# Patient Record
Sex: MALE | Race: WHITE | NOT HISPANIC OR LATINO | Employment: OTHER | URBAN - METROPOLITAN AREA
[De-identification: names, ages, dates, MRNs, and addresses within clinical notes are randomized per-mention and may not be internally consistent; named-entity substitution may affect disease eponyms.]

---

## 2017-02-02 ENCOUNTER — HOSPITAL ENCOUNTER (OUTPATIENT)
Dept: RADIOLOGY | Facility: MEDICAL CENTER | Age: 47
End: 2017-02-02
Attending: INTERNAL MEDICINE
Payer: COMMERCIAL

## 2017-02-02 VITALS
WEIGHT: 194 LBS | SYSTOLIC BLOOD PRESSURE: 103 MMHG | HEART RATE: 68 BPM | OXYGEN SATURATION: 91 % | DIASTOLIC BLOOD PRESSURE: 69 MMHG | HEIGHT: 72 IN | RESPIRATION RATE: 18 BRPM | BODY MASS INDEX: 26.28 KG/M2 | TEMPERATURE: 97.4 F

## 2017-02-02 DIAGNOSIS — M25.561 RIGHT KNEE PAIN, UNSPECIFIED CHRONICITY: ICD-10-CM

## 2017-02-02 PROCEDURE — 01922 ANES N-INVAS IMG/RADJ THER: CPT

## 2017-02-02 PROCEDURE — 700111 HCHG RX REV CODE 636 W/ 250 OVERRIDE (IP)

## 2017-02-02 PROCEDURE — 73721 MRI JNT OF LWR EXTRE W/O DYE: CPT | Mod: RT

## 2017-02-02 ASSESSMENT — PAIN SCALES - GENERAL
PAINLEVEL_OUTOF10: 0
PAINLEVEL_OUTOF10: 2

## 2017-02-02 NOTE — IP AVS SNAPSHOT
" <p align=\"LEFT\"><IMG SRC=\"[email or web address]\" alt=\"Image\" WIDTH=\"50%\" HEIGHT=\"200\" BORDER=\"\"></p>      `           Landon Allred   MRN: [id number]    Department:  [address]   Date of Visit:              `  Discharge Instructions       MRI ADULT DISCHARGE INSTRUCTIONS    You have been medicated today for your scan. Please follow the instructions below to ensure your safe recovery. If you have any questions or problems, feel free to call us at [phone].     1.   Have someone stay with you to assist you as needed.    2.   Do not drive or operate any mechanical devices.    3.   Do not perform any activity that requires concentration. Make no major decisions over the next 24 hours.     4.   Be careful changing positions from laying down to sitting or standing, as you may become dizzy.     5.   Do not drink alcohol for 48 hours.    6.   There are no restrictions for eating your normal meals. Drink fluids.    7.   You may continue your usual medications for pain, tranquilizers, muscle relaxants or sedatives when awake.     8.   Tomorrow, you may continue your normal daily activities.     9.   Pressure dressing on 10 - 15 minutes. If swelling or bleeding occurs when removed, continue placing direct pressure on injection site for another 5 minutes, or until bleeding stops.     I have been informed of and understand the above discharge instructions.      `       Diet / Nutrition:    Follow any diet instructions given to you by your doctor or the dietician, including how much salt (sodium) you are allowed each day.    If you are overweight, talk to your doctor about a weight reduction plan.    Activity:    Remain physically active following your doctor's instructions about exercise and activity.    Rest often.     Any time you become even a little tired or short of breath, SIT DOWN and rest.    Worsening Symptoms:    Report any of the following signs and symptoms to the " doctor's office immediately:    *Pain of jaw, arm, or neck  *Chest pain not relieved by medication                               *Dizziness or loss of consciousness  *Difficulty breathing even when at rest   *More tired than usual                                       *Bleeding drainage or swelling of surgical site  *Swelling of feet, ankles, legs or stomach                 *Fever (>100ºF)  *Pink or blood tinged sputum  *Weight gain (3lbs/day or 5lbs /week)           *Shock from internal defibrillator (if applicable)  *Palpitations or irregular heartbeats                *Cool and/or numb extremities    Stroke Awareness    Common Risk Factors for Stroke include:    Age  Atrial Fibrillation  Carotid Artery Stenosis  Diabetes Mellitus  Excessive alcohol consumption  High blood pressure  Overweight   Physical inactivity  Smoking    Warning signs and symptoms of a stroke include:    *Sudden numbness or weakness of the face, arm or leg (especially on one side of the body).  *Sudden confusion, trouble speaking or understanding.  *Sudden trouble seeing in one or both eyes.  *Sudden trouble walking, dizziness, loss of balance or coordination.Sudden severe headache with no known cause.    It is very important to get treatment quickly when a stroke occurs. If you experience any of the above warning signs, call 911 immediately.                    `     Quit Smoking / Tobacco Use:    I understand the use of any tobacco products increases my chance of suffering from future heart disease or stroke and could cause other illnesses which may shorten my life. Quitting the use of tobacco products is the single most important thing I can do to improve my health. For further information on smoking / tobacco cessation call a Toll Free Quit Line at 1-168.594.9287 (*[employer] [place]) or 1-483.183.1415 (American [employer] Association) or you can access the web based program at www.lungusa.org.    [place] Tobacco Users Help Line:  (017)  878-8044       Toll Free: 8-526-960-0241  Quit Tobacco Program [facility] (924)798-9350    Crisis Hotline:    [place] Crisis Hotline:  5-700-NQGWIFT or 1-923.188.4771    [place] Crisis Hotline:    1-633.990.8184 or 757-040-2488    Discharge Survey:   Thank you for choosing [facility]. We hope we did everything we could to make your hospital stay a pleasant one. You may be receiving a phone survey and we would appreciate your time and participation in answering the questions. Your input is very valuable to us in our efforts to improve our service to our patients and their families.        My signature on this form indicates that:    1. I have reviewed and understand the above information.  2. My questions regarding this information have been answered to my satisfaction.  3. I have formulated a plan with my discharge nurse to obtain my prescribed medications for home.                   `           Patient or Caregiver Signature:  ____________________________________________________________    Date:  ____________________________________________________________       `

## 2017-02-02 NOTE — DISCHARGE INSTRUCTIONS
MRI ADULT DISCHARGE INSTRUCTIONS    You have been medicated today for your scan. Please follow the instructions below to ensure your safe recovery. If you have any questions or problems, feel free to call us at [phone] or [phone].     1.   Have someone stay with you to assist you as needed.    2.   Do not drive or operate any mechanical devices.    3.   Do not perform any activity that requires concentration. Make no major decisions over the next 24 hours.     4.   Be careful changing positions from laying down to sitting or standing, as you may become dizzy.     5.   Do not drink alcohol for 48 hours.    6.   There are no restrictions for eating your normal meals. Drink fluids.    7.   You may continue your usual medications for pain, tranquilizers, muscle relaxants or sedatives when awake.     8.   Tomorrow, you may continue your normal daily activities.     9.   Pressure dressing on 10 - 15 minutes. If swelling or bleeding occurs when removed, continue placing direct pressure on injection site for another 5 minutes, or until bleeding stops.     I have been informed of and understand the above discharge instructions.

## 2018-03-05 ENCOUNTER — HOSPITAL ENCOUNTER (OUTPATIENT)
Dept: RADIOLOGY | Facility: MEDICAL CENTER | Age: 48
End: 2018-03-05
Attending: NURSE PRACTITIONER
Payer: MEDICARE

## 2018-03-05 VITALS
HEART RATE: 67 BPM | OXYGEN SATURATION: 90 % | RESPIRATION RATE: 18 BRPM | WEIGHT: 195 LBS | HEIGHT: 72 IN | DIASTOLIC BLOOD PRESSURE: 74 MMHG | TEMPERATURE: 98.4 F | BODY MASS INDEX: 26.41 KG/M2 | SYSTOLIC BLOOD PRESSURE: 114 MMHG

## 2018-03-05 DIAGNOSIS — M54.12 CERVICAL RADICULOPATHY: ICD-10-CM

## 2018-03-05 DIAGNOSIS — G89.29 OTHER CHRONIC BACK PAIN: ICD-10-CM

## 2018-03-05 DIAGNOSIS — M54.16 LUMBAR RADICULOPATHY: ICD-10-CM

## 2018-03-05 DIAGNOSIS — M54.9 OTHER CHRONIC BACK PAIN: ICD-10-CM

## 2018-03-05 PROCEDURE — 01922 ANES N-INVAS IMG/RADJ THER: CPT

## 2018-03-05 PROCEDURE — 72146 MRI CHEST SPINE W/O DYE: CPT

## 2018-03-05 PROCEDURE — 72141 MRI NECK SPINE W/O DYE: CPT

## 2018-03-05 PROCEDURE — 72148 MRI LUMBAR SPINE W/O DYE: CPT

## 2018-03-05 PROCEDURE — 700111 HCHG RX REV CODE 636 W/ 250 OVERRIDE (IP)

## 2018-03-05 RX ORDER — MIDAZOLAM HYDROCHLORIDE 1 MG/ML
INJECTION INTRAMUSCULAR; INTRAVENOUS
Status: DISPENSED
Start: 2018-03-05 | End: 2018-03-05

## 2018-03-05 ASSESSMENT — PAIN SCALES - GENERAL: PAINLEVEL_OUTOF10: 8

## 2018-03-05 NOTE — DISCHARGE INSTRUCTIONS
MRI ADULT DISCHARGE INSTRUCTIONS    You have been medicated today for your scan. Please follow the instructions below to ensure your safe recovery. If you have any questions or problems, feel free to call us at [phone] or [phone].     1.   Have someone stay with you to assist you as needed.    2.   Do not drive or operate any mechanical devices.    3.   Do not perform any activity that requires concentration. Make no major decisions over the next 24 hours.     4.   Be careful changing positions from laying down to sitting or standing, as you may become dizzy.     5.   Do not drink alcohol for 48 hours.    6.   There are no restrictions for eating your normal meals. Drink fluids.    7.   You may continue your usual medications for pain, tranquilizers, muscle relaxants or sedatives when awake.     8.   Tomorrow, you may continue your normal daily activities.     9.   Pressure dressing on 10 - 15 minutes. If swelling or bleeding occurs when removed, continue placing direct pressure on injection site for another 5 minutes, or until bleeding stops.     I have been informed of and understand the above discharge instructions.

## 2018-12-31 ENCOUNTER — HOSPITAL ENCOUNTER (OUTPATIENT)
Dept: RADIOLOGY | Facility: MEDICAL CENTER | Age: 48
End: 2018-12-31
Attending: INTERNAL MEDICINE
Payer: MEDICARE

## 2018-12-31 VITALS
WEIGHT: 214 LBS | HEIGHT: 72 IN | HEART RATE: 74 BPM | BODY MASS INDEX: 28.99 KG/M2 | TEMPERATURE: 98 F | RESPIRATION RATE: 18 BRPM | OXYGEN SATURATION: 93 %

## 2018-12-31 DIAGNOSIS — R06.02 SHORTNESS OF BREATH: ICD-10-CM

## 2018-12-31 DIAGNOSIS — R04.2 BLOODY SPUTUM: ICD-10-CM

## 2018-12-31 PROCEDURE — 01922 ANES N-INVAS IMG/RADJ THER: CPT

## 2018-12-31 PROCEDURE — 700111 HCHG RX REV CODE 636 W/ 250 OVERRIDE (IP)

## 2018-12-31 PROCEDURE — 71260 CT THORAX DX C+: CPT

## 2018-12-31 PROCEDURE — 700117 HCHG RX CONTRAST REV CODE 255: Performed by: INTERNAL MEDICINE

## 2018-12-31 RX ADMIN — IOHEXOL 75 ML: 350 INJECTION, SOLUTION INTRAVENOUS at 08:21

## 2018-12-31 ASSESSMENT — PAIN SCALES - GENERAL
PAINLEVEL_OUTOF10: 0

## 2018-12-31 NOTE — DISCHARGE INSTRUCTIONS
MRI ADULT DISCHARGE INSTRUCTIONS    You have been medicated today for your scan. Please follow the instructions below to ensure your safe recovery. If you have any questions or problems, feel free to call us at [phone] or [phone].     1.   Have someone stay with you to assist you as needed.    2.   Do not drive or operate any mechanical devices.    3.   Do not perform any activity that requires concentration. Make no major decisions over the next 24 hours.     4.   Be careful changing positions from laying down to sitting or standing, as you may become dizzy.     5.   Do not drink alcohol for 48 hours.    6.   There are no restrictions for eating your normal meals. Drink fluids.    7.   You may continue your usual medications for pain, tranquilizers, muscle relaxants or sedatives when awake.     8.   Tomorrow, you may continue your normal daily activities.     9.   Pressure dressing on 10 - 15 minutes. If swelling or bleeding occurs when removed, continue placing direct pressure on injection site for another 5 minutes, or until bleeding stops.     I have been informed of and understand the above discharge instructions.

## 2019-01-09 ENCOUNTER — HOSPITAL ENCOUNTER (EMERGENCY)
Facility: MEDICAL CENTER | Age: 49
End: 2019-01-09
Attending: EMERGENCY MEDICINE
Payer: MEDICARE

## 2019-01-09 VITALS
RESPIRATION RATE: 16 BRPM | WEIGHT: 195 LBS | DIASTOLIC BLOOD PRESSURE: 82 MMHG | HEIGHT: 72 IN | HEART RATE: 80 BPM | TEMPERATURE: 98.9 F | SYSTOLIC BLOOD PRESSURE: 129 MMHG | OXYGEN SATURATION: 95 % | BODY MASS INDEX: 26.41 KG/M2

## 2019-01-09 DIAGNOSIS — T78.40XA ALLERGIC REACTION, INITIAL ENCOUNTER: ICD-10-CM

## 2019-01-09 DIAGNOSIS — L29.9 ITCHING: ICD-10-CM

## 2019-01-09 PROCEDURE — 700102 HCHG RX REV CODE 250 W/ 637 OVERRIDE(OP): Performed by: EMERGENCY MEDICINE

## 2019-01-09 PROCEDURE — 700111 HCHG RX REV CODE 636 W/ 250 OVERRIDE (IP): Performed by: EMERGENCY MEDICINE

## 2019-01-09 PROCEDURE — 96375 TX/PRO/DX INJ NEW DRUG ADDON: CPT

## 2019-01-09 PROCEDURE — 99285 EMERGENCY DEPT VISIT HI MDM: CPT

## 2019-01-09 PROCEDURE — 96374 THER/PROPH/DIAG INJ IV PUSH: CPT

## 2019-01-09 PROCEDURE — A9270 NON-COVERED ITEM OR SERVICE: HCPCS | Performed by: EMERGENCY MEDICINE

## 2019-01-09 RX ORDER — HYDROMORPHONE HYDROCHLORIDE 1 MG/ML
1 INJECTION, SOLUTION INTRAMUSCULAR; INTRAVENOUS; SUBCUTANEOUS ONCE
Status: COMPLETED | OUTPATIENT
Start: 2019-01-09 | End: 2019-01-09

## 2019-01-09 RX ORDER — DIPHENHYDRAMINE HYDROCHLORIDE 50 MG/ML
50 INJECTION INTRAMUSCULAR; INTRAVENOUS ONCE
Status: COMPLETED | OUTPATIENT
Start: 2019-01-09 | End: 2019-01-09

## 2019-01-09 RX ORDER — PREDNISONE 20 MG/1
40 TABLET ORAL DAILY
Qty: 10 TAB | Refills: 0 | Status: SHIPPED | OUTPATIENT
Start: 2019-01-09 | End: 2019-01-14

## 2019-01-09 RX ORDER — PROMETHAZINE HYDROCHLORIDE 25 MG/1
25 TABLET ORAL ONCE
Status: COMPLETED | OUTPATIENT
Start: 2019-01-09 | End: 2019-01-09

## 2019-01-09 RX ORDER — PREDNISONE 20 MG/1
40 TABLET ORAL DAILY
Qty: 10 TAB | Refills: 0 | Status: SHIPPED | OUTPATIENT
Start: 2019-01-09 | End: 2019-01-09

## 2019-01-09 RX ORDER — PREDNISONE 20 MG/1
60 TABLET ORAL ONCE
Status: COMPLETED | OUTPATIENT
Start: 2019-01-09 | End: 2019-01-09

## 2019-01-09 RX ADMIN — PREDNISONE 60 MG: 20 TABLET ORAL at 13:19

## 2019-01-09 RX ADMIN — PROMETHAZINE HYDROCHLORIDE 25 MG: 25 TABLET ORAL at 14:03

## 2019-01-09 RX ADMIN — HYDROMORPHONE HYDROCHLORIDE 1 MG: 1 INJECTION, SOLUTION INTRAMUSCULAR; INTRAVENOUS; SUBCUTANEOUS at 15:19

## 2019-01-09 RX ADMIN — FAMOTIDINE 40 MG: 10 INJECTION INTRAVENOUS at 13:18

## 2019-01-09 RX ADMIN — DIPHENHYDRAMINE HYDROCHLORIDE 50 MG: 50 INJECTION INTRAMUSCULAR; INTRAVENOUS at 13:18

## 2019-01-09 ASSESSMENT — ENCOUNTER SYMPTOMS
FEVER: 0
ROS SKIN COMMENTS: SKIN PAIN
HEADACHES: 1
NAUSEA: 0
VOMITING: 0
SHORTNESS OF BREATH: 0
ABDOMINAL PAIN: 0

## 2019-01-09 ASSESSMENT — PAIN SCALES - GENERAL: PAINLEVEL_OUTOF10: 4

## 2019-01-09 NOTE — ED NOTES
"Pt arrives to triage with c/c possible allergic reaction to biofreeze.  Pt states \"My chiropractor sprayed this stuff all over me & now it's burning me.\"  Pt reports hives \"all over\"- no obvious sign of hives noted.  A&ox4. VSS.    *Pt becomes angry with RN's questions- \"I just don't know the answers to all your questions.\"  "

## 2019-01-09 NOTE — ED NOTES
"Pt provided with warm blankets. He is now c/o pain in his neck and states his migraine is not subsiding. Pt states \"I know I didn't come in for that, but I just wanted to mention it.\" Attempted to reassure pt. Advised him that the medications that he received are frequently given for migraine headaches.    "

## 2019-01-09 NOTE — ED PROVIDER NOTES
"ED Provider Note    Scribed for Lianet Mckenzie M.D. by Rigo Hercules. 1/9/2019, 12:26 PM.      Means of arrival: walk in  History obtained from: patient  History limited by: none    CHIEF COMPLAINT  Chief Complaint   Patient presents with   • Allergic Reaction     \"I believe I am having an allergic reaction to bio freeze\"        HPI  Landon Allred is a 48 y.o. male who presents to the Emergency Department for evaluation of skin itching starting just prior to arrival. He describes his pain as burning localized to his back and scalp where an analgesic spray (bio-freeze) was applied by his chiropracter. Patient reports associated diffuse rash. He states that he has a history of several allergies and is prescribed benadryl for acute reactions, but he did not administer this medication. Patient states that he was being treated at his chiropractor who spray Biofreeze to his back and scalp immediately preceding his symptom onset. He presents to the ED for evaluation of probable allergic reaction to this substance. Patient reports htat he has done well with Prednisone administration during previous allergic reactions.  Patient denies shortness of breath, mouth swelling, tongue swelling nausea, vomiting, abdominal pain.  He does have a mild headache associated with this reaction.    REVIEW OF SYSTEMS  Review of Systems   Constitutional: Negative for fever.   Respiratory: Negative for shortness of breath.    Cardiovascular: Negative for chest pain.   Gastrointestinal: Negative for abdominal pain, nausea and vomiting.   Skin: Positive for rash.        Skin pain   Neurological: Positive for headaches.   All other systems reviewed and are negative.    PAST MEDICAL HISTORY   has a past medical history of Abdominal pain; Abnormal gall bladder diagnostic imaging; Clostridium difficile diarrhea; DVT (deep venous thrombosis) (HCC); Migraine; Pancreatitis; PE (pulmonary embolism); Psychiatric disorder; and Psychiatric " disorder.    SURGICAL HISTORY   has a past surgical history that includes nerve ulnar repair or explore; dental extraction(s); closed rx nasal septal fracture; and rectal exploration.    SOCIAL HISTORY  Social History   Substance Use Topics   • Smoking status: Never Smoker   • Smokeless tobacco: Never Used   • Alcohol use No      History   Drug Use No       FAMILY HISTORY  None noted    CURRENT MEDICATIONS  Home Medications     Reviewed by Ema Garzon R.N. (Registered Nurse) on 01/09/19 at 1146  Med List Status: Not Addressed   Medication Last Dose Status   diazePAM (VALIUM) 5 MG Tab  Active   diphenhydrAMINE (BENADRYL) 50 MG Cap  Active   HYDROmorphone (DILAUDID) 4 MG Tab  Active   omeprazole (PRILOSEC) 20 MG delayed-release capsule  Active   promethazine (PHENERGAN) 25 MG Tab  Active   rivaroxaban (XARELTO) 10 MG Tab tablet  Active                ALLERGIES  Allergies   Allergen Reactions   • Dilaudid [Hydromorphone] Itching   • Shellfish Allergy Anaphylaxis   • Tape Hives   • Amitriptyline Hcl    • Asa [Aspirin]    • Biofreeze    • Codeine    • Hydrocodone-Acetaminophen    • Ketorolac Tromethamine [Toradol]    • Morphine Vomiting   • Ondansetron [Zofran]    • Other Misc      Can take dilaudid if given benadryl first   • Oxycodone    • Oxycodone-Acetaminophen    • Oxycodone-Aspirin    • Reglan [Metoclopramide]      Has no idea what his RX is   • Tramadol    • Zofran [Ondansetron]        PHYSICAL EXAM  VITAL SIGNS: /90   Pulse 94   Temp 37.2 °C (98.9 °F) (Temporal)   Resp 18   Ht 1.829 m (6')   Wt 88.5 kg (195 lb)   SpO2 95%   BMI 26.45 kg/m²   Vitals reviewed by myself.  Physical Exam  Nursing note and vitals reviewed.  Constitutional: Well-developed and well-nourished. Mild distress  HENT: Head is normocephalic and atraumatic.mild blanchable flushing to the face, no obvious urticaria, no tongue swelling, no facial swelling  Eyes: extra-ocular movements intact  Cardiovascular: Normal rate and  regular rhythm. No murmur heard.  Pulmonary/Chest: Breath sounds normal. No wheezes or rales.   Abdominal: Soft and non-tender. No distention.    Musculoskeletal: Extremities exhibit normal range of motion without edema or tenderness.   Neurological: Awake and alert  Skin: Skin is warm and dry. Mild blanchable flushing to face    DIAGNOSTIC STUDIES     PROCEDURES  None    REASSESSMENT  12:35 PM - Patient seen and evaluated at bedside. Discussed treatment in the ED with Benadryl and steroids for his symptoms. Patient verbalizes understanding and agreement to this plan of care.     1:30 PM - Patient reevaluated at bedside. Patient states that his symptoms continue and have not been relieved with interventions and is complaining of nausea. Discussed additional treatment with antiemetic. Patient agrees to this plan of care. Patient will be treated with Phenergan 25 mg.       2:40 PM - Recheck: Patient re-evaluated at beside. Patient reports feeling improved with interventions. At this time I feel that he is a good outpatient candidate. Patient will be discharged with instructions and provided with strict return precautions. Advised to follow up with his primary. Instructed to return to Emergency Department immediately if any new or worsening symptoms. He will be treated with a dose of dilaudid prior to discharge.     Discharge vitals: /90   Pulse 94   Temp 37.2 °C (98.9 °F) (Temporal)   Resp 18   Ht 1.829 m (6')   Wt 88.5 kg (195 lb)   SpO2 95%   BMI 26.45 kg/m²     COURSE & MEDICAL DECISION MAKING  Nursing notes, VS, PMSFHx reviewed in chart.    Patient is a 48-year-old male who comes in for allergic reaction.  On physical exam patient is well-appearing with vitals in normal limits.  He has no symptoms of airway compromise and is not hypotensive, therefore this is not anaphylaxis.  Patient does not have obvious urticaria but has mild flushing of his face and is complaining of itching.  As this feels like  his prior allergic reactions I like to treat him with Benadryl, prednisone, and famotidine.  After treatment patient reports his itchiness and redness have improved.  He states that this reaction has triggered a migraine headache for him with associated nausea.  He reports that he frequently gets migraine headaches with nausea.  He states that the migraine headache is right-sided and throbbing unchanged from his baseline migraines.  He is prescribed Dilaudid for his migraines, therefore I treat him with a dose of Dilaudid and Phenergan for his migraine headache associated with nausea.  After treatment patient is feeling greatly improved.  Therefore he is reassured, advised on symptomatic management of allergic reaction, provided with burst course of prednisone, and given strict return precautions.  Patient is then discharged home in stable condition.    The patient will return for new or worsening symptoms and is stable at the time of discharge.    The patient is referred to a primary physician for blood pressure management, diabetic screening, and for all other preventative health concerns.    DISPOSITION:  Patient will be discharged home in stable condition.      FINAL IMPRESSION  1. Allergic reaction, initial encounter    2. Itching          Rigo BURCH (Scribe), am scribing for, and in the presence of, Lianet Mckenzie M.D..    Electronically signed by: Rigo Hercules (Anika), 1/9/2019    Lianet BURCH M.D. personally performed the services described in this documentation, as scribed by Rigo Hercules in my presence, and it is both accurate and complete. C    The note accurately reflects work and decisions made by me.  Lianet Mckenzie  1/9/2019  3:05 PM

## 2019-01-09 NOTE — ED NOTES
Pt discharged to home. Pt was given follow up instructions and prescription for prednisone. Pt verbalized understanding of all instructions for discharge and is ambulatory out of ED with steady gait.

## 2019-01-14 ENCOUNTER — HOSPITAL ENCOUNTER (OUTPATIENT)
Dept: RADIOLOGY | Facility: MEDICAL CENTER | Age: 49
End: 2019-01-14
Attending: ORTHOPAEDIC SURGERY
Payer: MEDICARE

## 2019-01-14 VITALS
BODY MASS INDEX: 25.73 KG/M2 | HEART RATE: 78 BPM | WEIGHT: 190 LBS | OXYGEN SATURATION: 95 % | DIASTOLIC BLOOD PRESSURE: 77 MMHG | RESPIRATION RATE: 16 BRPM | TEMPERATURE: 98 F | HEIGHT: 72 IN | SYSTOLIC BLOOD PRESSURE: 124 MMHG

## 2019-01-14 DIAGNOSIS — M25.561 RIGHT KNEE PAIN, UNSPECIFIED CHRONICITY: ICD-10-CM

## 2019-01-14 PROCEDURE — 700111 HCHG RX REV CODE 636 W/ 250 OVERRIDE (IP)

## 2019-01-14 PROCEDURE — 01922 ANES N-INVAS IMG/RADJ THER: CPT

## 2019-01-14 PROCEDURE — 73721 MRI JNT OF LWR EXTRE W/O DYE: CPT | Mod: RT

## 2019-01-14 RX ORDER — MIDAZOLAM HYDROCHLORIDE 1 MG/ML
INJECTION INTRAMUSCULAR; INTRAVENOUS
Status: DISCONTINUED
Start: 2019-01-14 | End: 2019-01-15 | Stop reason: HOSPADM

## 2019-01-14 ASSESSMENT — PAIN SCALES - GENERAL
PAINLEVEL_OUTOF10: 0

## 2019-01-15 ENCOUNTER — HOSPITAL ENCOUNTER (EMERGENCY)
Facility: MEDICAL CENTER | Age: 49
End: 2019-01-15
Attending: EMERGENCY MEDICINE
Payer: MEDICARE

## 2019-01-15 ENCOUNTER — APPOINTMENT (OUTPATIENT)
Dept: RADIOLOGY | Facility: MEDICAL CENTER | Age: 49
End: 2019-01-15
Attending: EMERGENCY MEDICINE
Payer: MEDICARE

## 2019-01-15 VITALS
TEMPERATURE: 98 F | HEIGHT: 72 IN | SYSTOLIC BLOOD PRESSURE: 133 MMHG | OXYGEN SATURATION: 91 % | RESPIRATION RATE: 19 BRPM | WEIGHT: 190 LBS | HEART RATE: 83 BPM | BODY MASS INDEX: 25.73 KG/M2 | DIASTOLIC BLOOD PRESSURE: 74 MMHG

## 2019-01-15 DIAGNOSIS — F41.9 ANXIETY: ICD-10-CM

## 2019-01-15 DIAGNOSIS — J02.9 PHARYNGITIS, UNSPECIFIED ETIOLOGY: ICD-10-CM

## 2019-01-15 LAB
ANION GAP SERPL CALC-SCNC: 12 MMOL/L (ref 0–11.9)
BASOPHILS # BLD AUTO: 0.1 % (ref 0–1.8)
BASOPHILS # BLD: 0.01 K/UL (ref 0–0.12)
BUN SERPL-MCNC: 26 MG/DL (ref 8–22)
CALCIUM SERPL-MCNC: 9.5 MG/DL (ref 8.5–10.5)
CHLORIDE SERPL-SCNC: 106 MMOL/L (ref 96–112)
CO2 SERPL-SCNC: 22 MMOL/L (ref 20–33)
CREAT SERPL-MCNC: 1.2 MG/DL (ref 0.5–1.4)
EOSINOPHIL # BLD AUTO: 0 K/UL (ref 0–0.51)
EOSINOPHIL NFR BLD: 0 % (ref 0–6.9)
ERYTHROCYTE [DISTWIDTH] IN BLOOD BY AUTOMATED COUNT: 44.5 FL (ref 35.9–50)
GLUCOSE SERPL-MCNC: 119 MG/DL (ref 65–99)
HCT VFR BLD AUTO: 47.4 % (ref 42–52)
HGB BLD-MCNC: 16.2 G/DL (ref 14–18)
IMM GRANULOCYTES # BLD AUTO: 0.04 K/UL (ref 0–0.11)
IMM GRANULOCYTES NFR BLD AUTO: 0.4 % (ref 0–0.9)
LYMPHOCYTES # BLD AUTO: 1.31 K/UL (ref 1–4.8)
LYMPHOCYTES NFR BLD: 12.7 % (ref 22–41)
MCH RBC QN AUTO: 32 PG (ref 27–33)
MCHC RBC AUTO-ENTMCNC: 34.2 G/DL (ref 33.7–35.3)
MCV RBC AUTO: 93.7 FL (ref 81.4–97.8)
MONOCYTES # BLD AUTO: 0.61 K/UL (ref 0–0.85)
MONOCYTES NFR BLD AUTO: 5.9 % (ref 0–13.4)
NEUTROPHILS # BLD AUTO: 8.34 K/UL (ref 1.82–7.42)
NEUTROPHILS NFR BLD: 80.9 % (ref 44–72)
NRBC # BLD AUTO: 0 K/UL
NRBC BLD-RTO: 0 /100 WBC
PLATELET # BLD AUTO: 203 K/UL (ref 164–446)
PMV BLD AUTO: 9.8 FL (ref 9–12.9)
POTASSIUM SERPL-SCNC: 3.9 MMOL/L (ref 3.6–5.5)
RBC # BLD AUTO: 5.06 M/UL (ref 4.7–6.1)
SODIUM SERPL-SCNC: 140 MMOL/L (ref 135–145)
WBC # BLD AUTO: 10.3 K/UL (ref 4.8–10.8)

## 2019-01-15 PROCEDURE — 700102 HCHG RX REV CODE 250 W/ 637 OVERRIDE(OP): Performed by: EMERGENCY MEDICINE

## 2019-01-15 PROCEDURE — 99285 EMERGENCY DEPT VISIT HI MDM: CPT

## 2019-01-15 PROCEDURE — 85025 COMPLETE CBC W/AUTO DIFF WBC: CPT

## 2019-01-15 PROCEDURE — 71045 X-RAY EXAM CHEST 1 VIEW: CPT

## 2019-01-15 PROCEDURE — 70360 X-RAY EXAM OF NECK: CPT

## 2019-01-15 PROCEDURE — A9270 NON-COVERED ITEM OR SERVICE: HCPCS | Performed by: EMERGENCY MEDICINE

## 2019-01-15 PROCEDURE — 80048 BASIC METABOLIC PNL TOTAL CA: CPT

## 2019-01-15 PROCEDURE — 700111 HCHG RX REV CODE 636 W/ 250 OVERRIDE (IP): Performed by: EMERGENCY MEDICINE

## 2019-01-15 PROCEDURE — 36415 COLL VENOUS BLD VENIPUNCTURE: CPT

## 2019-01-15 PROCEDURE — 96374 THER/PROPH/DIAG INJ IV PUSH: CPT

## 2019-01-15 RX ORDER — DIPHENHYDRAMINE HYDROCHLORIDE 50 MG/ML
50 INJECTION INTRAMUSCULAR; INTRAVENOUS ONCE
Status: COMPLETED | OUTPATIENT
Start: 2019-01-15 | End: 2019-01-15

## 2019-01-15 RX ADMIN — DIPHENHYDRAMINE HYDROCHLORIDE 50 MG: 50 INJECTION, SOLUTION INTRAMUSCULAR; INTRAVENOUS at 09:37

## 2019-01-15 RX ADMIN — LIDOCAINE HYDROCHLORIDE 30 ML: 20 SOLUTION OROPHARYNGEAL at 09:37

## 2019-01-15 ASSESSMENT — PAIN SCALES - GENERAL: PAINLEVEL_OUTOF10: 8

## 2019-01-15 NOTE — ED TRIAGE NOTES
"Pt bib ems c/o cough sob pt states he had an mri yesterday \"and I had to be totally out sedated for it\" pt states he was intubated yesterday for it. Pt states today he woke with cough blood in sputum as well. Pt anxious. Pt states \"i don't know what's going on\" vss  "

## 2019-01-15 NOTE — ED PROVIDER NOTES
ED Provider Note    CHIEF COMPLAINT  Chief Complaint   Patient presents with   • Cough       HPI  Landon Allred is a 48 y.o. male who presents complaining of coughing vomiting up a small amount of blood, shortness of breath and pain in his throat that started this morning.  The patient states that he was intubated for an MRI of his knee yesterday.  He states that he always requires sedation for CTs and MRIs.  The patient states that he had no problems after the procedure and went to the  R ate dinner and slept all night.  He states that his symptoms started this morning.  He has pain with swallowing and feels like his throat is swollen.  He denies any acid reflux.  He states that it was a very small amount of blood in his sputum and then he coughed up some white sputum as well.  He feels somewhat short of breath and very anxious.  He denies any chest pains.  Denies any leg swelling.  He does take Xarelto which he did take this morning.  He denies any fevers or chills.  He denies any abdominal pain or leg swelling.    REVIEW OF SYSTEMS  HEENT:  No ear pain, congestion positive sore throat   EYES: no discharge redness or vision changes  CARDIAC: no chest pain, palpitations    PULMONARY: no dyspnea, positive cough or congestion   GI: no vomiting diarrhea or abdominal pain   : no dysuria, back pain or hematuria   Neuro: no weakness, numbness aphasia or headache  Musculoskeletal: no swelling deformity or pain no joint swelling  Endocrine: no fevers, sweating, weight loss   SKIN: no rash, erythema or contusions     See history of present illness all other systems are negative      PAST MEDICAL HISTORY  Past Medical History:   Diagnosis Date   • Abdominal pain    • Abnormal gall bladder diagnostic imaging    • Clostridium difficile diarrhea    • DVT (deep venous thrombosis) (HCC)    • Migraine    • Pancreatitis    • PE (pulmonary embolism)    • Psychiatric disorder     PTSD   • Psychiatric disorder     Depression        FAMILY HISTORY  History reviewed. No pertinent family history.    SOCIAL HISTORY  Social History     Social History   • Marital status: Single     Spouse name: N/A   • Number of children: N/A   • Years of education: N/A     Social History Main Topics   • Smoking status: Never Smoker   • Smokeless tobacco: Never Used   • Alcohol use No   • Drug use: No   • Sexual activity: Not on file     Other Topics Concern   • Not on file     Social History Narrative   • No narrative on file       SURGICAL HISTORY  Past Surgical History:   Procedure Laterality Date   • DENTAL EXTRACTION(S)     • NERVE ULNAR REPAIR OR EXPLORE     • PB CLOSED RX NASAL SEPTAL FRACTURE     • RECTAL EXPLORATION         CURRENT MEDICATIONS  Home Medications    **Home medications have not yet been reviewed for this encounter**         ALLERGIES  Allergies   Allergen Reactions   • Dilaudid [Hydromorphone] Itching   • Shellfish Allergy Anaphylaxis   • Tape Hives   • Amitriptyline Hcl    • Asa [Aspirin]    • Biofreeze    • Codeine    • Hydrocodone-Acetaminophen    • Ketorolac Tromethamine [Toradol]    • Morphine Vomiting   • Ondansetron [Zofran]    • Other Misc      Can take dilaudid if given benadryl first   • Oxycodone    • Oxycodone-Acetaminophen    • Oxycodone-Aspirin    • Reglan [Metoclopramide]      Has no idea what his RX is   • Tramadol    • Zofran [Ondansetron]        PHYSICAL EXAM  VITAL SIGNS: /87   Pulse 86   Temp 36.7 °C (98 °F) (Temporal)   Resp 15   Ht 1.829 m (6')   Wt 86.2 kg (190 lb)   SpO2 89%   BMI 25.77 kg/m²        Constitutional: Well developed, Well nourished, No acute distress, Non-toxic appearance.   HENT: Normocephalic, Atraumatic, Bilateral external ears normal, Oropharynx moist, No oral exudates, Nose normal.   Eyes: PERRLA, EOMI, Conjunctiva normal, No discharge.   Neck: Normal range of motion, No tenderness, Supple, No stridor.   Cardiovascular: Regular rate and rhythm no murmurs rubs or gallops  Thorax &  Lungs: There to auscultation bilaterally without wheezes rales or rhonchi no respiratory distress speaking full sentences  Abdomen: Bowel sounds normal, Soft, No tenderness, No masses, No pulsatile masses.   Skin: Warm, Dry, No erythema, No rash.   Back: No tenderness, No CVA tenderness.   Extremities: Intact distal pulses, No tenderness, No cyanosis, No clubbing.  Negative edema negative ccording negative Homans sign  Neurologic: Alert & oriented x 3, Normal motor function, Normal sensory function, No focal deficits noted.   Psych: Positive for anxiety    RADIOLOGY/PROCEDURES  DX-NECK FOR SOFT TISSUE   Final Result      Contour deformity at the base of tongue/anterior hypopharynx. This may be related to prominence of the lingual tonsils but a mass is not excluded.      No retropharyngeal/prevertebral edema is seen.      Limited evaluation of the epiglottis which does not appear significantly thickened.         DX-CHEST-PORTABLE (1 VIEW)   Final Result      No acute cardiopulmonary process is seen.            COURSE & MEDICAL DECISION MAKING  Pertinent Labs & Imaging studies reviewed. (See chart for details)  Ifferential diagnosis: Allergic reaction versus post traumatic intubation swelling versus reflux versus aspiration    Results for orders placed or performed during the hospital encounter of 01/15/19   CBC WITH DIFFERENTIAL   Result Value Ref Range    WBC 10.3 4.8 - 10.8 K/uL    RBC 5.06 4.70 - 6.10 M/uL    Hemoglobin 16.2 14.0 - 18.0 g/dL    Hematocrit 47.4 42.0 - 52.0 %    MCV 93.7 81.4 - 97.8 fL    MCH 32.0 27.0 - 33.0 pg    MCHC 34.2 33.7 - 35.3 g/dL    RDW 44.5 35.9 - 50.0 fL    Platelet Count 203 164 - 446 K/uL    MPV 9.8 9.0 - 12.9 fL    Neutrophils-Polys 80.90 (H) 44.00 - 72.00 %    Lymphocytes 12.70 (L) 22.00 - 41.00 %    Monocytes 5.90 0.00 - 13.40 %    Eosinophils 0.00 0.00 - 6.90 %    Basophils 0.10 0.00 - 1.80 %    Immature Granulocytes 0.40 0.00 - 0.90 %    Nucleated RBC 0.00 /100 WBC     Neutrophils (Absolute) 8.34 (H) 1.82 - 7.42 K/uL    Lymphs (Absolute) 1.31 1.00 - 4.80 K/uL    Monos (Absolute) 0.61 0.00 - 0.85 K/uL    Eos (Absolute) 0.00 0.00 - 0.51 K/uL    Baso (Absolute) 0.01 0.00 - 0.12 K/uL    Immature Granulocytes (abs) 0.04 0.00 - 0.11 K/uL    NRBC (Absolute) 0.00 K/uL   BASIC METABOLIC PANEL   Result Value Ref Range    Sodium 140 135 - 145 mmol/L    Potassium 3.9 3.6 - 5.5 mmol/L    Chloride 106 96 - 112 mmol/L    Co2 22 20 - 33 mmol/L    Glucose 119 (H) 65 - 99 mg/dL    Bun 26 (H) 8 - 22 mg/dL    Creatinine 1.20 0.50 - 1.40 mg/dL    Calcium 9.5 8.5 - 10.5 mg/dL    Anion Gap 12.0 (H) 0.0 - 11.9   ESTIMATED GFR   Result Value Ref Range    GFR If African American >60 >60 mL/min/1.73 m 2    GFR If Non African American >60 >60 mL/min/1.73 m 2      Gave the patient IV Benadryl in case this is some sort of allergic reaction.  X-rays were ordered.  His labs are normal.    The patient has not had any worsening of his sore throat and actually feels improved after the GI cocktail.  He does not have any stridor or respiratory distress and has been stable in the emergency department.  I reassured him that his symptoms might be traumatic from the intubation but he is having no worsening swelling.  I will discharge him home with GI cocktail.  He is to follow-up with his primary care physician and return for worsening symptoms.    primary care    Call in 2 days  for recheck    Current Outpatient Prescriptions   Medication Sig Dispense Refill   • fozia lanta-lidocaine-APAP-donnatal (GI COCKTAIL) Take 30 mL by mouth every 6 hours as needed for up to 3 days. 1 Bottle 0   • rivaroxaban (XARELTO) 10 MG Tab tablet Take 10 mg by mouth every bedtime.     • omeprazole (PRILOSEC) 20 MG delayed-release capsule Take 40 mg by mouth every day.     • diphenhydrAMINE (BENADRYL) 50 MG Cap Take 50 mg by mouth every 6 hours as needed for Itching (with dilaudid, valium).     • diazePAM (VALIUM) 5 MG Tab Take 10 mg by mouth  every 6 hours as needed for Anxiety.     • HYDROmorphone (DILAUDID) 4 MG Tab Take 4 mg by mouth 1 time daily as needed for Severe Pain.     • promethazine (PHENERGAN) 25 MG Tab Take 25 mg by mouth every 8 hours as needed for Nausea/Vomiting.       In prescribing controlled substances to this patient, I certify that I have obtained and reviewed the medical history of Landon Allred. I have also made a good bette effort to obtain applicable records from other providers who have treated the patient and records did not demonstrate any increased risk of substance abuse that would prevent me from prescribing controlled substances.     I have conducted a physical exam and documented it. I have reviewed Mr. Allred’s prescription history as maintained by the [place] Prescription Monitoring Program.     I have assessed the patient’s risk for abuse, dependency, and addiction using the validated Opioid Risk Tool available at https://www.mdcalc.com/hryeit-eyda-gpih-ort-narcotic-abuse.     Given the above, I believe the benefits of controlled substance therapy outweigh the risks. The reasons for prescribing controlled substances include non-narcotic, oral analgesic alternatives have been inadequate for pain control. Accordingly, I have discussed the risk and benefits, treatment plan, and alternative therapies with the patient.         FINAL IMPRESSION  1. Pharyngitis, traumatic    2. Anxiety            Electronically signed by: Natividad Mayers, 1/15/2019 9:29 AM

## 2019-01-15 NOTE — ED NOTES
Additional warm blankets provided upon request. Pillow readjusted under patient's neck upon request. Lights dimmed for comfort upon request

## 2019-01-15 NOTE — DISCHARGE INSTRUCTIONS
MRI ADULT DISCHARGE INSTRUCTIONS    You have been medicated today for your scan. Please follow the instructions below to ensure your safe recovery. If you have any questions or problems, feel free to call us at [phone] or [phone].     1.   Have someone stay with you to assist you as needed.    2.   Do not drive or operate any mechanical devices.    3.   Do not perform any activity that requires concentration. Make no major decisions over the next 24 hours.     4.   Be careful changing positions from laying down to sitting or standing, as you may become dizzy.     5.   Do not drink alcohol for 48 hours.    6.   There are no restrictions for eating your normal meals. Drink fluids.    7.   You may continue your usual medications for pain, tranquilizers, muscle relaxants or sedatives when awake.     8.   Tomorrow, you may continue your normal daily activities.     9.   Pressure dressing on 10 - 15 minutes. If swelling or bleeding occurs when removed, continue placing direct pressure on injection site for another 5 minutes, or until bleeding stops.     I have been informed of and understand the above discharge instructions. Midazolam (VERSED)  What is this medicine?  You were given MIDAZOLAM (CHELSEA julio) for your procedure today. This medication is a benzodiazepine. It is used to cause relaxation or sleep before surgery and to block the memory of the procedure.  This medicine may be used for other purposes; ask your health care provider or pharmacist if you have questions.  What side effects may I notice from receiving this medicine?  Side effects that you should report to your doctor or health care professional as soon as possible:  • allergic reactions like skin rash, itching or hives, swelling of the face, lips, or tongue  • breathing problems  • confusion  • dizziness or lightheadedness  • fast, irregular heartbeat  • halluninations during recovery  • numbness or tingling in the hands or feet  • pain, redness,  or swelling at site where injected  • seizures  Side effects that usually do not require medical attention (report to your doctor or health care professional if they continue or are bothersome):  • coughing  • headache  • hiccups  • involuntary eye and muscle movements  • loss of memory of events just before, during, and after use  • nausea, vomiting  • speech problems  • tiredness  • trouble sleeping or nightmares  This list may not describe all possible side effects. Call your doctor for medical advice about side effects. You may report side effects to FDA at 2-832-YQX-7704.    Fentanyl  What is this medicine?  You were given FENTANYL (FEN ta nil) for your procedure today, it is a pain reliever. It is used to treat breakthrough pain that your long acting pain medicine does not control. Do not use this medicine for a pain that will go away in a few days like pain from surgery, doctor or dentist visits.   This medicine may be used for other purposes; ask your health care provider or pharmacist if you have questions.  What side effects may I notice from receiving this medicine?  Side effects that you should report to your doctor or health care professional as soon as possible:  • allergic reactions like skin rash, itching or hives, swelling of the face, lips, or tongue  • breathing problems  • changes in vision  • confusion  • dry mouth  • feeling faint, lightheaded  • hallucination  • irregular heartbeat  • mouth pain, sores  • problems with balance, talking, walking  • trouble passing urine or change in the amount of urine  • unusual bleeding or bruising  • unusually weak or tired  Side effects that usually do not require medical attention (report to your doctor or health care professional if they continue or are bothersome):  • dizzy  • headache  • loss of appetite  • nausea, vomiting  • sweating  • tingling in mouth  This list may not describe all possible side effects. Call your doctor for medical advice about  side effects. You may report side effects to FDA at 8-315-FDA-8379.

## 2019-01-15 NOTE — ED NOTES
.Patient states understanding of discharge instructions. Ambulated with steady gait out of ED . Personal belongings with patient. Patient calm and smiling with staff at time of discharge.

## 2019-01-15 NOTE — FLOWSHEET NOTE
Patient extremely anxious and claustrophobic. Requests Benadryl, Versed and Fentanyl before anesthesia induction. Tolerated MRI procedure without incident. D/C instructions given to Homar/. Patient discharged to  via wheelchair. [place] and dress himself. VSS

## 2019-01-21 ENCOUNTER — HOSPITAL ENCOUNTER (OUTPATIENT)
Dept: RADIOLOGY | Facility: MEDICAL CENTER | Age: 49
End: 2019-01-21
Attending: PSYCHIATRY & NEUROLOGY
Payer: MEDICARE

## 2019-01-21 VITALS
TEMPERATURE: 97.8 F | OXYGEN SATURATION: 92 % | HEIGHT: 72 IN | HEART RATE: 88 BPM | WEIGHT: 200 LBS | RESPIRATION RATE: 18 BRPM | BODY MASS INDEX: 27.09 KG/M2

## 2019-01-21 DIAGNOSIS — M54.12 CERVICAL RADICULOPATHY: ICD-10-CM

## 2019-01-21 PROCEDURE — 72141 MRI NECK SPINE W/O DYE: CPT

## 2019-01-21 PROCEDURE — 700111 HCHG RX REV CODE 636 W/ 250 OVERRIDE (IP)

## 2019-01-21 PROCEDURE — 01922 ANES N-INVAS IMG/RADJ THER: CPT

## 2019-01-21 RX ORDER — MIDAZOLAM HYDROCHLORIDE 1 MG/ML
INJECTION INTRAMUSCULAR; INTRAVENOUS
Status: DISCONTINUED
Start: 2019-01-21 | End: 2019-01-22 | Stop reason: HOSPADM

## 2019-01-21 ASSESSMENT — PAIN SCALES - GENERAL
PAINLEVEL_OUTOF10: 0

## 2019-01-22 NOTE — DISCHARGE INSTRUCTIONS
MRI ADULT DISCHARGE INSTRUCTIONS    You have been medicated today for your scan. Please follow the instructions below to ensure your safe recovery. If you have any questions or problems, feel free to call us at [phone] or [phone].     1.   Have someone stay with you to assist you as needed.    2.   Do not drive or operate any mechanical devices.    3.   Do not perform any activity that requires concentration. Make no major decisions over the next 24 hours.     4.   Be careful changing positions from laying down to sitting or standing, as you may become dizzy.     5.   Do not drink alcohol for 48 hours.    6.   There are no restrictions for eating your normal meals. Drink fluids.    7.   You may continue your usual medications for pain, tranquilizers, muscle relaxants or sedatives when awake.     8.   Tomorrow, you may continue your normal daily activities.     9.   Pressure dressing on 10 - 15 minutes. If swelling or bleeding occurs when removed, continue placing direct pressure on injection site for another 5 minutes, or until bleeding stops.   Midazolam (VERSED)  What is this medicine?  You were given MIDAZOLAM (CHELSEA julio) for your procedure today. This medication is a benzodiazepine. It is used to cause relaxation or sleep before surgery and to block the memory of the procedure.  This medicine may be used for other purposes; ask your health care provider or pharmacist if you have questions.  What side effects may I notice from receiving this medicine?  Side effects that you should report to your doctor or health care professional as soon as possible:  • allergic reactions like skin rash, itching or hives, swelling of the face, lips, or tongue  • breathing problems  • confusion  • dizziness or lightheadedness  • fast, irregular heartbeat  • halluninations during recovery  • numbness or tingling in the hands or feet  • pain, redness, or swelling at site where injected  • seizures  Side effects that usually  do not require medical attention (report to your doctor or health care professional if they continue or are bothersome):  • coughing  • headache  • hiccups  • involuntary eye and muscle movements  • loss of memory of events just before, during, and after use  • nausea, vomiting  • speech problems  • tiredness  • trouble sleeping or nightmares  This list may not describe all possible side effects. Call your doctor for medical advice about side effects. You may report side effects to FDA at 6-316-SCA-6255.    Fentanyl  What is this medicine?  You were given FENTANYL (FEN ta nil) for your procedure today, it is a pain reliever. It is used to treat breakthrough pain that your long acting pain medicine does not control. Do not use this medicine for a pain that will go away in a few days like pain from surgery, doctor or dentist visits.   This medicine may be used for other purposes; ask your health care provider or pharmacist if you have questions.  What side effects may I notice from receiving this medicine?  Side effects that you should report to your doctor or health care professional as soon as possible:  • allergic reactions like skin rash, itching or hives, swelling of the face, lips, or tongue  • breathing problems  • changes in vision  • confusion  • dry mouth  • feeling faint, lightheaded  • hallucination  • irregular heartbeat  • mouth pain, sores  • problems with balance, talking, walking  • trouble passing urine or change in the amount of urine  • unusual bleeding or bruising  • unusually weak or tired  Side effects that usually do not require medical attention (report to your doctor or health care professional if they continue or are bothersome):  • dizzy  • headache  • loss of appetite  • nausea, vomiting  • sweating  • tingling in mouth  This list may not describe all possible side effects. Call your doctor for medical advice about side effects. You may report side effects to FDA at 8-195-FDA-8301.    I  have been informed of and understand the above discharge instructions.

## 2019-01-28 ENCOUNTER — HOSPITAL ENCOUNTER (OUTPATIENT)
Dept: RADIOLOGY | Facility: MEDICAL CENTER | Age: 49
End: 2019-01-28
Attending: PSYCHIATRY & NEUROLOGY
Payer: MEDICARE

## 2019-01-28 ENCOUNTER — HOSPITAL ENCOUNTER (EMERGENCY)
Facility: MEDICAL CENTER | Age: 49
End: 2019-01-29
Attending: EMERGENCY MEDICINE
Payer: MEDICARE

## 2019-01-28 VITALS
HEART RATE: 84 BPM | OXYGEN SATURATION: 94 % | WEIGHT: 200 LBS | BODY MASS INDEX: 27.12 KG/M2 | TEMPERATURE: 97.8 F | RESPIRATION RATE: 18 BRPM

## 2019-01-28 DIAGNOSIS — R55 SYNCOPE AND COLLAPSE: ICD-10-CM

## 2019-01-28 DIAGNOSIS — R73.09 ELEVATED GLUCOSE LEVEL: ICD-10-CM

## 2019-01-28 DIAGNOSIS — R51.9 FACIAL PAIN: ICD-10-CM

## 2019-01-28 DIAGNOSIS — R42 LIGHTHEADEDNESS: ICD-10-CM

## 2019-01-28 DIAGNOSIS — R53.1 ASTHENIA: ICD-10-CM

## 2019-01-28 DIAGNOSIS — E86.0 DEHYDRATION: ICD-10-CM

## 2019-01-28 LAB
ANION GAP SERPL CALC-SCNC: 11 MMOL/L (ref 0–11.9)
BASOPHILS # BLD AUTO: 0.2 % (ref 0–1.8)
BASOPHILS # BLD: 0.01 K/UL (ref 0–0.12)
BUN SERPL-MCNC: 27 MG/DL (ref 8–22)
CALCIUM SERPL-MCNC: 9.6 MG/DL (ref 8.5–10.5)
CHLORIDE SERPL-SCNC: 103 MMOL/L (ref 96–112)
CO2 SERPL-SCNC: 23 MMOL/L (ref 20–33)
CREAT SERPL-MCNC: 1.33 MG/DL (ref 0.5–1.4)
EOSINOPHIL # BLD AUTO: 0 K/UL (ref 0–0.51)
EOSINOPHIL NFR BLD: 0 % (ref 0–6.9)
ERYTHROCYTE [DISTWIDTH] IN BLOOD BY AUTOMATED COUNT: 45 FL (ref 35.9–50)
GLUCOSE SERPL-MCNC: 189 MG/DL (ref 65–99)
HCT VFR BLD AUTO: 49 % (ref 42–52)
HGB BLD-MCNC: 16.5 G/DL (ref 14–18)
IMM GRANULOCYTES # BLD AUTO: 0.01 K/UL (ref 0–0.11)
IMM GRANULOCYTES NFR BLD AUTO: 0.2 % (ref 0–0.9)
LYMPHOCYTES # BLD AUTO: 0.65 K/UL (ref 1–4.8)
LYMPHOCYTES NFR BLD: 14.2 % (ref 22–41)
MCH RBC QN AUTO: 32.2 PG (ref 27–33)
MCHC RBC AUTO-ENTMCNC: 33.7 G/DL (ref 33.7–35.3)
MCV RBC AUTO: 95.5 FL (ref 81.4–97.8)
MONOCYTES # BLD AUTO: 0.04 K/UL (ref 0–0.85)
MONOCYTES NFR BLD AUTO: 0.9 % (ref 0–13.4)
NEUTROPHILS # BLD AUTO: 3.86 K/UL (ref 1.82–7.42)
NEUTROPHILS NFR BLD: 84.5 % (ref 44–72)
NRBC # BLD AUTO: 0 K/UL
NRBC BLD-RTO: 0 /100 WBC
PLATELET # BLD AUTO: 190 K/UL (ref 164–446)
PMV BLD AUTO: 9.9 FL (ref 9–12.9)
POTASSIUM SERPL-SCNC: 3.9 MMOL/L (ref 3.6–5.5)
RBC # BLD AUTO: 5.13 M/UL (ref 4.7–6.1)
SODIUM SERPL-SCNC: 137 MMOL/L (ref 135–145)
TROPONIN I SERPL-MCNC: <0.01 NG/ML (ref 0–0.04)
WBC # BLD AUTO: 4.6 K/UL (ref 4.8–10.8)

## 2019-01-28 PROCEDURE — 70551 MRI BRAIN STEM W/O DYE: CPT

## 2019-01-28 PROCEDURE — 96375 TX/PRO/DX INJ NEW DRUG ADDON: CPT | Mod: XU

## 2019-01-28 PROCEDURE — 84484 ASSAY OF TROPONIN QUANT: CPT

## 2019-01-28 PROCEDURE — 700111 HCHG RX REV CODE 636 W/ 250 OVERRIDE (IP)

## 2019-01-28 PROCEDURE — 80048 BASIC METABOLIC PNL TOTAL CA: CPT

## 2019-01-28 PROCEDURE — 700105 HCHG RX REV CODE 258: Performed by: EMERGENCY MEDICINE

## 2019-01-28 PROCEDURE — 93005 ELECTROCARDIOGRAM TRACING: CPT | Performed by: EMERGENCY MEDICINE

## 2019-01-28 PROCEDURE — 99285 EMERGENCY DEPT VISIT HI MDM: CPT

## 2019-01-28 PROCEDURE — 96374 THER/PROPH/DIAG INJ IV PUSH: CPT | Mod: XU

## 2019-01-28 PROCEDURE — 01922 ANES N-INVAS IMG/RADJ THER: CPT

## 2019-01-28 PROCEDURE — 85025 COMPLETE CBC W/AUTO DIFF WBC: CPT

## 2019-01-28 PROCEDURE — 700111 HCHG RX REV CODE 636 W/ 250 OVERRIDE (IP): Performed by: EMERGENCY MEDICINE

## 2019-01-28 RX ORDER — ONDANSETRON 2 MG/ML
4 INJECTION INTRAMUSCULAR; INTRAVENOUS
Status: DISCONTINUED | OUTPATIENT
Start: 2019-01-28 | End: 2019-01-29 | Stop reason: HOSPADM

## 2019-01-28 RX ORDER — SODIUM CHLORIDE 9 MG/ML
1000 INJECTION, SOLUTION INTRAVENOUS ONCE
Status: COMPLETED | OUTPATIENT
Start: 2019-01-28 | End: 2019-01-29

## 2019-01-28 RX ORDER — SODIUM CHLORIDE, SODIUM LACTATE, POTASSIUM CHLORIDE, CALCIUM CHLORIDE 600; 310; 30; 20 MG/100ML; MG/100ML; MG/100ML; MG/100ML
INJECTION, SOLUTION INTRAVENOUS CONTINUOUS
Status: DISCONTINUED | OUTPATIENT
Start: 2019-01-28 | End: 2019-01-29 | Stop reason: HOSPADM

## 2019-01-28 RX ORDER — DIPHENHYDRAMINE HYDROCHLORIDE 50 MG/ML
50 INJECTION INTRAMUSCULAR; INTRAVENOUS ONCE
Status: COMPLETED | OUTPATIENT
Start: 2019-01-28 | End: 2019-01-28

## 2019-01-28 RX ORDER — HALOPERIDOL 5 MG/ML
1 INJECTION INTRAMUSCULAR
Status: DISCONTINUED | OUTPATIENT
Start: 2019-01-28 | End: 2019-01-29 | Stop reason: HOSPADM

## 2019-01-28 RX ORDER — DIPHENHYDRAMINE HYDROCHLORIDE 50 MG/ML
INJECTION INTRAMUSCULAR; INTRAVENOUS
Status: COMPLETED
Start: 2019-01-28 | End: 2019-01-28

## 2019-01-28 RX ORDER — MIDAZOLAM HYDROCHLORIDE 5 MG/ML
INJECTION INTRAMUSCULAR; INTRAVENOUS
Status: COMPLETED
Start: 2019-01-28 | End: 2019-01-28

## 2019-01-28 RX ORDER — LORAZEPAM 2 MG/ML
0.5 INJECTION INTRAMUSCULAR
Status: DISCONTINUED | OUTPATIENT
Start: 2019-01-28 | End: 2019-01-29 | Stop reason: HOSPADM

## 2019-01-28 RX ORDER — HALOPERIDOL 5 MG/ML
1 INJECTION INTRAMUSCULAR ONCE
Status: COMPLETED | OUTPATIENT
Start: 2019-01-28 | End: 2019-01-28

## 2019-01-28 RX ORDER — DIPHENHYDRAMINE HYDROCHLORIDE 50 MG/ML
12.5 INJECTION INTRAMUSCULAR; INTRAVENOUS
Status: DISCONTINUED | OUTPATIENT
Start: 2019-01-28 | End: 2019-01-29 | Stop reason: HOSPADM

## 2019-01-28 RX ADMIN — DIPHENHYDRAMINE HYDROCHLORIDE: 50 INJECTION, SOLUTION INTRAMUSCULAR; INTRAVENOUS at 13:00

## 2019-01-28 RX ADMIN — HALOPERIDOL LACTATE 1 MG: 5 INJECTION, SOLUTION INTRAMUSCULAR at 22:41

## 2019-01-28 RX ADMIN — FENTANYL CITRATE: 50 INJECTION, SOLUTION INTRAMUSCULAR; INTRAVENOUS at 13:00

## 2019-01-28 RX ADMIN — DIPHENHYDRAMINE HYDROCHLORIDE 50 MG: 50 INJECTION, SOLUTION INTRAMUSCULAR; INTRAVENOUS at 22:41

## 2019-01-28 RX ADMIN — SODIUM CHLORIDE 1000 ML: 9 INJECTION, SOLUTION INTRAVENOUS at 23:45

## 2019-01-28 RX ADMIN — MIDAZOLAM HYDROCHLORIDE: 5 INJECTION, SOLUTION INTRAMUSCULAR; INTRAVENOUS at 13:00

## 2019-01-28 NOTE — DISCHARGE INSTRUCTIONS
MRI ADULT DISCHARGE INSTRUCTIONS    You have been medicated today for your scan. Please follow the instructions below to ensure your safe recovery. If you have any questions or problems, feel free to call us at [phone] or [phone].     1.   Have someone stay with you to assist you as needed.    2.   Do not drive or operate any mechanical devices.    3.   Do not perform any activity that requires concentration. Make no major decisions over the next 24 hours.     4.   Be careful changing positions from laying down to sitting or standing, as you may become dizzy.     5.   Do not drink alcohol for 48 hours.    6.   There are no restrictions for eating your normal meals. Drink fluids.    7.   You may continue your usual medications for pain, tranquilizers, muscle relaxants or sedatives when awake.     8.   Tomorrow, you may continue your normal daily activities.     9.   Pressure dressing on 10 - 15 minutes. If swelling or bleeding occurs when removed, continue placing direct pressure on injection site for another 5 minutes, or until bleeding stops.     I have been informed of and understand the above discharge instructions.

## 2019-01-29 ENCOUNTER — APPOINTMENT (OUTPATIENT)
Dept: RADIOLOGY | Facility: MEDICAL CENTER | Age: 49
End: 2019-01-29
Attending: EMERGENCY MEDICINE
Payer: MEDICARE

## 2019-01-29 ENCOUNTER — APPOINTMENT (OUTPATIENT)
Dept: RADIOLOGY | Facility: MEDICAL CENTER | Age: 49
End: 2019-01-29
Payer: MEDICARE

## 2019-01-29 ENCOUNTER — HOSPITAL ENCOUNTER (EMERGENCY)
Facility: MEDICAL CENTER | Age: 49
End: 2019-01-29
Attending: EMERGENCY MEDICINE
Payer: MEDICARE

## 2019-01-29 VITALS
BODY MASS INDEX: 26.41 KG/M2 | WEIGHT: 195 LBS | DIASTOLIC BLOOD PRESSURE: 89 MMHG | SYSTOLIC BLOOD PRESSURE: 118 MMHG | TEMPERATURE: 98.1 F | OXYGEN SATURATION: 94 % | HEIGHT: 72 IN | RESPIRATION RATE: 18 BRPM | HEART RATE: 85 BPM

## 2019-01-29 VITALS
OXYGEN SATURATION: 94 % | BODY MASS INDEX: 26.41 KG/M2 | WEIGHT: 195 LBS | RESPIRATION RATE: 14 BRPM | HEART RATE: 89 BPM | HEIGHT: 72 IN | SYSTOLIC BLOOD PRESSURE: 117 MMHG | DIASTOLIC BLOOD PRESSURE: 84 MMHG | TEMPERATURE: 98.2 F

## 2019-01-29 DIAGNOSIS — S60.221A CONTUSION OF RIGHT HAND, INITIAL ENCOUNTER: ICD-10-CM
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
## 2019-01-29 LAB
ALBUMIN SERPL BCP-MCNC: 4.5 G/DL (ref 3.2–4.9)
ALBUMIN/GLOB SERPL: 1.6 G/DL
ALP SERPL-CCNC: 51 U/L (ref 30–99)
ALT SERPL-CCNC: 30 U/L (ref 2–50)
ANION GAP SERPL CALC-SCNC: 11 MMOL/L (ref 0–11.9)
APTT PPP: 25.5 SEC (ref 24.7–36)
AST SERPL-CCNC: 17 U/L (ref 12–45)
BASOPHILS # BLD AUTO: 0.1 % (ref 0–1.8)
BASOPHILS # BLD: 0.01 K/UL (ref 0–0.12)
BILIRUB SERPL-MCNC: 0.4 MG/DL (ref 0.1–1.5)
BNP SERPL-MCNC: 11 PG/ML (ref 0–100)
BUN SERPL-MCNC: 31 MG/DL (ref 8–22)
CALCIUM SERPL-MCNC: 9.3 MG/DL (ref 8.5–10.5)
CHLORIDE SERPL-SCNC: 107 MMOL/L (ref 96–112)
CO2 SERPL-SCNC: 23 MMOL/L (ref 20–33)
CREAT SERPL-MCNC: 1.24 MG/DL (ref 0.5–1.4)
EKG IMPRESSION: NORMAL
EOSINOPHIL # BLD AUTO: 0.01 K/UL (ref 0–0.51)
EOSINOPHIL NFR BLD: 0.1 % (ref 0–6.9)
ERYTHROCYTE [DISTWIDTH] IN BLOOD BY AUTOMATED COUNT: 46.5 FL (ref 35.9–50)
GLOBULIN SER CALC-MCNC: 2.9 G/DL (ref 1.9–3.5)
GLUCOSE SERPL-MCNC: 110 MG/DL (ref 65–99)
HCT VFR BLD AUTO: 48.6 % (ref 42–52)
HGB BLD-MCNC: 16.2 G/DL (ref 14–18)
IMM GRANULOCYTES # BLD AUTO: 0.03 K/UL (ref 0–0.11)
IMM GRANULOCYTES NFR BLD AUTO: 0.3 % (ref 0–0.9)
INR PPP: 0.97 (ref 0.87–1.13)
LIPASE SERPL-CCNC: 13 U/L (ref 11–82)
LYMPHOCYTES # BLD AUTO: 1.66 K/UL (ref 1–4.8)
LYMPHOCYTES NFR BLD: 19 % (ref 22–41)
MCH RBC QN AUTO: 32.3 PG (ref 27–33)
MCHC RBC AUTO-ENTMCNC: 33.3 G/DL (ref 33.7–35.3)
MCV RBC AUTO: 96.8 FL (ref 81.4–97.8)
MONOCYTES # BLD AUTO: 0.63 K/UL (ref 0–0.85)
MONOCYTES NFR BLD AUTO: 7.2 % (ref 0–13.4)
NEUTROPHILS # BLD AUTO: 6.4 K/UL (ref 1.82–7.42)
NEUTROPHILS NFR BLD: 73.3 % (ref 44–72)
NRBC # BLD AUTO: 0 K/UL
NRBC BLD-RTO: 0 /100 WBC
PLATELET # BLD AUTO: 195 K/UL (ref 164–446)
PMV BLD AUTO: 9.8 FL (ref 9–12.9)
POTASSIUM SERPL-SCNC: 3.8 MMOL/L (ref 3.6–5.5)
PROT SERPL-MCNC: 7.4 G/DL (ref 6–8.2)
PROTHROMBIN TIME: 13 SEC (ref 12–14.6)
RBC # BLD AUTO: 5.02 M/UL (ref 4.7–6.1)
SODIUM SERPL-SCNC: 141 MMOL/L (ref 135–145)
TROPONIN I SERPL-MCNC: <0.01 NG/ML (ref 0–0.04)
WBC # BLD AUTO: 8.7 K/UL (ref 4.8–10.8)

## 2019-01-29 PROCEDURE — 85610 PROTHROMBIN TIME: CPT

## 2019-01-29 PROCEDURE — 700111 HCHG RX REV CODE 636 W/ 250 OVERRIDE (IP): Performed by: EMERGENCY MEDICINE

## 2019-01-29 PROCEDURE — 96375 TX/PRO/DX INJ NEW DRUG ADDON: CPT

## 2019-01-29 PROCEDURE — 85730 THROMBOPLASTIN TIME PARTIAL: CPT

## 2019-01-29 PROCEDURE — 85025 COMPLETE CBC W/AUTO DIFF WBC: CPT

## 2019-01-29 PROCEDURE — 73130 X-RAY EXAM OF HAND: CPT | Mod: RT

## 2019-01-29 PROCEDURE — 83880 ASSAY OF NATRIURETIC PEPTIDE: CPT

## 2019-01-29 PROCEDURE — 84484 ASSAY OF TROPONIN QUANT: CPT

## 2019-01-29 PROCEDURE — 83690 ASSAY OF LIPASE: CPT

## 2019-01-29 PROCEDURE — 96374 THER/PROPH/DIAG INJ IV PUSH: CPT

## 2019-01-29 PROCEDURE — 99285 EMERGENCY DEPT VISIT HI MDM: CPT

## 2019-01-29 PROCEDURE — 700105 HCHG RX REV CODE 258: Performed by: EMERGENCY MEDICINE

## 2019-01-29 PROCEDURE — 70450 CT HEAD/BRAIN W/O DYE: CPT

## 2019-01-29 PROCEDURE — 80053 COMPREHEN METABOLIC PANEL: CPT

## 2019-01-29 PROCEDURE — 96376 TX/PRO/DX INJ SAME DRUG ADON: CPT

## 2019-01-29 PROCEDURE — 71045 X-RAY EXAM CHEST 1 VIEW: CPT

## 2019-01-29 RX ORDER — SODIUM CHLORIDE, SODIUM LACTATE, POTASSIUM CHLORIDE, CALCIUM CHLORIDE 600; 310; 30; 20 MG/100ML; MG/100ML; MG/100ML; MG/100ML
1000 INJECTION, SOLUTION INTRAVENOUS ONCE
Status: COMPLETED | OUTPATIENT
Start: 2019-01-29 | End: 2019-01-29

## 2019-01-29 RX ORDER — MIDAZOLAM HYDROCHLORIDE 1 MG/ML
4 INJECTION INTRAMUSCULAR; INTRAVENOUS ONCE
Status: COMPLETED | OUTPATIENT
Start: 2019-01-29 | End: 2019-01-29

## 2019-01-29 RX ORDER — DIPHENHYDRAMINE HYDROCHLORIDE 50 MG/ML
12.5 INJECTION INTRAMUSCULAR; INTRAVENOUS ONCE
Status: COMPLETED | OUTPATIENT
Start: 2019-01-29 | End: 2019-01-29

## 2019-01-29 RX ADMIN — DIPHENHYDRAMINE HYDROCHLORIDE 12.5 MG: 50 INJECTION, SOLUTION INTRAMUSCULAR; INTRAVENOUS at 20:30

## 2019-01-29 RX ADMIN — MIDAZOLAM 4 MG: 1 INJECTION INTRAMUSCULAR; INTRAVENOUS at 21:34

## 2019-01-29 RX ADMIN — SODIUM CHLORIDE, POTASSIUM CHLORIDE, SODIUM LACTATE AND CALCIUM CHLORIDE 1000 ML: 600; 310; 30; 20 INJECTION, SOLUTION INTRAVENOUS at 20:26

## 2019-01-29 ASSESSMENT — LIFESTYLE VARIABLES: DO YOU DRINK ALCOHOL: NO
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
## 2019-01-29 NOTE — ED PROVIDER NOTES
ED Provider Note    CHIEF COMPLAINT  Chief Complaint   Patient presents with   • Dizziness       HPI  Landon Allred is a 48 y.o. male who presents To the emergency department with chief complaint of feeling hot and flushed some nausea some dizziness and just feeling tired.  Patient has a history of chronic opiate abuse and use as well as some chronic headaches for which she has had multiple MRIs recently has extreme claustrophobia needs to be sedated with an LMA during these procedures.  He had an MRI earlier today of his brain.  He states after that he was still feeling a little woozy and then started to develop what he thought were high waves and some nausea but no vomiting and some difficulty breathing.  He states he does have multiple allergies but has not used his EpiPen today.  He was evaluated by EMS around 5 PM this evening they said he had a high glucose but other than that looks fine without signs of anaphylaxis.  He states his symptoms are not improving which is why presented to the ED.  He denies any unilateral weakness numbness or tingling any chest pain any leg swelling any cough or hemoptysis.  He is feeling a little anxious currently.  Patient is also complaining about 1 of his chronic headaches that started beginning this afternoon.  He states is usually treated with IV Dilaudid but is similar to his chronic headaches frontal and pressure-like in nature.    REVIEW OF SYSTEMS  Positives as above. Pertinent negatives include chest pain weakness numbness tingling vomiting leg swelling  All other review of systems are negative    PAST MEDICAL HISTORY   has a past medical history of Abdominal pain; Abnormal gall bladder diagnostic imaging; Clostridium difficile diarrhea; DVT (deep venous thrombosis) (HCC); Migraine; Pancreatitis; PE (pulmonary embolism); Psychiatric disorder; and Psychiatric disorder.    SOCIAL HISTORY  Social History     Social History Main Topics   • Smoking status: Never Smoker    • Smokeless tobacco: Never Used   • Alcohol use No   • Drug use: No   • Sexual activity: Not on file       SURGICAL HISTORY   has a past surgical history that includes nerve ulnar repair or explore; dental extraction(s); closed rx nasal septal fracture; and rectal exploration.    CURRENT MEDICATIONS  Home Medications     Reviewed by Addison Bañuelos R.N. (Registered Nurse) on 01/28/19 at 2015  Med List Status: Not Addressed   Medication Last Dose Status   diazePAM (VALIUM) 5 MG Tab  Active   diphenhydrAMINE (BENADRYL) 50 MG Cap  Active   Fluticasone Furoate-Vilanterol (BREO ELLIPTA) 200-25 MCG/INH AEROSOL POWDER, BREATH ACTIVATED  Active   HYDROmorphone (DILAUDID) 4 MG Tab  Active   promethazine (PHENERGAN) 25 MG Tab  Active   rivaroxaban (XARELTO) 10 MG Tab tablet  Active                ALLERGIES  Allergies   Allergen Reactions   • Dilaudid [Hydromorphone] Itching   • Shellfish Allergy Anaphylaxis   • Tape Hives   • Amitriptyline Hcl    • Asa [Aspirin]    • Biofreeze    • Codeine    • Hydrocodone-Acetaminophen    • Ketorolac Tromethamine [Toradol]    • Morphine Vomiting   • Ondansetron [Zofran]    • Other Misc      Can take dilaudid if given benadryl first   • Oxycodone    • Oxycodone-Acetaminophen    • Oxycodone-Aspirin    • Reglan [Metoclopramide]      Has no idea what his RX is   • Tramadol    • Zofran [Ondansetron]        PHYSICAL EXAM  VITAL SIGNS: /89   Pulse 81   Temp 36.7 °C (98.1 °F) (Temporal)   Resp 19   Ht 1.829 m (6')   Wt 88.5 kg (195 lb)   SpO2 95%   BMI 26.45 kg/m²    Pulse ox interpretation: I interpret this pulse ox as normal.  Constitutional: Alert mildly anxious  HENT: Normocephalic atraumatic, mildly dry MM  Eyes: PERRLA - 3mm not pinpoint, Conjunctiva normal, Non-icteric.   Neck: Normal range of motion, No tenderness, Supple, No stridor.   Cardiovascular: Regular rhythm, tachycardic no murmurs.   Thorax & Lungs: Normal breath sounds, No respiratory distress, No wheezing, No  chest tenderness.   Abdomen: Bowel sounds normal, Soft, No tenderness, No pulsatile masses. No peritoneal signs.  Skin: Warm, Dry, No erythema, hot and flushed, no hives but mildly erythematous at the edges of his face  Back: No bony tenderness, No CVA tenderness.   Extremities: Intact distal pulses, No edema, No tenderness, No cyanosis  Neurologic: Alert and oriented x3, No focal deficits noted.       DIFFERENTIAL DIAGNOSIS AND WORK UP PLAN    This is a 48 y.o. male who presents with Some generalized anxious complaints he appears mildly anxious but also tired at the same time, he has some flushness around the skin but no overt hives no wheezing no actual vomiting no oropharyngeal swelling no signs of anaphylaxis.  This could be an allergic reaction versus opiate withdrawal versus anxiety.  Low concern for pulmonary embolism he is tachycardic and feeling a little nauseous this could be an atypical ACS I have low concern.  Will evaluate with blood evaluation to see if he is hyperglycemic he has no history of diabetes.  Will perform EKG troponin treat patient with Benadryl and a small dose of IV Haldol.    DIAGNOSTIC STUDIES / PROCEDURES    EKG  12- Lead EKG; interpreted by myself - Ramila  Normal sinus rhythm with a rate of 108 bpm.   Normal axis.  Normal intervals.   No ST elevation or depression, or abnormal T wave inversion   No widening of QRS complex   Good R wave progression   No diagnostic Q waves.   Unchanged from prior EKG   Clinical Impression: sinus tach non specific unchanged from prior       LABS  Pertinent Lab Findings  White blood cell count of 4 with a left shift otherwise within normal limits patient has an elevated glucose of 190 with a BUN of 27 indicative of some dehydration without acute kidney injury troponin negative      RADIOLOGY  No orders to display   MRI BRAIN 1/28/19    1. Minimal supratentorial white matter disease. Nonspecific findings consistent with microvascular ischemic change versus  demyelination or gliosis.  2. Mild dolichoectasia of the basilar artery.  3. No evidence of acute infarction, hemorrhage, or mass lesion.    The radiologist's interpretation of all radiological studies have been reviewed by me.      COURSE & MEDICAL DECISION MAKING  Pertinent Labs & Imaging studies reviewed. (See chart for details)    11:19 PM  Patient w signs of KERRI on labs and still mild tachycardia when sitting up, concern for orthostasis 2/2 to hypertension but he is feeling better after benadryl and haldol   Patient received IV fluids and further resuscitation but he is already feeling much better.    00:30 AM  Patient had positive response to IV fluids with an improvement in his heart rate he ambulated without difficulty and is no longer hypoxic likely secondary to the Benadryl and Haldol.  He is feeling much better he feels comfortable going home we discussed the importance of plenty of oral fluids over the next few days and return to the ED for any new or worsening symptoms.  He understands feels comfortable going home    /78   Pulse 90   Temp 36.7 °C (98.1 °F) (Temporal)   Resp 18   Ht 1.829 m (6')   Wt 88.5 kg (195 lb)   SpO2 94%   BMI 26.45 kg/m²       The patient will return for new or worsening symptoms and is stable at the time of discharge.    The patient is referred to a primary physician for blood pressure management, diabetic screening, and for all other preventative health concerns.    DISPOSITION:  Patient will be discharged home in stable condition.    FOLLOW UP:  [facility], Emergency Dept  [address] [facility] [address] [address] [phone]    If symptoms worsen    Primary Care Provider    Schedule an appointment as soon as possible for a visit   for glucose recheck and eval for diabetes      OUTPATIENT MEDICATIONS:  Discharge Medication List as of 1/29/2019 12:34 AM              FINAL IMPRESSION  1. Dehydration    2. Lightheadedness    3. Elevated glucose  level              Electronically signed by: Rose Mcgrath, 1/28/2019 10:13 PM    This dictation has been created using voice recognition software and/or scribes. The accuracy of the dictation is limited by the abilities of the software and the expertise of the scribes. I expect there may be some errors of grammar and possibly content. I made every attempt to manually correct the errors within my dictation. However, errors related to voice recognition software and/or scribes may still exist and should be interpreted within the appropriate context.

## 2019-01-29 NOTE — ED NOTES
Pt tolerated meds well, no adverse reactions to meds given, pt no longer c/o head ache, dizziness.  Understands discharge instructions follow up.  Out ot lobby with steady gait.
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
## 2019-01-30 NOTE — ED NOTES
Verbalizes understanding of discharge and followup instructions. PIV removed, VSS. Ambulates at baseline, assisted to discharge by wheelchair.

## 2019-01-30 NOTE — ED NOTES
PIV established, blood drawn and sent to lab. Medicated per MAR. Holding versed until ready for CT scan. VSS, call light within reach.
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
## 2019-08-27 ENCOUNTER — APPOINTMENT (OUTPATIENT)
Dept: RADIOLOGY | Facility: MEDICAL CENTER | Age: 49
DRG: 379 | End: 2019-08-27
Attending: EMERGENCY MEDICINE
Payer: COMMERCIAL

## 2019-08-27 ENCOUNTER — HOSPITAL ENCOUNTER (INPATIENT)
Facility: MEDICAL CENTER | Age: 49
LOS: 2 days | DRG: 379 | End: 2019-08-30
Attending: EMERGENCY MEDICINE | Admitting: INTERNAL MEDICINE
Payer: COMMERCIAL

## 2019-08-27 ENCOUNTER — OFFICE VISIT (OUTPATIENT)
Dept: URGENT CARE | Facility: CLINIC | Age: 49
End: 2019-08-27
Payer: MEDICARE

## 2019-08-27 VITALS
DIASTOLIC BLOOD PRESSURE: 90 MMHG | TEMPERATURE: 98.4 F | BODY MASS INDEX: 26.41 KG/M2 | HEART RATE: 102 BPM | OXYGEN SATURATION: 97 % | SYSTOLIC BLOOD PRESSURE: 116 MMHG | HEIGHT: 72 IN | WEIGHT: 195 LBS | RESPIRATION RATE: 14 BRPM

## 2019-08-27 DIAGNOSIS — R04.2 HEMOPTYSIS: ICD-10-CM

## 2019-08-27 DIAGNOSIS — R10.11 RUQ ABDOMINAL PAIN: ICD-10-CM

## 2019-08-27 DIAGNOSIS — R53.83 EXHAUSTION: ICD-10-CM

## 2019-08-27 DIAGNOSIS — K92.2 GASTROINTESTINAL HEMORRHAGE, UNSPECIFIED GASTROINTESTINAL HEMORRHAGE TYPE: ICD-10-CM

## 2019-08-27 DIAGNOSIS — R63.0 LOSS OF APPETITE: ICD-10-CM

## 2019-08-27 DIAGNOSIS — R10.9 RIGHT FLANK PAIN: Primary | ICD-10-CM

## 2019-08-27 LAB
ALBUMIN SERPL BCP-MCNC: 5.3 G/DL (ref 3.2–4.9)
ALBUMIN/GLOB SERPL: 1.6 G/DL
ALP SERPL-CCNC: 59 U/L (ref 30–99)
ALT SERPL-CCNC: 30 U/L (ref 2–50)
ANION GAP SERPL CALC-SCNC: 13 MMOL/L (ref 0–11.9)
AST SERPL-CCNC: 18 U/L (ref 12–45)
BASOPHILS # BLD AUTO: 0.2 % (ref 0–1.8)
BASOPHILS # BLD: 0.01 K/UL (ref 0–0.12)
BILIRUB SERPL-MCNC: 0.9 MG/DL (ref 0.1–1.5)
BUN SERPL-MCNC: 26 MG/DL (ref 8–22)
CALCIUM SERPL-MCNC: 10.4 MG/DL (ref 8.5–10.5)
CHLORIDE SERPL-SCNC: 108 MMOL/L (ref 96–112)
CO2 SERPL-SCNC: 21 MMOL/L (ref 20–33)
CREAT SERPL-MCNC: 1.38 MG/DL (ref 0.5–1.4)
EOSINOPHIL # BLD AUTO: 0.09 K/UL (ref 0–0.51)
EOSINOPHIL NFR BLD: 1.7 % (ref 0–6.9)
ERYTHROCYTE [DISTWIDTH] IN BLOOD BY AUTOMATED COUNT: 43.9 FL (ref 35.9–50)
GLOBULIN SER CALC-MCNC: 3.4 G/DL (ref 1.9–3.5)
GLUCOSE SERPL-MCNC: 94 MG/DL (ref 65–99)
HCT VFR BLD AUTO: 50.2 % (ref 42–52)
HGB BLD-MCNC: 16.9 G/DL (ref 14–18)
IMM GRANULOCYTES # BLD AUTO: 0.01 K/UL (ref 0–0.11)
IMM GRANULOCYTES NFR BLD AUTO: 0.2 % (ref 0–0.9)
LIPASE SERPL-CCNC: 24 U/L (ref 11–82)
LYMPHOCYTES # BLD AUTO: 1.51 K/UL (ref 1–4.8)
LYMPHOCYTES NFR BLD: 28.6 % (ref 22–41)
MCH RBC QN AUTO: 31.6 PG (ref 27–33)
MCHC RBC AUTO-ENTMCNC: 33.7 G/DL (ref 33.7–35.3)
MCV RBC AUTO: 94 FL (ref 81.4–97.8)
MONOCYTES # BLD AUTO: 0.34 K/UL (ref 0–0.85)
MONOCYTES NFR BLD AUTO: 6.4 % (ref 0–13.4)
NEUTROPHILS # BLD AUTO: 3.32 K/UL (ref 1.82–7.42)
NEUTROPHILS NFR BLD: 62.9 % (ref 44–72)
NRBC # BLD AUTO: 0 K/UL
NRBC BLD-RTO: 0 /100 WBC
PLATELET # BLD AUTO: 198 K/UL (ref 164–446)
PMV BLD AUTO: 10 FL (ref 9–12.9)
POTASSIUM SERPL-SCNC: 3.9 MMOL/L (ref 3.6–5.5)
PROT SERPL-MCNC: 8.7 G/DL (ref 6–8.2)
RBC # BLD AUTO: 5.34 M/UL (ref 4.7–6.1)
SODIUM SERPL-SCNC: 142 MMOL/L (ref 135–145)
TROPONIN T SERPL-MCNC: <6 NG/L (ref 6–19)
WBC # BLD AUTO: 5.3 K/UL (ref 4.8–10.8)

## 2019-08-27 PROCEDURE — 74177 CT ABD & PELVIS W/CONTRAST: CPT

## 2019-08-27 PROCEDURE — 304561 HCHG STAT O2

## 2019-08-27 PROCEDURE — 700117 HCHG RX CONTRAST REV CODE 255: Performed by: EMERGENCY MEDICINE

## 2019-08-27 PROCEDURE — 99285 EMERGENCY DEPT VISIT HI MDM: CPT

## 2019-08-27 PROCEDURE — 93005 ELECTROCARDIOGRAM TRACING: CPT | Performed by: EMERGENCY MEDICINE

## 2019-08-27 PROCEDURE — 99204 OFFICE O/P NEW MOD 45 MIN: CPT | Performed by: PHYSICIAN ASSISTANT

## 2019-08-27 PROCEDURE — 85025 COMPLETE CBC W/AUTO DIFF WBC: CPT

## 2019-08-27 PROCEDURE — 83735 ASSAY OF MAGNESIUM: CPT

## 2019-08-27 PROCEDURE — 84484 ASSAY OF TROPONIN QUANT: CPT

## 2019-08-27 PROCEDURE — 96374 THER/PROPH/DIAG INJ IV PUSH: CPT

## 2019-08-27 PROCEDURE — 80053 COMPREHEN METABOLIC PANEL: CPT

## 2019-08-27 PROCEDURE — 83690 ASSAY OF LIPASE: CPT

## 2019-08-27 PROCEDURE — 96375 TX/PRO/DX INJ NEW DRUG ADDON: CPT

## 2019-08-27 PROCEDURE — 36415 COLL VENOUS BLD VENIPUNCTURE: CPT

## 2019-08-27 PROCEDURE — 700111 HCHG RX REV CODE 636 W/ 250 OVERRIDE (IP): Performed by: EMERGENCY MEDICINE

## 2019-08-27 RX ORDER — DIPHENHYDRAMINE HYDROCHLORIDE 50 MG/ML
25 INJECTION INTRAMUSCULAR; INTRAVENOUS ONCE
Status: COMPLETED | OUTPATIENT
Start: 2019-08-27 | End: 2019-08-27

## 2019-08-27 RX ORDER — LORAZEPAM 2 MG/ML
1 INJECTION INTRAMUSCULAR ONCE
Status: COMPLETED | OUTPATIENT
Start: 2019-08-27 | End: 2019-08-27

## 2019-08-27 RX ORDER — DIAZEPAM 10 MG/1
10 TABLET ORAL EVERY 6 HOURS PRN
Status: ON HOLD | COMMUNITY
End: 2019-08-30

## 2019-08-27 RX ORDER — PANTOPRAZOLE SODIUM 40 MG/10ML
40 INJECTION, POWDER, LYOPHILIZED, FOR SOLUTION INTRAVENOUS ONCE
Status: COMPLETED | OUTPATIENT
Start: 2019-08-27 | End: 2019-08-28

## 2019-08-27 RX ORDER — ZONISAMIDE 50 MG/1
50 CAPSULE ORAL 2 TIMES DAILY
COMMUNITY

## 2019-08-27 RX ORDER — ESOMEPRAZOLE MAGNESIUM 40 MG/1
40 CAPSULE, DELAYED RELEASE ORAL
COMMUNITY

## 2019-08-27 RX ADMIN — IOHEXOL 25 ML: 240 INJECTION, SOLUTION INTRATHECAL; INTRAVASCULAR; INTRAVENOUS; ORAL at 22:34

## 2019-08-27 RX ADMIN — DIPHENHYDRAMINE HYDROCHLORIDE 25 MG: 50 INJECTION INTRAMUSCULAR; INTRAVENOUS at 21:59

## 2019-08-27 RX ADMIN — IOHEXOL 100 ML: 350 INJECTION, SOLUTION INTRAVENOUS at 22:32

## 2019-08-27 RX ADMIN — LORAZEPAM 1 MG: 2 INJECTION INTRAMUSCULAR; INTRAVENOUS at 22:00

## 2019-08-28 PROBLEM — I26.99 PULMONARY EMBOLISM (HCC): Status: ACTIVE | Noted: 2019-08-28

## 2019-08-28 PROBLEM — K40.20 INGUINAL HERNIA, BILATERAL: Status: ACTIVE | Noted: 2019-08-28

## 2019-08-28 PROBLEM — I82.629 DEEP VEIN THROMBOSIS (DVT) OF UPPER EXTREMITY (HCC): Status: ACTIVE | Noted: 2019-08-28

## 2019-08-28 PROBLEM — K92.0 HEMATEMESIS: Status: ACTIVE | Noted: 2019-08-28

## 2019-08-28 LAB
EKG IMPRESSION: NORMAL
MAGNESIUM SERPL-MCNC: 2.3 MG/DL (ref 1.5–2.5)

## 2019-08-28 PROCEDURE — C9113 INJ PANTOPRAZOLE SODIUM, VIA: HCPCS | Performed by: EMERGENCY MEDICINE

## 2019-08-28 PROCEDURE — 700102 HCHG RX REV CODE 250 W/ 637 OVERRIDE(OP): Performed by: STUDENT IN AN ORGANIZED HEALTH CARE EDUCATION/TRAINING PROGRAM

## 2019-08-28 PROCEDURE — 96375 TX/PRO/DX INJ NEW DRUG ADDON: CPT

## 2019-08-28 PROCEDURE — A9270 NON-COVERED ITEM OR SERVICE: HCPCS | Performed by: STUDENT IN AN ORGANIZED HEALTH CARE EDUCATION/TRAINING PROGRAM

## 2019-08-28 PROCEDURE — 700105 HCHG RX REV CODE 258: Performed by: STUDENT IN AN ORGANIZED HEALTH CARE EDUCATION/TRAINING PROGRAM

## 2019-08-28 PROCEDURE — 770006 HCHG ROOM/CARE - MED/SURG/GYN SEMI*

## 2019-08-28 PROCEDURE — 700111 HCHG RX REV CODE 636 W/ 250 OVERRIDE (IP): Performed by: EMERGENCY MEDICINE

## 2019-08-28 PROCEDURE — 700111 HCHG RX REV CODE 636 W/ 250 OVERRIDE (IP): Performed by: STUDENT IN AN ORGANIZED HEALTH CARE EDUCATION/TRAINING PROGRAM

## 2019-08-28 PROCEDURE — 51798 US URINE CAPACITY MEASURE: CPT

## 2019-08-28 PROCEDURE — 99223 1ST HOSP IP/OBS HIGH 75: CPT | Mod: GC | Performed by: INTERNAL MEDICINE

## 2019-08-28 RX ORDER — HYDROMORPHONE HYDROCHLORIDE 1 MG/ML
0.5 INJECTION, SOLUTION INTRAMUSCULAR; INTRAVENOUS; SUBCUTANEOUS EVERY 4 HOURS PRN
Status: DISCONTINUED | OUTPATIENT
Start: 2019-08-28 | End: 2019-08-30

## 2019-08-28 RX ORDER — SODIUM CHLORIDE 9 MG/ML
INJECTION, SOLUTION INTRAVENOUS CONTINUOUS
Status: DISPENSED | OUTPATIENT
Start: 2019-08-28 | End: 2019-08-30

## 2019-08-28 RX ORDER — DIPHENHYDRAMINE HYDROCHLORIDE 50 MG/ML
25 INJECTION INTRAMUSCULAR; INTRAVENOUS EVERY 6 HOURS PRN
Status: DISCONTINUED | OUTPATIENT
Start: 2019-08-28 | End: 2019-08-29

## 2019-08-28 RX ORDER — POLYETHYLENE GLYCOL 3350 17 G/17G
1 POWDER, FOR SOLUTION ORAL
Status: DISCONTINUED | OUTPATIENT
Start: 2019-08-28 | End: 2019-08-30

## 2019-08-28 RX ORDER — OMEPRAZOLE 20 MG/1
20 CAPSULE, DELAYED RELEASE ORAL
Status: DISCONTINUED | OUTPATIENT
Start: 2019-08-28 | End: 2019-08-30 | Stop reason: HOSPADM

## 2019-08-28 RX ORDER — ZONISAMIDE 50 MG/1
50 CAPSULE ORAL 2 TIMES DAILY
Status: DISCONTINUED | OUTPATIENT
Start: 2019-08-28 | End: 2019-08-30 | Stop reason: HOSPADM

## 2019-08-28 RX ORDER — AMOXICILLIN 250 MG
2 CAPSULE ORAL 2 TIMES DAILY
Status: DISCONTINUED | OUTPATIENT
Start: 2019-08-28 | End: 2019-08-30

## 2019-08-28 RX ORDER — PROMETHAZINE HYDROCHLORIDE 25 MG/1
25 TABLET ORAL EVERY 6 HOURS PRN
Status: DISCONTINUED | OUTPATIENT
Start: 2019-08-28 | End: 2019-08-30 | Stop reason: HOSPADM

## 2019-08-28 RX ORDER — BISACODYL 10 MG
10 SUPPOSITORY, RECTAL RECTAL
Status: DISCONTINUED | OUTPATIENT
Start: 2019-08-28 | End: 2019-08-30

## 2019-08-28 RX ADMIN — ZONISAMIDE 50 MG: 50 CAPSULE ORAL at 18:01

## 2019-08-28 RX ADMIN — HYDROMORPHONE HYDROCHLORIDE 0.5 MG: 1 INJECTION, SOLUTION INTRAMUSCULAR; INTRAVENOUS; SUBCUTANEOUS at 18:08

## 2019-08-28 RX ADMIN — RIVAROXABAN 10 MG: 20 TABLET, FILM COATED ORAL at 18:00

## 2019-08-28 RX ADMIN — PANTOPRAZOLE SODIUM 40 MG: 40 INJECTION, POWDER, LYOPHILIZED, FOR SOLUTION INTRAVENOUS at 01:25

## 2019-08-28 RX ADMIN — SODIUM CHLORIDE: 9 INJECTION, SOLUTION INTRAVENOUS at 13:43

## 2019-08-28 RX ADMIN — HYDROMORPHONE HYDROCHLORIDE 0.5 MG: 1 INJECTION, SOLUTION INTRAMUSCULAR; INTRAVENOUS; SUBCUTANEOUS at 05:50

## 2019-08-28 RX ADMIN — OMEPRAZOLE 20 MG: 20 CAPSULE, DELAYED RELEASE ORAL at 05:55

## 2019-08-28 RX ADMIN — PROMETHAZINE HYDROCHLORIDE 25 MG: 25 TABLET ORAL at 13:52

## 2019-08-28 RX ADMIN — DIPHENHYDRAMINE HYDROCHLORIDE 25 MG: 50 INJECTION INTRAMUSCULAR; INTRAVENOUS at 05:52

## 2019-08-28 RX ADMIN — PROMETHAZINE HYDROCHLORIDE 25 MG: 25 TABLET ORAL at 07:41

## 2019-08-28 RX ADMIN — ZONISAMIDE 50 MG: 50 CAPSULE ORAL at 05:58

## 2019-08-28 RX ADMIN — SENNOSIDES, DOCUSATE SODIUM 2 TABLET: 50; 8.6 TABLET, FILM COATED ORAL at 05:55

## 2019-08-28 RX ADMIN — HYDROMORPHONE HYDROCHLORIDE 0.5 MG: 1 INJECTION, SOLUTION INTRAMUSCULAR; INTRAVENOUS; SUBCUTANEOUS at 11:52

## 2019-08-28 RX ADMIN — DIPHENHYDRAMINE HYDROCHLORIDE 25 MG: 50 INJECTION INTRAMUSCULAR; INTRAVENOUS at 11:44

## 2019-08-28 RX ADMIN — DIPHENHYDRAMINE HYDROCHLORIDE 25 MG: 50 INJECTION INTRAMUSCULAR; INTRAVENOUS at 18:08

## 2019-08-28 ASSESSMENT — ENCOUNTER SYMPTOMS
CHILLS: 0
TINGLING: 0
DOUBLE VISION: 0
BACK PAIN: 1
SPUTUM PRODUCTION: 0
EYE PAIN: 0
SINUS PAIN: 1
SPEECH CHANGE: 0
STRIDOR: 0
BLOOD IN STOOL: 1
PALPITATIONS: 0
COUGH: 0
EYE REDNESS: 0
DEPRESSION: 0
HEADACHES: 1
PND: 0
WHEEZING: 0
FEVER: 0
MYALGIAS: 0
INSOMNIA: 0
ORTHOPNEA: 0
VOMITING: 1
FOCAL WEAKNESS: 0
CLAUDICATION: 0
PHOTOPHOBIA: 0
NECK PAIN: 0
NERVOUS/ANXIOUS: 0
LOSS OF CONSCIOUSNESS: 0
SORE THROAT: 0
HEARTBURN: 0
TREMORS: 0
SEIZURES: 0
HALLUCINATIONS: 0
FLANK PAIN: 0
HEMOPTYSIS: 1
MYALGIAS: 1
FALLS: 0
ABDOMINAL PAIN: 1
WEAKNESS: 0
DIZZINESS: 0
HEMOPTYSIS: 0
NAUSEA: 1
CONSTIPATION: 0
BLURRED VISION: 0
HEARTBURN: 1
WEIGHT LOSS: 0
DIAPHORESIS: 0
DIARRHEA: 0
POLYDIPSIA: 0
BRUISES/BLEEDS EASILY: 0
MEMORY LOSS: 0
SHORTNESS OF BREATH: 0
SENSORY CHANGE: 0
HEADACHES: 0
EYE DISCHARGE: 0

## 2019-08-28 ASSESSMENT — COGNITIVE AND FUNCTIONAL STATUS - GENERAL
WALKING IN HOSPITAL ROOM: A LITTLE
MOBILITY SCORE: 17
SUGGESTED CMS G CODE MODIFIER DAILY ACTIVITY: CI
CLIMB 3 TO 5 STEPS WITH RAILING: A LOT
STANDING UP FROM CHAIR USING ARMS: A LITTLE
TURNING FROM BACK TO SIDE WHILE IN FLAT BAD: A LITTLE
DRESSING REGULAR LOWER BODY CLOTHING: A LITTLE
MOVING TO AND FROM BED TO CHAIR: A LITTLE
DAILY ACTIVITIY SCORE: 23
MOVING FROM LYING ON BACK TO SITTING ON SIDE OF FLAT BED: A LITTLE
SUGGESTED CMS G CODE MODIFIER MOBILITY: CK

## 2019-08-28 ASSESSMENT — LIFESTYLE VARIABLES
HAVE PEOPLE ANNOYED YOU BY CRITICIZING YOUR DRINKING: NO
EVER HAD A DRINK FIRST THING IN THE MORNING TO STEADY YOUR NERVES TO GET RID OF A HANGOVER: NO
CONSUMPTION TOTAL: NEGATIVE
ON A TYPICAL DAY WHEN YOU DRINK ALCOHOL HOW MANY DRINKS DO YOU HAVE: 0
HOW MANY TIMES IN THE PAST YEAR HAVE YOU HAD 5 OR MORE DRINKS IN A DAY: 0
TOTAL SCORE: 0
TOTAL SCORE: 0
AVERAGE NUMBER OF DAYS PER WEEK YOU HAVE A DRINK CONTAINING ALCOHOL: 0
EVER_SMOKED: NEVER
HAVE YOU EVER FELT YOU SHOULD CUT DOWN ON YOUR DRINKING: NO
EVER FELT BAD OR GUILTY ABOUT YOUR DRINKING: NO
SUBSTANCE_ABUSE: 0
TOTAL SCORE: 0
ALCOHOL_USE: NO

## 2019-08-28 ASSESSMENT — COPD QUESTIONNAIRES
DO YOU EVER COUGH UP ANY MUCUS OR PHLEGM?: NO/ONLY WITH OCCASIONAL COLDS OR INFECTIONS
IN THE PAST 12 MONTHS DO YOU DO LESS THAN YOU USED TO BECAUSE OF YOUR BREATHING PROBLEMS: DISAGREE/UNSURE
DURING THE PAST 4 WEEKS HOW MUCH DID YOU FEEL SHORT OF BREATH: NONE/LITTLE OF THE TIME
COPD SCREENING SCORE: 0
HAVE YOU SMOKED AT LEAST 100 CIGARETTES IN YOUR ENTIRE LIFE: NO/DON'T KNOW

## 2019-08-28 NOTE — ED NOTES
Call to schedule Mammogram [facility] : [phone]    Andrew Patel MD, for [employer].   ? 2010 [employer] Kristin Norris, MS, PT, and Ann Ha, PT, [facility], [facility], for [facility].                     Kristin Norris MS, PT, and Ann Ha PT, [facility], hospitals, for [employer].    2011 [facility] and/or its affiliates. All rights reserved.         Copyright   [employer] 2011  Adult Health Advisor

## 2019-08-28 NOTE — ED PROVIDER NOTES
ED Provider Note    Scribed for Tello Pollard M.D. by Chava Luna. 8/27/2019  8:56 PM    Primary care provider: Don Crawford M.D.  Means of arrival: walk in  History obtained from: patient  History limited by: none     CHIEF COMPLAINT  Chief Complaint   Patient presents with   • Sent from Urgent Care   • RUQ Pain       HPI  Landon Allred is a 49 y.o. male who presents to the Emergency Department for worsening hematemesis onset 3 weeks ago. The patient state that he had his upper and lower GI tact scoped prior to the bleeding. He was seen by his doctor who informed him that the bleeding should get better. The patient first went to urgent care when he was unable to see the GI specialist today. It was urgent care who then spent him here due to concern due to his RUQ pain. The patient was told there were concerns for his gallbladder and kidney's.  At this time he also endorses hematochezia, right lower quadrant pain, right lower back pain, and drowsiness but denies any  He states that the hematochezia began two days ago. He is currently anticoagulated with Xarelto. He has a history of pancreatis, IBS, brain lesions, and migraines. He also notes that he has a right inguinal hernia that will receive surgery Friday.     REVIEW OF SYSTEMS  Pertinent positives include: Hemoptysis. Pertinent negatives include: hematuria and headache. See history of present illness. All other systems are negative.     PAST MEDICAL HISTORY   has a past medical history of Abdominal pain, Abnormal gall bladder diagnostic imaging, Allergy, Arthritis, Asthma, Claustrophobia, Clostridium difficile diarrhea, Clotting disorder (HCC), Depression, DVT (deep venous thrombosis) ([facility]), GERD (gastroesophageal reflux disease), Head ache, IBD (inflammatory bowel disease), Migraine, MRSA (methicillin resistant Staphylococcus aureus), Pancreatitis, PE (pulmonary embolism), Psychiatric disorder, and Psychiatric disorder.    SURGICAL HISTORY   has a  "past surgical history that includes nerve ulnar repair or explore; dental extraction(s); closed rx nasal septal fracture; rectal exploration; femur orif; and elbow orif.    SOCIAL HISTORY  Social History     Tobacco Use   • Smoking status: Never Smoker   • Smokeless tobacco: Never Used   Substance Use Topics   • Alcohol use: No   • Drug use: No      Social History     Substance and Sexual Activity   Drug Use No       FAMILY HISTORY  No family history on file.    CURRENT MEDICATIONS  Home Medications     Reviewed by Franky Correa (Pharmacy Tech) on 08/27/19 at 2037  Med List Status: Complete   Medication Last Dose Status   esomeprazole (NEXIUM) 40 MG delayed-release capsule 8/27/2019 Active   HYDROmorphone (DILAUDID) 4 MG Tab 8/26/2019 Active   rivaroxaban (XARELTO) 10 MG Tab tablet 8/26/2019 Active   zonisamide (ZONEGRAN) 50 MG capsule 8/27/2019 Active                ALLERGIES  Allergies   Allergen Reactions   • Ciprofloxacin Anaphylaxis   • Dilaudid [Hydromorphone] Itching     States \"I have an adverse reaction and need benadryl first\"   • Shellfish Allergy Anaphylaxis   • Tape Hives   • Amitriptyline Hcl    • Asa [Aspirin]    • Biofreeze    • Codeine    • Hydrocodone-Acetaminophen    • Ketorolac Tromethamine [Toradol]    • Morphine Vomiting   • Ondansetron [Zofran]    • Other Misc      Can take dilaudid if given benadryl first   • Oxycodone    • Oxycodone-Acetaminophen    • Oxycodone-Aspirin    • Reglan [Metoclopramide]      Has no idea what his RX is   • Tramadol    • Zofran [Ondansetron]        PHYSICAL EXAM  VITAL SIGNS: /81   Pulse 88   Temp 37 °C (98.6 °F) (Temporal)   Resp 16   Ht 1.829 m (6')   Wt 88.5 kg (195 lb 1.7 oz)   SpO2 97%   BMI 26.46 kg/m²     Constitutional: Alert in no apparent distress.  HENT: No signs of trauma, Bilateral external ears normal, Nose normal. Uvula midline.   Eyes: Pupils are equal and reactive, Conjunctiva normal, Non-icteric.   Neck: Normal range of motion, No " tenderness, Supple, No stridor.   Lymphatic: No lymphadenopathy noted.   Cardiovascular: Regular rate and rhythm, no murmurs.   Thorax & Lungs: Normal breath sounds, No respiratory distress, No wheezing, No chest tenderness.   Abdomen:  Soft, No tenderness, No peritoneal signs, No masses, No pulsatile masses.   Skin: Warm, Dry, No erythema, No rash.   Back: No bony tenderness, No CVA tenderness.   Extremities: Intact distal pulses, No edema, No tenderness, No cyanosis.  Musculoskeletal: Good range of motion in all major joints. No tenderness to palpation or major deformities noted.   Neurologic: Alert , Normal motor function, Normal sensory function, No focal deficits noted.   Psychiatric: Affect normal, Judgment normal, Mood normal.     DIAGNOSTIC STUDIES / PROCEDURES    LABS  Labs Reviewed   COMP METABOLIC PANEL - Abnormal; Notable for the following components:       Result Value    Anion Gap 13.0 (*)     Bun 26 (*)     Albumin 5.3 (*)     Total Protein 8.7 (*)     All other components within normal limits   ESTIMATED GFR - Abnormal; Notable for the following components:    GFR If Non  55 (*)     All other components within normal limits   CBC WITH DIFFERENTIAL   LIPASE   TROPONIN   MAGNESIUM   URINALYSIS,CULTURE IF INDICATED   URINE DRUG SCREEN      All labs reviewed by me.        RADIOLOGY  CT-ABDOMEN-PELVIS WITH   Final Result      1.  No acute abdominal or pelvic findings.        The radiologist's interpretation of all radiological studies have been reviewed by me.    COURSE & MEDICAL DECISION MAKING  Nursing notes, VSALMAHx reviewed in chart.    49 y.o. male p/w chief complaint of hemoptysis.    8:56 PM Patient seen and examined at bedside.      The differential diagnoses include but are not limited to:   #GI bleed  large bore IV placed.   Not Tachycardic, normotensive, no e/o hemorraghic shock.  Given empiric PPI for PUD.  Not Given empiric octreotide for variceal bleed given no hx  Nor  ceftriaxone for SBP prophylaxis, as no hx  Unclear etiology at this time.   CBC w/o acute blood loss anemia, no thrombocytopenia.  BMP w/ uremia,    11:27 PM I discussed the patient's case and the above findings with Dr. Fontaine ([facility] internal med) who agrees to admit the patient.        DISPOSITION:  Patient will be admitted to Dr. Fontaine in guarded condition.     FINAL IMPRESSION  1. Gastrointestinal hemorrhage, unspecified gastrointestinal hemorrhage type          I, Chava Luna (Scribe), am scribing for, and in the presence of, Tello Pollard M.D..    Electronically signed by: Chava Luna (Scribe), 8/27/2019    I, Tello Pollard M.D. personally performed the services described in this documentation, as scribed by Chava Luna in my presence, and it is both accurate and complete.  C  The note accurately reflects work and decisions made by me.  Tello Pollard  8/28/2019  6:40 AM

## 2019-08-28 NOTE — SENIOR ADMIT NOTE
Senior Admit Note    Patient: Landon Allred.  MRN: [id number].                               Chief complaint: hematemesis, abdominal pain    HPI:  Mr Allred is a 49 y M w/ PMHx chronic pain, DVT/PE on xarelto, seizures, presenting for hematemesis and abdominal pain. Patient reports he had surveillance upper and lower endoscopy 8/7/19, results as discussed with his gastroenterologist were normal including biopsies. However patient reports since his scope procedures, he has had spoonfuls of coughing/vomiting up blood a few times per day. He notes he has never had this before, blood is sometimes bright sometimes dark. He also reports dark blood mixed with his stool for the past two days. He has been compliant with his xarelto. He also reports right sided abdominal pain which started recently as well. He spoke to his gastroenterologist about his hematemesis but was told everything was okay and plan was to monitor. He is concerned about his gallbladder after speaking with urgent care provider who referred him to ED today. Denies any other abdominal surgeries. He is very particular about his medicines, says he only tolerates benadryl and phenergan for nausea, dilaudid for pain.   In the ED, vital signs stable. Labs show H/H 16.9/50, CBC and CMP quite unremarkable, lipase wnl. FOBT on ED physicians rectal exam was negative. Although patient reports to me ED physician told him FOBT was positive. CT abdomen and pelvis shows no acute findings; does show bilateral inguinal hernias.    Assessment and Plan:    # Hematemesis  # Abdominal pain  # History of DVT/PE on xarelto  # Inguinal hernias    Admit to medicine as observation.  Vital signs stable, hemoglobin stable. Patient may be having mild bleeding from endoscopy biopsy sites which are slowly healing while on xarelto. Upper and lower endoscopy performed 8/7/19 per patient with normal findings. No laboratory or radiologic evidence of any post procedure complications such as  perforation.  Will continue home xarelto for now, monitor for hematemesis or bleeding.  Review ED note when available to check FOBT status.  Consider courtesy call to [facility] to let them know their patient is in the hospital, and if they would like formal consult placed for evaluation of symptoms.  Treat pain with low dose dilaudid which patient does take at home for migraines, only one opiate prescriber per [place] aware search.  Continue home PPI.  Patient has scheduled inguinal hernia repair next week.      DVT prophylaxis: xarelto  Code status: FULL    For complete details, please refer to H&P by intern.     Rob Fontaine M.D.

## 2019-08-28 NOTE — ED NOTES
Pt refusing to urinate at this time. Urinal within reach. Educated on importance of providing urine.

## 2019-08-28 NOTE — PATIENT INSTRUCTIONS
Flank Pain  Flank pain refers to pain that is located on the side of the body between the upper abdomen and the back. The pain may occur over a short period of time (acute) or may be long-term or reoccurring (chronic). It may be mild or severe. Flank pain can be caused by many things.  CAUSES   Some of the more common causes of flank pain include:  · Muscle strains.    · Muscle spasms.    · A disease of your spine (vertebral disk disease).    · A lung infection (pneumonia).    · Fluid around your lungs (pulmonary edema).    · A kidney infection.    · Kidney stones.    · A very painful skin rash caused by the chickenpox virus (shingles).    · Gallbladder disease.    HOME CARE INSTRUCTIONS   Home care will depend on the cause of your pain. In general,  · Rest as directed by your caregiver.  · Drink enough fluids to keep your urine clear or pale yellow.  · Only take over-the-counter or prescription medicines as directed by your caregiver. Some medicines may help relieve the pain.  · Tell your caregiver about any changes in your pain.  · Follow up with your caregiver as directed.  SEEK IMMEDIATE MEDICAL CARE IF:   · Your pain is not controlled with medicine.    · You have new or worsening symptoms.  · Your pain increases.    · You have abdominal pain.    · You have shortness of breath.    · You have persistent nausea or vomiting.    · You have swelling in your abdomen.    · You feel faint or pass out.    · You have blood in your urine.  · You have a fever or persistent symptoms for more than 2-3 days.  · You have a fever and your symptoms suddenly get worse.  MAKE SURE YOU:   · Understand these instructions.  · Will watch your condition.  · Will get help right away if you are not doing well or get worse.  This information is not intended to replace advice given to you by your health care provider. Make sure you discuss any questions you have with your health care provider.  Document Released: 02/08/2007 Document  Revised: 09/11/2013 Document Reviewed: 09/20/2016  [employer] Interactive Patient Education © 2017 [employer]

## 2019-08-28 NOTE — ED NOTES
Pt taken to [facility] by transport. All belongings and chart with patient.    Report called to Minal HOLLEY.

## 2019-08-28 NOTE — ASSESSMENT & PLAN NOTE
Multiple episodes of hematemesis since after the scope of the upper and lower GI and biopsy.  Blood streaked stools  hemodyamically stable  Hb 16.9  GI says to continue management, no need for any scope again.  Observe  Monitor for hematemesis,  IV Fluids 75ml/hr.  Continue PPI.

## 2019-08-28 NOTE — PROGRESS NOTES
Patient arrived on unit via gurney. Able to ambulate to from gurney to bed with hand held assist. A&OX4, VSS on room air. Oriented to room, remote and use of call light.    2 RN skin check complete with Jian, RN  Devices in place PIV LFA, R knee immobilizer, SCDs.  Skin assessed under devices yes-skin intact.  Confirmed pressure ulcers found on NA.  New potential pressure ulcers noted on NA.   The following interventions in place pt able to turn self side to side, extra pillows for support/repositioning.

## 2019-08-28 NOTE — ASSESSMENT & PLAN NOTE
Patient had a h/o pulmonary embolism 6 years ago for which he was on couple of medication didn't help in recurrence   Since he got Xeralto he didn't have any more episodes.  Continue Xeralto.

## 2019-08-28 NOTE — ED NOTES
Pt ambulated to room with assistance from RN. Agree with triage note. Assessment complete. PIV placed. Chart ready for ERP.

## 2019-08-28 NOTE — PROGRESS NOTES
"Subjective:      Pt is a 49 y.o. male who presents with Emesis; Low Back Pain; Loss of Appetite; and Fatigue            HPI  This is a new problem. Pt notes on 8/7/19, 20 days ago he had an upper and lower GI series and notes 3-4 days now of intense right flank pain, scant urine, loss of appetite, exhaustion, RUQ abd pain, throwing and coughing up blood and is concerned his B/L hernia repairs scheduled next week might be canceled. He notes he can only due CTs under complete anesthesia and cannot go to [facility] due to \"legal reasons\".  Pt has not taken any Rx medications for this condition. Pt states the pain is a 9-10/10, aching in nature and worse at night. Pt denies CP, SOB,  paresthesias, headaches, dizziness, change in vision, hives, or other joint pain. The pt's medication list, problem list, and allergies have been evaluated and reviewed during today's visit.    PMH:  Past Medical History:   Diagnosis Date   • Abdominal pain    • Abnormal gall bladder diagnostic imaging    • Allergy    • Arthritis    • Asthma    • Claustrophobia    • Clostridium difficile diarrhea    • Clotting disorder (HCC)    • Depression    • DVT (deep venous thrombosis) (HCC)    • GERD (gastroesophageal reflux disease)    • Head ache    • IBD (inflammatory bowel disease)    • Migraine    • MRSA (methicillin resistant Staphylococcus aureus)    • Pancreatitis    • PE (pulmonary embolism)    • Psychiatric disorder     PTSD   • Psychiatric disorder     Depression       PSH:  Past Surgical History:   Procedure Laterality Date   • DENTAL EXTRACTION(S)     • ELBOW ORIF     • FEMUR ORIF     • NERVE ULNAR REPAIR OR EXPLORE     • PB CLOSED RX NASAL SEPTAL FRACTURE     • RECTAL EXPLORATION         Fam Hx:  the patient's family history is not pertinent to their current complaint      Soc HX:  Social History     Socioeconomic History   • Marital status: Single     Spouse name: Not on file   • Number of children: Not on file   • Years of " education: Not on file   • Highest education level: Not on file   Occupational History   • Not on file   Social Needs   • Financial resource strain: Not on file   • Food insecurity:     Worry: Not on file     Inability: Not on file   • Transportation needs:     Medical: Not on file     Non-medical: Not on file   Tobacco Use   • Smoking status: Never Smoker   • Smokeless tobacco: Never Used   Substance and Sexual Activity   • Alcohol use: No   • Drug use: No   • Sexual activity: Not on file   Lifestyle   • Physical activity:     Days per week: Not on file     Minutes per session: Not on file   • Stress: Not on file   Relationships   • Social connections:     Talks on phone: Not on file     Gets together: Not on file     Attends [employer] service: Not on file     Active member of club or organization: Not on file     Attends meetings of clubs or organizations: Not on file     Relationship status: Not on file   • Intimate partner violence:     Fear of current or ex partner: Not on file     Emotionally abused: Not on file     Physically abused: Not on file     Forced sexual activity: Not on file   Other Topics Concern   • Not on file   Social History Narrative   • Not on file         Medications:    Current Outpatient Medications:   •  Fluticasone Furoate-Vilanterol (BREO ELLIPTA) 200-25 MCG/INH AEROSOL POWDER, BREATH ACTIVATED, Inhale 1 Puff by mouth., Disp: , Rfl:   •  rivaroxaban (XARELTO) 10 MG Tab tablet, Take 10 mg by mouth every bedtime., Disp: , Rfl:   •  diphenhydrAMINE (BENADRYL) 50 MG Cap, Take 50 mg by mouth every 6 hours as needed for Itching (with dilaudid, valium)., Disp: , Rfl:   •  HYDROmorphone (DILAUDID) 4 MG Tab, Take 4 mg by mouth 1 time daily as needed for Severe Pain., Disp: , Rfl:   •  promethazine (PHENERGAN) 25 MG Tab, Take 25 mg by mouth every 8 hours as needed for Nausea/Vomiting., Disp: , Rfl:       Allergies:  Ciprofloxacin; Dilaudid [hydromorphone]; Shellfish allergy; Tape; Amitriptyline  "hcl; Asa [aspirin]; Biofreeze; Codeine; Hydrocodone-acetaminophen; Ketorolac tromethamine [toradol]; Morphine; Ondansetron [zofran]; Other misc; Oxycodone; Oxycodone-acetaminophen; Oxycodone-aspirin; Reglan [metoclopramide]; Tramadol; and Zofran [ondansetron]    ROS  Constitutional: POS \"extreme exhaustion\", loss of appetite  HENT: Negative for congestion and sore throat.    Eyes: Negative for blurred vision, double vision and photophobia.   Respiratory: Negative for cough and shortness of breath. +hemoptysis  Cardiovascular: Negative for chest pain and palpitations.   Gastrointestinal: POS nausea, vomiting, RUQ abdominal pain, hematemesis  Genitourinary: POS right flank pain.   Musculoskeletal: Negative for joint pain and myalgias.   Skin: Negative for itching and rash.   Neurological: Negative for dizziness, tingling and headaches.   Endo/Heme/Allergies: Does not bruise/bleed easily.   Psychiatric/Behavioral: Negative for depression. The patient is not nervous/anxious.           Objective:     /90 (BP Location: Left arm, Patient Position: Standing)   Pulse (!) 102   Temp 36.9 °C (98.4 °F)   Resp 14   Ht 1.829 m (6')   Wt 88.5 kg (195 lb)   SpO2 97%   BMI 26.45 kg/m²      Physical Exam   Abdominal: Soft. Normal appearance and bowel sounds are normal. He exhibits no shifting dullness, no distension, no pulsatile liver, no fluid wave, no abdominal bruit, no ascites, no pulsatile midline mass and no mass. There is no hepatosplenomegaly. There is tenderness in the right upper quadrant. There is rebound, guarding and positive Mckenzie's sign. There is no CVA tenderness and no tenderness at McBurney's point. No hernia.       Musculoskeletal:        Back:           Constitutional: PT is oriented to person, place, and time. PT appears moderately distressed.   HENT:   Head: Normocephalic and atraumatic.   Mouth/Throat: Oropharynx is clear and moist. No oropharyngeal exudate.   Eyes: Conjunctivae normal and EOM " "are normal. Pupils are equal, round, and reactive to light.   Neck: Normal range of motion. Neck supple. No thyromegaly present.   Cardiovascular: Normal rate, regular rhythm, normal heart sounds and intact distal pulses.  Exam reveals no gallop and no friction rub.    No murmur heard.  Pulmonary/Chest: Effort normal and breath sounds normal. No respiratory distress. PT has no wheezes. PT has no rales. Pt exhibits no tenderness.   Musculoskeletal: Normal range of motion. PT exhibits no edema and no tenderness.   Neurological: PT is alert and oriented to person, place, and time. PT has normal reflexes. No cranial nerve deficit.   Skin: Skin is warm and dry. No rash noted. PT is not diaphoretic. No erythema.       Psychiatric: PT has a normal mood and affect. PT behavior is normal. Judgment and thought content normal.        Assessment/Plan:     1. Right flank pain      2. Hemoptysis      3. RUQ abdominal pain      4. Loss of appetite      5. Exhaustion    Due to current symptoms of 10/10 RUQ abd pain, right flank pain, inability to give a urine sample in clinic today, weakness, loss of appetite, exhaustion and coughing and throwing up blood pt will need to be seen at a higher level of triage.  Diff Dx: Esophageal varices, upper GI bleed, nephrolithiasis, cholelithiasis, complications from recent GI procedures  Pt has an extensive medical history and is seen concurrently by many specialists and at the ER many times in the past few months  Pt concerned for B/L hernia repair surgeries next week with his current symptoms and wishes for a full work up   Pt cannot go to [facility] ER due to \"legal reasons\" he notes  Pt states he needs to be a \"direct admit\" in order to perform CTs as he needs to be completely sedated due to hx of anxiety   TO [facility] ER NOW for further evaluation. Spoke with Alexsander on the phone, Charge Nurse at the ER , and he graciously accepted transfer of care for further imaging and evaluation of the " pt. Reiterated to patient that although a provider to provider transfer was made this will not necessarily expedite the ER process.  Pt declines EMS transport to ER now  Pt to go POV to ER now  Rest, fluids encouraged.  AVS with medical info given.  Pt was in full understanding and agreement with the plan.  Differential diagnosis, natural history, supportive care, and indications for immediate follow-up discussed. All questions answered. Patient agrees with the plan of care.  Follow-up as needed if symptoms worsen or fail to improve.

## 2019-08-28 NOTE — ASSESSMENT & PLAN NOTE
Patient with h/o upper limb DVT's about 3 times once in right wrist, and right arm about 4 to 5 years ago. says worked up made as he had family h/o Factor problem. He was also diagnosed with familial disease.  Continue Xeralto.

## 2019-08-28 NOTE — H&P
Internal Medicine Admitting History and Physical    Note Author: Stephania Khan M.D.       Name Landon Allred     1970   Age/Sex 49 y.o. male   MRN [id number]   Code Status Full     After 5PM or if no immediate response to page, please call for cross-coverage  Attending/Team: Edgard See Patient List for primary contact information  Call [phone] to page    1st Call - Day Intern (R1):   Dr. Mcgill 2nd Call - Day Sr. Resident (R2/R3):   Dr. Villar       Chief Complaint:   Hematemesis    HPI:  Mr Allred is a 50 y/o male with pmh of DVT, PE on xeralto,PTSD, depression, chronic pancreatitis, IBS and Inguinal Hernia who presented to the ER for hematemesis and right upper quadrant pain.    He says he visited his gasteroenterologist two weeks ago for a biopsy and ever since then has had 3/4 episodes of spoonfuls of hematemesis and hemoptysis every day. He has also had two episodes of  blood streaked stools since the last two days. This was accompanied by right upper quadrant pain and right lower back pain pain. He has had intermittent sharp right upper quadrant pain, ranging from 8/10 on movement to a 2/10 dull pain at rest.    This was associated with nausea and he says has not been eating or drinking much for the last two weeks. He expressed he gets a rash for which he has a prescription of i.v benadryl. He further expressed the only medications that work for his pain are Dilaudid and valium. He also expressed that if he undergoes a procedure the medications that work best for him are fentanyl and propofol.    In the ED, vital signs stable. Labs show H/H 16.9/50, CBC and CMP quite unremarkable, lipase wnl. CT abdomen and pelvis shows no acute findings; does show bilateral inguinal hernias.       Review of Systems   Constitutional: Negative for chills, diaphoresis, fever, malaise/fatigue and weight loss.   HENT: Positive for sinus pain. Negative for congestion, ear discharge, ear pain, hearing  loss, nosebleeds, sore throat and tinnitus.    Eyes: Negative for blurred vision, double vision, photophobia, pain, discharge and redness.   Respiratory: Positive for hemoptysis. Negative for cough, sputum production, shortness of breath, wheezing and stridor.    Cardiovascular: Negative for chest pain, palpitations, orthopnea, claudication, leg swelling and PND.   Gastrointestinal: Positive for abdominal pain, blood in stool and vomiting. Negative for constipation, diarrhea, heartburn and melena.   Genitourinary: Negative for dysuria, flank pain, frequency, hematuria and urgency.   Musculoskeletal: Positive for back pain. Negative for falls, joint pain, myalgias and neck pain.   Skin: Negative for itching and rash.   Neurological: Positive for headaches. Negative for dizziness, tingling, tremors, sensory change, speech change, focal weakness, seizures, loss of consciousness and weakness.   Endo/Heme/Allergies: Negative for environmental allergies. Does not bruise/bleed easily.   Psychiatric/Behavioral: Negative for depression, hallucinations, memory loss, substance abuse and suicidal ideas. The patient is not nervous/anxious and does not have insomnia.              Past Medical History (Chronic medical problem, known complications and current treatment)    DVT and PE: eliquis  Chronic pancreatitis: not being managed  B/L Inguinal hernia: scheduled for surgery on 6th Sept  IBS  PTSD  Depression    Past Surgical History:  Past Surgical History:   Procedure Laterality Date   • DENTAL EXTRACTION(S)     • ELBOW ORIF     • FEMUR ORIF     • NERVE ULNAR REPAIR OR EXPLORE     • PB CLOSED RX NASAL SEPTAL FRACTURE     • RECTAL EXPLORATION         Current Outpatient Medications:  Home Medications     Reviewed by Franky Correa (Pharmacy Tech) on 08/27/19 at 2037  Med List Status: Complete   Medication Last Dose Status   esomeprazole (NEXIUM) 40 MG delayed-release capsule 8/27/2019 Active   HYDROmorphone (DILAUDID) 4 MG Tab  "8/26/2019 Active   rivaroxaban (XARELTO) 10 MG Tab tablet 8/26/2019 Active   zonisamide (ZONEGRAN) 50 MG capsule 8/27/2019 Active                Medication Allergy/Sensitivities:  Allergies   Allergen Reactions   • Ciprofloxacin Anaphylaxis   • Dilaudid [Hydromorphone] Itching     States \"I have an adverse reaction and need benadryl first\"   • Shellfish Allergy Anaphylaxis   • Tape Hives   • Amitriptyline Hcl    • Asa [Aspirin]    • Biofreeze    • Codeine    • Hydrocodone-Acetaminophen    • Ketorolac Tromethamine [Toradol]    • Morphine Vomiting   • Ondansetron [Zofran]    • Other Misc      Can take dilaudid if given benadryl first   • Oxycodone    • Oxycodone-Acetaminophen    • Oxycodone-Aspirin    • Reglan [Metoclopramide]      Has no idea what his RX is   • Tramadol    • Zofran [Ondansetron]          Family History (mandatory)   No family history on file.   Father: [place]  Maternal Uncle: history of clots    Social History (mandatory)   Social History     Socioeconomic History   • Marital status: Single     Spouse name: Not on file   • Number of children: Not on file   • Years of education: Not on file   • Highest education level: Not on file   Occupational History   • Not on file   Social Needs   • Financial resource strain: Not on file   • Food insecurity:     Worry: Not on file     Inability: Not on file   • Transportation needs:     Medical: Not on file     Non-medical: Not on file   Tobacco Use   • Smoking status: Never Smoker   • Smokeless tobacco: Never Used   Substance and Sexual Activity   • Alcohol use: No   • Drug use: No   • Sexual activity: Not on file   Lifestyle   • Physical activity:     Days per week: Not on file     Minutes per session: Not on file   • Stress: Not on file   Relationships   • Social connections:     Talks on phone: Not on file     Gets together: Not on file     Attends Orthodox service: Not on file     Active member of club or organization: Not on file     Attends meetings of " clubs or organizations: Not on file     Relationship status: Not on file   • Intimate partner violence:     Fear of current or ex partner: Not on file     Emotionally abused: Not on file     Physically abused: Not on file     Forced sexual activity: Not on file   Other Topics Concern   • Not on file   Social History Narrative   • Not on file     Living situation: lives alone  PCP : Don Crawford M.D.    Physical Exam     Vitals:    08/27/19 2349 08/28/19 0100 08/28/19 0259 08/28/19 0315   BP: 104/67 101/78  100/66   Pulse: 75 65 66 75   Resp: 16 18 16 17   Temp:    36.5 °C (97.7 °F)   TempSrc:    Temporal   SpO2: 93% 95% 93% 95%   Weight:       Height:         Body mass index is 26.46 kg/m².  O2 therapy: Pulse Oximetry: 95 %, O2 (LPM): 0, O2 Delivery: None (Room Air)    Physical Exam   Constitutional: He is oriented to person, place, and time and well-developed, well-nourished, and in no distress. No distress.   HENT:   Head: Normocephalic and atraumatic.   Nose: Nose normal.   Mouth/Throat: No oropharyngeal exudate.   Eyes: Pupils are equal, round, and reactive to light. Conjunctivae and EOM are normal. Right eye exhibits no discharge. Left eye exhibits no discharge. No scleral icterus.   Neck: Normal range of motion. Neck supple. No JVD present.   Cardiovascular: Normal rate, regular rhythm, normal heart sounds and intact distal pulses. Exam reveals no gallop and no friction rub.   No murmur heard.  Pulmonary/Chest: Effort normal and breath sounds normal. No stridor. No respiratory distress. He has no wheezes. He has no rales.   Abdominal: Soft. Bowel sounds are normal. He exhibits no distension and no mass. There is tenderness. There is no rebound and no guarding.   Right upper quadrant, Rt lumbar , umblical, epigastric and hypogastric tenderness    Musculoskeletal: Normal range of motion. He exhibits no edema or deformity.   Right lower back tenderness  Negative CVA tenderness   Lymphadenopathy:     He has no  cervical adenopathy.   Neurological: He is alert and oriented to person, place, and time. He has normal reflexes. He displays normal reflexes. No cranial nerve deficit. He exhibits normal muscle tone. Gait normal.   Skin: Skin is warm. No rash noted. He is not diaphoretic. No erythema. No pallor.   Psychiatric: Affect normal.         Data Review       Old Records Request:   Deferred  Current Records review/summary: Completed    Lab Data Review:  Recent Results (from the past 24 hour(s))   CBC WITH DIFFERENTIAL    Collection Time: 08/27/19  8:20 PM   Result Value Ref Range    WBC 5.3 4.8 - 10.8 K/uL    RBC 5.34 4.70 - 6.10 M/uL    Hemoglobin 16.9 14.0 - 18.0 g/dL    Hematocrit 50.2 42.0 - 52.0 %    MCV 94.0 81.4 - 97.8 fL    MCH 31.6 27.0 - 33.0 pg    MCHC 33.7 33.7 - 35.3 g/dL    RDW 43.9 35.9 - 50.0 fL    Platelet Count 198 164 - 446 K/uL    MPV 10.0 9.0 - 12.9 fL    Neutrophils-Polys 62.90 44.00 - 72.00 %    Lymphocytes 28.60 22.00 - 41.00 %    Monocytes 6.40 0.00 - 13.40 %    Eosinophils 1.70 0.00 - 6.90 %    Basophils 0.20 0.00 - 1.80 %    Immature Granulocytes 0.20 0.00 - 0.90 %    Nucleated RBC 0.00 /100 WBC    Neutrophils (Absolute) 3.32 1.82 - 7.42 K/uL    Lymphs (Absolute) 1.51 1.00 - 4.80 K/uL    Monos (Absolute) 0.34 0.00 - 0.85 K/uL    Eos (Absolute) 0.09 0.00 - 0.51 K/uL    Baso (Absolute) 0.01 0.00 - 0.12 K/uL    Immature Granulocytes (abs) 0.01 0.00 - 0.11 K/uL    NRBC (Absolute) 0.00 K/uL   COMP METABOLIC PANEL    Collection Time: 08/27/19  8:20 PM   Result Value Ref Range    Sodium 142 135 - 145 mmol/L    Potassium 3.9 3.6 - 5.5 mmol/L    Chloride 108 96 - 112 mmol/L    Co2 21 20 - 33 mmol/L    Anion Gap 13.0 (H) 0.0 - 11.9    Glucose 94 65 - 99 mg/dL    Bun 26 (H) 8 - 22 mg/dL    Creatinine 1.38 0.50 - 1.40 mg/dL    Calcium 10.4 8.5 - 10.5 mg/dL    AST(SGOT) 18 12 - 45 U/L    ALT(SGPT) 30 2 - 50 U/L    Alkaline Phosphatase 59 30 - 99 U/L    Total Bilirubin 0.9 0.1 - 1.5 mg/dL    Albumin 5.3 (H) 3.2  - 4.9 g/dL    Total Protein 8.7 (H) 6.0 - 8.2 g/dL    Globulin 3.4 1.9 - 3.5 g/dL    A-G Ratio 1.6 g/dL   LIPASE    Collection Time: 19  8:20 PM   Result Value Ref Range    Lipase 24 11 - 82 U/L   ESTIMATED GFR    Collection Time: 19  8:20 PM   Result Value Ref Range    GFR If African American >60 >60 mL/min/1.73 m 2    GFR If Non African American 55 (A) >60 mL/min/1.73 m 2   TROPONIN    Collection Time: 19 11:10 PM   Result Value Ref Range    Troponin T <6 6 - 19 ng/L   EKG    Collection Time: 19 11:15 PM   Result Value Ref Range    Report       [facility] Emergency Dept.    Test Date:  2019  Pt Name:    MANOJ MARISCAL                 Department: ER  MRN:        [id number]                      Room:       [facility]  Gender:     Male                         Technician: [id number]  :        1970                   Requested By:JUAN RAMON MILNER  Order #:    [id number]                    Reading MD:    Measurements  Intervals                                Axis  Rate:       69                           P:          63  AZ:         152                          QRS:        5  QRSD:       84                           T:          25  QT:         424  QTc:        455    Interpretive Statements  SINUS RHYTHM  Compared to ECG 2019 22:34:57  Sinus tachycardia no longer present  Left-axis deviation no longer present         Imaging/Procedures Review:    Independant Imaging Review: Completed  CT-ABDOMEN-PELVIS WITH   Final Result      1.  No acute abdominal or pelvic findings.               EKG:   EKG Independent Review: Completed  QTc:455, HR: 69, Normal Sinus Rhythm, no ST/T changes     Records reviewed and summarized in current documentation :  No  [employer] teaching service handout given to patient:  No         Assessment/Plan     Inguinal hernia, bilateral  Assessment & Plan  Pt to undergo surgery 2019    Hematemesis  Assessment & Plan  Multiple episodes of hematemesis   Blood  streaked stools  H/o upper and lower GI endoscopy with biopsy  hemodyamically stable  Hb 16.9  GI opinion  Observe    Abdominal pain, other specified site  Assessment & Plan  Right Upper and lower quadrant pain  CT abdomen and pelvis unremarkable  Continue low dose dilaudid,which patient does take at home for migraines, only one opiate prescriber per [place] aware search.  Continue home PPI        Anticipated Hospital stay: Observation admit        Quality Measures  Quality-Core Measures   Reviewed items::  Labs reviewed, Medications reviewed and EKG reviewed  DVT prophylaxis - mechanical:  SCDs    PCP: Don Crawford M.D.

## 2019-08-28 NOTE — ED TRIAGE NOTES
"Pt sent from  . Pt notes on 8/7/19, upper and lower GI series and notes 3-4 days now of intense right flank pain, scant urine, loss of appetite, exhaustion, RUQ abd pain, throwing and coughing up blood and is concerned his B/L hernia repairs scheduled next week might be canceled. He notes he can only due CTs under complete anesthesia and cannot go to [facility] due to \"legal reasons\".  "

## 2019-08-28 NOTE — PROGRESS NOTES
Internal Medicine Interval Note  Note Author: Radha Mcgill M.D.     Name Landon Allred     1970   Age/Sex 49 y.o. male   MRN [id number]   Code Status Full Code     After 5PM or if no immediate response to page, please call for cross-coverage  Attending/Team: Dr Crain/Edgard See Patient List for primary contact information  Call [phone] to page    1st Call - Day Intern (R1):   Dr Mcgill 2nd Call - Day Sr. Resident (R2/R3):   Dr Villar         Reason for interval visit  (Principal Problem)   Vomiting blood since 3 weeks      Interval Problem Daily Status Update  (24 hours, problem oriented, brief subjective history, new lab/imaging data pertinent to that problem)   Mr Allred a 49 year old male with Pmx of Chronic pain, DVT/PE diagnosed 6 years ago with DVT in the right wrist and right arm about 3 times on Xeralto, Seizure ,Inguinal hernias, IBS,Fibromaylgia, GERD on Nexium had a EGD on  since then he says he is bringing out blood about 2 to 3 table spoonful. Associated with all medical problems.  Overnight still he has abdominal pain, bringing out blood, headache, back pain,denies any fever.chest pain,   His Labs CBC, chem, ca, mg, lfts, lipase troponin are all WNL  CT Abd-pelvis normal.      Review of Systems   Constitutional: Negative for chills, diaphoresis, fever and weight loss.   HENT: Negative for congestion, ear pain, hearing loss, sore throat and tinnitus.    Eyes: Negative for blurred vision, double vision and photophobia.   Respiratory: Negative for cough, hemoptysis, shortness of breath and wheezing.    Cardiovascular: Negative for chest pain, palpitations, orthopnea and leg swelling.   Gastrointestinal: Positive for abdominal pain, blood in stool, heartburn, nausea and vomiting.   Genitourinary: Negative for dysuria, frequency, hematuria and urgency.   Musculoskeletal: Positive for back pain and myalgias.   Skin: Negative for itching and rash.   Neurological:  Negative for dizziness, tingling, tremors, seizures, weakness and headaches.   Endo/Heme/Allergies: Negative for polydipsia. Does not bruise/bleed easily.       Disposition/Barriers to discharge:   Inpatient/for hematemesis  Consultants/Specialty  GI    PCP: Don Crawford M.D.      Quality Measures  Quality-Core Measures   Reviewed items::  EKG reviewed, Labs reviewed, Medications reviewed and Radiology images reviewed  Cox catheter::  No Cox  DVT: Xeralto.          Physical Exam       Vitals:    08/28/19 0100 08/28/19 0259 08/28/19 0315 08/28/19 0800   BP: 101/78  100/66 (!) 94/66   Pulse: 65 66 75 66   Resp: 18 16 17 16   Temp:   36.5 °C (97.7 °F) 36.6 °C (97.8 °F)   TempSrc:   Temporal Temporal   SpO2: 95% 93% 95% 92%   Weight:       Height:         Body mass index is 26.46 kg/m². Weight: 88.5 kg (195 lb 1.7 oz)  Oxygen Therapy:  Pulse Oximetry: 92 %, O2 (LPM): 0, O2 Delivery: None (Room Air)    Physical Exam   Constitutional: He is oriented to person, place, and time and well-developed, well-nourished, and in no distress. No distress.   HENT:   Head: Normocephalic and atraumatic.   Eyes: Pupils are equal, round, and reactive to light. Conjunctivae and EOM are normal.   Neck: Normal range of motion. Neck supple.   Cardiovascular: Normal rate, regular rhythm and normal heart sounds.   No murmur heard.  Pulmonary/Chest: Effort normal and breath sounds normal. No respiratory distress. He has no wheezes. He has no rales.   Abdominal: Soft. Bowel sounds are normal. He exhibits no distension and no mass. There is tenderness. There is no rebound and no guarding.   Tenderness present over the epigastric and right side of the abdomen.  No swellings in the inguinal area now.   Musculoskeletal: Normal range of motion. He exhibits no edema, tenderness or deformity.   Neurological: He is alert and oriented to person, place, and time. GCS score is 15.   Skin: Skin is warm. No rash noted. He is not diaphoretic. No erythema.              Assessment/Plan     Hematemesis  Assessment & Plan  Multiple episodes of hematemesis since after the scope of the upper and lower GI and biopsy.  Blood streaked stools  hemodyamically stable  Hb 16.9  GI says to continue management, no need for any scope again.  Observe  Monitor for hematemesis,  IV Fluids 75ml/hr.  Continue PPI.    Abdominal pain, other specified site  Assessment & Plan  Epigastric, Right Upper and lower quadrant pain   CT abdomen and pelvis unremarkable  Continue low dose dilaudid,which patient does take at home for migraines, only one opiate prescriber per [place] aware search.  Continue home PPI.        Deep vein thrombosis (DVT) of upper extremity (HCC)  Assessment & Plan  Patient with h/o upper limb DVT's about 3 times once in right wrist, and right arm about 4 to 5 years ago. says worked up made as he had family h/o Factor problem. He was also diagnosed with familial disease.  Continue Xeralto.    Pulmonary embolism (HCC)  Assessment & Plan  Patient had a h/o pulmonary embolism 6 years ago for which he was on couple of medication didn't help in recurrence   Since he got Xeralto he didn't have any more episodes.  Continue Xeralto.    Inguinal hernia, bilateral  Assessment & Plan  Patient with h/o bilateral inguinal hernias.  CT Scan of abdomen and pelvis confirmed.  Pt to undergo surgery 09/06/2019

## 2019-08-28 NOTE — ED NOTES
CT tech states she will give pt oral contrast in the next 15 minutes with oral contrast. Pt medicated with ativan and benadryl (see MAR) for CT scan. Vitals obtained.

## 2019-08-28 NOTE — ASSESSMENT & PLAN NOTE
Patient with h/o bilateral inguinal hernias.  CT Scan of abdomen and pelvis confirmed.  Pt to undergo surgery 09/06/2019

## 2019-08-28 NOTE — ED NOTES
Med rec updated and complete. Allergies reviewed. Met with pt at bedside and  Dicussed current medications and last doses taken.  Pt denies antibiotic use in last 14 days at home. [facility]. All other medications come from [facility].

## 2019-08-28 NOTE — ASSESSMENT & PLAN NOTE
Epigastric, Right Upper and lower quadrant pain   CT abdomen and pelvis unremarkable  Continue low dose dilaudid,which patient does take at home for migraines, only one opiate prescriber per [place] aware search.  Continue home PPI.

## 2019-08-29 ENCOUNTER — APPOINTMENT (OUTPATIENT)
Dept: RADIOLOGY | Facility: MEDICAL CENTER | Age: 49
DRG: 379 | End: 2019-08-29
Attending: STUDENT IN AN ORGANIZED HEALTH CARE EDUCATION/TRAINING PROGRAM
Payer: COMMERCIAL

## 2019-08-29 LAB
ANION GAP SERPL CALC-SCNC: 7 MMOL/L (ref 0–11.9)
BUN SERPL-MCNC: 21 MG/DL (ref 8–22)
CALCIUM SERPL-MCNC: 9 MG/DL (ref 8.5–10.5)
CHLORIDE SERPL-SCNC: 109 MMOL/L (ref 96–112)
CO2 SERPL-SCNC: 24 MMOL/L (ref 20–33)
CREAT SERPL-MCNC: 1.23 MG/DL (ref 0.5–1.4)
ERYTHROCYTE [DISTWIDTH] IN BLOOD BY AUTOMATED COUNT: 44.3 FL (ref 35.9–50)
GLUCOSE SERPL-MCNC: 89 MG/DL (ref 65–99)
HCT VFR BLD AUTO: 43.4 % (ref 42–52)
HGB BLD-MCNC: 14.6 G/DL (ref 14–18)
MAGNESIUM SERPL-MCNC: 2 MG/DL (ref 1.5–2.5)
MCH RBC QN AUTO: 32.1 PG (ref 27–33)
MCHC RBC AUTO-ENTMCNC: 33.6 G/DL (ref 33.7–35.3)
MCV RBC AUTO: 95.4 FL (ref 81.4–97.8)
PLATELET # BLD AUTO: 150 K/UL (ref 164–446)
PMV BLD AUTO: 10.3 FL (ref 9–12.9)
POTASSIUM SERPL-SCNC: 3.7 MMOL/L (ref 3.6–5.5)
RBC # BLD AUTO: 4.55 M/UL (ref 4.7–6.1)
SODIUM SERPL-SCNC: 140 MMOL/L (ref 135–145)
WBC # BLD AUTO: 4.5 K/UL (ref 4.8–10.8)

## 2019-08-29 PROCEDURE — 99232 SBSQ HOSP IP/OBS MODERATE 35: CPT | Mod: GC | Performed by: INTERNAL MEDICINE

## 2019-08-29 PROCEDURE — A9270 NON-COVERED ITEM OR SERVICE: HCPCS | Performed by: STUDENT IN AN ORGANIZED HEALTH CARE EDUCATION/TRAINING PROGRAM

## 2019-08-29 PROCEDURE — 700111 HCHG RX REV CODE 636 W/ 250 OVERRIDE (IP): Performed by: STUDENT IN AN ORGANIZED HEALTH CARE EDUCATION/TRAINING PROGRAM

## 2019-08-29 PROCEDURE — 51798 US URINE CAPACITY MEASURE: CPT

## 2019-08-29 PROCEDURE — 80048 BASIC METABOLIC PNL TOTAL CA: CPT

## 2019-08-29 PROCEDURE — 700102 HCHG RX REV CODE 250 W/ 637 OVERRIDE(OP): Performed by: STUDENT IN AN ORGANIZED HEALTH CARE EDUCATION/TRAINING PROGRAM

## 2019-08-29 PROCEDURE — 85027 COMPLETE CBC AUTOMATED: CPT

## 2019-08-29 PROCEDURE — 71046 X-RAY EXAM CHEST 2 VIEWS: CPT

## 2019-08-29 PROCEDURE — 83735 ASSAY OF MAGNESIUM: CPT

## 2019-08-29 PROCEDURE — 700105 HCHG RX REV CODE 258: Performed by: STUDENT IN AN ORGANIZED HEALTH CARE EDUCATION/TRAINING PROGRAM

## 2019-08-29 PROCEDURE — 770006 HCHG ROOM/CARE - MED/SURG/GYN SEMI*

## 2019-08-29 PROCEDURE — 36415 COLL VENOUS BLD VENIPUNCTURE: CPT

## 2019-08-29 RX ORDER — TAMSULOSIN HYDROCHLORIDE 0.4 MG/1
0.4 CAPSULE ORAL
Status: DISCONTINUED | OUTPATIENT
Start: 2019-08-29 | End: 2019-08-30 | Stop reason: HOSPADM

## 2019-08-29 RX ORDER — DIPHENHYDRAMINE HCL 25 MG
25 TABLET ORAL EVERY 6 HOURS PRN
Status: DISCONTINUED | OUTPATIENT
Start: 2019-08-29 | End: 2019-08-30 | Stop reason: HOSPADM

## 2019-08-29 RX ORDER — POTASSIUM CHLORIDE 20 MEQ/1
20 TABLET, EXTENDED RELEASE ORAL ONCE
Status: COMPLETED | OUTPATIENT
Start: 2019-08-29 | End: 2019-08-29

## 2019-08-29 RX ORDER — DIPHENHYDRAMINE HYDROCHLORIDE 50 MG/ML
25 INJECTION INTRAMUSCULAR; INTRAVENOUS ONCE
Status: COMPLETED | OUTPATIENT
Start: 2019-08-29 | End: 2019-08-29

## 2019-08-29 RX ADMIN — SODIUM CHLORIDE: 9 INJECTION, SOLUTION INTRAVENOUS at 18:25

## 2019-08-29 RX ADMIN — PROMETHAZINE HYDROCHLORIDE 25 MG: 25 TABLET ORAL at 00:06

## 2019-08-29 RX ADMIN — DIPHENHYDRAMINE HCL 25 MG: 25 TABLET ORAL at 22:16

## 2019-08-29 RX ADMIN — ZONISAMIDE 50 MG: 50 CAPSULE ORAL at 06:44

## 2019-08-29 RX ADMIN — HYDROMORPHONE HYDROCHLORIDE 0.5 MG: 1 INJECTION, SOLUTION INTRAMUSCULAR; INTRAVENOUS; SUBCUTANEOUS at 00:01

## 2019-08-29 RX ADMIN — TAMSULOSIN HYDROCHLORIDE 0.4 MG: 0.4 CAPSULE ORAL at 12:13

## 2019-08-29 RX ADMIN — HYDROMORPHONE HYDROCHLORIDE 0.5 MG: 1 INJECTION, SOLUTION INTRAMUSCULAR; INTRAVENOUS; SUBCUTANEOUS at 22:16

## 2019-08-29 RX ADMIN — RIVAROXABAN 10 MG: 20 TABLET, FILM COATED ORAL at 18:00

## 2019-08-29 RX ADMIN — OMEPRAZOLE 20 MG: 20 CAPSULE, DELAYED RELEASE ORAL at 06:44

## 2019-08-29 RX ADMIN — DIPHENHYDRAMINE HYDROCHLORIDE 25 MG: 50 INJECTION INTRAMUSCULAR; INTRAVENOUS at 06:55

## 2019-08-29 RX ADMIN — PROMETHAZINE HYDROCHLORIDE 25 MG: 25 TABLET ORAL at 08:59

## 2019-08-29 RX ADMIN — DIPHENHYDRAMINE HYDROCHLORIDE 25 MG: 50 INJECTION INTRAMUSCULAR; INTRAVENOUS at 14:38

## 2019-08-29 RX ADMIN — POTASSIUM CHLORIDE 20 MEQ: 20 TABLET, EXTENDED RELEASE ORAL at 08:42

## 2019-08-29 RX ADMIN — DIPHENHYDRAMINE HYDROCHLORIDE 25 MG: 50 INJECTION INTRAMUSCULAR; INTRAVENOUS at 00:01

## 2019-08-29 RX ADMIN — SODIUM CHLORIDE: 9 INJECTION, SOLUTION INTRAVENOUS at 04:21

## 2019-08-29 RX ADMIN — HYDROMORPHONE HYDROCHLORIDE 0.5 MG: 1 INJECTION, SOLUTION INTRAMUSCULAR; INTRAVENOUS; SUBCUTANEOUS at 07:00

## 2019-08-29 RX ADMIN — SENNOSIDES, DOCUSATE SODIUM 2 TABLET: 50; 8.6 TABLET, FILM COATED ORAL at 18:00

## 2019-08-29 RX ADMIN — DIPHENHYDRAMINE HYDROCHLORIDE 25 MG: 50 INJECTION INTRAMUSCULAR; INTRAVENOUS at 23:20

## 2019-08-29 RX ADMIN — PROMETHAZINE HYDROCHLORIDE 25 MG: 25 TABLET ORAL at 22:20

## 2019-08-29 RX ADMIN — SENNOSIDES, DOCUSATE SODIUM 2 TABLET: 50; 8.6 TABLET, FILM COATED ORAL at 06:44

## 2019-08-29 RX ADMIN — ZONISAMIDE 50 MG: 50 CAPSULE ORAL at 18:00

## 2019-08-29 RX ADMIN — HYDROMORPHONE HYDROCHLORIDE 0.5 MG: 1 INJECTION, SOLUTION INTRAMUSCULAR; INTRAVENOUS; SUBCUTANEOUS at 14:38

## 2019-08-29 ASSESSMENT — ENCOUNTER SYMPTOMS
ABDOMINAL PAIN: 1
EYE PAIN: 0
POLYDIPSIA: 0
MYALGIAS: 0
BLURRED VISION: 0
WEAKNESS: 0
SEIZURES: 0
HEARTBURN: 1
DIAPHORESIS: 0
DOUBLE VISION: 0
PALPITATIONS: 0
NAUSEA: 1
FEVER: 0
DIARRHEA: 0
DIZZINESS: 0
SPUTUM PRODUCTION: 0
BRUISES/BLEEDS EASILY: 0
BACK PAIN: 0
WEIGHT LOSS: 0
CHILLS: 0
TINGLING: 0
FALLS: 0
COUGH: 0
WHEEZING: 0
HEMOPTYSIS: 0
ORTHOPNEA: 0
PHOTOPHOBIA: 0
NECK PAIN: 0
SHORTNESS OF BREATH: 0
TREMORS: 0
HEADACHES: 1

## 2019-08-29 NOTE — PROGRESS NOTES
Internal Medicine Interval Note  Note Author: Radha Mcgill M.D.     Name Landon Allred     1970   Age/Sex 49 y.o. male   MRN [id number]   Code Status Full code     After 5PM or if no immediate response to page, please call for cross-coverage  Attending/Team: Dr Crain/Edgard See Patient List for primary contact information  Call [phone] to page    1st Call - Day Intern (R1):   Dr Mcgill 2nd Call - Day Sr. Resident (R2/R3):   Dr Villar         Reason for interval visit  (Principal Problem)   Vomiting blood, abdominal pain, since 3 weeks.      Interval Problem Daily Status Update  (24 hours, problem oriented, brief subjective history, new lab/imaging data pertinent to that problem)   Mr Allred 49 year old male with PMx of chronic pain, DVT/PE, Seizures, Inguinal hernias, IBS, fibromyalgia, GERD admitted with history of did an EGD 3 weeks ago since then he is brining out blood about 1 table spoon full.  For his pain he is taking narcotic medication from different facilities. No narcotic checks done in ED.  Overnight he is still c/o brining out blood, abdominal pain continued, difficulty passing urine last night for which bladder scan done found 1000ml but he passed out by himself.  CXR is normal.    Review of Systems   Constitutional: Negative for chills, diaphoresis, fever and weight loss.   HENT: Negative for ear discharge, ear pain, hearing loss and tinnitus.    Eyes: Negative for blurred vision, double vision, photophobia and pain.   Respiratory: Negative for cough, hemoptysis, sputum production, shortness of breath and wheezing.    Cardiovascular: Negative for chest pain, palpitations and orthopnea.   Gastrointestinal: Positive for abdominal pain, heartburn and nausea. Negative for diarrhea.   Genitourinary: Negative for dysuria, frequency, hematuria and urgency.   Musculoskeletal: Negative for back pain, falls, myalgias and neck pain.   Skin: Negative for itching and rash.    Neurological: Positive for headaches. Negative for dizziness, tingling, tremors, seizures and weakness.   Endo/Heme/Allergies: Negative for polydipsia. Does not bruise/bleed easily.       Disposition/Barriers to discharge:   Inpatient/Hematemesis.    Consultants/Specialty  Gastroenterology.    PCP: Don Crawford M.D.      Quality Measures  Quality-Core Measures   Reviewed items::  Labs reviewed and Medications reviewed  Cox catheter::  No Cox  DVT: xeralto.  Ulcer Prophylaxis::  Yes          Physical Exam       Vitals:    08/28/19 2307 08/29/19 0200 08/29/19 0514 08/29/19 0800   BP: (!) 97/67 (!) 97/64 (!) 98/66 103/63   Pulse: (!) 54 (!) 58 (!) 54 (!) 57   Resp:  16 18 16   Temp:  36.8 °C (98.2 °F) 36.2 °C (97.2 °F) 36.4 °C (97.6 °F)   TempSrc:  Temporal Temporal Temporal   SpO2:  92% 93% 93%   Weight:       Height:         Body mass index is 26.46 kg/m².    Oxygen Therapy:  Pulse Oximetry: 93 %, O2 (LPM): 0, O2 Delivery: None (Room Air)    Physical Exam   Constitutional: He is oriented to person, place, and time and well-developed, well-nourished, and in no distress. No distress.   HENT:   Head: Normocephalic and atraumatic.   Eyes: Pupils are equal, round, and reactive to light. Conjunctivae and EOM are normal.   Neck: Normal range of motion. Neck supple. No thyromegaly present.   Cardiovascular: Normal rate, regular rhythm and normal heart sounds.   No murmur heard.  Pulmonary/Chest: Breath sounds normal. He is in respiratory distress. He has no wheezes.   Abdominal: Soft. He exhibits no distension. There is tenderness. There is no rebound and no guarding.   Musculoskeletal: Normal range of motion. He exhibits no edema or tenderness.   Neurological: He is alert and oriented to person, place, and time. GCS score is 15.   Skin: Skin is warm. No rash noted. He is not diaphoretic. No erythema.   Psychiatric: Affect normal.             Assessment/Plan     Hematemesis  Assessment & Plan  Multiple episodes of  hematemesis since after the scope of the upper and lower GI and biopsy.  Blood streaked stools  hemodyamically stable  Hb 16.9  GI says to continue management, no need for any scope again.  Observe  Monitor for hematemesis,  IV Fluids 75ml/hr.  Continue PPI.    Abdominal pain, other specified site  Assessment & Plan  Epigastric, Right Upper and lower quadrant pain   CT abdomen and pelvis unremarkable  Continue low dose dilaudid,which patient does take at home for migraines, only one opiate prescriber per [place] aware search.  Continue home PPI.        Deep vein thrombosis (DVT) of upper extremity (HCC)  Assessment & Plan  Patient with h/o upper limb DVT's about 3 times once in right wrist, and right arm about 4 to 5 years ago. says worked up made as he had family h/o Factor problem. He was also diagnosed with familial disease.  Continue Xeralto.    Pulmonary embolism (HCC)  Assessment & Plan  Patient had a h/o pulmonary embolism 6 years ago for which he was on couple of medication didn't help in recurrence   Since he got Xeralto he didn't have any more episodes.  Continue Xeralto.    Inguinal hernia, bilateral  Assessment & Plan  Patient with h/o bilateral inguinal hernias.  CT Scan of abdomen and pelvis confirmed.  Pt to undergo surgery 09/06/2019

## 2019-08-29 NOTE — CARE PLAN
Problem: Communication  Goal: The ability to communicate needs accurately and effectively will improve  Outcome: PROGRESSING AS EXPECTED  Pt encouraged to voice feelings,concerns, and questions. Pt asked about medications, educated on omeprazole, pt verbalized understanding. No further questions/needs at this time.     Problem: Pain Management  Goal: Pain level will decrease to patient's comfort goal  Outcome: PROGRESSING AS EXPECTED   Pt educated on pain control methods, constipation with narcotic use, pain level decreases with medication and distraction. Pt denies further needs at this time.

## 2019-08-30 VITALS
HEIGHT: 72 IN | SYSTOLIC BLOOD PRESSURE: 100 MMHG | WEIGHT: 195.11 LBS | BODY MASS INDEX: 26.43 KG/M2 | RESPIRATION RATE: 16 BRPM | OXYGEN SATURATION: 92 % | TEMPERATURE: 98.3 F | HEART RATE: 74 BPM | DIASTOLIC BLOOD PRESSURE: 66 MMHG

## 2019-08-30 PROCEDURE — 700102 HCHG RX REV CODE 250 W/ 637 OVERRIDE(OP): Performed by: STUDENT IN AN ORGANIZED HEALTH CARE EDUCATION/TRAINING PROGRAM

## 2019-08-30 PROCEDURE — 700105 HCHG RX REV CODE 258: Performed by: STUDENT IN AN ORGANIZED HEALTH CARE EDUCATION/TRAINING PROGRAM

## 2019-08-30 PROCEDURE — A9270 NON-COVERED ITEM OR SERVICE: HCPCS | Performed by: STUDENT IN AN ORGANIZED HEALTH CARE EDUCATION/TRAINING PROGRAM

## 2019-08-30 PROCEDURE — 99239 HOSP IP/OBS DSCHRG MGMT >30: CPT | Mod: GC | Performed by: INTERNAL MEDICINE

## 2019-08-30 RX ADMIN — SENNOSIDES, DOCUSATE SODIUM 2 TABLET: 50; 8.6 TABLET, FILM COATED ORAL at 05:53

## 2019-08-30 RX ADMIN — SODIUM CHLORIDE: 9 INJECTION, SOLUTION INTRAVENOUS at 06:01

## 2019-08-30 RX ADMIN — TAMSULOSIN HYDROCHLORIDE 0.4 MG: 0.4 CAPSULE ORAL at 08:33

## 2019-08-30 RX ADMIN — OMEPRAZOLE 20 MG: 20 CAPSULE, DELAYED RELEASE ORAL at 05:53

## 2019-08-30 RX ADMIN — ZONISAMIDE 50 MG: 50 CAPSULE ORAL at 05:53

## 2019-08-30 NOTE — DISCHARGE PLANNING
Patient lives alone he is disabled. He has Medicare and [facility] as secondary payor. He has multiple medical issues. He reports that he has his own vehicle here and can drive himself home. He reports that he is having hernia and sinus surgery next week at [facility]. No discharge needs anticipated.     Care Transition Team Assessment    Information Source  Orientation : Oriented x 4  Information Given By: Patient  Informant's Name: MANOJ    Readmission Evaluation  Is this a readmission?: Yes - unplanned readmission    Elopement Risk  Legal Hold: No  Ambulatory or Self Mobile in Wheelchair: Yes  Disoriented: No  Psychiatric Symptoms: None  History of Wandering: No  Elopement this Admit: No  Vocalizing Wanting to Leave: No  Displays Behaviors, Body Language Wanting to Leave: No-Not at Risk for Elopement  Elopement Risk: Not at Risk for Elopement    Interdisciplinary Discharge Planning  Patient or legal guardian wants to designate a caregiver (see row info): No    Discharge Preparedness  What is your plan after discharge?: Home with help  What are your discharge supports?: Other (comment)(Friend)  Prior Functional Level: Independent with Activities of Daily Living, Independent with Medication Management    Functional Assesment  Prior Functional Level: Independent with Activities of Daily Living, Independent with Medication Management    Finances  Financial Barriers to Discharge: No  Prescription Coverage: Yes    Vision / Hearing Impairment  Vision Impairment : No  Hearing Impairment : No         Advance Directive  Advance Directive?: None  Advance Directive offered?: AD Booklet refused    Domestic Abuse  Have you ever been the victim of abuse or violence?: No    Psychological Assessment  History of Substance Abuse: None    Discharge Risks or Barriers  Discharge risks or barriers?: Complex medical needs  Patient risk factors: Vulnerable adult    Anticipated Discharge Information  Anticipated discharge  disposition: Home

## 2019-08-30 NOTE — CARE PLAN
Problem: Safety  Goal: Will remain free from falls  Outcome: PROGRESSING AS EXPECTED    Pt reinforced on use of call light when in need of assistance, pt verbalized understanding.     Problem: Pain Management  Goal: Pain level will decrease to patient's comfort goal  Outcome: PROGRESSING AS EXPECTED     Pt reinforced on pain scale 0-10, pt verbalized understanding. Pt stated medication reduces pain the best, with anti-emetic medications administered at the same time.

## 2019-08-30 NOTE — DISCHARGE INSTRUCTIONS
Discharge Instructions    Discharged to home by car with self. Discharged via walking, hospital escort: Refused.  Special equipment needed: Not Applicable    Be sure to schedule a follow-up appointment with your primary care doctor or any specialists as instructed.     Discharge Plan:   Influenza Vaccine Indication: Not indicated: Previously immunized this influenza season and > 8 years of age    I understand that a diet low in cholesterol, fat, and sodium is recommended for good health. Unless I have been given specific instructions below for another diet, I accept this instruction as my diet prescription.   Other diet: Regular    Special Instructions: None    · Is patient discharged on Warfarin / Coumadin?   No     Depression / Suicide Risk    As you are discharged from this [facility] facility, it is important to learn how to keep safe from harming yourself.    Recognize the warning signs:  · Abrupt changes in personality, positive or negative- including increase in energy   · Giving away possessions  · Change in eating patterns- significant weight changes-  positive or negative  · Change in sleeping patterns- unable to sleep or sleeping all the time   · Unwillingness or inability to communicate  · Depression  · Unusual sadness, discouragement and loneliness  · Talk of wanting to die  · Neglect of personal appearance   · Rebelliousness- reckless behavior  · Withdrawal from people/activities they love  · Confusion- inability to concentrate     If you or a loved one observes any of these behaviors or has concerns about self-harm, here's what you can do:  · Talk about it- your feelings and reasons for harming yourself  · Remove any means that you might use to hurt yourself (examples: pills, rope, extension cords, firearm)  · Get professional help from the community (Mental Health, Substance Abuse, psychological counseling)  · Do not be alone:Call your Safe Contact- someone whom you trust who will be there for  you.  · Call your local CRISIS HOTLINE 334-8989 or 123-038-4143  · Call your local Children's Mobile Crisis Response Team [place] (348) 431-8632 or www.[place]  · Call the toll free National Suicide Prevention Hotlines   · National Suicide Prevention Lifeline 870-728-QRQZ (8059)  · [employer] Line Network 800-SUICIDE (056-3962)    The patient is medically stable within his chronic conditions to be discharged home with gastroenterology, neurology and primary care follow-up.   Please asked the patient to call his outpatient providers and to schedule follow-up in the next 5 to 10 days as discussed.   Please remind him to avoid using narcotics.   Remind the patient to [facility] Psychology [facility].   Encourage oral hydration and a healthy, well-balance diet to help with energy and mood.

## 2019-08-30 NOTE — PROGRESS NOTES
Paged on call [facility] doctor regarding IV benedryl, due to patient statement that it is the only medicine route that works to prevent severe itching with dilaudid. On call doctor said that pt had drug seeking behavior, no IV benedryl to be given, after giving oral benedryl and IV dilaudid, if any reaction occurs to call immediately. Pt took oral benedryl and dilaudid, reaction of severe itching did occur, received orders for 1 time dose IV bendryl.

## 2019-08-30 NOTE — DISCHARGE SUMMARY
Internal Medicine Discharge Summary    Note Author: Luz Marina Villar M.D.     Name Landon Allred 1970   Age and Sex 49 y.o. male   MRN [id number]       Admit Date: 2019       Discharge Date: 2019    Service: [facility] Internal Medicine [place] Team  Attending Physician(s): Dr. Crain     Senior Resident(s): Dr. Villar  Abilio Resident(s): Dr. Mcgill  PCP: Don Crawford M.D.      Primary Diagnosis:   Hematemesis    Secondary Diagnoses:                Active Problems:    Hematemesis POA: Unknown    Inguinal hernia, bilateral POA: Unknown    Pulmonary embolism (HCC) POA: Unknown    Deep vein thrombosis (DVT) of upper extremity (HCC) POA: Unknown  Resolved Problems:    * No resolved hospital problems. *      Admission HPI:  49-year-old male with multiple medical problems, including chronic pain with long-term narcotic use, fibromyalgia, anxiety disorder, bilateral inguinal hernias and history of PE and DVT on Xarelto who presented with hematemesis and right-sided abdominal pain. The patient reported onset of hematemesis and right sided abdominal pain after he underwent a surveillance upper and lower endoscopy on 2019 for his family history of colon cancer in his father. He described spitting up spoonfuls of blood a few times per day. He notes he has never had this before, blood is sometimes bright sometimes dark. He also reports dark blood mixed with his stool for the past two days. He has been compliant with his xarelto. He also reports right sided abdominal pain which started recently as well.  He reports following up with his gastroenterologist about his hematemesis and abdominal pain and being told that everything seemed okay and that he should continue to monitor his symptoms. He is concerned about his gallbladder after speaking with urgent care provider who referred him to ED today. Denies any other abdominal surgeries. He is very particular about his medicines, says he only  tolerates benadryl and phenergan for nausea, dilaudid for pain.   In the ED, vital signs stable. Labs show H/H 16.9/50, CBC and CMP quite unremarkable, lipase wnl. FOBT on ED physicians rectal exam was negative. Although patient reports to me ED physician told him FOBT was positive. CT abdomen and pelvis shows no acute findings; does show bilateral inguinal hernias.    The patient's GI records from [facility] were personally reviewed. His EGD was unremarkable except for some gastritis; stomach and distal esophagus biopsies were benign. The patient's colonoscopy showed normal colonic mucosa no evidence of hemorrhoids.       Hospital Summary:     Hematemesis  Assessment & Plan  Multiple episodes of hematemesis since after the scope of the upper and lower GI and biopsy.  Blood streaked stools  hemodyamically stable  Hb 16.9  GI says to continue management, no need for any scope again.  Observe  Monitor for hematemesis,  IV Fluids 75ml/hr.  Continue PPI.    Abdominal pain, other specified site  Assessment & Plan  Epigastric, Right Upper and lower quadrant pain   CT abdomen and pelvis unremarkable  Continue low dose dilaudid,which patient does take at home for migraines, only one opiate prescriber per [place] aware search.  Continue home PPI.        Deep vein thrombosis (DVT) of upper extremity (HCC)  Assessment & Plan  Patient with h/o upper limb DVT's about 3 times once in right wrist, and right arm about 4 to 5 years ago. says worked up made as he had family h/o Factor problem. He was also diagnosed with familial disease.  Continue Xeralto.    Pulmonary embolism (HCC)  Assessment & Plan  Patient had a h/o pulmonary embolism 6 years ago for which he was on couple of medication didn't help in recurrence   Since he got Xeralto he didn't have any more episodes.  Continue Xeralto.    Inguinal hernia, bilateral  Assessment & Plan  Patient with h/o bilateral inguinal hernias.  CT Scan of abdomen and pelvis  confirmed.  Pt to undergo surgery 09/06/2019        Consultants:     GI      Procedures and Imaging:        None      Discharge Medications:          Medication Reconciliation: Completed       Medication List      CONTINUE taking these medications      Instructions   NEXIUM 40 MG delayed-release capsule  Generic drug:  esomeprazole   Take 40 mg by mouth every morning before breakfast.  Dose:  40 mg     XARELTO 10 MG Tabs tablet  Generic drug:  rivaroxaban   Take 10 mg by mouth with dinner.  Dose:  10 mg     zonisamide 50 MG capsule  Commonly known as:  ZONEGRAN   Take 50 mg by mouth 2 times a day.  Dose:  50 mg          Disposition: Home  Diet: Healthy  Activity: As tolerated      Instructions:      The patient was instructed to return to the ER in the event of worsening symptoms. I have counseled the patient on the importance of compliance and the patient has agreed to proceed with all medical recommendations and follow up plan indicated above.   The patient understands that all medications come with benefits and risks. Risks may include permanent injury or death and these risks can be minimized with close reassessment and monitoring.          Primary Care Provider:   Don Crawford M.D.      Follow Appointments:      Call to schedule an appointment in 5-7 days.   Don Crawford M.D.  [address] [address]  [phone]      Pending Studies:        None      Summary of Follow-up Issues:   Per problem-based hospital course above.      Discharge Time: 125 minutes (amount of time spent on discharge day patient visit, preparing discharge paperwork and arranging for patient follow up)  Hospital Course Type:  Inpatient Stay >2 midnights  Condition on Discharge: Stable within his chronic conditions.    ______________________________________________________________________      Interval History and Physical Exam for Day of Discharge:    No acute events.  Notes less pain, urinating regularly, had a  large BM today.      Most Recent Labs:    Lab Results   Component Value Date/Time    WBC 4.5 (L) 08/29/2019 04:09 AM    RBC 4.55 (L) 08/29/2019 04:09 AM    HEMOGLOBIN 14.6 08/29/2019 04:09 AM    HEMATOCRIT 43.4 08/29/2019 04:09 AM    MCV 95.4 08/29/2019 04:09 AM    MCH 32.1 08/29/2019 04:09 AM    MCHC 33.6 (L) 08/29/2019 04:09 AM    MPV 10.3 08/29/2019 04:09 AM    NEUTSPOLYS 62.90 08/27/2019 08:20 PM    LYMPHOCYTES 28.60 08/27/2019 08:20 PM    MONOCYTES 6.40 08/27/2019 08:20 PM    EOSINOPHILS 1.70 08/27/2019 08:20 PM    BASOPHILS 0.20 08/27/2019 08:20 PM      Lab Results   Component Value Date/Time    SODIUM 140 08/29/2019 04:09 AM    POTASSIUM 3.7 08/29/2019 04:09 AM    CHLORIDE 109 08/29/2019 04:09 AM    CO2 24 08/29/2019 04:09 AM    GLUCOSE 89 08/29/2019 04:09 AM    BUN 21 08/29/2019 04:09 AM    CREATININE 1.23 08/29/2019 04:09 AM      Lab Results   Component Value Date/Time    ALTSGPT 30 08/27/2019 08:20 PM    ASTSGOT 18 08/27/2019 08:20 PM    ALKPHOSPHAT 59 08/27/2019 08:20 PM    TBILIRUBIN 0.9 08/27/2019 08:20 PM    LIPASE 24 08/27/2019 08:20 PM    ALBUMIN 5.3 (H) 08/27/2019 08:20 PM    GLOBULIN 3.4 08/27/2019 08:20 PM    INR 0.97 01/29/2019 07:45 PM     Lab Results   Component Value Date/Time    PROTHROMBTM 13.0 01/29/2019 07:45 PM    INR 0.97 01/29/2019 07:45 PM

## 2019-11-18 ENCOUNTER — APPOINTMENT (OUTPATIENT)
Dept: RADIOLOGY | Facility: MEDICAL CENTER | Age: 49
End: 2019-11-18
Attending: EMERGENCY MEDICINE
Payer: MEDICARE

## 2019-11-18 ENCOUNTER — HOSPITAL ENCOUNTER (EMERGENCY)
Facility: MEDICAL CENTER | Age: 49
End: 2019-11-19
Attending: EMERGENCY MEDICINE
Payer: MEDICARE

## 2019-11-18 DIAGNOSIS — R11.2 NON-INTRACTABLE VOMITING WITH NAUSEA, UNSPECIFIED VOMITING TYPE: ICD-10-CM

## 2019-11-18 DIAGNOSIS — G47.00 INSOMNIA, UNSPECIFIED TYPE: ICD-10-CM

## 2019-11-18 DIAGNOSIS — M54.2 ACUTE NECK PAIN: ICD-10-CM

## 2019-11-18 DIAGNOSIS — S09.90XA CLOSED HEAD INJURY, INITIAL ENCOUNTER: ICD-10-CM
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
## 2019-11-18 DIAGNOSIS — S00.83XA CONTUSION OF FACE, INITIAL ENCOUNTER: ICD-10-CM

## 2019-11-18 DIAGNOSIS — R63.0 POOR APPETITE: ICD-10-CM

## 2019-11-18 DIAGNOSIS — R55 SYNCOPE, UNSPECIFIED SYNCOPE TYPE: ICD-10-CM

## 2019-11-18 LAB
ALBUMIN SERPL BCP-MCNC: 4.9 G/DL (ref 3.2–4.9)
ALBUMIN/GLOB SERPL: 1.7 G/DL
ALP SERPL-CCNC: 69 U/L (ref 30–99)
ALT SERPL-CCNC: 30 U/L (ref 2–50)
ANION GAP SERPL CALC-SCNC: 11 MMOL/L (ref 0–11.9)
AST SERPL-CCNC: 20 U/L (ref 12–45)
BASOPHILS # BLD AUTO: 0.3 % (ref 0–1.8)
BASOPHILS # BLD: 0.02 K/UL (ref 0–0.12)
BILIRUB SERPL-MCNC: 0.8 MG/DL (ref 0.1–1.5)
BUN SERPL-MCNC: 22 MG/DL (ref 8–22)
CALCIUM SERPL-MCNC: 10.1 MG/DL (ref 8.5–10.5)
CHLORIDE SERPL-SCNC: 109 MMOL/L (ref 96–112)
CO2 SERPL-SCNC: 22 MMOL/L (ref 20–33)
CREAT SERPL-MCNC: 1.27 MG/DL (ref 0.5–1.4)
EKG IMPRESSION: NORMAL
EOSINOPHIL # BLD AUTO: 0.05 K/UL (ref 0–0.51)
EOSINOPHIL NFR BLD: 0.8 % (ref 0–6.9)
ERYTHROCYTE [DISTWIDTH] IN BLOOD BY AUTOMATED COUNT: 47.2 FL (ref 35.9–50)
GLOBULIN SER CALC-MCNC: 2.9 G/DL (ref 1.9–3.5)
GLUCOSE SERPL-MCNC: 92 MG/DL (ref 65–99)
HCT VFR BLD AUTO: 52 % (ref 42–52)
HGB BLD-MCNC: 17.3 G/DL (ref 14–18)
IMM GRANULOCYTES # BLD AUTO: 0.01 K/UL (ref 0–0.11)
IMM GRANULOCYTES NFR BLD AUTO: 0.2 % (ref 0–0.9)
LIPASE SERPL-CCNC: 16 U/L (ref 11–82)
LYMPHOCYTES # BLD AUTO: 1.42 K/UL (ref 1–4.8)
LYMPHOCYTES NFR BLD: 22.5 % (ref 22–41)
MCH RBC QN AUTO: 32.2 PG (ref 27–33)
MCHC RBC AUTO-ENTMCNC: 33.3 G/DL (ref 33.7–35.3)
MCV RBC AUTO: 96.7 FL (ref 81.4–97.8)
MONOCYTES # BLD AUTO: 0.5 K/UL (ref 0–0.85)
MONOCYTES NFR BLD AUTO: 7.9 % (ref 0–13.4)
NEUTROPHILS # BLD AUTO: 4.31 K/UL (ref 1.82–7.42)
NEUTROPHILS NFR BLD: 68.3 % (ref 44–72)
NRBC # BLD AUTO: 0 K/UL
NRBC BLD-RTO: 0 /100 WBC
PLATELET # BLD AUTO: 185 K/UL (ref 164–446)
PMV BLD AUTO: 10.2 FL (ref 9–12.9)
POTASSIUM SERPL-SCNC: 3.9 MMOL/L (ref 3.6–5.5)
PROT SERPL-MCNC: 7.8 G/DL (ref 6–8.2)
RBC # BLD AUTO: 5.38 M/UL (ref 4.7–6.1)
SODIUM SERPL-SCNC: 142 MMOL/L (ref 135–145)
TROPONIN T SERPL-MCNC: <6 NG/L (ref 6–19)
TSH SERPL DL<=0.005 MIU/L-ACNC: 2.12 UIU/ML (ref 0.38–5.33)
WBC # BLD AUTO: 6.3 K/UL (ref 4.8–10.8)

## 2019-11-18 PROCEDURE — 72125 CT NECK SPINE W/O DYE: CPT

## 2019-11-18 PROCEDURE — 700102 HCHG RX REV CODE 250 W/ 637 OVERRIDE(OP): Performed by: EMERGENCY MEDICINE

## 2019-11-18 PROCEDURE — 99285 EMERGENCY DEPT VISIT HI MDM: CPT

## 2019-11-18 PROCEDURE — 83690 ASSAY OF LIPASE: CPT

## 2019-11-18 PROCEDURE — 96374 THER/PROPH/DIAG INJ IV PUSH: CPT

## 2019-11-18 PROCEDURE — 71046 X-RAY EXAM CHEST 2 VIEWS: CPT

## 2019-11-18 PROCEDURE — A9270 NON-COVERED ITEM OR SERVICE: HCPCS | Performed by: EMERGENCY MEDICINE

## 2019-11-18 PROCEDURE — 93005 ELECTROCARDIOGRAM TRACING: CPT

## 2019-11-18 PROCEDURE — 84484 ASSAY OF TROPONIN QUANT: CPT

## 2019-11-18 PROCEDURE — 84443 ASSAY THYROID STIM HORMONE: CPT

## 2019-11-18 PROCEDURE — 70450 CT HEAD/BRAIN W/O DYE: CPT

## 2019-11-18 PROCEDURE — 700111 HCHG RX REV CODE 636 W/ 250 OVERRIDE (IP): Performed by: EMERGENCY MEDICINE

## 2019-11-18 PROCEDURE — 85025 COMPLETE CBC W/AUTO DIFF WBC: CPT

## 2019-11-18 PROCEDURE — 96375 TX/PRO/DX INJ NEW DRUG ADDON: CPT

## 2019-11-18 PROCEDURE — 70486 CT MAXILLOFACIAL W/O DYE: CPT

## 2019-11-18 PROCEDURE — 80053 COMPREHEN METABOLIC PANEL: CPT

## 2019-11-18 PROCEDURE — 93005 ELECTROCARDIOGRAM TRACING: CPT | Performed by: EMERGENCY MEDICINE

## 2019-11-18 RX ORDER — LORAZEPAM 2 MG/ML
1 INJECTION INTRAMUSCULAR ONCE
Status: COMPLETED | OUTPATIENT
Start: 2019-11-18 | End: 2019-11-18

## 2019-11-18 RX ORDER — DIPHENHYDRAMINE HYDROCHLORIDE 50 MG/ML
25 INJECTION INTRAMUSCULAR; INTRAVENOUS ONCE
Status: COMPLETED | OUTPATIENT
Start: 2019-11-18 | End: 2019-11-18

## 2019-11-18 RX ORDER — PROMETHAZINE HYDROCHLORIDE 25 MG/1
25 TABLET ORAL ONCE
Status: COMPLETED | OUTPATIENT
Start: 2019-11-18 | End: 2019-11-18

## 2019-11-18 RX ADMIN — PROMETHAZINE HYDROCHLORIDE 25 MG: 25 TABLET ORAL at 21:12

## 2019-11-18 RX ADMIN — LORAZEPAM 1 MG: 2 INJECTION INTRAMUSCULAR; INTRAVENOUS at 19:46

## 2019-11-18 RX ADMIN — DIPHENHYDRAMINE HYDROCHLORIDE 25 MG: 50 INJECTION INTRAMUSCULAR; INTRAVENOUS at 19:46

## 2019-11-19 VITALS
RESPIRATION RATE: 16 BRPM | SYSTOLIC BLOOD PRESSURE: 118 MMHG | HEIGHT: 72 IN | OXYGEN SATURATION: 98 % | DIASTOLIC BLOOD PRESSURE: 82 MMHG | BODY MASS INDEX: 26.55 KG/M2 | WEIGHT: 195.99 LBS | TEMPERATURE: 98.2 F | HEART RATE: 74 BPM

## 2019-11-19 RX ORDER — LIDOCAINE 50 MG/G
1 PATCH TOPICAL EVERY 24 HOURS
Qty: 5 PATCH | Refills: 0 | Status: SHIPPED | OUTPATIENT
Start: 2019-11-19 | End: 2019-11-25

## 2019-11-19 NOTE — ED NOTES
"Pt still has pain he reports as 10/10- location posterior left head radiating down to shoulder. Reports nothing helps with pain except \"Dilaudid with Benedryl\". Pt is not diaphoretic, pulse is normal, no grimacing or other signs of distress.   "

## 2019-11-19 NOTE — ED NOTES
ERP aware pt up for re-evaluation. Pt continues to sleep, no emesis since writer took over cares.

## 2019-11-19 NOTE — ED TRIAGE NOTES
Pt to triage .  Chief Complaint   Patient presents with   • T-5000 FALL     fall x 2 yesterday    • N/V     possible blood in vomit but may have broken a tooth during fall    • Loss of Appetite   • Syncope     thursday    • Insomnia   • Dental Pain   • Migraine

## 2019-11-19 NOTE — ED PROVIDER NOTES
ED Provider Note    CHIEF COMPLAINT  Chief Complaint   Patient presents with   • T-5000 FALL     fall x 2 yesterday    • N/V     possible blood in vomit but may have broken a tooth during fall    • Loss of Appetite   • Syncope     thursday    • Insomnia   • Dental Pain   • Migraine        HPI    Primary care provider: Fausto Bill P.A.-C.   History obtained from: Patient  History limited by: None     Landon Allred is a 49 y.o. male who presents to the ED with multiple complaints.  Patient states that he fell 4 days ago and again yesterday.  He states that 4 days ago he was sitting on the toilet when he apparently passed out and woke up a few hours later on the floor.  He states that he almost passed out yesterday but did not.  He hit the back of his head as well as the left side of his jaw and is complaining of severe pounding left posterior headache as well as left jaw pain and neck pain.  He believes he may have broke some of his left lower teeth and that may have caused some bleeding which he swallowed and vomited up.  He vomited some blood yesterday.  He states that he was admitted to this hospital about a month ago for vomiting blood.  Patient states that he has been unable to eat or sleep for the past 2 weeks and getting progressively weaker.  He has nausea every time he tries to eat.  He also reports right lower quadrant abdominal pain that has been persistent.  Patient states he was evaluated at [facility] for his abdominal pain and he states that there is a family history of colon cancer.  He denies diarrhea/constipation/hematochezia/melena/dysuria/hematuria.  He states that he keeps an eye on those things because he is on Xarelto.  Patient is on Xarelto for PE.  He also states that he continues to have chest pain but it is better since he was seen at [facility].  He denies any known fever but reports that he feels cold all the time.  He denies pain anywhere  "else except \"my chronic pain that I have all the time.\"    REVIEW OF SYSTEMS  Please see HPI for pertinent positives/negatives.  All other systems reviewed and are negative.     PAST MEDICAL HISTORY  Past Medical History:   Diagnosis Date   • Allergy    • Arthritis    • Asthma    • Claustrophobia    • Clostridium difficile diarrhea    • Clotting disorder (HCC)    • Depression    • DVT (deep venous thrombosis) ([facility])    • GERD (gastroesophageal reflux disease)    • Hypertension    • IBD (inflammatory bowel disease)    • Migraine    • MRSA (methicillin resistant Staphylococcus aureus)    • Pancreatitis    • PE (pulmonary embolism)    • Psychiatric disorder     PTSD, depression   • Seizure (HCC)         SURGICAL HISTORY  Past Surgical History:   Procedure Laterality Date   • INGUINAL HERNIA REPAIR Right 09/06/2019   • DENTAL EXTRACTION(S)     • ELBOW ORIF     • FEMUR ORIF     • HERNIA REPAIR Right    • NERVE ULNAR REPAIR OR EXPLORE     • PB CLOSED RX NASAL SEPTAL FRACTURE     • RECTAL EXPLORATION          SOCIAL HISTORY  Social History     Tobacco Use   • Smoking status: Never Smoker   • Smokeless tobacco: Never Used   Substance and Sexual Activity   • Alcohol use: No   • Drug use: No   • Sexual activity: Not on file        FAMILY HISTORY  No family history on file.     CURRENT MEDICATIONS  Home Medications     Reviewed by Harriett Simmons R.N. (Registered Nurse) on 11/18/19 at 1651  Med List Status: Partial   Medication Last Dose Status   diazepam (VALIUM) 10 MG tablet prn Active   diphenhydrAMINE (BENADRYL) 50 MG Cap prn Active   EPINEPHrine (EPIPEN 2-NACHO) 0.3 MG/0.3ML Solution Auto-injector solution for injection prn Active   esomeprazole (NEXIUM) 40 MG delayed-release capsule 11/18/2019 Active   famotidine (PEPCID) 10 MG/ML Solution prn Active   HYDROmorphone HCl (DILAUDID PO) prn Active   rivaroxaban (XARELTO) 10 MG Tab tablet 11/18/2019 Active   zonisamide (ZONEGRAN) 50 MG capsule 11/18/2019 Active             " "    ALLERGIES  Allergies   Allergen Reactions   • Ciprofloxacin Anaphylaxis   • Dilaudid [Hydromorphone] Itching     States \"I have an adverse reaction and need benadryl first\"   • Shellfish Allergy Anaphylaxis   • Tape Hives   • Amitriptyline Hcl    • Asa [Aspirin]    • Biofreeze    • Celery Oil      Celery causes hives   • Codeine    • Hydrocodone-Acetaminophen    • Ketorolac Tromethamine [Toradol]    • Morphine Vomiting   • Ondansetron [Zofran]    • Other Misc      Can take dilaudid if given benadryl first   • Oxycodone    • Oxycodone-Acetaminophen    • Oxycodone-Aspirin    • Reglan [Metoclopramide]      Has no idea what his RX is   • Tramadol    • Zofran [Ondansetron]         PHYSICAL EXAM  VITAL SIGNS: /82   Pulse 74   Temp 36.8 °C (98.2 °F) (Temporal)   Resp 16   Ht 1.829 m (6')   Wt 88.9 kg (195 lb 15.8 oz)   SpO2 98%   BMI 26.58 kg/m²  @DINAH[[id number]::@     Pulse ox interpretation: 99% I interpret this pulse ox as normal     Cardiac monitor interpretation: Sinus rhythm with heart rate in the 80s as interpreted by me.  The patient presented with chest pain and cardiac monitor was ordered to monitor for dysrhythmia.    Constitutional: Well developed, well nourished, alert in no apparent distress, nontoxic appearance   HENT: Mild swelling left occipital scalp with tenderness to palpation without bruising/crepitus/step-off, normocephalic, bilateral external ears normal, no hemotympanum bilaterally, oropharynx moist and clear, dental decay left lower second molar with tenderness to palpation without gingival swelling/erythema/bleeding/laceration, airway patent, nose non TTP with no hematoma/bleeding/drainage, midface stable, no malocclusion, no periorbital swelling/bruising, no mastoid swelling/bruising, tender to palpation along left mandible without crepitus/step-off  Eyes: PERRL, EOMI, vision and visual fields are grossly intact bilaterally, conjunctiva without erythema, no discharge, no icterus "   Neck: Soft and supple, trachea midline, no stridor, no swelling/bruising, diffuse tenderness to palpation posteriorly, no stepoffs, no LAD, no JVD   Cardiovascular: Regular rate and rhythm, no murmurs/rubs/gallops, strong distal pulses and good perfusion   Thorax & Lungs: No respiratory distress, CTAB with equal BS bilaterally, no chest tenderness  Abdomen: Soft, mild right lower quadrant tenderness to palpation, nondistended, no G/R, normal BS, pelvis stable   Back: Normal inspection, no swelling/bruising, no midline TTP, no stepoffs, no CVAT   Extremities: No cyanosis, no edema, no gross deformity, good ROM at all joints, no tenderness, intact distal pulses with brisk cap refill   Skin: Warm, dry, no pallor/cyanosis, no rash noted   Lymphatic: No lymphadenopathy noted   Neuro: A/O times 3, GCS15, no focal deficits noted, sensation intact to touch, equal strength bilateral UE/LE   Psychiatric: Cooperative, anxious patient      DIAGNOSTIC STUDIES / PROCEDURES    EKG  12 Lead EKG obtained at 1654 and interpreted by me:   Rate: 83   Rhythm: Sinus rhythm   Ectopy: None  Intervals: Normal   Axis: LAD  QRS: Late precordial R wave transition  ST segments: Normal  T Waves: Normal    Clinical Impression: Sinus rhythm without acute ischemic changes or dysrhythmia  Compared to August 27, 2019 without significant change      LABS  All labs reviewed by me.     Results for orders placed or performed during the hospital encounter of 11/18/19   CBC WITH DIFFERENTIAL   Result Value Ref Range    WBC 6.3 4.8 - 10.8 K/uL    RBC 5.38 4.70 - 6.10 M/uL    Hemoglobin 17.3 14.0 - 18.0 g/dL    Hematocrit 52.0 42.0 - 52.0 %    MCV 96.7 81.4 - 97.8 fL    MCH 32.2 27.0 - 33.0 pg    MCHC 33.3 (L) 33.7 - 35.3 g/dL    RDW 47.2 35.9 - 50.0 fL    Platelet Count 185 164 - 446 K/uL    MPV 10.2 9.0 - 12.9 fL    Neutrophils-Polys 68.30 44.00 - 72.00 %    Lymphocytes 22.50 22.00 - 41.00 %    Monocytes 7.90 0.00 - 13.40 %    Eosinophils 0.80 0.00 -  6.90 %    Basophils 0.30 0.00 - 1.80 %    Immature Granulocytes 0.20 0.00 - 0.90 %    Nucleated RBC 0.00 /100 WBC    Neutrophils (Absolute) 4.31 1.82 - 7.42 K/uL    Lymphs (Absolute) 1.42 1.00 - 4.80 K/uL    Monos (Absolute) 0.50 0.00 - 0.85 K/uL    Eos (Absolute) 0.05 0.00 - 0.51 K/uL    Baso (Absolute) 0.02 0.00 - 0.12 K/uL    Immature Granulocytes (abs) 0.01 0.00 - 0.11 K/uL    NRBC (Absolute) 0.00 K/uL   COMP METABOLIC PANEL   Result Value Ref Range    Sodium 142 135 - 145 mmol/L    Potassium 3.9 3.6 - 5.5 mmol/L    Chloride 109 96 - 112 mmol/L    Co2 22 20 - 33 mmol/L    Anion Gap 11.0 0.0 - 11.9    Glucose 92 65 - 99 mg/dL    Bun 22 8 - 22 mg/dL    Creatinine 1.27 0.50 - 1.40 mg/dL    Calcium 10.1 8.5 - 10.5 mg/dL    AST(SGOT) 20 12 - 45 U/L    ALT(SGPT) 30 2 - 50 U/L    Alkaline Phosphatase 69 30 - 99 U/L    Total Bilirubin 0.8 0.1 - 1.5 mg/dL    Albumin 4.9 3.2 - 4.9 g/dL    Total Protein 7.8 6.0 - 8.2 g/dL    Globulin 2.9 1.9 - 3.5 g/dL    A-G Ratio 1.7 g/dL   LIPASE   Result Value Ref Range    Lipase 16 11 - 82 U/L   TROPONIN   Result Value Ref Range    Troponin T <6 6 - 19 ng/L   TSH   Result Value Ref Range    TSH 2.120 0.380 - 5.330 uIU/mL   ESTIMATED GFR   Result Value Ref Range    GFR If African American >60 >60 mL/min/1.73 m 2    GFR If Non African American >60 >60 mL/min/1.73 m 2   EKG   Result Value Ref Range    Report       [facility] Emergency Dept.    Test Date:  2019  Pt Name:    MANOJ MARISCAL                 Department: ER  MRN:        [id number]                      Room:  Gender:     Male                         Technician: [id number]  :        1970                   Requested By:ER TRIAGE PROTOCOL  Order #:    [id number]                    Reading MD:    Measurements  Intervals                                Axis  Rate:       83                           P:          55  NY:         140                          QRS:        -3  QRSD:       80                            T:          19  QT:         360  QTc:        423    Interpretive Statements  SINUS RHYTHM  Compared to ECG 08/27/2019 23:15:39  No significant changes         RADIOLOGY  The radiologist's interpretation of all radiological studies have been reviewed by me.     CT-HEAD W/O   Final Result         1.  No acute intracranial abnormality.      CT-CSPINE WITHOUT PLUS RECONS   Final Result         1.  No acute traumatic bony injury of the cervical spine is apparent.      CT-MAXILLOFACIAL W/O PLUS RECONS   Final Result         1.  No acute traumatic facial bony injuries identified.      DX-CHEST-2 VIEWS   Final Result         1. No active cardiopulmonary abnormalities are identified.             COURSE & MEDICAL DECISION MAKING  Nursing notes, VS, PMSFHx reviewed in chart.     Review of past medical records shows the patient was last admitted to this hospital August 27, 2019 for hematemesis without acute findings on CT abdomen/pelvis.  He had previous EGD that was unremarkable except for gastritis with benign biopsies of the stomach and distal esophagus and patient's colonoscopy without significant findings.  He was seen at [facility] on November 7, 2019 for chest pain, nausea and vomiting and dizziness and discharged from the ED after work-up.  He was seen at [facility] October 24, 2019 for abdominal pain with no acute process on CT abdomen/pelvis.  His left renal cysts are slightly more prominent compared to previous CT.      Differential diagnoses considered include but are not limited to: Contusion, concussion/post-concussion syndrome, Fx, intracranial hemorrhage, strain/sprain, syncope, near syncope, hypoglycemia, Sz, vasovagal episode, dysrhythmia, dehydration, electrolyte abnormality, AMI, pleural effusion, pleurisy, costochondritis, esophageal spasm, GERD, gastritis, PUD, hiatal hernia, neuropathy      Pt risk-stratified as low risk for MACE in the next 6 weeks by low HEART Score  (0-3):2    HISTORY  Highly suspicious +2  Moderately suspicious +1  Slightly suspicious 0    EKG  Significant ST depression +2  Nonspecific repolarization disturbance +1  Normal 0    AGE  ? 65 +2  45-65 +1  < 45 0    RISK FACTORS  Hypercholesterolemia, HTN, DM, Cigarette smoking, positive family history, obesity  ? 3 risk factors or history of atherosclerotic disease +2  1-2 risk factors +1  No risk factors known 0    TROPONIN  ? 3× normal limit +2  1-3× normal limit +1  ? normal limit 0      History and physical exam as above.  EKG without evidence for acute ischemic changes or dysrhythmia.  Labs are fairly unremarkable and similar compared to prior results.  Imaging studies with findings as above.  Patient requested premedication prior to CT scan and was given Benadryl and Ativan.  He has multiple allergies to nausea medicines and received Phenergan.  He tolerated oral fluids without any vomiting noted during his entire ED stay.  I discussed the findings with the patient.  He is noted to be sleeping comfortably but easily arousable in no acute distress and nontoxic in appearance without focal neurological findings.  No signs of acute surgical abdomen to suggest need for emergent imaging.  Patient without any hematemesis.  No signs of respiratory distress.  Low clinical suspicion at this time for acute serious pathology especially given his multiple recent negative work-ups.  Patient's main concern now is his continued left-sided headache from hitting his head and neck soreness.  Discussed with patient possible concussion/contusion and strain/sprain.  He will be prescribed Lidoderm patch to use as needed.  I discussed with him concussion precautions and avoidance of further head injury.  He was advised on outpatient follow-up and given return to ED precautions.  Patient verbalized understanding and agreed with plan of care with no further questions or concerns.      The patient is referred to a primary physician  for blood pressure management, diabetic screening, and for all other preventative health concerns.       FINAL IMPRESSION  1. Fall, initial encounter Active   2. Closed head injury, initial encounter Active   3. Contusion of face, initial encounter Active   4. Acute neck pain Active   5. Syncope, unspecified syncope type Active   6. Non-intractable vomiting with nausea, unspecified vomiting type Active   7. Insomnia, unspecified type Active   8. Poor appetite Active          DISPOSITION  Patient will be discharged home in stable condition.       FOLLOW UP  Fausto Bill, [email or web address].-ARLINE.  [address] [place] [address] [address]  [facility] [id number] [phone]    Call today      [facility], Emergency Dept  [address] [facility] [address] [phone]    If symptoms worsen         OUTPATIENT MEDICATIONS  Discharge Medication List as of 11/19/2019 12:40 AM      START taking these medications    Details   lidocaine (LIDODERM) 5 % Patch Apply 1 Patch to skin as directed every 24 hours., Disp-5 Patch, R-0, Print Rx Paper                Electronically signed by: Jan Montiel, 11/18/2019 6:12 PM      Portions of this record were made with voice recognition software.  Despite my review, spelling/grammar/context errors may still remain.  Interpretation of this chart should be taken in this context.

## 2019-11-19 NOTE — ED NOTES
Pt given fluids and emesis bag. Advised to sip small, frequent amounts, if emesis occurs- stop oral fluids and call RN.

## 2019-11-19 NOTE — ED NOTES
Pt encouraged to provide urine sample- pt reports he is unable at this time. Urinal at bedside.   Pt aware of plan of care- awaiting CT results.

## 2019-11-19 NOTE — ED NOTES
Patient states that he is allergic to the plastic of the blood pressure cuff and the bracelet.  Would not allow me to take his blood pressure unless it was over this clothing and I had to put coban on his wrist (loosely) to place his bracelet

## 2019-11-19 NOTE — ED NOTES
Pt reports HA rated 10/10 and nausea. Relayed med request for nausea/pain to ERP. Received VO for 25mg Phenergan and 650mg Tylenol.

## 2019-11-19 NOTE — ED NOTES
Discharge instructions given to pt, pt verbalized understanding. VSS. Sent one prescription. All personal belongings accounted for. Pt ambulated safely. IV was removed.

## 2019-11-19 NOTE — ED NOTES
Pt reports he is still nauseated. Reports the nausea increases when he moves his head in any direction. Pt declines attempts at oral fluids for now.

## 2019-11-19 NOTE — ED NOTES
Received report from LEYDI Jack. Updated white board, introduced self to pt. Pt needing to be premedicated for CT. Will call CT to obtain time before premedicating.

## 2019-11-25 ENCOUNTER — HOSPITAL ENCOUNTER (OUTPATIENT)
Facility: MEDICAL CENTER | Age: 49
End: 2019-11-27
Attending: EMERGENCY MEDICINE | Admitting: INTERNAL MEDICINE
Payer: COMMERCIAL

## 2019-11-25 ENCOUNTER — APPOINTMENT (OUTPATIENT)
Dept: RADIOLOGY | Facility: MEDICAL CENTER | Age: 49
End: 2019-11-25
Attending: EMERGENCY MEDICINE
Payer: COMMERCIAL

## 2019-11-25 DIAGNOSIS — R51.9 INTRACTABLE EPISODIC HEADACHE, UNSPECIFIED HEADACHE TYPE: ICD-10-CM

## 2019-11-25 DIAGNOSIS — I26.99 PULMONARY EMBOLISM WITHOUT ACUTE COR PULMONALE, UNSPECIFIED CHRONICITY, UNSPECIFIED PULMONARY EMBOLISM TYPE (HCC): ICD-10-CM

## 2019-11-25 DIAGNOSIS — R55 SYNCOPE, UNSPECIFIED SYNCOPE TYPE: ICD-10-CM

## 2019-11-25 DIAGNOSIS — Z79.01 CHRONIC ANTICOAGULATION: ICD-10-CM

## 2019-11-25 PROBLEM — G44.309 HEADACHE AS LATE EFFECT OF BRAIN INJURY (HCC): Status: ACTIVE | Noted: 2019-11-25

## 2019-11-25 PROBLEM — T78.40XA ALLERGIC REACTION: Status: ACTIVE | Noted: 2019-11-25

## 2019-11-25 PROBLEM — S06.9X0S HEADACHE AS LATE EFFECT OF BRAIN INJURY (HCC): Status: ACTIVE | Noted: 2019-11-25

## 2019-11-25 PROBLEM — S06.9XAS HEADACHE AS LATE EFFECT OF BRAIN INJURY (HCC): Status: ACTIVE | Noted: 2019-11-25

## 2019-11-25 LAB
ALBUMIN SERPL BCP-MCNC: 4.2 G/DL (ref 3.2–4.9)
ALBUMIN/GLOB SERPL: 1.4 G/DL
ALP SERPL-CCNC: 55 U/L (ref 30–99)
ALT SERPL-CCNC: 25 U/L (ref 2–50)
ANION GAP SERPL CALC-SCNC: 9 MMOL/L (ref 0–11.9)
APTT PPP: 35.3 SEC (ref 24.7–36)
AST SERPL-CCNC: 17 U/L (ref 12–45)
BASOPHILS # BLD AUTO: 0.2 % (ref 0–1.8)
BASOPHILS # BLD: 0.01 K/UL (ref 0–0.12)
BILIRUB SERPL-MCNC: 0.6 MG/DL (ref 0.1–1.5)
BUN SERPL-MCNC: 23 MG/DL (ref 8–22)
CALCIUM SERPL-MCNC: 9 MG/DL (ref 8.5–10.5)
CHLORIDE SERPL-SCNC: 110 MMOL/L (ref 96–112)
CO2 SERPL-SCNC: 23 MMOL/L (ref 20–33)
CREAT SERPL-MCNC: 1.23 MG/DL (ref 0.5–1.4)
EOSINOPHIL # BLD AUTO: 0.04 K/UL (ref 0–0.51)
EOSINOPHIL NFR BLD: 0.9 % (ref 0–6.9)
ERYTHROCYTE [DISTWIDTH] IN BLOOD BY AUTOMATED COUNT: 44.3 FL (ref 35.9–50)
GLOBULIN SER CALC-MCNC: 3 G/DL (ref 1.9–3.5)
GLUCOSE SERPL-MCNC: 91 MG/DL (ref 65–99)
HCT VFR BLD AUTO: 44.5 % (ref 42–52)
HGB BLD-MCNC: 15.1 G/DL (ref 14–18)
IMM GRANULOCYTES # BLD AUTO: 0.01 K/UL (ref 0–0.11)
IMM GRANULOCYTES NFR BLD AUTO: 0.2 % (ref 0–0.9)
INR PPP: 1.18 (ref 0.87–1.13)
LACTATE BLD-SCNC: 1.2 MMOL/L (ref 0.5–2)
LYMPHOCYTES # BLD AUTO: 1.44 K/UL (ref 1–4.8)
LYMPHOCYTES NFR BLD: 33.3 % (ref 22–41)
MCH RBC QN AUTO: 32.2 PG (ref 27–33)
MCHC RBC AUTO-ENTMCNC: 33.9 G/DL (ref 33.7–35.3)
MCV RBC AUTO: 94.9 FL (ref 81.4–97.8)
MONOCYTES # BLD AUTO: 0.4 K/UL (ref 0–0.85)
MONOCYTES NFR BLD AUTO: 9.2 % (ref 0–13.4)
NEUTROPHILS # BLD AUTO: 2.43 K/UL (ref 1.82–7.42)
NEUTROPHILS NFR BLD: 56.2 % (ref 44–72)
NRBC # BLD AUTO: 0 K/UL
NRBC BLD-RTO: 0 /100 WBC
PLATELET # BLD AUTO: 184 K/UL (ref 164–446)
PMV BLD AUTO: 10.1 FL (ref 9–12.9)
POTASSIUM SERPL-SCNC: 3.8 MMOL/L (ref 3.6–5.5)
PROT SERPL-MCNC: 7.2 G/DL (ref 6–8.2)
PROTHROMBIN TIME: 15.3 SEC (ref 12–14.6)
RBC # BLD AUTO: 4.69 M/UL (ref 4.7–6.1)
SODIUM SERPL-SCNC: 142 MMOL/L (ref 135–145)
TROPONIN T SERPL-MCNC: <6 NG/L (ref 6–19)
WBC # BLD AUTO: 4.3 K/UL (ref 4.8–10.8)

## 2019-11-25 PROCEDURE — 96375 TX/PRO/DX INJ NEW DRUG ADDON: CPT

## 2019-11-25 PROCEDURE — 99285 EMERGENCY DEPT VISIT HI MDM: CPT

## 2019-11-25 PROCEDURE — 84484 ASSAY OF TROPONIN QUANT: CPT

## 2019-11-25 PROCEDURE — 83605 ASSAY OF LACTIC ACID: CPT

## 2019-11-25 PROCEDURE — 85610 PROTHROMBIN TIME: CPT

## 2019-11-25 PROCEDURE — 85730 THROMBOPLASTIN TIME PARTIAL: CPT

## 2019-11-25 PROCEDURE — 700117 HCHG RX CONTRAST REV CODE 255: Performed by: EMERGENCY MEDICINE

## 2019-11-25 PROCEDURE — 70498 CT ANGIOGRAPHY NECK: CPT

## 2019-11-25 PROCEDURE — 80053 COMPREHEN METABOLIC PANEL: CPT

## 2019-11-25 PROCEDURE — 93005 ELECTROCARDIOGRAM TRACING: CPT | Performed by: EMERGENCY MEDICINE

## 2019-11-25 PROCEDURE — G0378 HOSPITAL OBSERVATION PER HR: HCPCS

## 2019-11-25 PROCEDURE — 70496 CT ANGIOGRAPHY HEAD: CPT

## 2019-11-25 PROCEDURE — 85025 COMPLETE CBC W/AUTO DIFF WBC: CPT

## 2019-11-25 PROCEDURE — 700111 HCHG RX REV CODE 636 W/ 250 OVERRIDE (IP): Performed by: EMERGENCY MEDICINE

## 2019-11-25 PROCEDURE — 96374 THER/PROPH/DIAG INJ IV PUSH: CPT

## 2019-11-25 PROCEDURE — 99220 PR INITIAL OBSERVATION CARE,LEVL III: CPT | Mod: GC | Performed by: HOSPITALIST

## 2019-11-25 RX ORDER — HYDROMORPHONE HYDROCHLORIDE 1 MG/ML
1 INJECTION, SOLUTION INTRAMUSCULAR; INTRAVENOUS; SUBCUTANEOUS ONCE
Status: COMPLETED | OUTPATIENT
Start: 2019-11-25 | End: 2019-11-25

## 2019-11-25 RX ORDER — HYDROMORPHONE HYDROCHLORIDE 4 MG/1
4 TABLET ORAL 4 TIMES DAILY PRN
COMMUNITY
End: 2020-01-30

## 2019-11-25 RX ORDER — ZONISAMIDE 50 MG/1
50 CAPSULE ORAL 2 TIMES DAILY
Status: DISCONTINUED | OUTPATIENT
Start: 2019-11-26 | End: 2019-11-27 | Stop reason: HOSPADM

## 2019-11-25 RX ORDER — BUDESONIDE AND FORMOTEROL FUMARATE DIHYDRATE 160; 4.5 UG/1; UG/1
2 AEROSOL RESPIRATORY (INHALATION) DAILY
Status: DISCONTINUED | OUTPATIENT
Start: 2019-11-26 | End: 2019-11-25

## 2019-11-25 RX ORDER — HYDROMORPHONE HYDROCHLORIDE 4 MG/1
4 TABLET ORAL EVERY 6 HOURS PRN
Status: DISCONTINUED | OUTPATIENT
Start: 2019-11-25 | End: 2019-11-27 | Stop reason: HOSPADM

## 2019-11-25 RX ORDER — ONDANSETRON 2 MG/ML
4 INJECTION INTRAMUSCULAR; INTRAVENOUS ONCE
Status: DISCONTINUED | OUTPATIENT
Start: 2019-11-25 | End: 2019-11-25

## 2019-11-25 RX ORDER — NALOXONE HYDROCHLORIDE 1 MG/ML
2 INJECTION INTRAMUSCULAR; INTRAVENOUS; SUBCUTANEOUS PRN
COMMUNITY
End: 2020-01-30

## 2019-11-25 RX ORDER — NALOXONE HYDROCHLORIDE 0.4 MG/ML
0.4 INJECTION, SOLUTION INTRAMUSCULAR; INTRAVENOUS; SUBCUTANEOUS
Status: DISCONTINUED | OUTPATIENT
Start: 2019-11-25 | End: 2019-11-27 | Stop reason: HOSPADM

## 2019-11-25 RX ORDER — M-VIT,TX,IRON,MINS/CALC/FOLIC 27MG-0.4MG
1 TABLET ORAL DAILY
Status: DISCONTINUED | OUTPATIENT
Start: 2019-11-26 | End: 2019-11-27 | Stop reason: HOSPADM

## 2019-11-25 RX ORDER — PROMETHAZINE HYDROCHLORIDE 12.5 MG/1
12.5-25 SUPPOSITORY RECTAL EVERY 4 HOURS PRN
Status: DISCONTINUED | OUTPATIENT
Start: 2019-11-25 | End: 2019-11-27 | Stop reason: HOSPADM

## 2019-11-25 RX ORDER — DIPHENHYDRAMINE HYDROCHLORIDE 50 MG/ML
25 INJECTION INTRAMUSCULAR; INTRAVENOUS ONCE
Status: COMPLETED | OUTPATIENT
Start: 2019-11-25 | End: 2019-11-25

## 2019-11-25 RX ORDER — FAMOTIDINE 20 MG/1
20 TABLET, FILM COATED ORAL 2 TIMES DAILY
Status: DISCONTINUED | OUTPATIENT
Start: 2019-11-26 | End: 2019-11-26

## 2019-11-25 RX ORDER — DIPHENHYDRAMINE HCL 25 MG
50 TABLET ORAL EVERY 8 HOURS PRN
Status: DISCONTINUED | OUTPATIENT
Start: 2019-11-25 | End: 2019-11-26

## 2019-11-25 RX ORDER — ALBUTEROL SULFATE 90 UG/1
2 AEROSOL, METERED RESPIRATORY (INHALATION) EVERY 6 HOURS PRN
Status: ON HOLD | COMMUNITY
End: 2019-11-27

## 2019-11-25 RX ORDER — FAMOTIDINE 20 MG/1
20 TABLET, FILM COATED ORAL 2 TIMES DAILY
Status: ON HOLD | COMMUNITY
Start: 2019-11-01 | End: 2019-11-27

## 2019-11-25 RX ORDER — PROMETHAZINE HYDROCHLORIDE 25 MG/1
12.5-25 TABLET ORAL EVERY 4 HOURS PRN
Status: DISCONTINUED | OUTPATIENT
Start: 2019-11-25 | End: 2019-11-27 | Stop reason: HOSPADM

## 2019-11-25 RX ORDER — M-VIT,TX,IRON,MINS/CALC/FOLIC 27MG-0.4MG
1 TABLET ORAL DAILY
COMMUNITY

## 2019-11-25 RX ORDER — BUDESONIDE AND FORMOTEROL FUMARATE DIHYDRATE 160; 4.5 UG/1; UG/1
2 AEROSOL RESPIRATORY (INHALATION) 2 TIMES DAILY
Status: DISCONTINUED | OUTPATIENT
Start: 2019-11-26 | End: 2019-11-27 | Stop reason: HOSPADM

## 2019-11-25 RX ORDER — NALOXONE HYDROCHLORIDE 4 MG/.1ML
1 SPRAY NASAL PRN
Status: ON HOLD | COMMUNITY
End: 2019-11-27

## 2019-11-25 RX ORDER — DIAZEPAM 5 MG/1
10 TABLET ORAL 2 TIMES DAILY PRN
Status: DISCONTINUED | OUTPATIENT
Start: 2019-11-25 | End: 2019-11-27 | Stop reason: HOSPADM

## 2019-11-25 RX ADMIN — IOHEXOL 80 ML: 350 INJECTION, SOLUTION INTRAVENOUS at 19:56

## 2019-11-25 RX ADMIN — HYDROMORPHONE HYDROCHLORIDE 1 MG: 1 INJECTION, SOLUTION INTRAMUSCULAR; INTRAVENOUS; SUBCUTANEOUS at 19:34

## 2019-11-25 RX ADMIN — DIPHENHYDRAMINE HYDROCHLORIDE 25 MG: 50 INJECTION INTRAMUSCULAR; INTRAVENOUS at 19:34

## 2019-11-25 ASSESSMENT — LIFESTYLE VARIABLES
DO YOU DRINK ALCOHOL: NO
DOES PATIENT WANT TO STOP DRINKING: NO

## 2019-11-25 ASSESSMENT — ENCOUNTER SYMPTOMS
SINUS PAIN: 0
PALPITATIONS: 0
NAUSEA: 1
DIZZINESS: 1
HEMOPTYSIS: 1
VOMITING: 1
BLURRED VISION: 0
DIAPHORESIS: 0
FEVER: 0
CHILLS: 0
WEAKNESS: 1
SENSORY CHANGE: 0
WEIGHT LOSS: 1
FALLS: 1
HEADACHES: 1
NECK PAIN: 1
COUGH: 1
BLOOD IN STOOL: 0
HEARTBURN: 0

## 2019-11-25 NOTE — ED TRIAGE NOTES
"50 y/o male bib wheelchair with multiple complaints. Pt c/o nausea/vomiting/diarrhea, abd pain, vomiting blood, dizziness, syncopal episode, neck pain, headache, inability to eat or sleep, etc. Pt states he was here one week ago for similar complaints and states his symptoms are not getting any better. He states he has tried icing and resting as well as stretching. Pt states he has taken his \"cocktail\" of dilaudid, valium and benadryl without relief of symptoms. Pt states he had a syncopal episode on Saturday and hit his head. Small abrasion noted to forehead.   "

## 2019-11-26 ENCOUNTER — ANESTHESIA EVENT (OUTPATIENT)
Dept: SURGERY | Facility: MEDICAL CENTER | Age: 49
End: 2019-11-26
Payer: COMMERCIAL

## 2019-11-26 ENCOUNTER — ANESTHESIA (OUTPATIENT)
Dept: SURGERY | Facility: MEDICAL CENTER | Age: 49
End: 2019-11-26
Payer: COMMERCIAL

## 2019-11-26 ENCOUNTER — APPOINTMENT (OUTPATIENT)
Dept: RADIOLOGY | Facility: MEDICAL CENTER | Age: 49
End: 2019-11-26
Attending: STUDENT IN AN ORGANIZED HEALTH CARE EDUCATION/TRAINING PROGRAM
Payer: COMMERCIAL

## 2019-11-26 PROBLEM — R04.2 COUGH WITH HEMOPTYSIS: Status: ACTIVE | Noted: 2019-11-26

## 2019-11-26 LAB — EKG IMPRESSION: NORMAL

## 2019-11-26 PROCEDURE — 700111 HCHG RX REV CODE 636 W/ 250 OVERRIDE (IP): Performed by: STUDENT IN AN ORGANIZED HEALTH CARE EDUCATION/TRAINING PROGRAM

## 2019-11-26 PROCEDURE — 700105 HCHG RX REV CODE 258: Performed by: ANESTHESIOLOGY

## 2019-11-26 PROCEDURE — 700102 HCHG RX REV CODE 250 W/ 637 OVERRIDE(OP): Performed by: STUDENT IN AN ORGANIZED HEALTH CARE EDUCATION/TRAINING PROGRAM

## 2019-11-26 PROCEDURE — A9576 INJ PROHANCE MULTIPACK: HCPCS | Performed by: STUDENT IN AN ORGANIZED HEALTH CARE EDUCATION/TRAINING PROGRAM

## 2019-11-26 PROCEDURE — A9270 NON-COVERED ITEM OR SERVICE: HCPCS | Performed by: STUDENT IN AN ORGANIZED HEALTH CARE EDUCATION/TRAINING PROGRAM

## 2019-11-26 PROCEDURE — G0378 HOSPITAL OBSERVATION PER HR: HCPCS

## 2019-11-26 PROCEDURE — 95819 EEG AWAKE AND ASLEEP: CPT | Mod: 26 | Performed by: PSYCHIATRY & NEUROLOGY

## 2019-11-26 PROCEDURE — 96374 THER/PROPH/DIAG INJ IV PUSH: CPT | Mod: XU

## 2019-11-26 PROCEDURE — 99203 OFFICE O/P NEW LOW 30 MIN: CPT | Performed by: NURSE PRACTITIONER

## 2019-11-26 PROCEDURE — 700102 HCHG RX REV CODE 250 W/ 637 OVERRIDE(OP): Performed by: ANESTHESIOLOGY

## 2019-11-26 PROCEDURE — 700117 HCHG RX CONTRAST REV CODE 255: Performed by: STUDENT IN AN ORGANIZED HEALTH CARE EDUCATION/TRAINING PROGRAM

## 2019-11-26 PROCEDURE — A9270 NON-COVERED ITEM OR SERVICE: HCPCS | Performed by: ANESTHESIOLOGY

## 2019-11-26 PROCEDURE — 96375 TX/PRO/DX INJ NEW DRUG ADDON: CPT | Mod: XU

## 2019-11-26 PROCEDURE — 96376 TX/PRO/DX INJ SAME DRUG ADON: CPT | Mod: XU

## 2019-11-26 PROCEDURE — 70553 MRI BRAIN STEM W/O & W/DYE: CPT

## 2019-11-26 PROCEDURE — 160002 HCHG RECOVERY MINUTES (STAT)

## 2019-11-26 PROCEDURE — 99226 PR SUBSEQUENT OBSERVATION CARE,LEVEL III: CPT | Mod: GC | Performed by: HOSPITALIST

## 2019-11-26 PROCEDURE — 700111 HCHG RX REV CODE 636 W/ 250 OVERRIDE (IP): Performed by: ANESTHESIOLOGY

## 2019-11-26 PROCEDURE — 95819 EEG AWAKE AND ASLEEP: CPT | Performed by: PSYCHIATRY & NEUROLOGY

## 2019-11-26 RX ORDER — OMEPRAZOLE 20 MG/1
20 CAPSULE, DELAYED RELEASE ORAL
Status: DISCONTINUED | OUTPATIENT
Start: 2019-11-26 | End: 2019-11-27 | Stop reason: HOSPADM

## 2019-11-26 RX ORDER — DIPHENHYDRAMINE HYDROCHLORIDE 50 MG/ML
INJECTION INTRAMUSCULAR; INTRAVENOUS PRN
Status: DISCONTINUED | OUTPATIENT
Start: 2019-11-26 | End: 2019-11-26 | Stop reason: SURG

## 2019-11-26 RX ORDER — HYDROMORPHONE HYDROCHLORIDE 1 MG/ML
0.1 INJECTION, SOLUTION INTRAMUSCULAR; INTRAVENOUS; SUBCUTANEOUS
Status: CANCELLED | OUTPATIENT
Start: 2019-11-26

## 2019-11-26 RX ORDER — HYDROMORPHONE HYDROCHLORIDE 1 MG/ML
0.5 INJECTION, SOLUTION INTRAMUSCULAR; INTRAVENOUS; SUBCUTANEOUS EVERY 4 HOURS PRN
Status: DISCONTINUED | OUTPATIENT
Start: 2019-11-26 | End: 2019-11-27 | Stop reason: HOSPADM

## 2019-11-26 RX ORDER — HALOPERIDOL 5 MG/ML
1 INJECTION INTRAMUSCULAR
Status: DISCONTINUED | OUTPATIENT
Start: 2019-11-26 | End: 2019-11-26 | Stop reason: HOSPADM

## 2019-11-26 RX ORDER — SODIUM CHLORIDE, SODIUM LACTATE, POTASSIUM CHLORIDE, CALCIUM CHLORIDE 600; 310; 30; 20 MG/100ML; MG/100ML; MG/100ML; MG/100ML
INJECTION, SOLUTION INTRAVENOUS
Status: DISCONTINUED | OUTPATIENT
Start: 2019-11-26 | End: 2019-11-26 | Stop reason: SURG

## 2019-11-26 RX ORDER — DIPHENHYDRAMINE HYDROCHLORIDE 50 MG/ML
12.5 INJECTION INTRAMUSCULAR; INTRAVENOUS
Status: DISCONTINUED | OUTPATIENT
Start: 2019-11-26 | End: 2019-11-26 | Stop reason: HOSPADM

## 2019-11-26 RX ORDER — HYDROMORPHONE HYDROCHLORIDE 1 MG/ML
0.4 INJECTION, SOLUTION INTRAMUSCULAR; INTRAVENOUS; SUBCUTANEOUS
Status: CANCELLED | OUTPATIENT
Start: 2019-11-26

## 2019-11-26 RX ORDER — MIDAZOLAM HYDROCHLORIDE 1 MG/ML
1 INJECTION INTRAMUSCULAR; INTRAVENOUS
Status: DISCONTINUED | OUTPATIENT
Start: 2019-11-26 | End: 2019-11-26 | Stop reason: HOSPADM

## 2019-11-26 RX ORDER — PROMETHAZINE HYDROCHLORIDE 25 MG/1
25 TABLET ORAL EVERY 6 HOURS PRN
Status: DISCONTINUED | OUTPATIENT
Start: 2019-11-26 | End: 2019-11-26 | Stop reason: HOSPADM

## 2019-11-26 RX ORDER — IPRATROPIUM BROMIDE AND ALBUTEROL SULFATE 2.5; .5 MG/3ML; MG/3ML
3 SOLUTION RESPIRATORY (INHALATION)
Status: DISCONTINUED | OUTPATIENT
Start: 2019-11-26 | End: 2019-11-26 | Stop reason: HOSPADM

## 2019-11-26 RX ORDER — SODIUM CHLORIDE, SODIUM LACTATE, POTASSIUM CHLORIDE, CALCIUM CHLORIDE 600; 310; 30; 20 MG/100ML; MG/100ML; MG/100ML; MG/100ML
INJECTION, SOLUTION INTRAVENOUS CONTINUOUS
Status: DISCONTINUED | OUTPATIENT
Start: 2019-11-26 | End: 2019-11-26 | Stop reason: HOSPADM

## 2019-11-26 RX ORDER — DIPHENHYDRAMINE HYDROCHLORIDE 50 MG/ML
25 INJECTION INTRAMUSCULAR; INTRAVENOUS EVERY 6 HOURS PRN
Status: DISCONTINUED | OUTPATIENT
Start: 2019-11-26 | End: 2019-11-27 | Stop reason: HOSPADM

## 2019-11-26 RX ORDER — MIDAZOLAM HYDROCHLORIDE 1 MG/ML
INJECTION INTRAMUSCULAR; INTRAVENOUS
Status: COMPLETED
Start: 2019-11-26 | End: 2019-11-26

## 2019-11-26 RX ORDER — HYDROMORPHONE HYDROCHLORIDE 1 MG/ML
0.2 INJECTION, SOLUTION INTRAMUSCULAR; INTRAVENOUS; SUBCUTANEOUS
Status: CANCELLED | OUTPATIENT
Start: 2019-11-26

## 2019-11-26 RX ADMIN — DIPHENHYDRAMINE HYDROCHLORIDE 25 MG: 50 INJECTION, SOLUTION INTRAMUSCULAR; INTRAVENOUS at 10:34

## 2019-11-26 RX ADMIN — MULTIPLE VITAMINS W/ MINERALS TAB 1 TABLET: TAB at 05:32

## 2019-11-26 RX ADMIN — RIVAROXABAN 20 MG: 20 TABLET, FILM COATED ORAL at 17:58

## 2019-11-26 RX ADMIN — DIPHENHYDRAMINE HYDROCHLORIDE 25 MG: 50 INJECTION INTRAMUSCULAR; INTRAVENOUS at 01:29

## 2019-11-26 RX ADMIN — BUDESONIDE AND FORMOTEROL FUMARATE DIHYDRATE 2 PUFF: 160; 4.5 AEROSOL RESPIRATORY (INHALATION) at 09:54

## 2019-11-26 RX ADMIN — ZONISAMIDE 50 MG: 50 CAPSULE ORAL at 05:33

## 2019-11-26 RX ADMIN — HYDROMORPHONE HYDROCHLORIDE 0.5 MG: 1 INJECTION, SOLUTION INTRAMUSCULAR; INTRAVENOUS; SUBCUTANEOUS at 05:33

## 2019-11-26 RX ADMIN — DIPHENHYDRAMINE HYDROCHLORIDE 25 MG: 50 INJECTION INTRAMUSCULAR; INTRAVENOUS at 13:46

## 2019-11-26 RX ADMIN — HYDROMORPHONE HYDROCHLORIDE 0.5 MG: 1 INJECTION, SOLUTION INTRAMUSCULAR; INTRAVENOUS; SUBCUTANEOUS at 01:32

## 2019-11-26 RX ADMIN — OMEPRAZOLE 20 MG: 20 CAPSULE, DELAYED RELEASE ORAL at 07:25

## 2019-11-26 RX ADMIN — DIPHENHYDRAMINE HYDROCHLORIDE 25 MG: 50 INJECTION INTRAMUSCULAR; INTRAVENOUS at 20:32

## 2019-11-26 RX ADMIN — PROMETHAZINE HYDROCHLORIDE 25 MG: 25 TABLET ORAL at 20:36

## 2019-11-26 RX ADMIN — MIDAZOLAM HYDROCHLORIDE 2 MG: 1 INJECTION, SOLUTION INTRAMUSCULAR; INTRAVENOUS at 10:34

## 2019-11-26 RX ADMIN — HYDROMORPHONE HYDROCHLORIDE 0.5 MG: 1 INJECTION, SOLUTION INTRAMUSCULAR; INTRAVENOUS; SUBCUTANEOUS at 20:44

## 2019-11-26 RX ADMIN — GADOTERIDOL 18 ML: 279.3 INJECTION, SOLUTION INTRAVENOUS at 11:17

## 2019-11-26 RX ADMIN — DIPHENHYDRAMINE HYDROCHLORIDE 25 MG: 50 INJECTION INTRAMUSCULAR; INTRAVENOUS at 07:30

## 2019-11-26 RX ADMIN — HYDROMORPHONE HYDROCHLORIDE 0.5 MG: 1 INJECTION, SOLUTION INTRAMUSCULAR; INTRAVENOUS; SUBCUTANEOUS at 13:51

## 2019-11-26 RX ADMIN — PROPOFOL 200 MG: 10 INJECTION, EMULSION INTRAVENOUS at 10:34

## 2019-11-26 RX ADMIN — BUDESONIDE AND FORMOTEROL FUMARATE DIHYDRATE 2 PUFF: 160; 4.5 AEROSOL RESPIRATORY (INHALATION) at 17:59

## 2019-11-26 RX ADMIN — ZONISAMIDE 50 MG: 50 CAPSULE ORAL at 17:59

## 2019-11-26 RX ADMIN — SODIUM CHLORIDE, POTASSIUM CHLORIDE, SODIUM LACTATE AND CALCIUM CHLORIDE: 600; 310; 30; 20 INJECTION, SOLUTION INTRAVENOUS at 10:34

## 2019-11-26 ASSESSMENT — ENCOUNTER SYMPTOMS
SINUS PAIN: 0
DIAPHORESIS: 0
NECK PAIN: 1
VOMITING: 1
PALPITATIONS: 0
MEMORY LOSS: 1
DIZZINESS: 1
CHILLS: 0
CONSTIPATION: 0
FALLS: 1
COUGH: 1
HEMOPTYSIS: 1
WEAKNESS: 1
FEVER: 0
DIARRHEA: 1
HEARTBURN: 0
NAUSEA: 1
BLOOD IN STOOL: 0
NERVOUS/ANXIOUS: 1
SENSORY CHANGE: 0
HEADACHES: 1
WEIGHT LOSS: 1
BLURRED VISION: 0
ABDOMINAL PAIN: 1
INSOMNIA: 1

## 2019-11-26 ASSESSMENT — LIFESTYLE VARIABLES
EVER_SMOKED: NEVER
HOW MANY TIMES IN THE PAST YEAR HAVE YOU HAD 5 OR MORE DRINKS IN A DAY: 0
CONSUMPTION TOTAL: NEGATIVE
EVER HAD A DRINK FIRST THING IN THE MORNING TO STEADY YOUR NERVES TO GET RID OF A HANGOVER: NO
ON A TYPICAL DAY WHEN YOU DRINK ALCOHOL HOW MANY DRINKS DO YOU HAVE: 0
ALCOHOL_USE: NO
EVER FELT BAD OR GUILTY ABOUT YOUR DRINKING: NO
HAVE PEOPLE ANNOYED YOU BY CRITICIZING YOUR DRINKING: NO
TOTAL SCORE: 0
AVERAGE NUMBER OF DAYS PER WEEK YOU HAVE A DRINK CONTAINING ALCOHOL: 0
HAVE YOU EVER FELT YOU SHOULD CUT DOWN ON YOUR DRINKING: NO

## 2019-11-26 ASSESSMENT — COGNITIVE AND FUNCTIONAL STATUS - GENERAL
DAILY ACTIVITIY SCORE: 21
DRESSING REGULAR LOWER BODY CLOTHING: A LITTLE
HELP NEEDED FOR BATHING: A LITTLE
MOVING FROM LYING ON BACK TO SITTING ON SIDE OF FLAT BED: A LITTLE
TOILETING: A LITTLE
MOVING TO AND FROM BED TO CHAIR: A LITTLE
CLIMB 3 TO 5 STEPS WITH RAILING: A LITTLE
SUGGESTED CMS G CODE MODIFIER MOBILITY: CK
SUGGESTED CMS G CODE MODIFIER DAILY ACTIVITY: CJ
STANDING UP FROM CHAIR USING ARMS: A LITTLE
MOBILITY SCORE: 19
WALKING IN HOSPITAL ROOM: A LITTLE

## 2019-11-26 ASSESSMENT — PATIENT HEALTH QUESTIONNAIRE - PHQ9
2. FEELING DOWN, DEPRESSED, IRRITABLE, OR HOPELESS: NOT AT ALL
SUM OF ALL RESPONSES TO PHQ9 QUESTIONS 1 AND 2: 0
1. LITTLE INTEREST OR PLEASURE IN DOING THINGS: NOT AT ALL

## 2019-11-26 ASSESSMENT — PAIN SCALES - GENERAL: PAIN_LEVEL: 3

## 2019-11-26 NOTE — ASSESSMENT & PLAN NOTE
Patient presents with multiple complaints including worsening nausea, dizziness, weakness, poor appetite, headache and neck pain with syncopal episodes with LOC, can be hours before he wakes up.  EEG and MRI with and without contrast did not reveal abnormalities.  Patient was assessed by neurology and they recommend patient following up for longer EEG, concern for patient's polypharmacy as cause of his presentation.  Patient's orthostatic vital signs were negative.  Plan  -Patient will have follow-up appointment made with neurology  -Patient will also have order sent for ZIO Patch placement  -Patient has home health with physical therapy and nursing staff

## 2019-11-26 NOTE — PROGRESS NOTES
Received report from ED RN and assumed care of pt. Pt arrived to [place] [id number] via rney. Pt ambulated with 2 person assistance. Pt stated feeling dizzy. Assessment performed. Pt A&Ox4. 2 RN skin check performed. Patient call light and belongings within reach, non-skid socks in use, bed alarm in use.

## 2019-11-26 NOTE — ED PROVIDER NOTES
ED Provider Note    CHIEF COMPLAINT  Chief Complaint   Patient presents with   • N/V   • Headache   • Neck Pain       HPI  Landon Allred is a 49 y.o. male who presents to the emergency department complaining of a headache, nausea, vomiting, hematemesis, dizziness and syncopal episodes.    The patient's been in the emergency department for multiple complaints.  He states he has been evaluated for syncope.  Is also been evaluated for hematemesis.  He is now feeling worse.    The patient states again today he had a syncopal episode.  This caused him to hit his head and again to reinjure his head and cause more pain.  He is having pain which is localized to the base of skull to the top of the neck on the left side of his head.  It is posterior in nature.  And it is severe.  Is been associate with dizziness.  As well as nausea and vomiting and diarrhea.    Patient denies any hematochezia.  States he had some hematemesis.  Denies any chest pain or shortness of breath.  The patient has been taking home medications without relief and is now feeling is able to control his pain.    REVIEW OF SYSTEMS  See HPI for further details. All other systems are negative.    PAST MEDICAL HISTORY  Past Medical History:   Diagnosis Date   • Allergy    • Arthritis    • Asthma    • Claustrophobia    • Clostridium difficile diarrhea    • Clotting disorder (HCC)    • Depression    • DVT (deep venous thrombosis) ([facility])    • GERD (gastroesophageal reflux disease)    • Hypertension    • IBD (inflammatory bowel disease)    • Migraine    • MRSA (methicillin resistant Staphylococcus aureus)    • Pancreatitis    • PE (pulmonary embolism)    • Psychiatric disorder     PTSD, depression   • Seizure (HCC)        FAMILY HISTORY  No family history on file.    SOCIAL HISTORY  Social History     Socioeconomic History   • Marital status: Single     Spouse name: Not on file   • Number of children: Not on file   • Years of education: Not on file   •  "Highest education level: Not on file   Occupational History   • Not on file   Social Needs   • Financial resource strain: Not on file   • Food insecurity:     Worry: Not on file     Inability: Not on file   • Transportation needs:     Medical: Not on file     Non-medical: Not on file   Tobacco Use   • Smoking status: Never Smoker   • Smokeless tobacco: Never Used   Substance and Sexual Activity   • Alcohol use: No   • Drug use: No   • Sexual activity: Not on file   Lifestyle   • Physical activity:     Days per week: Not on file     Minutes per session: Not on file   • Stress: Not on file   Relationships   • Social connections:     Talks on phone: Not on file     Gets together: Not on file     Attends Mu-ism service: Not on file     Active member of club or organization: Not on file     Attends meetings of clubs or organizations: Not on file     Relationship status: Not on file   • Intimate partner violence:     Fear of current or ex partner: Not on file     Emotionally abused: Not on file     Physically abused: Not on file     Forced sexual activity: Not on file   Other Topics Concern   • Not on file   Social History Narrative   • Not on file       SURGICAL HISTORY  Past Surgical History:   Procedure Laterality Date   • INGUINAL HERNIA REPAIR Right 09/06/2019   • DENTAL EXTRACTION(S)     • ELBOW ORIF     • FEMUR ORIF     • HERNIA REPAIR Right    • NERVE ULNAR REPAIR OR EXPLORE     • PB CLOSED RX NASAL SEPTAL FRACTURE     • RECTAL EXPLORATION         CURRENT MEDICATIONS  Home Medications    **Home medications have not yet been reviewed for this encounter**         ALLERGIES  Allergies   Allergen Reactions   • Ciprofloxacin Anaphylaxis   • Dilaudid [Hydromorphone] Itching     States \"I have an adverse reaction and need benadryl first\"   • Shellfish Allergy Anaphylaxis   • Tape Hives   • Amitriptyline Hcl    • Asa [Aspirin]    • Biofreeze    • Celery Oil      Celery causes hives   • Codeine    • " Hydrocodone-Acetaminophen    • Ketorolac Tromethamine [Toradol]    • Morphine Vomiting   • Ondansetron [Zofran]    • Other Misc      Can take dilaudid if given benadryl first   • Oxycodone    • Oxycodone-Acetaminophen    • Oxycodone-Aspirin    • Reglan [Metoclopramide]      Has no idea what his RX is   • Tramadol    • Zofran [Ondansetron]        PHYSICAL EXAM  VITAL SIGNS: /88   Pulse 92   Temp 36.6 °C (97.9 °F) (Temporal)   Resp 20   Ht 1.829 m (6')   Wt 88.5 kg (195 lb)   SpO2 96%   BMI 26.45 kg/m²    Constitutional: Awake alert comfortable no acute cardiopulmonary distress  HENT: Normocephalic, Atraumatic, Bilateral external ears normal, Oropharynx moist, No oral exudates, Nose normal.   Eyes: PERRL, EOMI, Conjunctiva normal, No discharge.   Neck: No midline cervical spine tenderness.  Tenderness in the posterior occipital area most in the left.  Cardiovascular: Normal heart rate, Normal rhythm, No murmurs, No rubs, No gallops.   Thorax & Lungs: Normal breath sounds, No respiratory distress, No wheezing,  Abdomen: Bowel sounds normal, Soft, No tenderness,  Skin: Warm, Dry, No erythema, No rash.   Back: No tenderness, No CVA tenderness.   Musculoskeletal: Good range of motion in all major joints.   Neurologic: Alert & oriented x 3, No focal deficits noted.  Normal sensation with upper and lower extremities.    Psychiatric: Affect normal      Results for orders placed or performed during the hospital encounter of 11/25/19   CBC WITH DIFFERENTIAL   Result Value Ref Range    WBC 4.3 (L) 4.8 - 10.8 K/uL    RBC 4.69 (L) 4.70 - 6.10 M/uL    Hemoglobin 15.1 14.0 - 18.0 g/dL    Hematocrit 44.5 42.0 - 52.0 %    MCV 94.9 81.4 - 97.8 fL    MCH 32.2 27.0 - 33.0 pg    MCHC 33.9 33.7 - 35.3 g/dL    RDW 44.3 35.9 - 50.0 fL    Platelet Count 184 164 - 446 K/uL    MPV 10.1 9.0 - 12.9 fL    Neutrophils-Polys 56.20 44.00 - 72.00 %    Lymphocytes 33.30 22.00 - 41.00 %    Monocytes 9.20 0.00 - 13.40 %    Eosinophils 0.90  0.00 - 6.90 %    Basophils 0.20 0.00 - 1.80 %    Immature Granulocytes 0.20 0.00 - 0.90 %    Nucleated RBC 0.00 /100 WBC    Neutrophils (Absolute) 2.43 1.82 - 7.42 K/uL    Lymphs (Absolute) 1.44 1.00 - 4.80 K/uL    Monos (Absolute) 0.40 0.00 - 0.85 K/uL    Eos (Absolute) 0.04 0.00 - 0.51 K/uL    Baso (Absolute) 0.01 0.00 - 0.12 K/uL    Immature Granulocytes (abs) 0.01 0.00 - 0.11 K/uL    NRBC (Absolute) 0.00 K/uL   COMP METABOLIC PANEL   Result Value Ref Range    Sodium 142 135 - 145 mmol/L    Potassium 3.8 3.6 - 5.5 mmol/L    Chloride 110 96 - 112 mmol/L    Co2 23 20 - 33 mmol/L    Anion Gap 9.0 0.0 - 11.9    Glucose 91 65 - 99 mg/dL    Bun 23 (H) 8 - 22 mg/dL    Creatinine 1.23 0.50 - 1.40 mg/dL    Calcium 9.0 8.5 - 10.5 mg/dL    AST(SGOT) 17 12 - 45 U/L    ALT(SGPT) 25 2 - 50 U/L    Alkaline Phosphatase 55 30 - 99 U/L    Total Bilirubin 0.6 0.1 - 1.5 mg/dL    Albumin 4.2 3.2 - 4.9 g/dL    Total Protein 7.2 6.0 - 8.2 g/dL    Globulin 3.0 1.9 - 3.5 g/dL    A-G Ratio 1.4 g/dL   TROPONIN   Result Value Ref Range    Troponin T <6 6 - 19 ng/L   PROTHROMBIN TIME   Result Value Ref Range    PT 15.3 (H) 12.0 - 14.6 sec    INR 1.18 (H) 0.87 - 1.13   APTT   Result Value Ref Range    APTT 35.3 24.7 - 36.0 sec   LACTIC ACID   Result Value Ref Range    Lactic Acid 1.2 0.5 - 2.0 mmol/L   ESTIMATED GFR   Result Value Ref Range    GFR If African American >60 >60 mL/min/1.73 m 2    GFR If Non African American >60 >60 mL/min/1.73 m 2   EKG (NOW)   Result Value Ref Range    Report       [facility]    Test Date:  2019  Pt Name:    MANOJ MARISCAL                 Department: ER  MRN:        [id number]                      Room:       TRAUMA - EXAM 1  Gender:     Male                         Technician: [id number]  :        1970                   Requested By:ALINA ST  Order #:    [id number]                    Reading MD:    Measurements  Intervals                                Axis  Rate:        73                           P:          0  OK:         135                          QRS:        4  QRSD:       82                           T:          21  QT:         396  QTc:        437    Interpretive Statements  SINUS RHYTHM  Compared to ECG 11/18/2019 16:57:34  No significant changes        RADIOLOGY/PROCEDURES  \  CT-CTA HEAD WITH & W/O-POST PROCESS   Final Result      CT angiogram of the [place] within normal limits.      CT-CTA NECK WITH & W/O-POST PROCESSING   Final Result      CT angiogram of the neck within normal limits.      MR-BRAIN-WITH & W/O    (Results Pending)         COURSE & MEDICAL DECISION MAKING  Pertinent Labs & Imaging studies reviewed. (See chart for details)    The patient presents with posterior headache, neck pain after a single episode and fall.  This is not midline neck tenderness I do not think he is having pain with cervical spine fracture this is lateral pain.  He is currently on anticoagulation, therefore I am concerned about hemorrhage.  I am also concerned about cervical vessel dissection because the location of his pain.    The patient has multiple other complaints.  The other complaints do not seem to be acute and did not really require admission or further work-up other than basic labs.  His abdominal exam is benign.  He reports vomiting and hematemesis.  Is pretty vague on this and he reports this is a series of multiple complaints.  He also claims a syncopal episode/    Regarding the syncopal episodes chest x-ray and basic labs are unremarkable I think is unlikely is a significant I think this can be managed as an outpatient.    Reports vomiting and diarrhea and some blood in his emesis.  Last emesis felt to be from swallowed blood from a oral trauma.  His labs are reassuring including a serum BUN.  Do not think is likely a significant GI bleed.    His main complaint is posterior head pain and neck pain.  This is lateral.  Worried about a dissection causing  posterior occipital pain.  This is negative.  Cerebral hemorrhage considering his anticoagulation.  This is also negative.  The patient was reassessed after given some pain medications and his imaging and work-up was still in quite a bit of pain.    Patient was here about a week ago for similar process was not admitted.  This point with continued recurrent syncope and worsening pain he needs further work-up.  He will be hospitalized the [facility] medicine for continued work-up and treatment.  The been of her intractable pain and further work-up of his posterior neck pain and head pain.    FINAL IMPRESSION  1. Syncope, unspecified syncope type     2. Intractable episodic headache, unspecified headache type     3. Pulmonary embolism without acute cor pulmonale, unspecified chronicity, unspecified pulmonary embolism type (HCC)     4. Chronic anticoagulation         2.   3.         Electronically signed by: Ander Jackson, 11/25/2019 8:11 PM

## 2019-11-26 NOTE — ANESTHESIA POSTPROCEDURE EVALUATION
Patient: Landon Allred    Procedure Summary     Date:  11/26/19 Room / Location:  [facility] INTERVENTIONAL RADIOLOGY 03 / SURGERY [facility]    Anesthesia Start:  1034 Anesthesia Stop:  1122    Procedure:  RECOVERY ONLY Diagnosis:      Surgeon:  Recoveryonly Surgery Responsible Provider:  Roque Limon M.D.    Anesthesia Type:  general ASA Status:  3          Final Anesthesia Type: general  Last vitals  BP   Blood Pressure: 107/73    Temp   36.6 °C (97.8 °F)    Pulse   Pulse: 65   Resp   16    SpO2   94 %      Anesthesia Post Evaluation    Patient location during evaluation: PACU  Patient participation: complete - patient participated  Level of consciousness: sleepy but conscious  Pain score: 3    Airway patency: patent  Anesthetic complications: no  Cardiovascular status: hemodynamically stable  Respiratory status: acceptable  Hydration status: euvolemic    PONV: none           Nurse Pain Score: 7 (NPRS)

## 2019-11-26 NOTE — ANESTHESIA PREPROCEDURE EVALUATION
Anxiety. Pain. Multiple allergies.     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)      CARDIAC   (+) Deep vein thrombosis (DVT) of upper extremity (HCC)   (+) Pulmonary embolism (HCC)       Physical Exam    Airway   Mallampati: II  TM distance: >3 FB  Neck ROM: full       Cardiovascular - normal exam  Rhythm: regular  Rate: normal  (-) murmur     Dental - normal exam         Pulmonary - normal exam  Breath sounds clear to auscultation     Abdominal    Neurological - normal exam                 Anesthesia Plan    ASA 3   ASA physical status 3 criteria: CVA or TIA - history (> 3 months)    Plan - general       Airway plan will be LMA        Induction: intravenous    Postoperative Plan: Postoperative administration of opioids is intended.    Pertinent diagnostic labs and testing reviewed    Informed Consent:    Anesthetic plan and risks discussed with patient.    Use of blood products discussed with: patient whom consented to blood products.

## 2019-11-26 NOTE — ED NOTES
Pt back from CT, pt updated on poc. Additional warm blankets provided. Pt requesting hot packs, order placed. Pt resting with no additional complaints at this time.

## 2019-11-26 NOTE — ED NOTES
Pt pivoted to kiet, reports no improvement and had another syncopal event.  Has a long list of C/C

## 2019-11-26 NOTE — THERAPY
PT eval attempted; pt refused stating he was having too much pain, was dizzy, and was afraid of falling. Attempted education; still refused. Will re-attempt when able.

## 2019-11-26 NOTE — PROGRESS NOTES
"       Internal Medicine Interval Note  Note Author: Anh River M.D.     Name Landon Allred     1970   Age/Sex 49 y.o. male   MRN [id number]   Code Status Full     After 5PM or if no immediate response to page, please call for cross-coverage  Attending/Team: Jeanette/Salomón See Patient List for primary contact information  Call [phone] to page    1st Call - Day Intern (R1): Perico 2nd Call - Day Sr. Resident (R2/R3):   Bob         Reason for interval visit  (Principal Problem)   Head pain      Interval Problem Daily Status Update  (24 hours, problem oriented, brief subjective history, new lab/imaging data pertinent to that problem)   Pt feeling \"off\" this AM but is less aware of his abdominal and back of head pain since being on Dilaudid.   Went in for MRI, to follow  Neuro consult placed, to follow  Sent a fax request for orders from [facility] in order to acquire information about his past neurology services   Follow PT/OT    ROS    Disposition/Barriers to discharge:   ***    Consultants/Specialty  ***  PCP: No primary care provider on file.      Quality Measures  Quality-Core Measures        Physical Exam       Vitals:    19 1145 19 1200 19 1215 19 1300   BP: 112/69 126/63 115/64 (!) 95/58   Pulse: 60 63 60 62   Resp: 14 20 20 18   Temp:    36.5 °C (97.7 °F)   TempSrc:    Temporal   SpO2: 99% 94% 94% 90%   Weight:       Height:         Body mass index is 26.97 kg/m². Weight: 90.2 kg (198 lb 13.7 oz)  Oxygen Therapy:  Pulse Oximetry: 90 %, O2 (LPM): 6, O2 Delivery: None (Room Air)    Physical Exam          Assessment/Plan     * Syncopal episodes  Assessment & Plan  Patient presents with multiple complaints including worsening nausea, dizziness, weakness, poor appetite, headache and neck pain with syncopal episodes with LOC, can be hours before he wakes up.  Causes include neurally mediated (situational, vasovagal) versus less likely cardiac with normal EKG, no " palpitations.  Patient has significant anxiety, pain, multiple stressors from chronic medical conditions.  He maintains that these episodes only occur inside the home.  CTA head and neck here normal and multiple previous head CT is negative.  Was evaluated by neurology Santy but unclear if EEG showed any foci or epileptiform activity.  In the absence of any structural brain disease, functional/psychogenic?  Plan:  -Admit to medical floor  -Per chart review patient had last MRI under general anesthesia, currently brain MRI ordered under sedation, primary team to consult with imaging which is the most appropriate  -Primary team to consider consulting neurology for rule out of seizure etiology  -Phenergan IV for nausea  -Pain control as mentioned in plan for headache  - General diet as tolerated, supplements also ordered if patient prefers those instead  -PT/OT consult  -[facility] and possibly [facility]ry, and for all his other chronic conditions    Cough with hemoptysis  Assessment & Plan  Possible etiologies include continued bleeding from dental trauma given he is on anticoagulants, scant blood in lower left quadrant where tooth was broken versus gastric ulcers.  As mentioned in HPI he had normal EGD 8/7/2019.  [facility], so they want to schedule him for repeat EGD.  No melena or hematochezia reported.  Hemoglobin stable.  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  Most likely due to his positive traumas, previous MRIs C spine shows mild degenerative disc disease with no  central canal stenosis and/or myelopathy.   Checked [facility] and patient has been prescribed 18+30 pills of Dilaudid 4 mg since September 2019 by surgical and 1 other provider in [place].  Patient says he does not need to take them regularly except for very recently and still has most of a bottle left.  Requested Dilaudid IV.  Plan:  - Continued home regimen of Benadryl plus Dilaudid, may have IV as needed for a very limited duration, however should be reevaluated frequently and we aim to not introduce more doses of narcotics as this is not the most appropriate medication for headache    Allergic reaction  Assessment & Plan  Patient has multiple allergies, history of anaphylaxis and requires benadryl multiple times a day before pain medication  -Benadryl IV 25 q6h PRN or before pain medication      Pulmonary embolism (HCC)- (present on admission)  Assessment & Plan  History of upper extremity DVT and PE, continue home Xarelto 10 mg daily

## 2019-11-26 NOTE — OR NURSING
Patient aroused spontaneously; returned to sleep quickly  [facility]  Seizure precautions in place  Dozing long intervals; awakens easily and returns to sleep quickly  No complaints of discomfort/pain  No complaints of nausea  Transferred to room via gurney

## 2019-11-26 NOTE — PROGRESS NOTES
Received bedside report from night RN. Assumed care at 0700. Patient is awake/alert. Pt on room air. Assessment completed. Bed locked in lowest position. Call light within reach. Whiteboard updates with nurse's and CNA's numbers. Hourly rounding in place.

## 2019-11-26 NOTE — PROGRESS NOTES
"       Internal Medicine Interval Note  Note Author: Anh River M.D.     Name Landon Allred     1970   Age/Sex 49 y.o. male   MRN [id number]   Code Status full     After 5PM or if no immediate response to page, please call for cross-coverage  Attending/Team: Jeanette/Slaomón See Patient List for primary contact information  Call [phone] to page    1st Call - Day Intern (R1):   Perico 2nd Call - Day Sr. Resident (R2/R3):   Bob         Reason for interval visit  (Principal Problem)   Head pain      Interval Problem Daily Status Update  (24 hours, problem oriented, brief subjective history, new lab/imaging data pertinent to that problem)   Pt feeling \"off\" this AM but is less aware of his abdominal and back of head pain since being on Dilaudid.   Went in for MRI, to follow  Neuro consult placed, to follow  Sent a fax request for orders from [facility] in order to acquire information about his past neurology services   Follow PT/OT    Review of Systems   Constitutional: Positive for malaise/fatigue and weight loss. Negative for chills, diaphoresis and fever.   HENT: Negative for congestion and sinus pain.    Eyes: Negative for blurred vision.   Respiratory: Positive for cough and hemoptysis.    Cardiovascular: Negative for chest pain, palpitations and leg swelling.   Gastrointestinal: Positive for abdominal pain (From hernia), diarrhea, nausea and vomiting. Negative for blood in stool, constipation and heartburn.   Genitourinary: Negative for dysuria, frequency and urgency.   Musculoskeletal: Positive for falls and neck pain.   Skin: Positive for itching.   Neurological: Positive for dizziness, weakness and headaches. Negative for sensory change.   Psychiatric/Behavioral: Positive for memory loss. The patient is nervous/anxious and has insomnia.        Disposition/Barriers to discharge:   Clinical improvement and management    Consultants/Specialty  Neurology  PCP: No primary care provider " on file.      Quality Measures  Quality-Core Measures   Reviewed items::  Labs reviewed and Medications reviewed  Cox catheter::  No Cox  DVT: On Xarelto.  DVT prophylaxis - mechanical:  SCDs          Physical Exam       Vitals:    11/26/19 1145 11/26/19 1200 11/26/19 1215 11/26/19 1300   BP: 112/69 126/63 115/64 (!) 95/58   Pulse: 60 63 60 62   Resp: 14 20 20 18   Temp:    36.5 °C (97.7 °F)   TempSrc:    Temporal   SpO2: 99% 94% 94% 90%   Weight:       Height:         Body mass index is 26.97 kg/m². Weight: 90.2 kg (198 lb 13.7 oz)  Oxygen Therapy:  Pulse Oximetry: 90 %, O2 (LPM): 6, O2 Delivery: None (Room Air)    Physical Exam   Constitutional: He is oriented to person, place, and time and well-developed, well-nourished, and in no distress.   HENT:   Head: Normocephalic. Head is with abrasion.       Mouth/Throat: Mucous membranes are normal. Oral lesions present. Abnormal dentition. No dental abscesses. No oropharyngeal exudate.       Eyes: Pupils are equal, round, and reactive to light.   Eyelids fluttering   Neck: Muscular tenderness present. No spinous process tenderness present. No neck rigidity. Decreased range of motion present.       Cardiovascular: Normal rate, regular rhythm, S1 normal, S2 normal and intact distal pulses. Exam reveals no gallop and no friction rub.   No murmur heard.  Pulmonary/Chest: Effort normal. No respiratory distress. He has no wheezes. He has no rales.   Abdominal: Soft. Bowel sounds are normal. He exhibits no distension. There is no hepatosplenomegaly. There is no tenderness. There is no rebound and no guarding.       Musculoskeletal:         General: Tenderness present. No edema.      Comments: Right lower extremity in brace, strength 2/5.  Left lower extremity strength 4/5.  Upper extremity strength 4/ bilaterally, sensation intact   Neurological: He is alert and oriented to person, place, and time. No cranial nerve deficit.   Skin: Skin is warm and dry. No rash noted. No  "erythema.   Psychiatric: Affect normal. His mood appears anxious (Very talkative). He does not exhibit a depressed mood.             Assessment/Plan     * Syncopal episodes  Assessment & Plan  Patient presents with multiple complaints including worsening nausea, dizziness, weakness, poor appetite, headache and neck pain with syncopal episodes with LOC, can be hours before he wakes up.  Causes include neurally mediated (situational, vasovagal) versus less likely cardiac with normal EKG, no palpitations.  Patient has significant anxiety, pain, multiple stressors from chronic medical conditions.  He maintains that these episodes only occur inside the home.  CTA head and neck here normal and multiple previous head CT is negative.  Was evaluated by neurology [place] but unclear if EEG showed any foci or epileptiform activity.  In the absence of any structural brain disease, functional/psychogenic?  Plan:  -Admit to medical floor  -Primary team to consider consulting neurology for rule out of seizure etiology  -Phenergan IV for nausea  -Pain control as mentioned in plan for headache  - General diet as tolerated, supplements also ordered if patient prefers those instead  -PT/OT consult  -[facility] and possibly [facility], and for all his other chronic conditions  -Pt feeling \"off\" this AM but is less aware of his abdominal and back of head pain since being on Dilaudid.   -Went in for MRI, to follow  -Neuro consult placed, to follow  -Sent a fax request for orders from [facility] in order to acquire information about his past neurology services   -Follow PT/OT    Cough with hemoptysis  Assessment & Plan  Possible etiologies include continued bleeding from dental trauma given he is on anticoagulants, scant blood in lower left quadrant where tooth was broken versus gastric ulcers.  As mentioned in HPI he had normal EGD 8/7/2019.  [facility], so they want to schedule him for " repeat EGD.  No melena or hematochezia reported.  Hemoglobin stable.  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  Most likely due to his positive traumas, previous MRIs C spine shows mild degenerative disc disease with no central canal stenosis and/or myelopathy.   Checked [place]  and patient has been prescribed 18+30 pills of Dilaudid 4 mg since September 2019 by surgical and 1 other provider in [place].  Patient says he does not need to take them regularly except for very recently and still has most of a bottle left.  Requested Dilaudid IV.  Plan:  - Continued home regimen of Benadryl plus Dilaudid, may have IV as needed for a very limited duration, however should be reevaluated frequently and we aim to not introduce more doses of narcotics as this is not the most appropriate medication for headache    Allergic reaction  Assessment & Plan  Patient has multiple allergies, history of anaphylaxis and requires benadryl multiple times a day before pain medication  -Benadryl IV 25 q6h PRN or before pain medication      Pulmonary embolism (HCC)- (present on admission)  Assessment & Plan  History of upper extremity DVT and PE, continue home Xarelto 10 mg daily

## 2019-11-26 NOTE — ANESTHESIA PROCEDURE NOTES
Airway  Date/Time: 11/26/2019 10:38 AM  Performed by: Roque Limon M.D.  Authorized by: Roque Limon M.D.     Location:  OR  Urgency:  Elective  Indications for Airway Management:  Anesthesia  Spontaneous Ventilation: absent    Sedation Level:  Deep  Preoxygenated: Yes    Mask Difficulty Assessment:  0 - not attempted  Final Airway Type:  Supraglottic airway  Final Supraglottic Airway:  Standard LMA  SGA Size:  4  Number of Attempts at Approach:  1

## 2019-11-26 NOTE — ANESTHESIA QCDR
2019 [facility] Clinical Data Registry (for Quality Improvement)     Postoperative nausea/vomiting risk protocol (Adult = 18 yrs and Pediatric 3-17 yrs)- (430 and 463)  General inhalation anesthetic (NOT TIVA) with PONV risk factors: Yes  Provision of anti-emetic therapy with at least 2 different classes of agents: Yes   Patient DID NOT receive anti-emetic therapy and reason is documented in Medical Record:  N/A    Multimodal Pain Management- (AQI59)  Patient undergoing Elective Surgery (i.e. Outpatient, or ASC, or Prescheduled Surgery prior to Hospital Admission): Yes  Use of Multimodal Pain Management, two or more drugs and/or interventions, NOT including systemic opioids: Yes   Exception: Documented allergy to multiple classes of analgesics:  N/A    PACU assessment of acute postoperative pain prior to Anesthesia Care End- Applies to Patients Age = 18- (ABG7)  Initial PACU pain score is which of the following: < 7/10  Patient unable to report pain score: N/A    Post-anesthetic transfer of care checklist/protocol to PACU/ICU- (426 and 427)  Upon conclusion of case, patient transferred to which of the following locations: PACU/Non-ICU  Use of transfer checklist/protocol: Yes  Exclusion: Service Performed in Patient Hospital Room (and thus did not require transfer): N/A    PACU Reintubation- (AQI31)  General anesthesia requiring endotracheal intubation (ETT) along with subsequent extubation in OR or PACU: No  Required reintubation in the PACU: N/A  Extubation was a planned trial documented in the medical record prior to removal of the original airway device: N/A    Unplanned admission to ICU related to anesthesia service up through end of PACU care- (MD51)  Unplanned admission to ICU (not initially anticipated at anesthesia start time): No
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
## 2019-11-26 NOTE — CARE PLAN
Problem: Communication  Goal: The ability to communicate needs accurately and effectively will improve  Outcome: PROGRESSING AS EXPECTED     Problem: Safety  Goal: Will remain free from injury  Outcome: PROGRESSING AS EXPECTED  Intervention: Provide assistance with mobility  Note:   Pt calling for assistance with mobility using call light.
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
## 2019-11-26 NOTE — ANESTHESIA TIME REPORT
Anesthesia Start and Stop Event Times     Date Time Event    11/26/2019 1033 Ready for Procedure     1034 Anesthesia Start     1122 Anesthesia Stop        Responsible Staff  11/26/19    Name Role Begin End    Roque Limon M.D. Anesth 1034 1122        Preop Diagnosis (Free Text):  Pre-op Diagnosis             Preop Diagnosis (Codes):    Post op Diagnosis  Migraine      Premium Reason  Non-Premium    Comments:

## 2019-11-26 NOTE — ED NOTES
Pt medicated prior to CT. Pt educated on medications given. Pt denies any questions. Pt now to CT.

## 2019-11-26 NOTE — ASSESSMENT & PLAN NOTE
Patient has multiple allergies, history of anaphylaxis and requires benadryl multiple times a day before pain medication  -Benadryl IV 25 q6h PRN or before pain medication
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
## 2019-11-26 NOTE — PROGRESS NOTES
Pt returned from MRI in [facility] with transport. Pt awake but sleepy from the sedation. Vitals signs checked. Hourly rounding in place.

## 2019-11-26 NOTE — PROGRESS NOTES
· 2 RN skin check completed with LEYDI Fleming.   · Devices in place PIV, right leg brace.  · Skin assessed under devices yes.  · Confirmed pressure ulcers found on N/A.  · New potential pressure ulcers noted on N/A. Wound consult placed? N/A. Photo uploaded? N/A.   · The following interventions are in place presure distribution bed, pillows in use for support, q-shift RN skin checks, pt ambulatory x1 assist to bathroom.

## 2019-11-26 NOTE — CARE PLAN
Problem: Safety  Goal: Will remain free from injury  Outcome: PROGRESSING AS EXPECTED  Note:   Bed in locked and lowest position. Three side rails up. Call light within reach. Patient instructed on use of call bell and how to call for assistance. Threaded socks in use. Objects in room cleared for ambulation. Bed alarm in use.       Problem: Pain Management  Goal: Pain level will decrease to patient's comfort goal  Outcome: PROGRESSING AS EXPECTED  Note:   Patient pain level assessed using the 1-10 scale. Patient medicated as per MAR, encouraged position changes and rest. Heat pack applied.

## 2019-11-26 NOTE — ASSESSMENT & PLAN NOTE
Possible etiologies include continued bleeding from dental trauma given he is on anticoagulants, scant blood in lower left quadrant where tooth was broken versus gastric ulcers.  As mentioned in HPI he had normal EGD 8/7/2019.  [facility], so they want to schedule him for repeat EGD.  No melena or hematochezia reported.  Hemoglobin stable.  Plan:  - Fecal occult blood test ordered  - Recommend [facility] with [facility]  -Patient is on both famotidine and esomeprazole as outpatient, continued only omeprazole as inpatient  -Continue to monitor hemoglobin and for worsening symptoms

## 2019-11-26 NOTE — SENIOR ADMIT NOTE
Senior Admit Note    This is a 49 years old male who presents to ER with several complaints -nausea, vomiting, dizziness, syncopal episode, abdominal pain, headache, hematemesis, neck pain, generalized weakness, inability to sleep/function properly.  The complaints are acute, he has been in the ER/hospital multiple times for above-mentioned complaints -previous work-ups has been negative so far.  He has an extensive list of allergy and can only take certain medications, currently takes Dilaudid, Valium, Benadryl which has not been providing him with any relief. Also, patient mentioned he gets these episodes when he is inside the house, never when he is outside.       In the ER his vital signs are stable.  CT angiogram was done in the ER concerns of hemorrhage as he is on blood thinners and was complaining of headache -which was normal.      was checked, he gets his pain meds from pain clinic in [place], last prescription was on 10/30/2019, he was given total 9 pills.       Physical Exam   Constitutional: He is oriented to person, place, and time.   Elderly looking male lying in bed, complains of pain all over his body, posterior side of his headache neck however talking to me does not appear to be in severe pain   HENT:   Head: Normocephalic and atraumatic.   No open wound, tenderness along the posterior neck area   Eyes: Pupils are equal, round, and reactive to light. Conjunctivae and EOM are normal.   Neck: Normal range of motion. Neck supple.   Neck ROM limited due to pain    Cardiovascular: Normal rate, regular rhythm, normal heart sounds and intact distal pulses.   Pulmonary/Chest: Effort normal and breath sounds normal. No respiratory distress. He has no wheezes. He has no rales. He exhibits no tenderness.   Abdominal: Soft. Bowel sounds are normal.   Musculoskeletal: Normal range of motion.         General: No tenderness or edema.      Comments: No  point tenderness along the spine   Neurological: He is alert and oriented to person, place, and time. No cranial nerve deficit. Coordination normal.   Right LE 2/5 chronic   Other extremities 4/5 chronic   Sensation intact        Assessment and Plan:   Nausea Vomiting:  Syncopal episode:   Chronic pain:   - No true neurological deficit.   - Syncope chronic, going on for about a year. From history type is unclear. Functional?  - No echocardiogram on file, however normal EKG. I don't think syncope is cardiac.   - No neurology consult available from recent years, patient states that he was evaluate by neurology in [place] where he was told he has 'abnormal EEG'. He also mentions he has 'inoperable' brain mass  - History and physical exam doesn't reflect any acute worsening of neurological status. Has had multiple images done recently (CT and CTA) without any pathology. Last MRI was done in 1/2019 - which showed possible small vessel disease, but otherwise unremarkable.   - Patient was admitted for further work up given his recurrence of nature.   - It's reasonable to get a repeat MRI of Brain to check if any mass / changes from prior (Patient needs sedation MRI).   - Patient also has a drug seeking behavior ( Multiple allergies to conventional pain medications, only can tolerate Dilaudid, on my exam he didn't appear to be 10/10 pain, when he was complaining of severe pain and asking for IV Dilaudid) Will resume his home dilaudid, will not add extra unless absolutely necessary.   - Patient will probably benefit from psychotherapy ?       For Full H&P see Dr. Downs's note

## 2019-11-27 ENCOUNTER — PATIENT OUTREACH (OUTPATIENT)
Dept: HEALTH INFORMATION MANAGEMENT | Facility: OTHER | Age: 49
End: 2019-11-27

## 2019-11-27 VITALS
HEIGHT: 72 IN | BODY MASS INDEX: 26.93 KG/M2 | WEIGHT: 198.85 LBS | RESPIRATION RATE: 16 BRPM | OXYGEN SATURATION: 92 % | SYSTOLIC BLOOD PRESSURE: 116 MMHG | DIASTOLIC BLOOD PRESSURE: 79 MMHG | TEMPERATURE: 98.9 F | HEART RATE: 86 BPM

## 2019-11-27 PROBLEM — R04.2 COUGH WITH HEMOPTYSIS: Status: RESOLVED | Noted: 2019-11-26 | Resolved: 2019-11-27

## 2019-11-27 PROBLEM — T78.40XA ALLERGIC REACTION: Status: RESOLVED | Noted: 2019-11-25 | Resolved: 2019-11-27

## 2019-11-27 PROBLEM — I26.99 PULMONARY EMBOLISM (HCC): Status: RESOLVED | Noted: 2019-08-28 | Resolved: 2019-11-27

## 2019-11-27 PROCEDURE — A9270 NON-COVERED ITEM OR SERVICE: HCPCS | Performed by: STUDENT IN AN ORGANIZED HEALTH CARE EDUCATION/TRAINING PROGRAM

## 2019-11-27 PROCEDURE — 99217 PR OBSERVATION CARE DISCHARGE: CPT | Mod: GC | Performed by: HOSPITALIST

## 2019-11-27 PROCEDURE — 700102 HCHG RX REV CODE 250 W/ 637 OVERRIDE(OP): Performed by: STUDENT IN AN ORGANIZED HEALTH CARE EDUCATION/TRAINING PROGRAM

## 2019-11-27 PROCEDURE — 700111 HCHG RX REV CODE 636 W/ 250 OVERRIDE (IP): Performed by: STUDENT IN AN ORGANIZED HEALTH CARE EDUCATION/TRAINING PROGRAM

## 2019-11-27 PROCEDURE — G0378 HOSPITAL OBSERVATION PER HR: HCPCS

## 2019-11-27 PROCEDURE — 97161 PT EVAL LOW COMPLEX 20 MIN: CPT

## 2019-11-27 PROCEDURE — 97165 OT EVAL LOW COMPLEX 30 MIN: CPT

## 2019-11-27 PROCEDURE — 96376 TX/PRO/DX INJ SAME DRUG ADON: CPT

## 2019-11-27 RX ADMIN — DIPHENHYDRAMINE HYDROCHLORIDE 25 MG: 50 INJECTION INTRAMUSCULAR; INTRAVENOUS at 02:56

## 2019-11-27 RX ADMIN — HYDROMORPHONE HYDROCHLORIDE 0.5 MG: 1 INJECTION, SOLUTION INTRAMUSCULAR; INTRAVENOUS; SUBCUTANEOUS at 02:56

## 2019-11-27 RX ADMIN — ZONISAMIDE 50 MG: 50 CAPSULE ORAL at 04:35

## 2019-11-27 RX ADMIN — HYDROMORPHONE HYDROCHLORIDE 0.5 MG: 1 INJECTION, SOLUTION INTRAMUSCULAR; INTRAVENOUS; SUBCUTANEOUS at 09:13

## 2019-11-27 RX ADMIN — OMEPRAZOLE 20 MG: 20 CAPSULE, DELAYED RELEASE ORAL at 04:39

## 2019-11-27 RX ADMIN — BUDESONIDE AND FORMOTEROL FUMARATE DIHYDRATE 2 PUFF: 160; 4.5 AEROSOL RESPIRATORY (INHALATION) at 04:35

## 2019-11-27 RX ADMIN — MULTIPLE VITAMINS W/ MINERALS TAB 1 TABLET: TAB at 04:35

## 2019-11-27 RX ADMIN — DIPHENHYDRAMINE HYDROCHLORIDE 25 MG: 50 INJECTION INTRAMUSCULAR; INTRAVENOUS at 09:10

## 2019-11-27 ASSESSMENT — ENCOUNTER SYMPTOMS
HEADACHES: 1
GASTROINTESTINAL NEGATIVE: 1
MUSCULOSKELETAL NEGATIVE: 1
CARDIOVASCULAR NEGATIVE: 1
RESPIRATORY NEGATIVE: 1
NERVOUS/ANXIOUS: 1
WEAKNESS: 1

## 2019-11-27 ASSESSMENT — COGNITIVE AND FUNCTIONAL STATUS - GENERAL
MOBILITY SCORE: 19
DAILY ACTIVITIY SCORE: 22
SUGGESTED CMS G CODE MODIFIER DAILY ACTIVITY: CJ
MOVING FROM LYING ON BACK TO SITTING ON SIDE OF FLAT BED: A LITTLE
MOVING TO AND FROM BED TO CHAIR: A LITTLE
SUGGESTED CMS G CODE MODIFIER MOBILITY: CK
HELP NEEDED FOR BATHING: A LITTLE
CLIMB 3 TO 5 STEPS WITH RAILING: A LITTLE
WALKING IN HOSPITAL ROOM: A LITTLE
STANDING UP FROM CHAIR USING ARMS: A LITTLE
DRESSING REGULAR LOWER BODY CLOTHING: A LITTLE

## 2019-11-27 ASSESSMENT — GAIT ASSESSMENTS
GAIT LEVEL OF ASSIST: SUPERVISED
DEVIATION: ANTALGIC;STEP TO;DECREASED BASE OF SUPPORT;DECREASED HEEL STRIKE;DECREASED TOE OFF;OTHER (COMMENT)
DISTANCE (FEET): 40
ASSISTIVE DEVICE: FRONT WHEEL WALKER

## 2019-11-27 ASSESSMENT — ACTIVITIES OF DAILY LIVING (ADL): TOILETING: INDEPENDENT

## 2019-11-27 NOTE — PROGRESS NOTES
"Patient resting in bed, complains of pain 8/10 in head. Patient is allergic to dilaudid so he can get benadryl and dilaudid at 0900. Patient states \"I haven't had a BM since Saturday, I haven't eaten since then and I haven't slept in 3 weeks. MD saw patient today and he might get discharged today. Seizure precautions on bed, bed locked and in lowest position, all needs met, will continue to hourly round   "

## 2019-11-27 NOTE — CARE PLAN
Problem: Safety  Goal: Will remain free from injury  Outcome: PROGRESSING AS EXPECTED  Note:   Bed locked and in lowest position, non slip socks on, educate patient to call for assistance, seizure pads on bed      Problem: Bowel/Gastric:  Goal: Normal bowel function is maintained or improved  Outcome: PROGRESSING SLOWER THAN EXPECTED  Note:   No BM since Saturday per patient, will inform MD      Problem: Pain Management  Goal: Pain level will decrease to patient's comfort goal  Outcome: PROGRESSING SLOWER THAN EXPECTED  Note:   Dilaudid Q4 hours PRN, hat packs for the back of head

## 2019-11-27 NOTE — THERAPY
"Occupational Therapy Evaluation completed.   Functional Status:  SPV ADLs and ADL transfers, inconsistent report to presentation  Plan of Care: Patient with no further skilled OT needs in the acute care setting at this time  Discharge Recommendations:  Equipment: No Equipment Needed. Post-acute therapy Recommend continue home health already in place for occupational therapy services.     See \"Rehab Therapy-Acute\" Patient Summary Report for complete documentation.    Pt is 48yo male admitted with multiple complaints including dizziness, nausea, poor oral intake, weakness and episodes of \"blacking out\", all imaging and EEGs negative for acute process. Pt reports current home health RN and PT services. Pt reports he manages his self-cares and IADLs including driving himself to appointments independently. Pt with very inconsistent report of functional abilities and challenges. Pt verbose and tangential about unrelated past medical history, appears perseverative on justification for requesting pain medication, pt reminded OTs role is not related to medication administration. Pt presents without functional deficits that indicate need for acute OT intervention, anticipate dc home once medically cleared.   "

## 2019-11-27 NOTE — DISCHARGE INSTRUCTIONS
Discharge Instructions    Discharged to home by car with self. Discharged via wheelchair, hospital escort: Yes.  Special equipment needed: Not Applicable    Be sure to schedule a follow-up appointment with your primary care doctor or any specialists as instructed.     Discharge Plan:   Diet Plan: Discussed  Activity Level: Discussed  Confirmed Follow up Appointment: Patient to Call and Schedule Appointment  Confirmed Symptoms Management: Discussed  Medication Reconciliation Updated: Yes  Influenza Vaccine Indication: Not indicated: Previously immunized this influenza season and > 8 years of age    I understand that a diet low in cholesterol, fat, and sodium is recommended for good health. Unless I have been given specific instructions below for another diet, I accept this instruction as my diet prescription.   Other diet: regular    Special Instructions: None    · Is patient discharged on Warfarin / Coumadin?   No     Depression / Suicide Risk    As you are discharged from this [facility] facility, it is important to learn how to keep safe from harming yourself.    Recognize the warning signs:  · Abrupt changes in personality, positive or negative- including increase in energy   · Giving away possessions  · Change in eating patterns- significant weight changes-  positive or negative  · Change in sleeping patterns- unable to sleep or sleeping all the time   · Unwillingness or inability to communicate  · Depression  · Unusual sadness, discouragement and loneliness  · Talk of wanting to die  · Neglect of personal appearance   · Rebelliousness- reckless behavior  · Withdrawal from people/activities they love  · Confusion- inability to concentrate     If you or a loved one observes any of these behaviors or has concerns about self-harm, here's what you can do:  · Talk about it- your feelings and reasons for harming yourself  · Remove any means that you might use to hurt yourself (examples: pills, rope, extension  cords, firearm)  · Get professional help from the community (Mental Health, Substance Abuse, psychological counseling)  · Do not be alone:Call your Safe Contact- someone whom you trust who will be there for you.  · Call your local CRISIS HOTLINE 558-9375 or 756-914-8315  · Call your local Children's Mobile Crisis Response Team [place] (882) 668-8032 or www.[employer]  · Call the toll free National Suicide Prevention Hotlines   · National Suicide Prevention Lifeline 093-591-QVNU (0435)  · National Hope Line Network 800-SUICIDE (364-9188)

## 2019-11-27 NOTE — PROGRESS NOTES
" Patient has discharge orders in. Patient is to follow up with neurology. Patient drove himself here and the hospital is not going to pay for transportation because patient is medically cleared. Patient worked with PT; read PT note. Orthostatic vitals done. Lying /71 pulse 70. Sitting patient states he is dizzy, BP 97/74 pulse 86. Standing Patient again states \"I'm even more dizzy\" /79 pulse 86. Informed MD of result. MD, nurse and charge nurse are going to speak with patient that he is going to be discharged today and that there will be no more IV pain medication because patient is driving himself home and because he is already dizzy and the medication will only make patient more dizzy.   "

## 2019-11-27 NOTE — PROGRESS NOTES
"Patient will be discharging home and driving himself. No IV pain medications since 0900. Discharge instructions and paperwork gone over with patient. IV discontinued and intact. Patient states \"I am dizzy when I stand but not when I sit or if I'm laying. If I feel dizzy while driving I will pull over and wait till it passes\". All belongings sent with patient.   "

## 2019-11-27 NOTE — PROGRESS NOTES
Bedside report received from Kathe HOLLEY . Assumed care of pt at 1845 . Pt is awake at this time with no signs of distress. Plan of care discussed with pt. Pt is A&O x 4. Pt is on RA. Bed alarm is on, bed in lowest position, bed rails up x 2, belongings and call light within reach. Hourly rounding in place.

## 2019-11-27 NOTE — PROGRESS NOTES
Pt is complaining of swelling in his right hand. There is no warmth or tenderness. Will notify MD in the AM.

## 2019-11-27 NOTE — DISCHARGE PLANNING
Anticipated Discharge Disposition: Home    Action: DC order placed. LSW sent tiger text to [facility] MD to discuss discharge needs. Pt will need to follow up with Neuro. Information to set up appt in [facility]. NO additional anticipated discharge needs.    Barriers to Discharge: None    Plan: No discharge needs at this time.
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
## 2019-11-27 NOTE — PROGRESS NOTES
"Informed patient that benadryl, epinephrine, pepcid, and albuterol were all being stopped. Patient states \"I'm not going to stop taking them, I need the benadryl to take with dilaudid, the epinephrine to carry around at all times, and I'm almost finished with the Pepcid so I'm going to still take these medications.\" Educated patient that he should stop taking them and to talk with his PCP about either continuing or discontinuing the medications if he has questions.   "

## 2019-11-27 NOTE — PROGRESS NOTES
Internal Medicine Interval Note  Note Author: Sreedhar Rojas M.D.     Name Landon Allred     1970   Age/Sex 49 y.o. male   MRN [id number]   Code Status Full Code     After 5PM or if no immediate response to page, please call for cross-coverage  Attending/Team: Dr. Reid/Salomón See Patient List for primary contact information  Call [phone] to page    1st Call - Day Intern (R1):   Dr. River 2nd Call - Day Sr. Resident (R2/R3):   Dr. Rojas         Reason for interval visit  (Principal Problem)   Syncopal episodes    Interval Problem Daily Status Update  (24 hours, problem oriented, brief subjective history, new lab/imaging data pertinent to that problem)   -Patient feels about the same as yesterday.   -Patient updated on results of MRI and EEG, they were normal.   -Patient was set up for Zio patch placement per Neuro recommendations  -Patient will also be set up with follow up with Neurologist in [place]  -Prior to discharge patient began complaining of weakness and trouble standing, physical therapy graciously agreed to see patient and nursing staff also probably took orthostatic vitals.      Review of Systems   Constitutional: Positive for malaise/fatigue.   HENT: Negative.    Respiratory: Negative.    Cardiovascular: Negative.    Gastrointestinal: Negative.    Genitourinary: Negative.    Musculoskeletal: Negative.    Neurological: Positive for weakness and headaches.   Psychiatric/Behavioral: The patient is nervous/anxious.        Disposition/Barriers to discharge:   Patient medically stable for discharge    Consultants/Specialty  Neurology  PCP: No primary care provider on file.      Quality Measures  Quality-Core Measures    DVT Xarelto    Physical Exam       Vitals:    19 0913 19 1330 19 1331 19 1332   BP:  105/71 (!) 97/74 116/79   Pulse:  70 86 86   Resp: 16      Temp:       TempSrc:       SpO2:       Weight:       Height:         Body mass index is 26.97  kg/m².    Oxygen Therapy:  Pulse Oximetry: 92 %, O2 (LPM): 0, O2 Delivery: None (Room Air)    Physical Exam   Constitutional: He is oriented to person, place, and time and well-developed, well-nourished, and in no distress. No distress.   HENT:   Head: Normocephalic and atraumatic.   Eyes: EOM are normal.   Neck: Normal range of motion.   Cardiovascular: Normal rate and regular rhythm.   Pulmonary/Chest: Effort normal and breath sounds normal.   Abdominal: Soft. Bowel sounds are normal. He exhibits no distension.   Musculoskeletal: Normal range of motion.         General: No edema.   Neurological: He is alert and oriented to person, place, and time. GCS score is 15.   Skin: Skin is warm. He is not diaphoretic.   Psychiatric: His mood appears anxious.       Assessment/Plan     * Syncopal episodes  Assessment & Plan  Patient presents with multiple complaints including worsening nausea, dizziness, weakness, poor appetite, headache and neck pain with syncopal episodes with LOC, can be hours before he wakes up.  EEG and MRI with and without contrast did not reveal abnormalities.  Patient was assessed by neurology and they recommend patient following up for longer EEG, concern for patient's polypharmacy as cause of his presentation.  Patient's orthostatic vital signs were negative.  Plan  -Patient will have follow-up appointment made with neurology  -Patient will also have order sent for ZIO Patch placement  -Patient has home health with physical therapy and nursing staff

## 2019-11-27 NOTE — CARE PLAN
Problem: Communication  Goal: The ability to communicate needs accurately and effectively will improve  Outcome: PROGRESSING AS EXPECTED   PT is able to express needs verbally.     Problem: Safety  Goal: Will remain free from falls  Outcome: PROGRESSING AS EXPECTED   Bed is locked and in lowest position. Call light and table within reach. Non-skid socks on. Bed alarm not on.

## 2019-11-27 NOTE — THERAPY
"Physical Therapy Evaluation completed.   Bed Mobility:  Supine to Sit: Supervised  Transfers: Sit to Stand: Stand by Assist  Gait: Level Of Assist: Supervised with Front-Wheel Walker       Plan of Care: Patient to dc to home later today  Discharge Recommendations: Equipment: No Equipment Needed. See below    Pt presents to PT with impaired balance, gait and general locomotion. During evaluation, pt does appear to be at increased risk for falls. However he is somewhat inconsisten with presentation with observed mobility/movement v functional activity. He demonstrates abnormal step to gait mechanics with FWW with SBA/SPv, though initially reports he would be unable to use FWW 2' to pain at BUE hands. Also noted that pt ambulated hallways ealier in AM with no AD and HHA from CNA (to \"get away from roomate\") though at time of eval, reports he is limited and more unsteady 2' to current dizziness (?). His PLOF and transportation situation is also inconsistent.He reports a hx of falls from \"blacking out\" and \"waking up on floor\". Note that per MD, pt is to dc today. Spoke with MD, vignesh, and DARNELL re: dc planning concerns and inconsistencies. He appears functionally capable of dc to home once medically cleared for dc to home.  WOuld recommend resumption of home health at time of medical cleareance to home.       See \"Rehab Therapy-Acute\" Patient Summary Report for complete documentation.     "

## 2019-12-19 ENCOUNTER — HOSPITAL ENCOUNTER (EMERGENCY)
Facility: MEDICAL CENTER | Age: 49
End: 2019-12-20
Attending: EMERGENCY MEDICINE
Payer: COMMERCIAL

## 2019-12-19 VITALS
HEART RATE: 83 BPM | RESPIRATION RATE: 18 BRPM | DIASTOLIC BLOOD PRESSURE: 81 MMHG | SYSTOLIC BLOOD PRESSURE: 123 MMHG | OXYGEN SATURATION: 95 % | WEIGHT: 185 LBS | HEIGHT: 72 IN | TEMPERATURE: 97.7 F | BODY MASS INDEX: 25.06 KG/M2

## 2019-12-19 DIAGNOSIS — T78.40XA ALLERGIC REACTION, INITIAL ENCOUNTER: ICD-10-CM

## 2019-12-19 PROCEDURE — 96374 THER/PROPH/DIAG INJ IV PUSH: CPT

## 2019-12-19 PROCEDURE — 96375 TX/PRO/DX INJ NEW DRUG ADDON: CPT

## 2019-12-19 PROCEDURE — 700111 HCHG RX REV CODE 636 W/ 250 OVERRIDE (IP): Performed by: EMERGENCY MEDICINE

## 2019-12-19 PROCEDURE — 99284 EMERGENCY DEPT VISIT MOD MDM: CPT

## 2019-12-19 RX ORDER — DIPHENHYDRAMINE HYDROCHLORIDE 50 MG/ML
50 INJECTION INTRAMUSCULAR; INTRAVENOUS ONCE
Status: COMPLETED | OUTPATIENT
Start: 2019-12-19 | End: 2019-12-19

## 2019-12-19 RX ORDER — METHYLPREDNISOLONE SODIUM SUCCINATE 125 MG/2ML
125 INJECTION, POWDER, LYOPHILIZED, FOR SOLUTION INTRAMUSCULAR; INTRAVENOUS ONCE
Status: COMPLETED | OUTPATIENT
Start: 2019-12-19 | End: 2019-12-19

## 2019-12-19 RX ORDER — LORAZEPAM 2 MG/ML
1 INJECTION INTRAMUSCULAR ONCE
Status: COMPLETED | OUTPATIENT
Start: 2019-12-19 | End: 2019-12-19

## 2019-12-19 RX ADMIN — METHYLPREDNISOLONE SODIUM SUCCINATE 125 MG: 125 INJECTION, POWDER, FOR SOLUTION INTRAMUSCULAR; INTRAVENOUS at 23:28

## 2019-12-19 RX ADMIN — FAMOTIDINE 20 MG: 10 INJECTION INTRAVENOUS at 23:28

## 2019-12-19 RX ADMIN — LORAZEPAM 1 MG: 2 INJECTION INTRAMUSCULAR; INTRAVENOUS at 23:24

## 2019-12-19 RX ADMIN — DIPHENHYDRAMINE HYDROCHLORIDE 50 MG: 50 INJECTION INTRAMUSCULAR; INTRAVENOUS at 23:28

## 2019-12-20 ENCOUNTER — TELEPHONE (OUTPATIENT)
Dept: OPHTHALMOLOGY | Facility: MEDICAL CENTER | Age: 49
End: 2019-12-20

## 2019-12-20 RX ORDER — FAMOTIDINE 40 MG/1
40 TABLET, FILM COATED ORAL DAILY
Qty: 10 TAB | Refills: 0 | Status: SHIPPED | OUTPATIENT
Start: 2019-12-20 | End: 2020-01-30

## 2019-12-20 RX ORDER — HYDROXYZINE 50 MG/1
50 TABLET, FILM COATED ORAL EVERY 6 HOURS PRN
Qty: 30 TAB | Refills: 1 | Status: SHIPPED | OUTPATIENT
Start: 2019-12-20 | End: 2020-01-30

## 2019-12-20 RX ORDER — PREDNISONE 20 MG/1
TABLET ORAL
Qty: 18 TAB | Refills: 0 | Status: SHIPPED | OUTPATIENT
Start: 2019-12-20 | End: 2020-01-30

## 2019-12-20 RX ORDER — LORAZEPAM 1 MG/1
1 TABLET ORAL EVERY 12 HOURS PRN
Qty: 10 TAB | Refills: 0 | Status: SHIPPED | OUTPATIENT
Start: 2019-12-20 | End: 2019-12-25

## 2019-12-20 ASSESSMENT — LIFESTYLE VARIABLES
DO YOU DRINK ALCOHOL: NO
DOES PATIENT WANT TO STOP DRINKING: NO

## 2019-12-20 NOTE — ED TRIAGE NOTES
Pt ambulated to triage c/o allergic reaction to unknown substance he was at the [employer] bar and states he didn't eat anything new tonight.  Pt has several allergies. Pt states right arm is itching some redness noted. Pt denies sob. Nad. Placed in [facility]

## 2019-12-20 NOTE — ED NOTES
Pt met for the first time by this ed nurse. Pt is a&ox4 and speaking full sentences. Pt w/c'd to room from the lobby and was able to transfer from / to [facility] with no incident.

## 2019-12-20 NOTE — ED PROVIDER NOTES
ED Provider Note    CHIEF COMPLAINT  Chief Complaint   Patient presents with   • Allergic Reaction       HPI  Landon Allred is a 49 y.o. male who presents to the emergency room today with allergic reaction.  Patient has history of frequent allergic reactions and is not quite sure what he came in contact with this occurred just prior him his emergency room he has redness with hives across his arms face trunk and back no shortness of breath nausea vomiting fever chills.  He has recently just finished a steroid taper from previous allergic reaction approximately 10 days ago.  He does have allergy immunologist that he follows up with and has appointment in January to see this physician.    REVIEW OF SYSTEMS  See HPI for further details. All other systems are negative.     PAST MEDICAL HISTORY  Past Medical History:   Diagnosis Date   • Allergy    • Arthritis    • Asthma    • Claustrophobia    • Clostridium difficile diarrhea    • Clotting disorder ([facility])    • Depression    • DVT (deep venous thrombosis) ([facility])    • GERD (gastroesophageal reflux disease)    • Hypertension    • IBD (inflammatory bowel disease)    • Migraine    • MRSA (methicillin resistant Staphylococcus aureus)    • Pancreatitis    • PE (pulmonary embolism)    • Psychiatric disorder     PTSD, depression   • Seizure ([facility])        FAMILY HISTORY  [unfilled]    SOCIAL HISTORY  Social History     Socioeconomic History   • Marital status: Single     Spouse name: Not on file   • Number of children: Not on file   • Years of education: Not on file   • Highest education level: Not on file   Occupational History   • Not on file   Social Needs   • Financial resource strain: Not on file   • Food insecurity:     Worry: Not on file     Inability: Not on file   • Transportation needs:     Medical: Not on file     Non-medical: Not on file   Tobacco Use   • Smoking status: Never Smoker   • Smokeless tobacco: Never Used   Substance and Sexual Activity   • Alcohol use: No  "  • Drug use: No   • Sexual activity: Not on file   Lifestyle   • Physical activity:     Days per week: Not on file     Minutes per session: Not on file   • Stress: Not on file   Relationships   • Social connections:     Talks on phone: Not on file     Gets together: Not on file     Attends Faith service: Not on file     Active member of club or organization: Not on file     Attends meetings of clubs or organizations: Not on file     Relationship status: Not on file   • Intimate partner violence:     Fear of current or ex partner: Not on file     Emotionally abused: Not on file     Physically abused: Not on file     Forced sexual activity: Not on file   Other Topics Concern   • Not on file   Social History Narrative   • Not on file       SURGICAL HISTORY  Past Surgical History:   Procedure Laterality Date   • INGUINAL HERNIA REPAIR Right 09/06/2019   • DENTAL EXTRACTION(S)     • ELBOW ORIF     • FEMUR ORIF     • HERNIA REPAIR Right    • NERVE ULNAR REPAIR OR EXPLORE     • PB CLOSED RX NASAL SEPTAL FRACTURE     • RECTAL EXPLORATION         CURRENT MEDICATIONS  Home Medications     Reviewed by Olga Bolanos R.N. (Registered Nurse) on 12/19/19 at 1926  Med List Status: Partial   Medication Last Dose Status   amoxicillin-clavulanate (AUGMENTIN) 875-125 MG Tab  Active   diazepam (VALIUM) 10 MG tablet  Active   esomeprazole (NEXIUM) 40 MG delayed-release capsule  Active   Fluticasone Furoate-Vilanterol (BREO ELLIPTA) 200-25 MCG/INH AEROSOL POWDER, BREATH ACTIVATED  Active   HYDROmorphone (DILAUDID) 4 MG Tab  Active   Naloxone HCl (NARCAN) 2 MG/2ML Solution Prefilled Syringe  Active   rivaroxaban (XARELTO) 10 MG Tab tablet  Active   therapeutic multivitamin-minerals (THERAGRAN-M) Tab  Active   zonisamide (ZONEGRAN) 50 MG capsule  Active                ALLERGIES  Allergies   Allergen Reactions   • Ciprofloxacin Anaphylaxis   • Dilaudid [Hydromorphone] Itching     States \"I have an adverse reaction and need benadryl " "first\"   • Shellfish Allergy Anaphylaxis   • Tape Hives   • Augmentin Rash     rash   • Amitriptyline Hcl    • Asa [Aspirin]    • Biofreeze    • Celery Oil      Celery causes hives   • Codeine    • Hydrocodone-Acetaminophen    • Ketorolac Tromethamine [Toradol]    • Morphine Vomiting   • Ondansetron [Zofran]    • Other Misc      Can take dilaudid if given benadryl first   • Oxycodone    • Oxycodone-Acetaminophen    • Oxycodone-Aspirin    • Reglan [Metoclopramide]      Has no idea what his RX is   • Risperidone    • Tramadol    • Zofran [Ondansetron]        PHYSICAL EXAM  VITAL SIGNS: /81   Pulse 83   Temp 36.5 °C (97.7 °F) (Oral)   Resp 18   Ht 1.829 m (6')   Wt 83.9 kg (185 lb)   SpO2 95%   BMI 25.09 kg/m²  Room air O2: 96    Constitutional: Well developed, Well nourished, acute distress, Non-toxic appearance.   HENT: Normocephalic, Atraumatic, Bilateral external ears normal, Oropharynx moist, No oral exudates, Nose normal.   Eyes: PERRLA, EOMI, Conjunctiva normal, No discharge.   Neck: Normal range of motion, No tenderness, Supple, No stridor.   Lymphatic: No lymphadenopathy noted.   Cardiovascular: Normal heart rate, Normal rhythm, No murmurs, No rubs, No gallops.   Thorax & Lungs: Normal breath sounds, No respiratory distress, No wheezing, No chest tenderness.   Abdomen: Bowel sounds normal, Soft, No tenderness, No masses, No pulsatile masses.   Skin: Warm, Dry, No erythema, No rash.  Urticaric rash across the upper extremities trunk, back face and neck consistent with allergic reaction.  Back: No tenderness, No CVA tenderness.   Extremities: Intact distal pulses, No edema, No tenderness, No cyanosis, No clubbing.   Musculoskeletal: Good range of motion in all major joints. No tenderness to palpation or major deformities noted.   Neurologic: Alert & oriented x 3, Normal motor function, Normal sensory function, No focal deficits noted.   Psychiatric: Affect normal, Judgment normal, Mood normal. "         COURSE & MEDICAL DECISION MAKING  Pertinent Labs & Imaging studies reviewed. (See chart for details)  IV was established patient was given Solu-Medrol 25 mg IV push famotidine 20 mg IV push, Benadryl 50 mg IV push with good resolution of his rash on reexamination prior to discharge she is feeling much improved.  Placed on these medications for home will place back on steroid taper.  Follow-up as previously scheduled 1/6/2020 with his immunologist return for persistent or worsening symptoms verbalized understanding of instructions and need for follow-up.  Please note that patient does have EpiPen at home.    FINAL IMPRESSION  1.  Acute allergic reaction  2.   3.         Electronically signed by: Krishna Kennedy, 12/20/2019 12:36 AM

## 2019-12-20 NOTE — ED NOTES
Patient verbalized understanding of discharge instructions, provided with discharge paperwork, gait steady, ambulated independently to JENNA roach.

## 2020-01-03 ENCOUNTER — HOSPITAL ENCOUNTER (EMERGENCY)
Facility: MEDICAL CENTER | Age: 50
End: 2020-01-04
Attending: EMERGENCY MEDICINE
Payer: COMMERCIAL

## 2020-01-03 ENCOUNTER — APPOINTMENT (OUTPATIENT)
Dept: RADIOLOGY | Facility: MEDICAL CENTER | Age: 50
End: 2020-01-03
Attending: EMERGENCY MEDICINE
Payer: COMMERCIAL

## 2020-01-03 DIAGNOSIS — R51.9 CHRONIC NONINTRACTABLE HEADACHE, UNSPECIFIED HEADACHE TYPE: ICD-10-CM

## 2020-01-03 DIAGNOSIS — G43.909 MIGRAINE WITHOUT STATUS MIGRAINOSUS, NOT INTRACTABLE, UNSPECIFIED MIGRAINE TYPE: ICD-10-CM

## 2020-01-03 DIAGNOSIS — G89.4 CHRONIC PAIN SYNDROME: ICD-10-CM

## 2020-01-03 DIAGNOSIS — G89.29 CHRONIC NONINTRACTABLE HEADACHE, UNSPECIFIED HEADACHE TYPE: ICD-10-CM

## 2020-01-03 DIAGNOSIS — F11.20 NARCOTIC DEPENDENCE (HCC): ICD-10-CM

## 2020-01-03 PROCEDURE — 99284 EMERGENCY DEPT VISIT MOD MDM: CPT

## 2020-01-03 PROCEDURE — A9270 NON-COVERED ITEM OR SERVICE: HCPCS | Performed by: EMERGENCY MEDICINE

## 2020-01-03 PROCEDURE — 700105 HCHG RX REV CODE 258: Performed by: EMERGENCY MEDICINE

## 2020-01-03 PROCEDURE — 700111 HCHG RX REV CODE 636 W/ 250 OVERRIDE (IP): Performed by: EMERGENCY MEDICINE

## 2020-01-03 PROCEDURE — 96374 THER/PROPH/DIAG INJ IV PUSH: CPT

## 2020-01-03 PROCEDURE — 96375 TX/PRO/DX INJ NEW DRUG ADDON: CPT

## 2020-01-03 PROCEDURE — 700102 HCHG RX REV CODE 250 W/ 637 OVERRIDE(OP): Performed by: EMERGENCY MEDICINE

## 2020-01-03 RX ORDER — DIAZEPAM 5 MG/ML
5 INJECTION, SOLUTION INTRAMUSCULAR; INTRAVENOUS ONCE
Status: COMPLETED | OUTPATIENT
Start: 2020-01-03 | End: 2020-01-03

## 2020-01-03 RX ORDER — SODIUM CHLORIDE 9 MG/ML
1000 INJECTION, SOLUTION INTRAVENOUS ONCE
Status: COMPLETED | OUTPATIENT
Start: 2020-01-03 | End: 2020-01-04

## 2020-01-03 RX ORDER — DIPHENHYDRAMINE HYDROCHLORIDE 50 MG/ML
25 INJECTION INTRAMUSCULAR; INTRAVENOUS ONCE
Status: COMPLETED | OUTPATIENT
Start: 2020-01-03 | End: 2020-01-03

## 2020-01-03 RX ORDER — PROCHLORPERAZINE EDISYLATE 5 MG/ML
10 INJECTION INTRAMUSCULAR; INTRAVENOUS ONCE
Status: DISCONTINUED | OUTPATIENT
Start: 2020-01-03 | End: 2020-01-03

## 2020-01-03 RX ORDER — DEXAMETHASONE SODIUM PHOSPHATE 10 MG/ML
10 INJECTION, SOLUTION INTRAMUSCULAR; INTRAVENOUS ONCE
Status: COMPLETED | OUTPATIENT
Start: 2020-01-03 | End: 2020-01-03

## 2020-01-03 RX ORDER — PROMETHAZINE HYDROCHLORIDE 25 MG/1
25 TABLET ORAL EVERY 6 HOURS PRN
Status: DISCONTINUED | OUTPATIENT
Start: 2020-01-03 | End: 2020-01-04 | Stop reason: HOSPADM

## 2020-01-03 RX ADMIN — DEXAMETHASONE SODIUM PHOSPHATE 10 MG: 10 INJECTION INTRAMUSCULAR; INTRAVENOUS at 23:17

## 2020-01-03 RX ADMIN — DIPHENHYDRAMINE HYDROCHLORIDE 25 MG: 50 INJECTION INTRAMUSCULAR; INTRAVENOUS at 23:15

## 2020-01-03 RX ADMIN — PROMETHAZINE HYDROCHLORIDE 25 MG: 25 TABLET ORAL at 23:14

## 2020-01-03 RX ADMIN — DIAZEPAM 5 MG: 5 INJECTION, SOLUTION INTRAMUSCULAR; INTRAVENOUS at 23:30

## 2020-01-03 RX ADMIN — SODIUM CHLORIDE 1000 ML: 9 INJECTION, SOLUTION INTRAVENOUS at 23:13

## 2020-01-04 ENCOUNTER — APPOINTMENT (OUTPATIENT)
Dept: RADIOLOGY | Facility: MEDICAL CENTER | Age: 50
End: 2020-01-04
Attending: EMERGENCY MEDICINE
Payer: COMMERCIAL

## 2020-01-04 VITALS
HEIGHT: 72 IN | DIASTOLIC BLOOD PRESSURE: 71 MMHG | RESPIRATION RATE: 16 BRPM | HEART RATE: 73 BPM | OXYGEN SATURATION: 94 % | BODY MASS INDEX: 26.01 KG/M2 | TEMPERATURE: 98.5 F | SYSTOLIC BLOOD PRESSURE: 121 MMHG | WEIGHT: 192 LBS

## 2020-01-04 PROCEDURE — 70450 CT HEAD/BRAIN W/O DYE: CPT

## 2020-01-04 NOTE — ED TRIAGE NOTES
Chief Complaint   Patient presents with   • Head Pain     left occipital head pain, radiating down left arm, left back since tuesday   • Nausea     since thursday   • Blurred Vision     left side since thursday     Pt to triage via wheelchair for migraine since Tuesday with nausea and blurred vision since Thursday. Pt said when he initially had the migraine on Tuesday he also had an episode of confusion. Pt reports taking his migraine cocktail X2 at home but it did not work.  Pt also reporting left sided neck pain. Pt a+ox4, GCS 15.

## 2020-01-04 NOTE — ED PROVIDER NOTES
"ED Provider Note    Scribed for Jose Becerra M.D. by Jose Becerra. 1/3/2020  10:26 PM    CHIEF COMPLAINT  Chief Complaint   Patient presents with   • Head Pain     left occipital head pain, radiating down left arm, left back since tuesday   • Nausea     since thursday   • Blurred Vision     left side since thursday       HPI  Landon Allred is a 49 y.o. male who presents to the Emergency Room for persistent left-sided reciprocal headache, with some pain radiating to the left side of the neck and arm since Tuesday.  He says that most of the symptoms are consistent with his migraines, but this has not responded to his home cocktail, which includes Phenergan, Valium, and Dilaudid.  Patient gives a somewhat overinclusive history of being a \"totally disabled vet,\" with biweekly home care, and says that he is discussed this headache twice with a nurse who comes to check on him.  He says that he was encouraged to come to the emergency department since his symptoms have not been responding to his usual treatment at home.  He said that he took his \"migraine cocktail\" at home on Tuesday night, felt a little bit better for a few hours, and then felt much worse on Wednesday.  We discussed that this may be rebound headaches, secondary to treating migraines with narcotics.  The patient's says \"Well, that is what they have always given me, since I am allergic to everything else.\"  He denies fevers or chills, chest pain shortness of breath or trauma.  He also reports some dull pain behind his left eye, which she says is atypical for his migraines.  He is fully alert and oriented, with clear speech, and no deficits.    Records show that this is the patient's 19th emergency department visit visible in Epic in the last year, to various facilities.  He has a history of chronic pain disorder, chronic opiate use due to this condition, with opiate dependence.  He has numerous allergies, including some suspicious allergies " "such as a reported allergy to Dilaudid which he can tolerate if he gets Benadryl first, despite having multiple recent prescriptions for PO dilaudid at home.     The patient says that [facility], and when I asked why he says \"if you can figure that out, it would be helpful to me.\"  Shortly thereafter, he reports that he recently received a letter in the mail from [facility] reporting that he has some \"C4 and C5 narrowing\" on a recent x-ray.  He apparently does receive some [facility].      REVIEW OF SYSTEMS  See HPI for further details.    PAST MEDICAL HISTORY   has a past medical history of Allergy, Arthritis, Asthma, Claustrophobia, Clostridium difficile diarrhea, Clotting disorder ([facility]), Depression, DVT (deep venous thrombosis) ([facility]), GERD (gastroesophageal reflux disease), Hypertension, IBD (inflammatory bowel disease), Migraine, MRSA (methicillin resistant Staphylococcus aureus), Pancreatitis, PE (pulmonary embolism), Psychiatric disorder, and Seizure ([facility]).    SOCIAL HISTORY  Social History     Tobacco Use   • Smoking status: Never Smoker   • Smokeless tobacco: Never Used   Substance and Sexual Activity   • Alcohol use: No   • Drug use: No   • Sexual activity: Not on file       SURGICAL HISTORY   has a past surgical history that includes nerve ulnar repair or explore; dental extraction(s); closed rx nasal septal fracture; rectal exploration; femur orif; elbow orif; inguinal hernia repair (Right, 09/06/2019); and hernia repair (Right).    CURRENT MEDICATIONS  Home Medications    **Home medications have not yet been reviewed for this encounter**         ALLERGIES  Allergies   Allergen Reactions   • Ciprofloxacin Anaphylaxis   • Dilaudid [Hydromorphone] Itching     States \"I have an adverse reaction and need benadryl first\"   • Shellfish Allergy Anaphylaxis   • Tape Hives   • Augmentin Rash     rash   • Amitriptyline Hcl    • Asa [Aspirin]    • Biofreeze    • Celery Oil  "     Celery causes hives   • Codeine    • Compazine [Prochlorperazine]      Hallucinations, dizziness   • Hydrocodone-Acetaminophen    • Ketorolac Tromethamine [Toradol]    • Morphine Vomiting   • Ondansetron [Zofran]    • Other Misc      Can take dilaudid if given benadryl first   • Oxycodone    • Oxycodone-Acetaminophen    • Oxycodone-Aspirin    • Reglan [Metoclopramide]      Has no idea what his RX is   • Risperidone    • Tramadol    • Zofran [Ondansetron]          PHYSICAL EXAM  VITAL SIGNS: /71   Pulse 73   Temp 36.9 °C (98.5 °F) (Temporal)   Resp 16   Ht 1.829 m (6')   Wt 87.1 kg (192 lb)   SpO2 94%   BMI 26.04 kg/m²   Pulse ox interpretation: I interpret this pulse ox as normal.  Constitutional: Alert in no apparent distress.  HENT: No signs of trauma, Bilateral external ears normal, Nose normal.   Eyes: Conjunctiva normal, Non-icteric.   Neck: Normal range of motion, Supple, No stridor.   Lymphatic: No lymphadenopathy noted.   Heart: Normal peripheral perfusion.  Lungs: Unlabored respirations.  Abdomen: Bowel sounds normal, Soft, No tenderness, No masses, No pulsatile masses. No peritoneal signs.  Skin: Warm, Dry, No erythema, No rash.   Back: No midline bony tenderness.  Extremities: Intact distal pulses, No edema, No cyanosis.  Musculoskeletal: Good range of motion in all major joints. No or major deformities noted.   Neurologic: Alert , Normal motor function, Normal sensory function, No focal deficits noted.   Psychiatric: Affect dramatic, Judgment normal, Mood normal.       CT-HEAD W/O   Final Result      Normal CT scan of the head without contrast.               INTERPRETING LOCATION:  [address] [place] [address], [id number]            COURSE & MEDICAL DECISION MAKING  The patient's VS, Nurses notes reviewed. (See chart for details)    10:26 PM Patient seen and examined at bedside. Ordered for he has a history of migraine headaches and chronic pain syndrome.  He is presenting with an atraumatic  headache, consistent with his migraines, but refractory to home treatments, with some new features, including some pain behind his left eye which he says is atypical.  He has a nonfocal neurologic exam.  This patient is a somewhat difficult historian, with a history of chronic pain syndrome, and some evidence in his chart of inconsistencies with respect to allergies, raising some concern for drug-seeking behavior.  With the difficulty in his history, I think it is reasonable to obtain a head CT to exclude mass lesion or bleed.  The patient be treated with migraine medications, including a saline bolus, Compazine, Benadryl, Valium.    11:07 PM this patient declined the Compazine, reporting an allergy.  He takes Phenergan at home, so will be given to him orally.     1:53 AM this patient had an unremarkable head CT, with low pretest probability for acute findings.  He has a history of migraine headaches, and chronic pain syndrome.  He has been more comfortable after ED medications, and as above, we discussed the significant possibility of rebound headaches developing after treating any headache, especially migraines perhaps, with narcotics, including but not limited to the patient's 4 mg Dilaudid tablets at home.  He has established primary care and home health visits, apparently, and is safe and appropriate for discharge.     The patient will return for new or worsening symptoms and is stable at the time of discharge.    The patient is referred to a primary physician for blood pressure management, diabetic screening, and for all other preventative health concerns.    DISPOSITION:  Patient will be discharged home in stable condition.    FOLLOW UP:  Your regular medical providers.    Schedule an appointment as soon as possible for a visit       Gregorio Villasenor M.D.  [address] [place] [id number] [phone]    Schedule an appointment as soon as possible for a visit   for chronic headache  consultation.      OUTPATIENT MEDICATIONS:  Discharge Medication List as of 1/4/2020  2:11 AM            FINAL IMPRESSION  1. Migraine without status migrainosus, not intractable, unspecified migraine type    2. Chronic nonintractable headache, unspecified headache type    3. Chronic pain syndrome    4. Narcotic dependence (HCC)

## 2020-01-04 NOTE — ED NOTES
The patient has been provided with discharge education and information.  The patient was also provided with instructions on follow up care and return precautions.  The patient verbalizes understanding of discharge instructions, follow up care, and return precautions.  All questions have been answered. NAD, A/Ox4, good color and appropriate at time of discharge.  Patient steady gait out of department.  Patient states he is calling for a ride.

## 2020-01-04 NOTE — ED NOTES
Patient roomed via wheel chair, patient in AOx4, NAD, due to allergic reaction will keep on off monitoring equipment.

## 2020-01-04 NOTE — DISCHARGE INSTRUCTIONS
Your laboratory tests and CT scanning here were normal, and reassuring.  There is no evidence of a new or dangerous cause of your headaches.  Your symptoms seem consistent with your history of migraines, with muscle spasm.  Treating migraine headaches with narcotic pain medication, such as Dilaudid, is usually counterproductive, tending to lead to worsening or rebound headaches.  Please discuss alternative approaches to headache management with your doctors.  If you would like to consult a neurologist, use the contact information we have provided.

## 2020-01-29 ENCOUNTER — OFFICE VISIT (OUTPATIENT)
Dept: OPHTHALMOLOGY | Facility: MEDICAL CENTER | Age: 50
End: 2020-01-29
Payer: COMMERCIAL

## 2020-01-29 ENCOUNTER — HOSPITAL ENCOUNTER (OUTPATIENT)
Facility: MEDICAL CENTER | Age: 50
End: 2020-01-30
Attending: EMERGENCY MEDICINE | Admitting: HOSPITALIST
Payer: COMMERCIAL

## 2020-01-29 DIAGNOSIS — H52.4 ACCOMMODATIVE INSUFFICIENCY: ICD-10-CM

## 2020-01-29 DIAGNOSIS — M54.2 NECK PAIN: ICD-10-CM

## 2020-01-29 DIAGNOSIS — R42 DIZZINESS: ICD-10-CM

## 2020-01-29 DIAGNOSIS — S06.9X0S TRAUMATIC BRAIN INJURY, WITHOUT LOSS OF CONSCIOUSNESS, SEQUELA (HCC): ICD-10-CM

## 2020-01-29 DIAGNOSIS — R51.9 NONINTRACTABLE HEADACHE, UNSPECIFIED CHRONICITY PATTERN, UNSPECIFIED HEADACHE TYPE: ICD-10-CM

## 2020-01-29 DIAGNOSIS — M54.81 BILATERAL OCCIPITAL NEURALGIA: ICD-10-CM

## 2020-01-29 DIAGNOSIS — H52.203 HYPEROPIA OF BOTH EYES WITH ASTIGMATISM: ICD-10-CM

## 2020-01-29 DIAGNOSIS — H52.03 HYPEROPIA OF BOTH EYES WITH ASTIGMATISM: ICD-10-CM

## 2020-01-29 PROBLEM — F41.8 DEPRESSION WITH ANXIETY: Status: ACTIVE | Noted: 2020-01-29

## 2020-01-29 PROBLEM — R52 INTRACTABLE PAIN: Status: ACTIVE | Noted: 2020-01-29

## 2020-01-29 PROBLEM — D68.2 FACTOR V DEFICIENCY (HCC): Status: ACTIVE | Noted: 2020-01-29

## 2020-01-29 PROBLEM — S06.9XAA TRAUMATIC BRAIN INJURY (HCC): Status: ACTIVE | Noted: 2020-01-29

## 2020-01-29 LAB
ANION GAP SERPL CALC-SCNC: 9 MMOL/L (ref 0–11.9)
BASOPHILS # BLD AUTO: 0.1 % (ref 0–1.8)
BASOPHILS # BLD: 0.01 K/UL (ref 0–0.12)
BUN SERPL-MCNC: 22 MG/DL (ref 8–22)
CALCIUM SERPL-MCNC: 9.3 MG/DL (ref 8.5–10.5)
CHLORIDE SERPL-SCNC: 110 MMOL/L (ref 96–112)
CO2 SERPL-SCNC: 21 MMOL/L (ref 20–33)
CREAT SERPL-MCNC: 1.31 MG/DL (ref 0.5–1.4)
EOSINOPHIL # BLD AUTO: 0.01 K/UL (ref 0–0.51)
EOSINOPHIL NFR BLD: 0.1 % (ref 0–6.9)
ERYTHROCYTE [DISTWIDTH] IN BLOOD BY AUTOMATED COUNT: 46 FL (ref 35.9–50)
GLUCOSE SERPL-MCNC: 129 MG/DL (ref 65–99)
HCT VFR BLD AUTO: 44.8 % (ref 42–52)
HGB BLD-MCNC: 15.1 G/DL (ref 14–18)
IMM GRANULOCYTES # BLD AUTO: 0.03 K/UL (ref 0–0.11)
IMM GRANULOCYTES NFR BLD AUTO: 0.4 % (ref 0–0.9)
LYMPHOCYTES # BLD AUTO: 0.64 K/UL (ref 1–4.8)
LYMPHOCYTES NFR BLD: 7.5 % (ref 22–41)
MCH RBC QN AUTO: 32.5 PG (ref 27–33)
MCHC RBC AUTO-ENTMCNC: 33.7 G/DL (ref 33.7–35.3)
MCV RBC AUTO: 96.6 FL (ref 81.4–97.8)
MONOCYTES # BLD AUTO: 0.08 K/UL (ref 0–0.85)
MONOCYTES NFR BLD AUTO: 0.9 % (ref 0–13.4)
NEUTROPHILS # BLD AUTO: 7.74 K/UL (ref 1.82–7.42)
NEUTROPHILS NFR BLD: 91 % (ref 44–72)
NRBC # BLD AUTO: 0 K/UL
NRBC BLD-RTO: 0 /100 WBC
PLATELET # BLD AUTO: 193 K/UL (ref 164–446)
PMV BLD AUTO: 10.1 FL (ref 9–12.9)
POTASSIUM SERPL-SCNC: 3.9 MMOL/L (ref 3.6–5.5)
RBC # BLD AUTO: 4.64 M/UL (ref 4.7–6.1)
SODIUM SERPL-SCNC: 140 MMOL/L (ref 135–145)
WBC # BLD AUTO: 8.5 K/UL (ref 4.8–10.8)

## 2020-01-29 PROCEDURE — 80048 BASIC METABOLIC PNL TOTAL CA: CPT

## 2020-01-29 PROCEDURE — 700111 HCHG RX REV CODE 636 W/ 250 OVERRIDE (IP): Performed by: EMERGENCY MEDICINE

## 2020-01-29 PROCEDURE — 99204 OFFICE O/P NEW MOD 45 MIN: CPT | Mod: 25 | Performed by: OPHTHALMOLOGY

## 2020-01-29 PROCEDURE — 700102 HCHG RX REV CODE 250 W/ 637 OVERRIDE(OP): Performed by: EMERGENCY MEDICINE

## 2020-01-29 PROCEDURE — 92015 DETERMINE REFRACTIVE STATE: CPT | Performed by: OPHTHALMOLOGY

## 2020-01-29 PROCEDURE — 85025 COMPLETE CBC W/AUTO DIFF WBC: CPT

## 2020-01-29 PROCEDURE — 96374 THER/PROPH/DIAG INJ IV PUSH: CPT

## 2020-01-29 PROCEDURE — 700105 HCHG RX REV CODE 258: Performed by: EMERGENCY MEDICINE

## 2020-01-29 PROCEDURE — 99285 EMERGENCY DEPT VISIT HI MDM: CPT

## 2020-01-29 PROCEDURE — A9270 NON-COVERED ITEM OR SERVICE: HCPCS | Performed by: EMERGENCY MEDICINE

## 2020-01-29 PROCEDURE — 96376 TX/PRO/DX INJ SAME DRUG ADON: CPT

## 2020-01-29 PROCEDURE — 96375 TX/PRO/DX INJ NEW DRUG ADDON: CPT

## 2020-01-29 PROCEDURE — 92250 FUNDUS PHOTOGRAPHY W/I&R: CPT | Performed by: OPHTHALMOLOGY

## 2020-01-29 PROCEDURE — 99220 PR INITIAL OBSERVATION CARE,LEVL III: CPT | Performed by: HOSPITALIST

## 2020-01-29 PROCEDURE — G0378 HOSPITAL OBSERVATION PER HR: HCPCS

## 2020-01-29 RX ORDER — HYDROMORPHONE HYDROCHLORIDE 1 MG/ML
0.5 INJECTION, SOLUTION INTRAMUSCULAR; INTRAVENOUS; SUBCUTANEOUS
Status: DISCONTINUED | OUTPATIENT
Start: 2020-01-29 | End: 2020-01-30

## 2020-01-29 RX ORDER — PROMETHAZINE HYDROCHLORIDE 25 MG/1
25 TABLET ORAL ONCE
Status: COMPLETED | OUTPATIENT
Start: 2020-01-29 | End: 2020-01-29

## 2020-01-29 RX ORDER — DIAZEPAM 5 MG/ML
5 INJECTION, SOLUTION INTRAMUSCULAR; INTRAVENOUS ONCE
Status: COMPLETED | OUTPATIENT
Start: 2020-01-29 | End: 2020-01-29

## 2020-01-29 RX ORDER — DIAZEPAM 5 MG/1
10 TABLET ORAL 2 TIMES DAILY PRN
Status: DISCONTINUED | OUTPATIENT
Start: 2020-01-29 | End: 2020-01-30

## 2020-01-29 RX ORDER — HYDROMORPHONE HYDROCHLORIDE 1 MG/ML
0.5 INJECTION, SOLUTION INTRAMUSCULAR; INTRAVENOUS; SUBCUTANEOUS
Status: DISCONTINUED | OUTPATIENT
Start: 2020-01-29 | End: 2020-01-29

## 2020-01-29 RX ORDER — SODIUM CHLORIDE 9 MG/ML
1000 INJECTION, SOLUTION INTRAVENOUS ONCE
Status: COMPLETED | OUTPATIENT
Start: 2020-01-29 | End: 2020-01-29

## 2020-01-29 RX ORDER — AMOXICILLIN 250 MG
2 CAPSULE ORAL 2 TIMES DAILY
Status: DISCONTINUED | OUTPATIENT
Start: 2020-01-29 | End: 2020-01-30 | Stop reason: HOSPADM

## 2020-01-29 RX ORDER — GABAPENTIN 100 MG/1
100 CAPSULE ORAL 3 TIMES DAILY
Status: DISCONTINUED | OUTPATIENT
Start: 2020-01-30 | End: 2020-01-29

## 2020-01-29 RX ORDER — OXYCODONE HYDROCHLORIDE 5 MG/1
5 TABLET ORAL
Status: DISCONTINUED | OUTPATIENT
Start: 2020-01-29 | End: 2020-01-30

## 2020-01-29 RX ORDER — ZONISAMIDE 50 MG/1
50 CAPSULE ORAL 2 TIMES DAILY
Status: DISCONTINUED | OUTPATIENT
Start: 2020-01-30 | End: 2020-01-30 | Stop reason: HOSPADM

## 2020-01-29 RX ORDER — POLYETHYLENE GLYCOL 3350 17 G/17G
1 POWDER, FOR SOLUTION ORAL
Status: DISCONTINUED | OUTPATIENT
Start: 2020-01-29 | End: 2020-01-30 | Stop reason: HOSPADM

## 2020-01-29 RX ORDER — PROMETHAZINE HYDROCHLORIDE 25 MG/1
12.5-25 TABLET ORAL EVERY 4 HOURS PRN
Status: DISCONTINUED | OUTPATIENT
Start: 2020-01-29 | End: 2020-01-30 | Stop reason: HOSPADM

## 2020-01-29 RX ORDER — DIPHENHYDRAMINE HYDROCHLORIDE 50 MG/ML
50 INJECTION INTRAMUSCULAR; INTRAVENOUS ONCE
Status: COMPLETED | OUTPATIENT
Start: 2020-01-29 | End: 2020-01-29

## 2020-01-29 RX ORDER — OXYCODONE HYDROCHLORIDE 5 MG/1
10 TABLET ORAL
Status: DISCONTINUED | OUTPATIENT
Start: 2020-01-29 | End: 2020-01-29

## 2020-01-29 RX ORDER — HYDROMORPHONE HYDROCHLORIDE 1 MG/ML
1 INJECTION, SOLUTION INTRAMUSCULAR; INTRAVENOUS; SUBCUTANEOUS
Status: DISCONTINUED | OUTPATIENT
Start: 2020-01-29 | End: 2020-01-29

## 2020-01-29 RX ORDER — OXYCODONE HYDROCHLORIDE 5 MG/1
10 TABLET ORAL
Status: DISCONTINUED | OUTPATIENT
Start: 2020-01-29 | End: 2020-01-30

## 2020-01-29 RX ORDER — DEXAMETHASONE SODIUM PHOSPHATE 10 MG/ML
16 INJECTION, SOLUTION INTRAMUSCULAR; INTRAVENOUS ONCE
Status: COMPLETED | OUTPATIENT
Start: 2020-01-29 | End: 2020-01-29

## 2020-01-29 RX ORDER — BISACODYL 10 MG
10 SUPPOSITORY, RECTAL RECTAL
Status: DISCONTINUED | OUTPATIENT
Start: 2020-01-29 | End: 2020-01-30 | Stop reason: HOSPADM

## 2020-01-29 RX ORDER — OXYCODONE HYDROCHLORIDE 5 MG/1
5 TABLET ORAL
Status: DISCONTINUED | OUTPATIENT
Start: 2020-01-29 | End: 2020-01-29

## 2020-01-29 RX ORDER — ONDANSETRON 2 MG/ML
4 INJECTION INTRAMUSCULAR; INTRAVENOUS EVERY 4 HOURS PRN
Status: DISCONTINUED | OUTPATIENT
Start: 2020-01-29 | End: 2020-01-30 | Stop reason: HOSPADM

## 2020-01-29 RX ORDER — ESOMEPRAZOLE MAGNESIUM 40 MG/1
40 CAPSULE, DELAYED RELEASE ORAL
Status: DISCONTINUED | OUTPATIENT
Start: 2020-01-30 | End: 2020-01-30

## 2020-01-29 RX ORDER — SODIUM CHLORIDE, SODIUM LACTATE, POTASSIUM CHLORIDE, CALCIUM CHLORIDE 600; 310; 30; 20 MG/100ML; MG/100ML; MG/100ML; MG/100ML
1000 INJECTION, SOLUTION INTRAVENOUS ONCE
Status: DISCONTINUED | OUTPATIENT
Start: 2020-01-29 | End: 2020-01-30 | Stop reason: HOSPADM

## 2020-01-29 RX ORDER — PROMETHAZINE HYDROCHLORIDE 12.5 MG/1
12.5-25 SUPPOSITORY RECTAL EVERY 4 HOURS PRN
Status: DISCONTINUED | OUTPATIENT
Start: 2020-01-29 | End: 2020-01-30 | Stop reason: HOSPADM

## 2020-01-29 RX ORDER — ONDANSETRON 4 MG/1
4 TABLET, ORALLY DISINTEGRATING ORAL EVERY 4 HOURS PRN
Status: DISCONTINUED | OUTPATIENT
Start: 2020-01-29 | End: 2020-01-30 | Stop reason: HOSPADM

## 2020-01-29 RX ORDER — BACLOFEN 10 MG/1
10 TABLET ORAL 3 TIMES DAILY
Status: DISCONTINUED | OUTPATIENT
Start: 2020-01-30 | End: 2020-01-30 | Stop reason: HOSPADM

## 2020-01-29 RX ADMIN — DIPHENHYDRAMINE HYDROCHLORIDE 50 MG: 50 INJECTION INTRAMUSCULAR; INTRAVENOUS at 18:41

## 2020-01-29 RX ADMIN — DIAZEPAM 5 MG: 5 INJECTION, SOLUTION INTRAMUSCULAR; INTRAVENOUS at 21:11

## 2020-01-29 RX ADMIN — DEXAMETHASONE SODIUM PHOSPHATE 16 MG: 10 INJECTION INTRAMUSCULAR; INTRAVENOUS at 18:43

## 2020-01-29 RX ADMIN — FENTANYL CITRATE 50 MCG: 0.05 INJECTION, SOLUTION INTRAMUSCULAR; INTRAVENOUS at 23:38

## 2020-01-29 RX ADMIN — FENTANYL CITRATE 50 MCG: 0.05 INJECTION, SOLUTION INTRAMUSCULAR; INTRAVENOUS at 21:11

## 2020-01-29 RX ADMIN — DIAZEPAM 5 MG: 5 INJECTION, SOLUTION INTRAMUSCULAR; INTRAVENOUS at 18:40

## 2020-01-29 RX ADMIN — SODIUM CHLORIDE 1000 ML: 9 INJECTION, SOLUTION INTRAVENOUS at 21:49

## 2020-01-29 RX ADMIN — PROMETHAZINE HYDROCHLORIDE 25 MG: 25 TABLET ORAL at 18:44

## 2020-01-29 ASSESSMENT — EXTERNAL EXAM - LEFT EYE: OS_EXAM: NORMAL

## 2020-01-29 ASSESSMENT — ENCOUNTER SYMPTOMS
WEAKNESS: 1
COUGH: 1
DOUBLE VISION: 1
BLURRED VISION: 1
VOMITING: 0
EYE PAIN: 1
NECK PAIN: 1
NECK PAIN: 1
EYE DISCHARGE: 1
DEPRESSION: 1
HEADACHES: 1
DIZZINESS: 1
EYE REDNESS: 1
HEADACHES: 1
ABDOMINAL PAIN: 0
BLURRED VISION: 1
PHOTOPHOBIA: 1
NAUSEA: 0
PHOTOPHOBIA: 1
VOMITING: 1
BACK PAIN: 1
DIZZINESS: 1
CONSTITUTIONAL NEGATIVE: 1

## 2020-01-29 ASSESSMENT — REFRACTION_MANIFEST
OS_AXIS: 088
OD_CYLINDER: +1.25
METHOD_AUTOREFRACTION: 1
OS_SPHERE: -0.25
OD_SPHERE: -0.75
OD_AXIS: 090
OS_CYLINDER: +1.00

## 2020-01-29 ASSESSMENT — CUP TO DISC RATIO
OD_RATIO: 0.1
OS_RATIO: 0.1

## 2020-01-29 ASSESSMENT — CONF VISUAL FIELD
OS_NORMAL: 1
OD_NORMAL: 1

## 2020-01-29 ASSESSMENT — EXTERNAL EXAM - RIGHT EYE: OD_EXAM: NORMAL

## 2020-01-29 ASSESSMENT — REFRACTION_WEARINGRX
OS_AXIS: 070
OS_SPHERE: -0.50
OD_CYLINDER: +0.75
OD_AXIS: 104
SPECS_TYPE: SVL
OS_CYLINDER: +0.50
OD_SPHERE: +1.00

## 2020-01-29 ASSESSMENT — SLIT LAMP EXAM - LIDS
COMMENTS: NORMAL
COMMENTS: NORMAL

## 2020-01-29 ASSESSMENT — REFRACTION
OS_ADD: +2.75
OS_SPHERE: PLANO
OD_AXIS: 090
OD_ADD: +2.75
OS_AXIS: 090
OD_CYLINDER: +1.00
OS_CYLINDER: +0.75
OD_SPHERE: -0.50

## 2020-01-29 ASSESSMENT — TONOMETRY
OD_IOP_MMHG: 13
IOP_METHOD: APPLANATION
OS_IOP_MMHG: 13

## 2020-01-29 ASSESSMENT — VISUAL ACUITY
METHOD: SNELLEN - LINEAR
OD_CC: J1
OD_SC+: -2
OD_SC: 20/15
OS_SC: 20/15
OS_CC: J1

## 2020-01-29 NOTE — PROGRESS NOTES
Peds/Neuro Ophthalmology:   Sarmad Velazco M.D.    Date & Time note created:    1/29/2020   5:12 PM     Referring MD / APRN:  Pcp Pt States None, No att. providers found    Patient ID:  Name:             Landon Allred   YOB: 1970  Age:                 49 y.o.  male   MRN:               [id number]    Chief Complaint/Reason for Visit:     Loss of Vision and Ptosis      History of Present Illness:    Landon Allred is a 49 y.o. male   50 y/o male referred by Dr. Montoya for worsening vision, bilateral ptosis.  Patient states he has constant eye pain with floaters and blurred vision.  Patient also states he has dizziness, headaches, and lightheadedness.  Patient also states he has double vision and pain in back lower left of head that causes his vision to black out.      Review of Systems:  Review of Systems   Constitutional: Negative.    HENT: Positive for hearing loss and nosebleeds.    Eyes: Positive for blurred vision, double vision, photophobia, pain, discharge and redness.   Respiratory: Positive for cough.    Cardiovascular: Positive for chest pain.   Gastrointestinal: Positive for vomiting.   Genitourinary: Negative.    Musculoskeletal: Positive for back pain, joint pain and neck pain.   Neurological: Positive for dizziness, weakness and headaches.   Psychiatric/Behavioral: Positive for depression.       Past Medical History:   Past Medical History:   Diagnosis Date   • Allergy    • Arthritis    • Asthma    • Claustrophobia    • Clostridium difficile diarrhea    • Clotting disorder (HCC)    • Depression    • DVT (deep venous thrombosis) ([facility])    • GERD (gastroesophageal reflux disease)    • Hypertension    • IBD (inflammatory bowel disease)    • Migraine    • MRSA (methicillin resistant Staphylococcus aureus)    • Pancreatitis    • PE (pulmonary embolism)    • Psychiatric disorder     PTSD, depression   • Seizure ([facility])        Past Surgical History:  Past Surgical History:  "  Procedure Laterality Date   • INGUINAL HERNIA REPAIR Right 09/06/2019   • DENTAL EXTRACTION(S)     • ELBOW ORIF     • FEMUR ORIF     • HERNIA REPAIR Right    • NERVE ULNAR REPAIR OR EXPLORE     • PB CLOSED RX NASAL SEPTAL FRACTURE     • RECTAL EXPLORATION         Current Outpatient Medications:  Current Outpatient Medications   Medication Sig Dispense Refill   • predniSONE (DELTASONE) 20 MG Tab Take one tablet with food three times a day for 3-days, then take one tablet with food 2-times a day for 3-days, then take one tablet once a day with food for 3-days. Total of 9-days  And 18 tablets as directed 18 Tab 0   • hydrOXYzine HCl (ATARAX) 50 MG Tab Take 1 Tab by mouth every 6 hours as needed for Itching. 30 Tab 1   • famotidine (PEPCID) 40 MG Tab Take 1 Tab by mouth every day. 10 Tab 0   • HYDROmorphone (DILAUDID) 4 MG Tab Take 4 mg by mouth 4 times a day as needed for Severe Pain.     • therapeutic multivitamin-minerals (THERAGRAN-M) Tab Take 1 Tab by mouth every day.     • Fluticasone Furoate-Vilanterol (BREO ELLIPTA) 200-25 MCG/INH AEROSOL POWDER, BREATH ACTIVATED Inhale 1 Puff by mouth every day.     • diazepam (VALIUM) 10 MG tablet Take 10 mg by mouth 2 times a day as needed for Anxiety. Indications: Muscle Spasm     • zonisamide (ZONEGRAN) 50 MG capsule Take 50 mg by mouth 2 times a day.     • esomeprazole (NEXIUM) 40 MG delayed-release capsule Take 40 mg by mouth every morning before breakfast.     • rivaroxaban (XARELTO) 10 MG Tab tablet Take 10 mg by mouth with dinner.     • amoxicillin-clavulanate (AUGMENTIN) 875-125 MG Tab Take 1 Tab by mouth 2 times a day. (Patient not taking: Reported on 1/29/2020) 20 Tab 0   • Naloxone HCl (NARCAN) 2 MG/2ML Solution Prefilled Syringe 2 mg by Intravenous route as needed.       No current facility-administered medications for this visit.        Allergies:  Allergies   Allergen Reactions   • Ciprofloxacin Anaphylaxis   • Dilaudid [Hydromorphone] Itching     States \"I " "have an adverse reaction and need benadryl first\"   • Shellfish Allergy Anaphylaxis   • Tape Hives   • Augmentin Rash     rash   • Amitriptyline Hcl    • Asa [Aspirin]    • Biofreeze    • Celery Oil      Celery causes hives   • Codeine    • Compazine [Prochlorperazine]      Hallucinations, dizziness   • Hydrocodone-Acetaminophen    • Ketorolac Tromethamine [Toradol]    • Morphine Vomiting   • Ondansetron [Zofran]    • Other Misc      Can take dilaudid if given benadryl first   • Oxycodone    • Oxycodone-Acetaminophen    • Oxycodone-Aspirin    • Reglan [Metoclopramide]      Has no idea what his RX is   • Risperidone    • Tramadol    • Zofran [Ondansetron]        Family History:  Family History   Problem Relation Age of Onset   • Hypertension Mother    • Cancer Father    • Hypertension Father        Social History:  Social History     Socioeconomic History   • Marital status: Single     Spouse name: Not on file   • Number of children: Not on file   • Years of education: Not on file   • Highest education level: Not on file   Occupational History   • Not on file   Social Needs   • Financial resource strain: Not on file   • Food insecurity:     Worry: Not on file     Inability: Not on file   • Transportation needs:     Medical: Not on file     Non-medical: Not on file   Tobacco Use   • Smoking status: Never Smoker   • Smokeless tobacco: Never Used   Substance and Sexual Activity   • Alcohol use: No   • Drug use: No   • Sexual activity: Not on file   Lifestyle   • Physical activity:     Days per week: Not on file     Minutes per session: Not on file   • Stress: Not on file   Relationships   • Social connections:     Talks on phone: Not on file     Gets together: Not on file     Attends Denominational service: Not on file     Active member of club or organization: Not on file     Attends meetings of clubs or organizations: Not on file     Relationship status: Not on file   • Intimate partner violence:     Fear of current or ex " partner: Not on file     Emotionally abused: Not on file     Physically abused: Not on file     Forced sexual activity: Not on file   Other Topics Concern   • Not on file   Social History Narrative    50 y/o male, disabled          Physical Exam:  Physical Exam    Oriented x 3  Weight/BMI: There is no height or weight on file to calculate BMI.  There were no vitals taken for this visit.    Base Eye Exam     Visual Acuity (Snellen - Linear)       Right Left    Dist sc 20/15 -2 20/15    Near cc J1 J1          Tonometry (Applanation, 5:00 PM)       Right Left    Pressure 13 13          Pupils       Pupils    Right PERRL    Left PERRL          Visual Fields       Right Left     Full Full          Extraocular Movement       Right Left     Full, Ortho Full, Ortho          Neuro/Psych     Oriented x3:  Yes    Mood/Affect:  Normal          Dilation     Both eyes:  Tropicamide (MYDRIACYL) 1% ophthalmic solution, Phenylephrine (NEOSYNEPHRINE) ophthalmic solution 2.5%, Cyclopentolate (CYCLOGYL) 1% ophthalmic solution @ 5:01 PM            Additional Tests     Color       Right Left    [place] 10/10 10/10          Stereo     Fly:  -    Animals:  0/3    Circles:  0/9            Slit Lamp and Fundus Exam     External Exam       Right Left    External Normal Normal          Slit Lamp Exam       Right Left    Lids/Lashes Normal Normal    Conjunctiva/Sclera White and quiet White and quiet    Cornea Clear Clear    Anterior Chamber Deep and quiet Deep and quiet    Iris Round and reactive Round and reactive    Lens Nuclear sclerosis Nuclear sclerosis    Vitreous Normal Normal          Fundus Exam       Right Left    Disc Normal Normal    C/D Ratio 0.1 0.1    Macula Normal Normal    Vessels Normal Normal    Periphery Normal Normal            Refraction     Wearing Rx       Sphere Cylinder Axis    Right +1.00 +0.75 104    Left -0.50 +0.50 070    Age:  4yrs    Type:  SVL          Manifest Refraction (Auto)       Sphere Cylinder Axis     Right -0.75 +1.25 090    Left -0.25 +1.00 088          Cycloplegic Refraction       Sphere Cylinder Axis Dist VA Add    Right -0.50 +1.00 090 20/20 +2.75    Left [place] +0.75 090 20/20 +2.75          Final Rx       Sphere Cylinder Axis Add    Right -0.50 +1.00 090 +2.75    Left [place] +0.75 090 +2.75                Pertinent Lab/Test/Imaging Review:      Assessment and Plan:     Bilateral occipital neuralgia  1/29/2020 - Suspect that some of his occipital very pinpoint headaches associated with stiff neck and discomfort radiating to the periorbital region is secondary to occipital neuralgia. Long history of multiple head injuries and attempts to control pain to the point where he has to come to the ER for control. By history has had some nerve blocks, but in his description the seem to involve mor shoulder girdle. Possible attempts or discussion for further blocks bu Dr Rogers in Neurology at [facility], but having difficulty getting back to be seen. By description from patient uncertain if the blocks were in the region of the occipital nerve. Therefore recommended referral to Dr Molina in Rehab medicine.     Traumatic brain injury (HCC)  1/29/2020 - History of multiple concussions with headaches and traumatic brain injury. Obtained OCT NFL thicken ss and ganglion cell thickness that was unremarkable  With OD 97 and OS 98. Normal ganglion cell thickness. Therefore no evidence of traumatic optic neuropathy or retrograde axonal degeneration.     Accommodative insufficiency  1/29/2020 - accommodative insufficieny and probable episodes of accommodative spasm leading to some of the blurry vision and worsening reading. Most likely a combination of early NS cataracts, presbyopia and the traumatic brain injury. Also current rx over corrected and the reading rx significantly undercorrected. Was able to correct to 20/20 vision at distance and near with new glasses rx. Discussed importance of wearing glasses at all times to  relax accommodation and prevent spasm at both distance and near. Since needs both distance and reading correction recommended progressive lenses.     Hyperopia of both eyes with astigmatism  1/29/2020 gave new glasses rx.         Sarmad Velazco M.D.

## 2020-01-30 VITALS
OXYGEN SATURATION: 95 % | HEIGHT: 72 IN | RESPIRATION RATE: 16 BRPM | TEMPERATURE: 97.9 F | HEART RATE: 78 BPM | SYSTOLIC BLOOD PRESSURE: 100 MMHG | DIASTOLIC BLOOD PRESSURE: 61 MMHG | BODY MASS INDEX: 26.01 KG/M2 | WEIGHT: 192.02 LBS

## 2020-01-30 PROCEDURE — 700111 HCHG RX REV CODE 636 W/ 250 OVERRIDE (IP): Performed by: NURSE PRACTITIONER

## 2020-01-30 PROCEDURE — G0378 HOSPITAL OBSERVATION PER HR: HCPCS

## 2020-01-30 PROCEDURE — 96376 TX/PRO/DX INJ SAME DRUG ADON: CPT

## 2020-01-30 PROCEDURE — A9270 NON-COVERED ITEM OR SERVICE: HCPCS | Performed by: NURSE PRACTITIONER

## 2020-01-30 PROCEDURE — 700102 HCHG RX REV CODE 250 W/ 637 OVERRIDE(OP): Performed by: HOSPITALIST

## 2020-01-30 PROCEDURE — 700105 HCHG RX REV CODE 258: Performed by: NURSE PRACTITIONER

## 2020-01-30 PROCEDURE — 700102 HCHG RX REV CODE 250 W/ 637 OVERRIDE(OP): Performed by: NURSE PRACTITIONER

## 2020-01-30 PROCEDURE — A9270 NON-COVERED ITEM OR SERVICE: HCPCS | Performed by: HOSPITALIST

## 2020-01-30 PROCEDURE — 700111 HCHG RX REV CODE 636 W/ 250 OVERRIDE (IP): Performed by: HOSPITALIST

## 2020-01-30 PROCEDURE — 700105 HCHG RX REV CODE 258: Performed by: HOSPITALIST

## 2020-01-30 PROCEDURE — 99217 PR OBSERVATION CARE DISCHARGE: CPT | Performed by: INTERNAL MEDICINE

## 2020-01-30 PROCEDURE — 51798 US URINE CAPACITY MEASURE: CPT

## 2020-01-30 RX ORDER — DIPHENHYDRAMINE HYDROCHLORIDE 50 MG/ML
25 INJECTION INTRAMUSCULAR; INTRAVENOUS EVERY 6 HOURS PRN
Status: DISCONTINUED | OUTPATIENT
Start: 2020-01-30 | End: 2020-01-30 | Stop reason: HOSPADM

## 2020-01-30 RX ORDER — DIAZEPAM 5 MG/ML
5 INJECTION, SOLUTION INTRAMUSCULAR; INTRAVENOUS ONCE
Status: COMPLETED | OUTPATIENT
Start: 2020-01-30 | End: 2020-01-30

## 2020-01-30 RX ORDER — OMEPRAZOLE 20 MG/1
20 CAPSULE, DELAYED RELEASE ORAL DAILY
Status: DISCONTINUED | OUTPATIENT
Start: 2020-01-30 | End: 2020-01-30 | Stop reason: HOSPADM

## 2020-01-30 RX ORDER — BUDESONIDE AND FORMOTEROL FUMARATE DIHYDRATE 160; 4.5 UG/1; UG/1
2 AEROSOL RESPIRATORY (INHALATION)
Status: DISCONTINUED | OUTPATIENT
Start: 2020-01-30 | End: 2020-01-30 | Stop reason: HOSPADM

## 2020-01-30 RX ORDER — PROMETHAZINE HYDROCHLORIDE 25 MG/1
25 TABLET ORAL ONCE
Status: COMPLETED | OUTPATIENT
Start: 2020-01-30 | End: 2020-01-30

## 2020-01-30 RX ORDER — DEXAMETHASONE SODIUM PHOSPHATE 4 MG/ML
10 INJECTION, SOLUTION INTRA-ARTICULAR; INTRALESIONAL; INTRAMUSCULAR; INTRAVENOUS; SOFT TISSUE ONCE
Status: COMPLETED | OUTPATIENT
Start: 2020-01-30 | End: 2020-01-30

## 2020-01-30 RX ORDER — HYDROMORPHONE HYDROCHLORIDE 2 MG/1
4 TABLET ORAL EVERY 4 HOURS PRN
Status: DISCONTINUED | OUTPATIENT
Start: 2020-01-30 | End: 2020-01-30 | Stop reason: HOSPADM

## 2020-01-30 RX ORDER — CARBAMAZEPINE 100 MG/1
100 TABLET, CHEWABLE ORAL 3 TIMES DAILY
Status: DISCONTINUED | OUTPATIENT
Start: 2020-01-30 | End: 2020-01-30

## 2020-01-30 RX ORDER — DIPHENHYDRAMINE HCL 25 MG
25 TABLET ORAL EVERY 6 HOURS PRN
Status: DISCONTINUED | OUTPATIENT
Start: 2020-01-30 | End: 2020-01-30

## 2020-01-30 RX ORDER — SODIUM CHLORIDE 9 MG/ML
500 INJECTION, SOLUTION INTRAVENOUS ONCE
Status: COMPLETED | OUTPATIENT
Start: 2020-01-30 | End: 2020-01-30

## 2020-01-30 RX ORDER — DIPHENHYDRAMINE HYDROCHLORIDE 50 MG/ML
25 INJECTION INTRAMUSCULAR; INTRAVENOUS ONCE
Status: COMPLETED | OUTPATIENT
Start: 2020-01-30 | End: 2020-01-30

## 2020-01-30 RX ADMIN — DIAZEPAM 5 MG: 5 INJECTION, SOLUTION INTRAMUSCULAR; INTRAVENOUS at 15:51

## 2020-01-30 RX ADMIN — ZONISAMIDE 50 MG: 50 CAPSULE ORAL at 03:23

## 2020-01-30 RX ADMIN — DIPHENHYDRAMINE HYDROCHLORIDE 25 MG: 50 INJECTION INTRAMUSCULAR; INTRAVENOUS at 03:11

## 2020-01-30 RX ADMIN — DEXAMETHASONE SODIUM PHOSPHATE 10 MG: 4 INJECTION, SOLUTION INTRA-ARTICULAR; INTRALESIONAL; INTRAMUSCULAR; INTRAVENOUS; SOFT TISSUE at 15:42

## 2020-01-30 RX ADMIN — HYDROMORPHONE HYDROCHLORIDE 4 MG: 2 TABLET ORAL at 03:19

## 2020-01-30 RX ADMIN — FENTANYL CITRATE 50 MCG: 50 INJECTION, SOLUTION INTRAMUSCULAR; INTRAVENOUS at 15:48

## 2020-01-30 RX ADMIN — RIVAROXABAN 10 MG: 10 TABLET, FILM COATED ORAL at 03:23

## 2020-01-30 RX ADMIN — PROMETHAZINE HYDROCHLORIDE 25 MG: 25 TABLET ORAL at 15:00

## 2020-01-30 RX ADMIN — SODIUM CHLORIDE 500 ML: 9 INJECTION, SOLUTION INTRAVENOUS at 15:57

## 2020-01-30 RX ADMIN — DIPHENHYDRAMINE HYDROCHLORIDE 25 MG: 50 INJECTION INTRAMUSCULAR; INTRAVENOUS at 15:43

## 2020-01-30 RX ADMIN — OMEPRAZOLE 20 MG: 20 CAPSULE, DELAYED RELEASE ORAL at 06:18

## 2020-01-30 RX ADMIN — BUDESONIDE AND FORMOTEROL FUMARATE DIHYDRATE 2 PUFF: 160; 4.5 AEROSOL RESPIRATORY (INHALATION) at 03:17

## 2020-01-30 ASSESSMENT — LIFESTYLE VARIABLES
EVER HAD A DRINK FIRST THING IN THE MORNING TO STEADY YOUR NERVES TO GET RID OF A HANGOVER: NO
EVER FELT BAD OR GUILTY ABOUT YOUR DRINKING: NO
HAVE PEOPLE ANNOYED YOU BY CRITICIZING YOUR DRINKING: NO
ON A TYPICAL DAY WHEN YOU DRINK ALCOHOL HOW MANY DRINKS DO YOU HAVE: 0
ALCOHOL_USE: NO
EVER_SMOKED: NEVER
TOTAL SCORE: 0
TOTAL SCORE: 0
AVERAGE NUMBER OF DAYS PER WEEK YOU HAVE A DRINK CONTAINING ALCOHOL: 0
HOW MANY TIMES IN THE PAST YEAR HAVE YOU HAD 5 OR MORE DRINKS IN A DAY: 0
CONSUMPTION TOTAL: NEGATIVE
HAVE YOU EVER FELT YOU SHOULD CUT DOWN ON YOUR DRINKING: NO
TOTAL SCORE: 0

## 2020-01-30 ASSESSMENT — ENCOUNTER SYMPTOMS
TINGLING: 0
VOMITING: 0
WHEEZING: 0
NAUSEA: 0
SORE THROAT: 0
FOCAL WEAKNESS: 0
DIZZINESS: 1
FEVER: 0
PHOTOPHOBIA: 0
DEPRESSION: 0
DIARRHEA: 0
NECK PAIN: 1
PALPITATIONS: 0
HEADACHES: 1
SHORTNESS OF BREATH: 0
CHILLS: 0
MYALGIAS: 0
ABDOMINAL PAIN: 0
COUGH: 0

## 2020-01-30 ASSESSMENT — PATIENT HEALTH QUESTIONNAIRE - PHQ9
1. LITTLE INTEREST OR PLEASURE IN DOING THINGS: NOT AT ALL
2. FEELING DOWN, DEPRESSED, IRRITABLE, OR HOPELESS: NOT AT ALL
SUM OF ALL RESPONSES TO PHQ9 QUESTIONS 1 AND 2: 0

## 2020-01-30 NOTE — ED NOTES
Per CDU, okay to send pt to floor, RN will call back later for report. Pt to CDU in stable condition via stretcher.

## 2020-01-30 NOTE — ED NOTES
Pt reporting pain to IV with flushing, 2nd IVR placed, unsuccessful attempt at calling report x1.

## 2020-01-30 NOTE — ASSESSMENT & PLAN NOTE
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 Most likely a combination of early NS cataracts, presbyopia and the traumatic brain injury. Also current rx over corrected and the reading rx significantly undercorrected. Was able to correct to 20/20 vision at distance and near with new glasses rx. Discussed importance of wearing glasses at all times to relax accommodation and prevent spasm at both distance and near. Since needs both distance and reading correction recommended progressive lenses.

## 2020-01-30 NOTE — ED NOTES
Pt now able to move neck more than before, states pain slightly improved but continues to c/o of discomfort. Tech at bedside to attempt to ambulate pt.

## 2020-01-30 NOTE — ASSESSMENT & PLAN NOTE
1/29/2020 - Suspect that some of his occipital very pinpoint headaches associated with stiff neck and discomfort radiating to the periorbital region is secondary to occipital neuralgia. Long history of multiple head injuries and attempts to control pain to the point where he has to come to the ER for control. By history has had some nerve blocks, but in his description the seem to involve mor shoulder girdle. Possible attempts or discussion for further blocks bu Dr Rogers in Neurology at [facility], but having difficulty getting back to be seen. By description from patient uncertain if the blocks were in the region of the occipital nerve. Therefore recommended referral to Dr Molina in Rehab medicine.

## 2020-01-30 NOTE — H&P
Hospital Medicine History & Physical Note    Date of Service  1/29/2020    Primary Care Physician  Pcp Pt States None    Consultants  None    Code Status  Full    Chief Complaint  Chief Complaint   Patient presents with   • Headache     left occipital,  recurrent.  dx with occipital neuralgia   • Neck Pain     radiates into left neck    • Dizziness       History of Presenting Illness  49 y.o. male who presented on 1/29/2020 with headache, neck pain, and dizziness.  This is a 49-year-old gentleman who is known to this hospital.  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  The patient was last admitted to this facility in November by the [facility] internal medicine resident team for similar symptoms of headache, neck pain and dizziness with a syncopal episode.  The patient reportedly had inconsistent physical exam findings with various providers.  Work-up including CT of the head and neck, EEG, and MRI were negative.  He was seen by neurology and arrangements had been made for follow-up on December 2 with outpatient neurology.  He had been cleared by PT and OT for discharge home despite his complaints of feeling weak.  He was seen again in our emergency room on January 3 with the same complaints and had a work-up which was negative.  He was educated on the possibility of worsening rebound headache secondary to narcotic use, because his symptoms were controlled in the emergency room, he was discharged home in stable condition.  The patient was seen by Dr. Collins of opthoneurology today and again patient was diagnosed with occipital neuralgia.  He was referred to outpatient for nerve block but comes to the emergency room because he has had intractable left-sided headaches which radiates down his neck.  These are described as constant, severe, and worsened with movement.  He denies any alleviating  factors.  He does have associated dizziness with vision changes.  After several hours in the emergency room, his pain remains uncontrolled.  Hospital service was requested to admit this patient for symptomatic management.    Review of Systems  Review of Systems   Constitutional: Negative for chills and fever.   HENT: Negative for congestion and sore throat.    Eyes: Negative for photophobia.   Respiratory: Negative for cough, shortness of breath and wheezing.    Cardiovascular: Negative for chest pain and palpitations.   Gastrointestinal: Negative for abdominal pain, diarrhea, nausea and vomiting.   Genitourinary: Negative for dysuria.   Musculoskeletal: Positive for neck pain. Negative for myalgias.   Skin: Negative.    Neurological: Positive for dizziness and headaches. Negative for tingling and focal weakness.   Psychiatric/Behavioral: Negative for depression and suicidal ideas.       Past Medical History  Past Medical History:   Diagnosis Date   • Allergy    • Arthritis    • Asthma    • Claustrophobia    • Clostridium difficile diarrhea    • Clotting disorder ([facility])    • Depression    • DVT (deep venous thrombosis) ([facility])    • GERD (gastroesophageal reflux disease)    • Hypertension    • IBD (inflammatory bowel disease)    • Migraine    • MRSA (methicillin resistant Staphylococcus aureus)    • Pancreatitis    • PE (pulmonary embolism)    • Psychiatric disorder     PTSD, depression   • Seizure ([facility])        Surgical History  Past Surgical History:   Procedure Laterality Date   • INGUINAL HERNIA REPAIR Right 09/06/2019   • DENTAL EXTRACTION(S)     • ELBOW ORIF     • FEMUR ORIF     • HERNIA REPAIR Right    • NERVE ULNAR REPAIR OR EXPLORE     • PB CLOSED RX NASAL SEPTAL FRACTURE     • RECTAL EXPLORATION         Family History  Family History   Problem Relation Age of Onset   • Hypertension Mother    • Cancer Father    • Hypertension Father        Social History  Social History     Tobacco Use   • Smoking status: Never  "Smoker   • Smokeless tobacco: Never Used   Substance Use Topics   • Alcohol use: No   • Drug use: No       Allergies  Allergies   Allergen Reactions   • Ciprofloxacin Anaphylaxis   • Dilaudid [Hydromorphone] Itching     States \"I have an adverse reaction and need benadryl first\"   • Shellfish Allergy Anaphylaxis   • Tape Hives   • Augmentin Rash     rash   • Amitriptyline Hcl    • Asa [Aspirin]    • Biofreeze    • Celery Oil      Celery causes hives   • Codeine    • Compazine [Prochlorperazine]      Hallucinations, dizziness   • Hydrocodone-Acetaminophen    • Ketorolac Tromethamine [Toradol]    • Morphine Vomiting   • Ondansetron [Zofran]    • Other Misc      Can take dilaudid if given benadryl first   • Oxycodone    • Oxycodone-Acetaminophen    • Oxycodone-Aspirin    • Reglan [Metoclopramide]      Has no idea what his RX is   • Risperidone    • Tramadol    • Zofran [Ondansetron]        Medications  No current facility-administered medications on file prior to encounter.      Current Outpatient Medications on File Prior to Encounter   Medication Sig Dispense Refill   • predniSONE (DELTASONE) 20 MG Tab Take one tablet with food three times a day for 3-days, then take one tablet with food 2-times a day for 3-days, then take one tablet once a day with food for 3-days. Total of 9-days  And 18 tablets as directed 18 Tab 0   • hydrOXYzine HCl (ATARAX) 50 MG Tab Take 1 Tab by mouth every 6 hours as needed for Itching. 30 Tab 1   • famotidine (PEPCID) 40 MG Tab Take 1 Tab by mouth every day. 10 Tab 0   • amoxicillin-clavulanate (AUGMENTIN) 875-125 MG Tab Take 1 Tab by mouth 2 times a day. (Patient not taking: Reported on 1/29/2020) 20 Tab 0   • HYDROmorphone (DILAUDID) 4 MG Tab Take 4 mg by mouth 4 times a day as needed for Severe Pain.     • therapeutic multivitamin-minerals (THERAGRAN-M) Tab Take 1 Tab by mouth every day.     • Fluticasone Furoate-Vilanterol (BREO ELLIPTA) 200-25 MCG/INH AEROSOL POWDER, BREATH ACTIVATED " Inhale 1 Puff by mouth every day.     • Naloxone HCl (NARCAN) 2 MG/2ML Solution Prefilled Syringe 2 mg by Intravenous route as needed.     • diazepam (VALIUM) 10 MG tablet Take 10 mg by mouth 2 times a day as needed for Anxiety. Indications: Muscle Spasm     • zonisamide (ZONEGRAN) 50 MG capsule Take 50 mg by mouth 2 times a day.     • esomeprazole (NEXIUM) 40 MG delayed-release capsule Take 40 mg by mouth every morning before breakfast.     • rivaroxaban (XARELTO) 10 MG Tab tablet Take 10 mg by mouth with dinner.         Physical Exam  Hemodynamics  Temp (24hrs), Av.6 °C (97.9 °F), Min:36.6 °C (97.9 °F), Max:36.6 °C (97.9 °F)   Temperature: 36.6 °C (97.9 °F)  Pulse  Av.9  Min: 73  Max: 111    Blood Pressure: 108/83      Respiratory      Respiration: 18, Pulse Oximetry: 95 %             Physical Exam   Constitutional: He is oriented to person, place, and time. No distress.   HENT:   Head: Normocephalic and atraumatic.   Right Ear: External ear normal.   Left Ear: External ear normal.   Eyes: EOM are normal. Right eye exhibits no discharge. Left eye exhibits no discharge.   Neck: Neck supple. No JVD present.   Cardiovascular: Normal rate, regular rhythm and normal heart sounds.   Pulmonary/Chest: Effort normal and breath sounds normal. No respiratory distress. He exhibits no tenderness.   Abdominal: Soft. Bowel sounds are normal. He exhibits no distension. There is no tenderness.   Musculoskeletal:         General: No edema.   Neurological: He is alert and oriented to person, place, and time. No cranial nerve deficit.   Skin: Skin is dry. He is not diaphoretic. No erythema.   Psychiatric: He has a normal mood and affect. His behavior is normal.   Nursing note and vitals reviewed.    Capillary refill less than 3 seconds, distal pulses intact    Laboratory:          No results for input(s): ALTSGPT, ASTSGOT, ALKPHOSPHAT, TBILIRUBIN, DBILIRUBIN, GAMMAGT, AMYLASE, LIPASE, ALB, PREALBUMIN, GLUCOSE in the last 72  hours.              Lab Results   Component Value Date    TROPONINI <0.02 11/07/2019       Imaging  Ct-head W/o    Result Date: 1/4/2020  HISTORY/REASON FOR EXAM:  Headache, chronic, with new features. TECHNIQUE/EXAM DESCRIPTION: CT scan of the head without contrast, 1/4/2020 1:06 AM. Contiguous 5 mm axial sections were obtained from the skull base through the vertex. Up to date radiation dose reduction adjustments have been utilized to meet ALARA standards for radiation dose reduction. COMPARISON:  11/25/2019 FINDINGS:   The ventricular system and cortical sulci are normal.  There is no midline shift or other mass effect.  There is no acute intra-axial abnormality or extra-axial fluid collection.  There is no intracranial hemorrhage.  The calvaria are intact.  The visualized paranasal sinuses show no unusual opacity.     Normal CT scan of the head without contrast. INTERPRETING LOCATION:  [address] [place] [address], [id number]        Assessment/Plan:  Anticipate that patient will need less than 2 midnights for management of the discussed medical issues.    * Intractable pain  Assessment & Plan  Patient states that he was previously diagnosed with occipital neuralgia by a neurologist in [facility], this diagnosis was reiterated by an opthoneurologist today.  I discussed the typical treatment for occipital neuralgia with the patient including first-line treatment of carbamazepine which I am hesitant to start as it decreases the efficacy of Xarelto.  I raised the possibility of utilizing gabapentin but patient states that he has an allergy, review of his medical records shows multiple allergies to multiple medications.  I will start him on baclofen although evidence is not as strong as neuropathic pain medications.  Of note, review of the medical record also shows that patient has had behavior in the past which has been concerning for secondary gain and this remains the case.  We will admit him to the hospital and start him  on symptomatic management with plans to discuss with neurology in the morning to see if there is any intervention that can be initiated in the hospital setting to control his symptoms.  We will attempt to quickly wean off of IV therapy with transition to oral.  Patient states that he has been referred by his opthoneurologist for nerve block which will need to be followed up on an outpatient basis.    Factor V deficiency (HCC)  Assessment & Plan  Continue home Xarelto.    Traumatic brain injury (HCC)- (present on admission)  Assessment & Plan  This is chronic, unclear if the patient has had any seizures but will continue his home Zonegran with seizure precautions.    Depression with anxiety  Assessment & Plan  It does not appear that the patient is on any long-acting antidepressants, this should be addressed on an outpatient basis.  Continue home Valium for now.      Prophylaxis: Patient is on Xarelto, no additional need for DVT prophylaxis, home PPI indicated, bowel protocol as needed

## 2020-01-30 NOTE — ED PROVIDER NOTES
ED Provider Note    Scribed for Lianet Mckenzie M.D. by Cleo Murry. 1/29/2020, 5:35 PM.  Means of arrival: Walk-in  History obtained from: Patient  History limited by: None    CHIEF COMPLAINT  Chief Complaint   Patient presents with   • Headache     left occipital,  recurrent.  dx with occipital neuralgia   • Neck Pain     radiates into left neck    • Dizziness       HPI  Landon Allred is a 49 y.o. male who presents to the Emergency Department for a chronic intermittent headache. He states his headache is left sided, and radiates down his neck.  He describes the pain as sharp and shooting and severe.  His pain is worsened with sitting up and ambulating.  He endorses associated dizziness, photophobia, and blurry visions. He was recently here on January 3rd for similar symptoms, for which he states he was treated for, but symptoms returned 5 days later. He has an appointment with a neurologist for the symptoms, however it is now until March 2020.  Patient reports that he did see neuro-ophthalmologist Dr. Velazco today who has diagnosed him with occipital neuralgia and referred him to a pain specialist who can do injections to manage his pain.    REVIEW OF SYSTEMS  Review of Systems   Eyes: Positive for blurred vision and photophobia.   Gastrointestinal: Negative for abdominal pain, nausea and vomiting.   Musculoskeletal: Positive for neck pain.   Neurological: Positive for dizziness and headaches.   All other systems reviewed and are negative.        PAST MEDICAL HISTORY   has a past medical history of Allergy, Arthritis, Asthma, Claustrophobia, Clostridium difficile diarrhea, Clotting disorder ([facility]), Depression, DVT (deep venous thrombosis) ([facility]), GERD (gastroesophageal reflux disease), Hypertension, IBD (inflammatory bowel disease), Migraine, MRSA (methicillin resistant Staphylococcus aureus), Pancreatitis, PE (pulmonary embolism), Psychiatric disorder, and Seizure ([facility]).    SURGICAL HISTORY   has a past  "surgical history that includes nerve ulnar repair or explore; dental extraction(s); closed rx nasal septal fracture; rectal exploration; femur orif; elbow orif; inguinal hernia repair (Right, 09/06/2019); and hernia repair (Right).    SOCIAL HISTORY  Social History     Tobacco Use   • Smoking status: Never Smoker   • Smokeless tobacco: Never Used   Substance Use Topics   • Alcohol use: No   • Drug use: No      Social History     Substance and Sexual Activity   Drug Use No       FAMILY HISTORY  Family History   Problem Relation Age of Onset   • Hypertension Mother    • Cancer Father    • Hypertension Father        CURRENT MEDICATIONS  Home Medications     Reviewed by Reyna Llamas R.N. (Registered Nurse) on 01/29/20 at 1655  Med List Status: Partial   Medication Last Dose Status   amoxicillin-clavulanate (AUGMENTIN) 875-125 MG Tab  Active   diazepam (VALIUM) 10 MG tablet  Active   esomeprazole (NEXIUM) 40 MG delayed-release capsule  Active   famotidine (PEPCID) 40 MG Tab  Active   Fluticasone Furoate-Vilanterol (BREO ELLIPTA) 200-25 MCG/INH AEROSOL POWDER, BREATH ACTIVATED  Active   HYDROmorphone (DILAUDID) 4 MG Tab  Active   hydrOXYzine HCl (ATARAX) 50 MG Tab  Active   Naloxone HCl (NARCAN) 2 MG/2ML Solution Prefilled Syringe  Active   predniSONE (DELTASONE) 20 MG Tab  Active   rivaroxaban (XARELTO) 10 MG Tab tablet  Active   therapeutic multivitamin-minerals (THERAGRAN-M) Tab  Active   zonisamide (ZONEGRAN) 50 MG capsule  Active                ALLERGIES  Allergies   Allergen Reactions   • Ciprofloxacin Anaphylaxis   • Dilaudid [Hydromorphone] Itching     States \"I have an adverse reaction and need benadryl first\"   • Shellfish Allergy Anaphylaxis   • Tape Hives   • Augmentin Rash     rash   • Amitriptyline Hcl    • Asa [Aspirin]    • Biofreeze    • Celery Oil      Celery causes hives   • Codeine    • Compazine [Prochlorperazine]      Hallucinations, dizziness   • Hydrocodone-Acetaminophen    • Ketorolac " Tromethamine [Toradol]    • Morphine Vomiting   • Ondansetron [Zofran]    • Other Misc      Can take dilaudid if given benadryl first   • Oxycodone    • Oxycodone-Acetaminophen    • Oxycodone-Aspirin    • Reglan [Metoclopramide]      Has no idea what his RX is   • Risperidone    • Tramadol    • Zofran [Ondansetron]        PHYSICAL EXAM  VITAL SIGNS: /96   Pulse 94   Temp 36.6 °C (97.9 °F) (Temporal)   Resp 20   Ht 1.829 m (6')   Wt 87.1 kg (192 lb 0.3 oz)   SpO2 96%   BMI 26.04 kg/m²   Vitals reviewed by myself.  Physical Exam  Nursing note and vitals reviewed.  Constitutional: Well-developed and well-nourished. No distress.   HENT: Head is normocephalic and atraumatic. Oropharynx is clear and moist without exudate or erythema.   Eyes: Pupils are equal, round, and reactive to light. No horizontal or vertical nystagmus. Conjunctiva are normal.   Cardiovascular: Normal rate and regular rhythm. No murmur heard. Normal radial pulses.  Pulmonary/Chest: Breath sounds normal. No wheezes or rales.   Abdominal: Soft and non-tender. No distention.    Musculoskeletal: Extremities exhibit normal range of motion without edema or tenderness. Patient ambulates with a normal narrow-based steady gait.   Neurological: Awake, alert and oriented to person, place, and time. No focal deficits noted. Cranial nerves II - XII intact. No pronator drift.  No dysmetria on cerebellar testing. Normal speech and language. Normal strength and sensation in bilateral upper and lower extremities.  Patient does become symptomatic when sitting up  Skin: Skin is warm and dry. No rash.   Psychiatric: Normal mood and affect. Appropriate for clinical situation.    DIAGNOSTIC STUDIES :  none    REASSESSMENT  5:44 PM - Patient seen and examined at bedside. Discussed plan of care. Patient agrees to the plan of care. Patient is advised he will be referred to a specialist to evaluate his chronic symptoms.     7:14 PM  Patient was reevaluated at  bedside.    7:18 PM - Patient was reevaluated at bedside. He reports feeling improved following Benadryl 50 mg.    7:58 PM  - Patient was reevaluated at bedside. Patient reports feeling slightly improved.     8:35 PM - Patient was seen at bedside and updated on the plan of care.    9:30 PM - Patient was reevaluated at bedside.     10:53 PM - Patient was seen at bedside. Discussed lab results with the patient as detailed above. Patient is informed of the plan for admission. Patient verbalizes an understanding and agreement to this plan of care.     HYDRATION: Based on the patient's presentation of Inability to take oral fluids the patient was given IV fluids. IV Hydration was used because oral hydration was not adequate alone. Upon recheck following hydration, the patient was feeling improved.    COURSE & MEDICAL DECISION MAKING  Nursing notes, VS, PMSFHx reviewed in chart.    Patient is a 49-year-old male who comes in for evaluation of headache and neck pain.  Differential diagnosis includes occipital neuralgia, tension headache, migraine headache.  As patient reports this is his normal baseline chronic pain and he has no focal neurologic deficits I do not feel repeat imaging is indicated.  Patient is agreeable to this plan.    Patient's initial vitals are within normal limits, he is neurologically intact with no focal neurologic deficits.  Patient is treated with dexamethasone, Valium, Benadryl, and promethazine.  Patient has minimal relief in his symptoms and is further treated with fentanyl and repeat dose of Valium without any relief in his symptoms.  Patient has been unable to tolerate oral intake as he cannot sit up secondary to his dizziness and therefore he is treated with IV fluids again with minimal relief in symptoms.  Given his ongoing symptoms and patient's inability to sit up and perform normal activities of daily living given the severity of his symptoms I will hospitalize him for further pain and  symptom management.  I discussed the case with Dr. Mai who has accepted the patient for hospitalization.  Patient is in guarded condition.    DISPOSITION:  Patient will be hospitalized by Dr. Mai in gaurded condition.    FINAL IMPRESSION  1. Neck pain    2. Nonintractable headache, unspecified chronicity pattern, unspecified headache type    3. Dizziness          Cleo BURCH (Scribe), am scribing for, and in the presence of, Lianet Mckenzie M.D..    Electronically signed by: Cleo Murry (Scribe), 1/29/2020    Lianet BURCH M.D. personally performed the services described in this documentation, as scribed by Cleo Murry in my presence, and it is both accurate and complete. C.     The note accurately reflects work and decisions made by me.  Lianet Mckenzie M.D.  1/29/2020  11:50 PM

## 2020-01-30 NOTE — ED TRIAGE NOTES
Landon Allred  Chief Complaint   Patient presents with   • Headache     left occipital,  recurrent.  dx with occipital neuralgia   • Neck Pain     radiates into left neck    • Dizziness     Pt to triage in w/c with above complaint.  Was seen by ophthalmology today and was told he needed a block for pain relief, unable to get into neurologist until end of March. Pt states pain has been increasing over last 1.5 weeks.  Pt seen here for same on 1/3 and medication he was given worked at that time.    +nausea and light sensitivity.     Pt returned to [facility], educated on triage process, and to inform staff of any changes or concerns.

## 2020-01-30 NOTE — ASSESSMENT & PLAN NOTE
Patient states that he was previously diagnosed with occipital neuralgia by a neurologist in [facility], this diagnosis was reiterated by an opthoneurologist today.  I discussed the typical treatment for occipital neuralgia with the patient including first-line treatment of carbamazepine which I am hesitant to start as it decreases the efficacy of Xarelto.  I raised the possibility of utilizing gabapentin but patient states that he has an allergy, review of his medical records shows multiple allergies to multiple medications.  I will start him on baclofen although evidence is not as strong as neuropathic pain medications.  Of note, review of the medical record also shows that patient has had behavior in the past which has been concerning for secondary gain and this remains the case.  We will admit him to the hospital and start him on symptomatic management with plans to discuss with neurology in the morning to see if there is any intervention that can be initiated in the hospital setting to control his symptoms.  We will attempt to quickly wean off of IV therapy with transition to oral.  Patient states that he has been referred by his opthoneurologist for nerve block which will need to be followed up on an outpatient basis.

## 2020-01-30 NOTE — ED NOTES
Assumed care of pt from prior RN, pt sleeping, breathing equal and unlabored, ERP to room, awakens easily to voice, pt requesting heated blanket and heat pack, per ERP, pt not yet ready to ambulated for evaluation. Heated blanket given to pt by ERP, await heat pack from supply.

## 2020-01-30 NOTE — ASSESSMENT & PLAN NOTE
It does not appear that the patient is on any long-acting antidepressants, this should be addressed on an outpatient basis.  Continue home Valium for now.

## 2020-01-30 NOTE — ED NOTES
Pt reports headache, blurry vision, neck pain and vomiting blood. [facility] and was told he needs to see a neurologist, to states he can not get into them until end of march. Pt states monitoring devices can not be connected to him due to allergies. Pt has coban placed under name band stating he has an allergy to the name band a well.

## 2020-01-30 NOTE — DISCHARGE SUMMARY
Discharge Summary    CHIEF COMPLAINT ON ADMISSION  Chief Complaint   Patient presents with   • Headache     left occipital,  recurrent.  dx with occipital neuralgia   • Neck Pain     radiates into left neck    • Dizziness       Reason for Admission  Back Pain; Head Pain      Admission Date  1/29/2020    CODE STATUS  Full Code    HPI & HOSPITAL COURSE  49 y.o. male who presented on 1/29/2020 with headache, neck pain, and dizziness.  This is a 49-year-old gentleman who is known to this hospital.  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  The patient was last admitted to this facility in November by the [facility] internal medicine resident team for similar symptoms of headache, neck pain and dizziness with a syncopal episode.  The patient reportedly had inconsistent physical exam findings with various providers.  Work-up including CT of the head and neck, EEG, and MRI were negative.  He was seen by neurology and arrangements had been made for follow-up on December 2 with outpatient neurology.  He had been cleared by PT and OT for discharge home despite his complaints of feeling weak.  He was seen again in our emergency room on January 3 with the same complaints and had a work-up which was negative.  He was educated on the possibility of worsening rebound headache secondary to narcotic use, because his symptoms were controlled in the emergency room, he was discharged home in stable condition.  The patient was seen by Dr. Collins of opthoneurology today and again patient was diagnosed with occipital neuralgia.  He was referred to outpatient for nerve block but comes to the emergency room because he has had intractable left-sided headaches which radiates down his neck.  These are described as constant, severe, and worsened with movement.  He denies any alleviating factors.  He does have associated  dizziness with vision changes.  After several hours in the emergency room, his pain remains uncontrolled.  Hospital service was requested to admit this patient for symptomatic management.    Patient was admitted to the CDU and monitored overnight.  There was no acute events.  On subsequent examination the patient remains nonfocal.  He complains of severe and unrelenting and on improved pain stemming from the left occipital region radiating to the scalp and to the neck.  He says this is been a chronic pain and despite the 4 mg dose of Dilaudid this morning, it is completely unchanged.  He follows with a chronic pain provider in [place] who has recommended injection therapy for occipital neuralgia.  He was also seen by neuro-ophthalmology who has diagnosed him with bilateral occipital neuralgia, prior traumatic brain injury, and accommodative insufficiency.  He feels the latter may be contributing to his ocular pain.    The patient has had benefit with migraine cocktail therapy including steroids, IV narcotics, anti-emetics, and antihistamines.  This was attempted at the ER here today but the patient reports his IV infiltrated and he received little if any benefit.    I called the patient's outpatient pain provider and discussed his case.  The patient would likely benefit from occipital nerve blocks, and he has been somewhat reluctant to pursue this.  However escalation of his narcotics has failed to produce any benefit and will likely lead to ongoing problems with opioid tolerance, dependence, and/or addiction.  His outpatient pain provider has made an appointment available Monday morning at 6:45 AM whereby the patient can receive occipital blocks.  Further, he is discouraged further IV narcotic use as this will only escalate his opioid needs moving forward.  In the absence of an organic neurologic disease, this is primarily palliative.  The patient, again, is nonfocal and relates only on relented pain in the  "occipital region is his primary complaint.  He will be given 1 more attempts at migraine abortive therapy, per his request, and then discharged to outpatient follow-up with his chronic physiatry us.  He does have neurology follow-up in the future in [place] and he can pursue further discussion of palliative measures should he fail to improve.    Therefore, he is discharged in fair and stable condition to home with close outpatient follow-up.    Discharge Date  1/30/2020    FOLLOW UP ITEMS POST DISCHARGE  PM&R follow-up w Dr. Devon Matson 2/3/2020    DISCHARGE DIAGNOSES  Principal Problem:    Intractable pain POA: Yes  Active Problems:    Traumatic brain injury (HCC) POA: Yes    Factor V deficiency (HCC) POA: Yes    Depression with anxiety POA: Yes  Resolved Problems:    * No resolved hospital problems. *      FOLLOW UP  Future Appointments   Date Time Provider Department Center   7/6/2020  1:30 PM Sarmad Velazco M.D. [facility] None     No follow-up provider specified.    MEDICATIONS ON DISCHARGE     Medication List      Continue taking these medications      Instructions   Breo Ellipta 200-25 MCG/INH Aepb  Generic drug:  Fluticasone Furoate-Vilanterol   Inhale 1 Puff by mouth every day.  Dose:  1 Puff     NexIUM 40 MG delayed-release capsule  Generic drug:  esomeprazole   Take 40 mg by mouth every morning before breakfast.  Dose:  40 mg     therapeutic multivitamin-minerals Tabs   Take 1 Tab by mouth every day.  Dose:  1 Tab     Xarelto 10 MG Tabs tablet  Generic drug:  rivaroxaban   Take 10 mg by mouth with dinner.  Dose:  10 mg     zonisamide 50 MG capsule  Commonly known as:  ZONEGRAN   Take 50 mg by mouth 2 times a day.  Dose:  50 mg            Allergies  Allergies   Allergen Reactions   • Ciprofloxacin Anaphylaxis   • Dilaudid [Hydromorphone] Itching     States \"I have an adverse reaction and need benadryl first\"   • Shellfish Allergy Anaphylaxis   • Tape Hives   • Augmentin Rash     rash   • " Amitriptyline Hcl    • Asa [Aspirin]    • Baclofen    • Biofreeze    • Celery Oil      Celery causes hives   • Codeine    • Compazine [Prochlorperazine]      Hallucinations, dizziness   • Hydrocodone-Acetaminophen    • Ketorolac Tromethamine [Toradol]    • Morphine Vomiting   • Ondansetron [Zofran]    • Other Misc      Can take dilaudid if given benadryl first   • Oxycodone    • Oxycodone-Acetaminophen    • Oxycodone-Aspirin    • Reglan [Metoclopramide]      Has no idea what his RX is   • Risperidone    • Tramadol    • Zofran [Ondansetron]        DIET  Orders Placed This Encounter   Procedures   • Diet Order Regular     Standing Status:   Standing     Number of Occurrences:   1     Order Specific Question:   Diet:     Answer:   Regular [1]       ACTIVITY  As tolerated.  Weight bearing as tolerated    CONSULTATIONS  Phone consult w PMR    LABORATORY  Lab Results   Component Value Date    SODIUM 140 01/29/2020    POTASSIUM 3.9 01/29/2020    CHLORIDE 110 01/29/2020    CO2 21 01/29/2020    GLUCOSE 129 (H) 01/29/2020    BUN 22 01/29/2020    CREATININE 1.31 01/29/2020        Lab Results   Component Value Date    WBC 8.5 01/29/2020    HEMOGLOBIN 15.1 01/29/2020    HEMATOCRIT 44.8 01/29/2020    PLATELETCT 193 01/29/2020

## 2020-01-30 NOTE — PROGRESS NOTES
Assessment completed. Pt A&Ox4. Respirations are even and unlabored on 2L n/c. Pt reports constant neck and head pain, 9/10. Multiple complaints at this time, states he should not go home until he has his nerve block as recommended by his outpatient doctor. Patient requested a bladder scan. Updated hospitalist. Constant re-direction needed. Monitors applied, VS stable, call light and belongings within reach. POC updated (re-evaluation, pain control). Pt educated on room and call light, pt verbalized understanding. Communication board updated. Needs met.

## 2020-01-30 NOTE — PROGRESS NOTES
Subjective    Chief Complaint  This information was obtained from the patient  The patient is new to the [address] [facility] here for an initial visit for the evaluation and management of non-healing wound(s). R buttock and back pressure ulcers.   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 Measurements are 1.5cm length x 1.4cm width x 1cm depth, with an area of 2.1 sq cm and a volume of 2.1 cubic cm. Hypergranulation was noted.  Undermining has Triamcinolone acetonide as prescribed by derm applied, then vaseline gauze and Mepore bordered gauze dressing  Wound #2 (Back)  . Wound Treatment Note  Cleansed wound and periwound with non-cytotoxic agent.  using Wound Cleanser Spray (1)  Applied Primary Wo

## 2020-01-30 NOTE — CARE PLAN
Problem: Safety  Goal: Will remain free from falls  Outcome: PROGRESSING AS EXPECTED  Note:   Pt educated on safety precautions, utilization of the call light, and bed alarm.  Pt verbalized understanding.      Problem: Pain Management  Goal: Pain level will decrease to patient's comfort goal  Outcome: PROGRESSING AS EXPECTED  Note:   Pt educated regarding non-pharmacological methods of pain relief as well as informed about medication available on his MAR.

## 2020-01-30 NOTE — ED NOTES
Heat pack applied to neck, pt reports pain persistent at this time, difficult moving neck, rates 9/10. Pt agreeable to have tech attempt vitals at this time.

## 2020-01-30 NOTE — ED NOTES
Med rec updated and complete. Allergies reviewed. Pt stated that he has had allergies to medications for approximately 25 years and does not really remember all the  Reactions.     No antibiotic use in last 14 days.    [facility]. Short term [facility] in [place].

## 2020-01-30 NOTE — ASSESSMENT & PLAN NOTE
1/29/2020 - History of multiple concussions with headaches and traumatic brain injury. Obtained OCT NFL thicken ss and ganglion cell thickness that was unremarkable  With OD 97 and OS 98. Normal ganglion cell thickness. Therefore no evidence of traumatic optic neuropathy or retrograde axonal degeneration.

## 2020-01-30 NOTE — ASSESSMENT & PLAN NOTE
This is chronic, unclear if the patient has had any seizures but will continue his home Zonegran with seizure precautions.

## 2020-01-31 NOTE — PROGRESS NOTES
IV Dc 'd. Discharge instructions provided to patient. Pt verbalizes understanding. Pt states all questions have been answered. Copy of DC provided to patient. Signed copy in chart. Pt states all personal belongings are in possession. Pt escorted off unit by this RN without incident. Refused w/c. States friend is waiting in ER  and will provide transportation.

## 2020-01-31 NOTE — DISCHARGE INSTRUCTIONS
Discharge Instructions    Discharged to home by car with friend. Discharged via wheelchair, hospital escort: Yes.  Special equipment needed: Not Applicable    Be sure to schedule a follow-up appointment with your primary care doctor or any specialists as instructed.     Discharge Plan:   Diet Plan: Discussed  Activity Level: Discussed  Confirmed Follow up Appointment: Patient to Call and Schedule Appointment  Confirmed Symptoms Management: Discussed  Medication Reconciliation Updated: Yes  Influenza Vaccine Indication: Not indicated: Previously immunized this influenza season and > 8 years of age    I understand that a diet low in cholesterol, fat, and sodium is recommended for good health. Unless I have been given specific instructions below for another diet, I accept this instruction as my diet prescription.   Other diet: heart healthy    Special Instructions: None    · Is patient discharged on Warfarin / Coumadin?   No     Depression / Suicide Risk    As you are discharged from this [facility] facility, it is important to learn how to keep safe from harming yourself.    Recognize the warning signs:  · Abrupt changes in personality, positive or negative- including increase in energy   · Giving away possessions  · Change in eating patterns- significant weight changes-  positive or negative  · Change in sleeping patterns- unable to sleep or sleeping all the time   · Unwillingness or inability to communicate  · Depression  · Unusual sadness, discouragement and loneliness  · Talk of wanting to die  · Neglect of personal appearance   · Rebelliousness- reckless behavior  · Withdrawal from people/activities they love  · Confusion- inability to concentrate     If you or a loved one observes any of these behaviors or has concerns about self-harm, here's what you can do:  · Talk about it- your feelings and reasons for harming yourself  · Remove any means that you might use to hurt yourself (examples: pills, rope,  extension cords, firearm)  · Get professional help from the community (Mental Health, Substance Abuse, psychological counseling)  · Do not be alone:Call your Safe Contact- someone whom you trust who will be there for you.  · Call your local CRISIS HOTLINE 760-1887 or 967-244-8370  · Call your local Children's Mobile Crisis Response Team [place] (711) 072-3339 or www.[employer]  · Call the toll free National Suicide Prevention Hotlines   · National Suicide Prevention Lifeline 041-422-FIQH (6812)  · National Hope Line Network 800-SUICIDE (944-7284)                Discharge Instructions per Pj Alas, A.P.N.    1.  Review your discharge Medication Reconciliation form as you may be provided with new prescriptions or changes to previously prescribed medications.  Be sure to take all of your prescribed medications exactly as directed.  Further questions can be directed to your local pharmacist or primary care provider (PCP).    2. Follow-up with your PCP.  An appointment may have already been scheduled for you.  Please review your discharge paperwork and locate this information.  Please be sure to call your PCP to cancel if you are unable to make this appointment or require schedule changes.  It is critical to to follow-up with your PCP to review hospital records and ensure any further diagnostic work-up, prescription refills, or referrals.     3. ACTIVITIES: After discharge from the hospital, you may resume routine activities including walking and going u/down stairs. However, no strenuous activity. For further clarification, call your PCP     4. DRIVING: You may drive whenever you are off pain medications and are able to perform the activities needed to drive, i.e. turning, bending, twisting, etc.     5. BOWEL FUNCTION: A few patients, after hospitalizations, will develop bowel irregularity. You could also experience constipation due to pain medication and decreased activity level. If you feel this is  occurring, please take an over-the-counter laxative (Milk of Magnesia, Ex-Lax, Senokot, etc.) until the problem has resolved.     Return to ER if you develop recurrent chest pain, shortness of breath, bloody sputum, fever of 101.5 or greater, intractable nausea or vomiting, blood in the vomit or urine, intractable pain, new onset inability to ambulate, focal motor weakness, acute vision disturbance, acute speech disturbance, intractable abdominal pain, or any other worrisome symptoms.

## 2020-02-14 ENCOUNTER — APPOINTMENT (OUTPATIENT)
Dept: RADIOLOGY | Facility: MEDICAL CENTER | Age: 50
End: 2020-02-14
Attending: EMERGENCY MEDICINE
Payer: COMMERCIAL

## 2020-02-14 ENCOUNTER — HOSPITAL ENCOUNTER (EMERGENCY)
Facility: MEDICAL CENTER | Age: 50
End: 2020-02-14
Attending: EMERGENCY MEDICINE
Payer: COMMERCIAL

## 2020-02-14 VITALS
RESPIRATION RATE: 16 BRPM | HEIGHT: 72 IN | DIASTOLIC BLOOD PRESSURE: 68 MMHG | HEART RATE: 73 BPM | OXYGEN SATURATION: 93 % | TEMPERATURE: 98.1 F | WEIGHT: 192.02 LBS | SYSTOLIC BLOOD PRESSURE: 110 MMHG | BODY MASS INDEX: 26.01 KG/M2

## 2020-02-14 DIAGNOSIS — G44.89 OTHER HEADACHE SYNDROME: ICD-10-CM

## 2020-02-14 DIAGNOSIS — F43.10 PTSD (POST-TRAUMATIC STRESS DISORDER): ICD-10-CM

## 2020-02-14 LAB
ALBUMIN SERPL BCP-MCNC: 4.2 G/DL (ref 3.2–4.9)
ALBUMIN/GLOB SERPL: 1.4 G/DL
ALP SERPL-CCNC: 52 U/L (ref 30–99)
ALT SERPL-CCNC: 30 U/L (ref 2–50)
ANION GAP SERPL CALC-SCNC: 12 MMOL/L (ref 0–11.9)
AST SERPL-CCNC: 32 U/L (ref 12–45)
BASOPHILS # BLD AUTO: 0.3 % (ref 0–1.8)
BASOPHILS # BLD: 0.02 K/UL (ref 0–0.12)
BILIRUB SERPL-MCNC: 0.7 MG/DL (ref 0.1–1.5)
BUN SERPL-MCNC: 19 MG/DL (ref 8–22)
CALCIUM SERPL-MCNC: 9.2 MG/DL (ref 8.5–10.5)
CHLORIDE SERPL-SCNC: 109 MMOL/L (ref 96–112)
CO2 SERPL-SCNC: 21 MMOL/L (ref 20–33)
CREAT SERPL-MCNC: 1.29 MG/DL (ref 0.5–1.4)
EOSINOPHIL # BLD AUTO: 0.11 K/UL (ref 0–0.51)
EOSINOPHIL NFR BLD: 1.9 % (ref 0–6.9)
ERYTHROCYTE [DISTWIDTH] IN BLOOD BY AUTOMATED COUNT: 44.9 FL (ref 35.9–50)
GLOBULIN SER CALC-MCNC: 3 G/DL (ref 1.9–3.5)
GLUCOSE SERPL-MCNC: 86 MG/DL (ref 65–99)
HCT VFR BLD AUTO: 46.7 % (ref 42–52)
HGB BLD-MCNC: 16.3 G/DL (ref 14–18)
IMM GRANULOCYTES # BLD AUTO: 0.02 K/UL (ref 0–0.11)
IMM GRANULOCYTES NFR BLD AUTO: 0.3 % (ref 0–0.9)
LYMPHOCYTES # BLD AUTO: 1.69 K/UL (ref 1–4.8)
LYMPHOCYTES NFR BLD: 29 % (ref 22–41)
MCH RBC QN AUTO: 33.1 PG (ref 27–33)
MCHC RBC AUTO-ENTMCNC: 34.9 G/DL (ref 33.7–35.3)
MCV RBC AUTO: 94.7 FL (ref 81.4–97.8)
MONOCYTES # BLD AUTO: 0.44 K/UL (ref 0–0.85)
MONOCYTES NFR BLD AUTO: 7.6 % (ref 0–13.4)
NEUTROPHILS # BLD AUTO: 3.54 K/UL (ref 1.82–7.42)
NEUTROPHILS NFR BLD: 60.9 % (ref 44–72)
NRBC # BLD AUTO: 0 K/UL
NRBC BLD-RTO: 0 /100 WBC
PLATELET # BLD AUTO: 174 K/UL (ref 164–446)
PMV BLD AUTO: 10.6 FL (ref 9–12.9)
POTASSIUM SERPL-SCNC: 4.2 MMOL/L (ref 3.6–5.5)
PROT SERPL-MCNC: 7.2 G/DL (ref 6–8.2)
RBC # BLD AUTO: 4.93 M/UL (ref 4.7–6.1)
SODIUM SERPL-SCNC: 142 MMOL/L (ref 135–145)
WBC # BLD AUTO: 5.8 K/UL (ref 4.8–10.8)

## 2020-02-14 PROCEDURE — 80053 COMPREHEN METABOLIC PANEL: CPT

## 2020-02-14 PROCEDURE — A9270 NON-COVERED ITEM OR SERVICE: HCPCS | Performed by: EMERGENCY MEDICINE

## 2020-02-14 PROCEDURE — 700111 HCHG RX REV CODE 636 W/ 250 OVERRIDE (IP): Performed by: EMERGENCY MEDICINE

## 2020-02-14 PROCEDURE — 96374 THER/PROPH/DIAG INJ IV PUSH: CPT

## 2020-02-14 PROCEDURE — 99284 EMERGENCY DEPT VISIT MOD MDM: CPT

## 2020-02-14 PROCEDURE — 85025 COMPLETE CBC W/AUTO DIFF WBC: CPT

## 2020-02-14 PROCEDURE — 700102 HCHG RX REV CODE 250 W/ 637 OVERRIDE(OP): Performed by: EMERGENCY MEDICINE

## 2020-02-14 PROCEDURE — 96375 TX/PRO/DX INJ NEW DRUG ADDON: CPT

## 2020-02-14 PROCEDURE — 36415 COLL VENOUS BLD VENIPUNCTURE: CPT

## 2020-02-14 RX ORDER — PROMETHAZINE HYDROCHLORIDE 25 MG/1
25 TABLET ORAL ONCE
Status: COMPLETED | OUTPATIENT
Start: 2020-02-14 | End: 2020-02-14

## 2020-02-14 RX ORDER — PROCHLORPERAZINE EDISYLATE 5 MG/ML
10 INJECTION INTRAMUSCULAR; INTRAVENOUS ONCE
Status: DISCONTINUED | OUTPATIENT
Start: 2020-02-14 | End: 2020-02-14

## 2020-02-14 RX ORDER — DEXAMETHASONE SODIUM PHOSPHATE 4 MG/ML
10 INJECTION, SOLUTION INTRA-ARTICULAR; INTRALESIONAL; INTRAMUSCULAR; INTRAVENOUS; SOFT TISSUE ONCE
Status: COMPLETED | OUTPATIENT
Start: 2020-02-14 | End: 2020-02-14

## 2020-02-14 RX ORDER — DIPHENHYDRAMINE HYDROCHLORIDE 50 MG/ML
25 INJECTION INTRAMUSCULAR; INTRAVENOUS ONCE
Status: DISCONTINUED | OUTPATIENT
Start: 2020-02-14 | End: 2020-02-14

## 2020-02-14 RX ORDER — DIPHENHYDRAMINE HYDROCHLORIDE 50 MG/ML
25 INJECTION INTRAMUSCULAR; INTRAVENOUS ONCE
Status: COMPLETED | OUTPATIENT
Start: 2020-02-14 | End: 2020-02-14

## 2020-02-14 RX ADMIN — PROMETHAZINE HYDROCHLORIDE 25 MG: 25 TABLET ORAL at 20:06

## 2020-02-14 RX ADMIN — DEXAMETHASONE SODIUM PHOSPHATE 10 MG: 4 INJECTION, SOLUTION INTRA-ARTICULAR; INTRALESIONAL; INTRAMUSCULAR; INTRAVENOUS; SOFT TISSUE at 20:00

## 2020-02-14 RX ADMIN — DIPHENHYDRAMINE HYDROCHLORIDE 25 MG: 50 INJECTION INTRAMUSCULAR; INTRAVENOUS at 20:05

## 2020-02-15 NOTE — ED NOTES
Reviewed DC instructions with pt. Pt verbalized understanding. DC'd IV, catheter intact. Pt ambulatory with steady gait to lobby be transported home by self. VSS on room air. Pt A/Ox4 leaving unit. All questions/ concerns addressed with pt prior to DC

## 2020-02-15 NOTE — ED TRIAGE NOTES
"Landon Allred  Chief Complaint   Patient presents with   • Headache     dx with occipital neuralgia,  left sided pain that now radiates into shoulder blade    • Dizziness   • Nausea   • Neck Pain     Pt to triage in w/c, with above complaint. VSS, pt has been seen here multiple times for same,  Had procedure done on 2/4, possible nerve block, but pt unsure, had relief for 3 days and then on Monday has been having increased pain, similar to pain but now radiates into left shoulder blade and across neck to the right.    Dizziness with ambulating and states unable to move neck due to pain. Pt has neurology appt for end of March but is \"not able to wait that long with the pain and dizziness\"    Pt returned to lobby, educated on triage process, and to inform staff of any changes or concerns.       "

## 2020-02-15 NOTE — ED PROVIDER NOTES
ED Provider Note    CHIEF COMPLAINT  Chief Complaint   Patient presents with   • Headache     dx with occipital neuralgia,  left sided pain that now radiates into shoulder blade    • Dizziness   • Nausea   • Neck Pain       HPI  Landon Allred is a 49 y.o. male who presents to the Emergency Department with history of occipital neuralgia, he is on Xarelto for factor V Leiden, he has had a traumatic brain injury with PTSD depression and anxiety.  He has had multiple admissions to the hospital for headache neck pain and dizziness, the last was because he had an IV that was not working and he was given so much narcotic they were worried about his breathing.  He has had CTs of the head MRIs and EEGs, within the interim between his last admission on January 29 and now he did have occipital nerve block.  He states it was not effective.  He has called the pain management doctor and does have an appointment for next week but states that the pain is too much that he cannot control it.  He does have 4 mg Dilaudid at home which he is taking and is on a contract for pain.  He denies any new trauma to his head no fever the pain radiates from his eye.        REVIEW OF SYSTEMS  Positive for headache dizziness neck pain fever vomiting negative for fever, vomiting.  As above all other systems are negative.    PAST MEDICAL HISTORY   has a past medical history of Allergy, Arthritis, Asthma, Claustrophobia, Clostridium difficile diarrhea, Clotting disorder ([facility]), Depression, DVT (deep venous thrombosis) ([facility]), GERD (gastroesophageal reflux disease), Hypertension, IBD (inflammatory bowel disease), Migraine, MRSA (methicillin resistant Staphylococcus aureus), Pancreatitis, PE (pulmonary embolism), Psychiatric disorder, and Seizure ([facility]).    FAMILY HISTORY  Family History   Problem Relation Age of Onset   • Hypertension Mother    • Cancer Father    • Hypertension Father         SOCIAL HISTORY  Social History     Tobacco Use   •  "Smoking status: Never Smoker   • Smokeless tobacco: Never Used   Substance and Sexual Activity   • Alcohol use: No   • Drug use: No   • Sexual activity: Not on file       SURGICAL HISTORY   has a past surgical history that includes nerve ulnar repair or explore; dental extraction(s); closed rx nasal septal fracture; rectal exploration; femur orif; elbow orif; inguinal hernia repair (Right, 09/06/2019); and hernia repair (Right).    CURRENT MEDICATIONS  Reviewed.  See Encounter Summary.  Include No current facility-administered medications for this encounter.     Current Outpatient Medications:   •  therapeutic multivitamin-minerals (THERAGRAN-M) Tab, Take 1 Tab by mouth every day., Disp: , Rfl:   •  Fluticasone Furoate-Vilanterol (BREO ELLIPTA) 200-25 MCG/INH AEROSOL POWDER, BREATH ACTIVATED, Inhale 1 Puff by mouth every day., Disp: , Rfl:   •  zonisamide (ZONEGRAN) 50 MG capsule, Take 50 mg by mouth 2 times a day., Disp: , Rfl:   •  esomeprazole (NEXIUM) 40 MG delayed-release capsule, Take 40 mg by mouth every morning before breakfast., Disp: , Rfl:   •  rivaroxaban (XARELTO) 10 MG Tab tablet, Take 10 mg by mouth with dinner., Disp: , Rfl:       ALLERGIES  Allergies   Allergen Reactions   • Ciprofloxacin Anaphylaxis   • Dilaudid [Hydromorphone] Itching     States \"I have an adverse reaction and need benadryl first\"   • Shellfish Allergy Anaphylaxis   • Tape Hives   • Augmentin Rash     rash   • Amitriptyline Hcl    • Asa [Aspirin]    • Baclofen    • Biofreeze    • Celery Oil      Celery causes hives   • Codeine    • Compazine [Prochlorperazine]      Hallucinations, dizziness   • Hydrocodone-Acetaminophen    • Ketorolac Tromethamine [Toradol]    • Morphine Vomiting   • Ondansetron [Zofran]    • Other Misc      Can take dilaudid if given benadryl first   • Oxycodone    • Oxycodone-Acetaminophen    • Oxycodone-Aspirin    • Reglan [Metoclopramide]      Has no idea what his RX is   • Risperidone    • Tramadol    • " Zofran [Ondansetron]        PHYSICAL EXAM  VITAL SIGNS: /75   Pulse 67   Temp 36.7 °C (98.1 °F) (Oral)   Resp 18   Ht 1.829 m (6')   Wt 87.1 kg (192 lb 0.3 oz)   SpO2 92%   BMI 26.04 kg/m²   Constitutional:  Talkative, Alert able to answer questions  HENT: Nose is normal in appearance, external ears are normal,  moist mucous membranes  Eyes: Anicteric,  pupils are equal round and reactive, there is no conjunctival drainage or pallor   Neck: The trachea is midline, there is no obvious mass or meningeal signs  Cardiovascular: Good perfusion, regular rate and rhythm without murmurs gallops or rubs  Thorax & Lungs: Respiratory rate and effort are normal. .  Abdomen: Abdomen is normal in appearance, no gross peritoneal signs   Musculoskeletal: No deformities noted in all 4 extremities.   Skin: Visualized skin is warm without rash.  Neurologic:  Cranial nerves II through XII are intact there is no focal abnormality noted.  Psychiatric: Normal mood and mentation    RADIOLOGY/PROCEDURES  Pertinent Labs   Results for orders placed or performed during the hospital encounter of 02/14/20   CBC WITH DIFFERENTIAL   Result Value Ref Range    WBC 5.8 4.8 - 10.8 K/uL    RBC 4.93 4.70 - 6.10 M/uL    Hemoglobin 16.3 14.0 - 18.0 g/dL    Hematocrit 46.7 42.0 - 52.0 %    MCV 94.7 81.4 - 97.8 fL    MCH 33.1 (H) 27.0 - 33.0 pg    MCHC 34.9 33.7 - 35.3 g/dL    RDW 44.9 35.9 - 50.0 fL    Platelet Count 174 164 - 446 K/uL    MPV 10.6 9.0 - 12.9 fL    Neutrophils-Polys 60.90 44.00 - 72.00 %    Lymphocytes 29.00 22.00 - 41.00 %    Monocytes 7.60 0.00 - 13.40 %    Eosinophils 1.90 0.00 - 6.90 %    Basophils 0.30 0.00 - 1.80 %    Immature Granulocytes 0.30 0.00 - 0.90 %    Nucleated RBC 0.00 /100 WBC    Neutrophils (Absolute) 3.54 1.82 - 7.42 K/uL    Lymphs (Absolute) 1.69 1.00 - 4.80 K/uL    Monos (Absolute) 0.44 0.00 - 0.85 K/uL    Eos (Absolute) 0.11 0.00 - 0.51 K/uL    Baso (Absolute) 0.02 0.00 - 0.12 K/uL    Immature  Granulocytes (abs) 0.02 0.00 - 0.11 K/uL    NRBC (Absolute) 0.00 K/uL   Comp Metabolic Panel   Result Value Ref Range    Sodium 142 135 - 145 mmol/L    Potassium 4.2 3.6 - 5.5 mmol/L    Chloride 109 96 - 112 mmol/L    Co2 21 20 - 33 mmol/L    Anion Gap 12.0 (H) 0.0 - 11.9    Glucose 86 65 - 99 mg/dL    Bun 19 8 - 22 mg/dL    Creatinine 1.29 0.50 - 1.40 mg/dL    Calcium 9.2 8.5 - 10.5 mg/dL    AST(SGOT) 32 12 - 45 U/L    ALT(SGPT) 30 2 - 50 U/L    Alkaline Phosphatase 52 30 - 99 U/L    Total Bilirubin 0.7 0.1 - 1.5 mg/dL    Albumin 4.2 3.2 - 4.9 g/dL    Total Protein 7.2 6.0 - 8.2 g/dL    Globulin 3.0 1.9 - 3.5 g/dL    A-G Ratio 1.4 g/dL   ESTIMATED GFR   Result Value Ref Range    GFR If African American >60 >60 mL/min/1.73 m 2    GFR If Non African American 59 (A) >60 mL/min/1.73 m 2         COURSE & MEDICAL DECISION MAKING  Nursing notes and vital signs were reviewed. (See chart for details)  The patients  records were reviewed, history was obtained from the patient;     The patient presents with chronic headaches he has had no new trauma he has no evidence of fever he has no seizure breakthroughs or persistent vomiting.  He has had extensive work-up for this headache and I believe it is a chronic condition.  He presents to the emergency department and I discussed with him that he is on a pain management contract.  It is not appropriate to receive IV narcotics in this setting.  Additionally he is on high-dose narcotics and is requesting a benzodiazepines which is also not indicated.  The patient and I had this discussion and he is agreeable to nonnarcotic treatment.  I discussed doing a CT of his head because he is on blood thinners but the patient feels like his CT would create  more stress because of his PTSD and tells me that this is not different than his normal headaches has had no head trauma.    The patient does state he vomits blood but this has been going on for years so I did check his CBC and a CMP to  make sure he has no change in his hemoglobin or BUN to suggest an upper GI bleeding source.  These were both unremarkable.    In the emergency department he was given IV Benadryl IV Decadron and oral Phenergan.  The patient will go home to sleep.  He knows to follow-up with his pain management doctor for ongoing care.    FINAL IMPRESSION  1.  Occipital neuralgia         DISPOSITION  Home in stable condition      FOLLOW UP:      The patient was discharged home with an information sheet on headache and told to return immediately for any signs or symptoms listed, but specifically if fever, vomiting, or any worsening at all.  The patient verbally agreed to the discharge precautions and follow-up plan which is documented in EPIC.    Electronically signed by: Rosa Maria Salas M.D., 2/14/2020 7:27 PM

## 2020-03-17 ENCOUNTER — HOSPITAL ENCOUNTER (EMERGENCY)
Facility: MEDICAL CENTER | Age: 50
End: 2020-03-18
Attending: EMERGENCY MEDICINE
Payer: COMMERCIAL

## 2020-03-17 ENCOUNTER — APPOINTMENT (OUTPATIENT)
Dept: RADIOLOGY | Facility: MEDICAL CENTER | Age: 50
End: 2020-03-17
Attending: EMERGENCY MEDICINE
Payer: COMMERCIAL

## 2020-03-17 DIAGNOSIS — R68.84 JAW PAIN: ICD-10-CM

## 2020-03-17 DIAGNOSIS — L98.491: ICD-10-CM

## 2020-03-17 DIAGNOSIS — R05.9 COUGH: ICD-10-CM

## 2020-03-17 LAB
ALBUMIN SERPL BCP-MCNC: 4.4 G/DL (ref 3.2–4.9)
ALBUMIN/GLOB SERPL: 1.6 G/DL
ALP SERPL-CCNC: 54 U/L (ref 30–99)
ALT SERPL-CCNC: 25 U/L (ref 2–50)
ANION GAP SERPL CALC-SCNC: 11 MMOL/L (ref 7–16)
AST SERPL-CCNC: 26 U/L (ref 12–45)
BASOPHILS # BLD AUTO: 0.3 % (ref 0–1.8)
BASOPHILS # BLD: 0.02 K/UL (ref 0–0.12)
BILIRUB SERPL-MCNC: 0.6 MG/DL (ref 0.1–1.5)
BUN SERPL-MCNC: 20 MG/DL (ref 8–22)
CALCIUM SERPL-MCNC: 10 MG/DL (ref 8.5–10.5)
CHLORIDE SERPL-SCNC: 109 MMOL/L (ref 96–112)
CO2 SERPL-SCNC: 19 MMOL/L (ref 20–33)
CREAT SERPL-MCNC: 1.27 MG/DL (ref 0.5–1.4)
EOSINOPHIL # BLD AUTO: 0.16 K/UL (ref 0–0.51)
EOSINOPHIL NFR BLD: 2.8 % (ref 0–6.9)
ERYTHROCYTE [DISTWIDTH] IN BLOOD BY AUTOMATED COUNT: 42 FL (ref 35.9–50)
FLUAV RNA SPEC QL NAA+PROBE: NEGATIVE
FLUBV RNA SPEC QL NAA+PROBE: NEGATIVE
GLOBULIN SER CALC-MCNC: 2.7 G/DL (ref 1.9–3.5)
GLUCOSE SERPL-MCNC: 92 MG/DL (ref 65–99)
HCT VFR BLD AUTO: 45.3 % (ref 42–52)
HGB BLD-MCNC: 15.6 G/DL (ref 14–18)
IMM GRANULOCYTES # BLD AUTO: 0.02 K/UL (ref 0–0.11)
IMM GRANULOCYTES NFR BLD AUTO: 0.3 % (ref 0–0.9)
LACTATE BLD-SCNC: 1.5 MMOL/L (ref 0.5–2)
LYMPHOCYTES # BLD AUTO: 1.81 K/UL (ref 1–4.8)
LYMPHOCYTES NFR BLD: 31.5 % (ref 22–41)
MCH RBC QN AUTO: 32.5 PG (ref 27–33)
MCHC RBC AUTO-ENTMCNC: 34.4 G/DL (ref 33.7–35.3)
MCV RBC AUTO: 94.4 FL (ref 81.4–97.8)
MONOCYTES # BLD AUTO: 0.44 K/UL (ref 0–0.85)
MONOCYTES NFR BLD AUTO: 7.7 % (ref 0–13.4)
NEUTROPHILS # BLD AUTO: 3.29 K/UL (ref 1.82–7.42)
NEUTROPHILS NFR BLD: 57.4 % (ref 44–72)
NRBC # BLD AUTO: 0 K/UL
NRBC BLD-RTO: 0 /100 WBC
PLATELET # BLD AUTO: 222 K/UL (ref 164–446)
PMV BLD AUTO: 10.4 FL (ref 9–12.9)
POTASSIUM SERPL-SCNC: 4.4 MMOL/L (ref 3.6–5.5)
PROT SERPL-MCNC: 7.1 G/DL (ref 6–8.2)
RBC # BLD AUTO: 4.8 M/UL (ref 4.7–6.1)
SODIUM SERPL-SCNC: 139 MMOL/L (ref 135–145)
WBC # BLD AUTO: 5.7 K/UL (ref 4.8–10.8)

## 2020-03-17 PROCEDURE — 87633 RESP VIRUS 12-25 TARGETS: CPT

## 2020-03-17 PROCEDURE — 87486 CHLMYD PNEUM DNA AMP PROBE: CPT

## 2020-03-17 PROCEDURE — 94760 N-INVAS EAR/PLS OXIMETRY 1: CPT

## 2020-03-17 PROCEDURE — 80053 COMPREHEN METABOLIC PANEL: CPT

## 2020-03-17 PROCEDURE — 71045 X-RAY EXAM CHEST 1 VIEW: CPT

## 2020-03-17 PROCEDURE — 99284 EMERGENCY DEPT VISIT MOD MDM: CPT

## 2020-03-17 PROCEDURE — 96372 THER/PROPH/DIAG INJ SC/IM: CPT

## 2020-03-17 PROCEDURE — 85025 COMPLETE CBC W/AUTO DIFF WBC: CPT

## 2020-03-17 PROCEDURE — 87581 M.PNEUMON DNA AMP PROBE: CPT

## 2020-03-17 PROCEDURE — 87502 INFLUENZA DNA AMP PROBE: CPT | Mod: XU

## 2020-03-17 PROCEDURE — 73030 X-RAY EXAM OF SHOULDER: CPT | Mod: RT

## 2020-03-17 PROCEDURE — 83605 ASSAY OF LACTIC ACID: CPT

## 2020-03-17 PROCEDURE — 87040 BLOOD CULTURE FOR BACTERIA: CPT

## 2020-03-17 RX ORDER — HYDROMORPHONE HYDROCHLORIDE 1 MG/ML
0.5 INJECTION, SOLUTION INTRAMUSCULAR; INTRAVENOUS; SUBCUTANEOUS ONCE
Status: COMPLETED | OUTPATIENT
Start: 2020-03-18 | End: 2020-03-18

## 2020-03-17 RX ORDER — DIPHENHYDRAMINE HYDROCHLORIDE 50 MG/ML
25 INJECTION INTRAMUSCULAR; INTRAVENOUS ONCE
Status: COMPLETED | OUTPATIENT
Start: 2020-03-18 | End: 2020-03-18

## 2020-03-18 VITALS
HEART RATE: 88 BPM | TEMPERATURE: 98.1 F | OXYGEN SATURATION: 98 % | DIASTOLIC BLOOD PRESSURE: 80 MMHG | RESPIRATION RATE: 15 BRPM | SYSTOLIC BLOOD PRESSURE: 131 MMHG

## 2020-03-18 LAB
C PNEUM DNA SPEC QL NAA+PROBE: NOT DETECTED
FLUAV H1 2009 PAND RNA SPEC QL NAA+PROBE: NOT DETECTED
FLUAV H1 RNA SPEC QL NAA+PROBE: NOT DETECTED
FLUAV H3 RNA SPEC QL NAA+PROBE: NOT DETECTED
FLUAV RNA SPEC QL NAA+PROBE: NOT DETECTED
FLUBV RNA SPEC QL NAA+PROBE: NOT DETECTED
HADV DNA SPEC QL NAA+PROBE: NOT DETECTED
HCOV RNA SPEC QL NAA+PROBE: NOT DETECTED
HMPV RNA SPEC QL NAA+PROBE: NOT DETECTED
HPIV1 RNA SPEC QL NAA+PROBE: NOT DETECTED
HPIV2 RNA SPEC QL NAA+PROBE: NOT DETECTED
HPIV3 RNA SPEC QL NAA+PROBE: NOT DETECTED
HPIV4 RNA SPEC QL NAA+PROBE: NOT DETECTED
M PNEUMO DNA SPEC QL NAA+PROBE: NOT DETECTED
RSV A RNA SPEC QL NAA+PROBE: NOT DETECTED
RSV B RNA SPEC QL NAA+PROBE: NOT DETECTED
RV+EV RNA SPEC QL NAA+PROBE: NOT DETECTED

## 2020-03-18 PROCEDURE — 700111 HCHG RX REV CODE 636 W/ 250 OVERRIDE (IP): Performed by: EMERGENCY MEDICINE

## 2020-03-18 PROCEDURE — 96372 THER/PROPH/DIAG INJ SC/IM: CPT

## 2020-03-18 RX ADMIN — HYDROMORPHONE HYDROCHLORIDE 0.5 MG: 1 INJECTION, SOLUTION INTRAMUSCULAR; INTRAVENOUS; SUBCUTANEOUS at 00:05

## 2020-03-18 RX ADMIN — DIPHENHYDRAMINE HYDROCHLORIDE 25 MG: 50 INJECTION, SOLUTION INTRAMUSCULAR; INTRAVENOUS at 00:05

## 2020-03-18 NOTE — ED NOTES
"ED Positive Culture Follow-up/Notification Note:    Date: 3/18/2020     Patient seen in the ED on 3/17/2020 for jaw pain following dental surgery in [place] as well as pain in the right shoulder, SOB, fatigue and fever with temp of 101 F at home. Has had a recent exposure to a known COVID-19 positive individual.  1. Cough    2. Skin ulcer of shoulder, limited to breakdown of skin (HCC)    3. Jaw pain       Discharge Medication List as of 3/17/2020 11:58 PM          Allergies: Ciprofloxacin; Dilaudid [hydromorphone]; Shellfish allergy; Tape; Augmentin; Amitriptyline hcl; Asa [aspirin]; Baclofen; Biofreeze; Celery oil; Codeine; Compazine [prochlorperazine]; Hydrocodone-acetaminophen; Ketorolac tromethamine [toradol]; Morphine; Ondansetron [zofran]; Other misc; Oxycodone; Oxycodone-acetaminophen; Oxycodone-aspirin; Reglan [metoclopramide]; Risperidone; Tramadol; and Zofran [ondansetron]     Vitals:    03/17/20 2342 03/18/20 0000 03/18/20 0009 03/18/20 0018   BP:    131/80   Pulse: 80 94 84 88   Resp:    15   Temp:    36.7 °C (98.1 °F)   TempSrc:    Tympanic   SpO2: 92% 96% 96% 98%       Final cultures:   Results     Procedure Component Value Units Date/Time    BLOOD CULTURE [[id number]] Collected:  03/17/20 2222    Order Status:  Completed Specimen:  Blood from Peripheral Updated:  03/18/20 0300    Narrative:       Per Hospital Policy: Only change Specimen Src: to \"Line\" if  specified by physician order.    Influenza A/B By PCR (Adult - Flu Only) [[id number]] Collected:  03/17/20 2220    Order Status:  Completed Specimen:  Respirate from Nasopharyngeal Updated:  03/17/20 2313     Influenza virus A RNA Negative     Influenza virus B, PCR Negative    Narrative:       PUI for possible COVID-19. Reflex to [facility] if negative.    BLOOD CULTURE [[id number]]     Order Status:  Canceled Specimen:  Blood from Peripheral         Results for MANOJ MARISCAL (MRN [id number]) as of 3/18/2020 09:16   3/17/2020 22:20   Adenovirus, " PCR Not Detected   Chlamydia pneumoniae, PCR Not Detected   Coronavirus, PCR Not Detected   Human Metapneumovirus, PCR Not Detected   Influenza A 2009, H1N1, PCR Not Detected   Influenza virus A RNA Negative   Influenza virus B, PCR Negative   Influenza virus A H1 RNA Not Detected   Influenza virus A H3 RNA Not Detected   Mycoplasma pneumoniae, PCR Not Detected   Parainfluenza 4, PCR Not Detected   Parainfluenza virus 1, PCR Not Detected   Parainfluenza virus 2, PCR Not Detected   Parainfluenza virus 3, PCR Not Detected   Resp Syncytial Virus A, PCR Not Detected   Resp Syncytial Virus B, PCR Not Detected   Rhinovirus / Enterovirus, PCR Not Detected     Plan:   Influenza and Respiratory PCR panel negative. Their case will move forward to COVID-19 coronavirus testing.  They are to continue to self-isolate/self-quarantine until further until testing is completed and further direction is provided to them.      Ema Delcid, PharmD

## 2020-03-18 NOTE — ED PROVIDER NOTES
ED Provider Note    Scribed for Garfield Mcclendon M.D. by Cleo Murry. 3/17/2020, 9:47 PM.    Primary care provider: Pcp Pt States None  Means of arrival: Walk-in  History obtained from: Patient  History limited by: none    CHIEF COMPLAINT  Chief Complaint   Patient presents with   • Jaw Pain   • Shortness of Breath       HPI  Landon Allred is a 50 y.o. male who presents to the Emergency Department for constant jaw pain since 3/16 following dental surgery at [facility]. Patient states when he woke up following his surgery, he noticed a pain on the back right of his shoulder, which began to have redness and a white area to it. His redness is now down to an ulceration now. He adds that 3-4 days later, he began to experience shortness of breath, fatigue, and fevers of 101 °F. At that point in time, he was called by the hospital and notified he was exposed to COVID, the patient has since been self isolating. He has been on Clindamycin for her jaw surgery. He continues to have jaw pain with bad taste. He denies any swelling of the face or dysphagia. Patient was then instructed to present to the ED from his physician for his jaw pain, shortness of breath, and exposure to COVID.    REVIEW OF SYSTEMS  Pertinent positives include jaw pain, right shoulder pain and erythema, shortness of breath, fatigue, fever, jaw pain, and bad taste. Pertinent negatives include facial swelling or dysphagia. All other systems negative.    PAST MEDICAL HISTORY   has a past medical history of Allergy, Arthritis, Asthma, Claustrophobia, Clostridium difficile diarrhea, Clotting disorder ([facility]), Depression, DVT (deep venous thrombosis) ([facility]), GERD (gastroesophageal reflux disease), Hypertension, IBD (inflammatory bowel disease), Migraine, MRSA (methicillin resistant Staphylococcus aureus), Pancreatitis, PE (pulmonary embolism), Psychiatric disorder, and Seizure ([facility]).    SURGICAL HISTORY   has a past surgical history that includes nerve  "ulnar repair or explore; dental extraction(s); closed rx nasal septal fracture; rectal exploration; femur orif; elbow orif; inguinal hernia repair (Right, 09/06/2019); and hernia repair (Right).    SOCIAL HISTORY  Social History     Tobacco Use   • Smoking status: Never Smoker   • Smokeless tobacco: Never Used   Substance Use Topics   • Alcohol use: No   • Drug use: No      Social History     Substance and Sexual Activity   Drug Use No       FAMILY HISTORY  Family History   Problem Relation Age of Onset   • Hypertension Mother    • Cancer Father    • Hypertension Father        CURRENT MEDICATIONS  Home Medications    **Home medications have not yet been reviewed for this encounter**         ALLERGIES  Allergies   Allergen Reactions   • Ciprofloxacin Anaphylaxis   • Dilaudid [Hydromorphone] Itching     States \"I have an adverse reaction and need benadryl first\"   • Shellfish Allergy Anaphylaxis   • Tape Hives   • Augmentin Rash     rash   • Amitriptyline Hcl    • Asa [Aspirin]    • Baclofen    • Biofreeze    • Celery Oil      Celery causes hives   • Codeine    • Compazine [Prochlorperazine]      Hallucinations, dizziness   • Hydrocodone-Acetaminophen    • Ketorolac Tromethamine [Toradol]    • Morphine Vomiting   • Ondansetron [Zofran]    • Other Misc      Can take dilaudid if given benadryl first   • Oxycodone    • Oxycodone-Acetaminophen    • Oxycodone-Aspirin    • Reglan [Metoclopramide]      Has no idea what his RX is   • Risperidone    • Tramadol    • Zofran [Ondansetron]        PHYSICAL EXAM  VITAL SIGNS: /100   Pulse 99   Temp 36.7 °C (98.1 °F) (Oral)   Resp 16   SpO2 98%     Constitutional: Well developed, Well nourished, mild distress.   HENT: Sutures in place along left jaw line with no access or discharge, Normocephalic, Atraumatic, Oropharynx moist. No signs of ludwigs angina.    Eyes: Conjunctiva normal, No discharge.   Cardiovascular: Normal heart rate, Normal rhythm, No murmurs, equal pulses. "   Pulmonary: Slightly diminished breath sounds bilaterally. No wheezing, No rales, No rhonchi.  Chest: No chest wall tenderness or deformity.   Abdomen:Soft, No tenderness, No masses, no rebound, no guarding.   Musculoskeletal: Right shoulder posterior aspect black eschar following 3 cm by 3 cm with 1 cm surrounding erythema very tender to touch, slightly warm with no fluctuation or discharge. No edema in legs no calf tenderness.  Skin: Warm, Dry, No erythema, No rash.   Neurologic: Alert & oriented x 3, Normal motor function,  No focal deficits noted.   Psychiatric: Affect normal, Judgment normal, Mood normal.     LABS  Results for orders placed or performed during the hospital encounter of 03/17/20   CBC WITH DIFFERENTIAL   Result Value Ref Range    WBC 5.7 4.8 - 10.8 K/uL    RBC 4.80 4.70 - 6.10 M/uL    Hemoglobin 15.6 14.0 - 18.0 g/dL    Hematocrit 45.3 42.0 - 52.0 %    MCV 94.4 81.4 - 97.8 fL    MCH 32.5 27.0 - 33.0 pg    MCHC 34.4 33.7 - 35.3 g/dL    RDW 42.0 35.9 - 50.0 fL    Platelet Count 222 164 - 446 K/uL    MPV 10.4 9.0 - 12.9 fL    Neutrophils-Polys 57.40 44.00 - 72.00 %    Lymphocytes 31.50 22.00 - 41.00 %    Monocytes 7.70 0.00 - 13.40 %    Eosinophils 2.80 0.00 - 6.90 %    Basophils 0.30 0.00 - 1.80 %    Immature Granulocytes 0.30 0.00 - 0.90 %    Nucleated RBC 0.00 /100 WBC    Neutrophils (Absolute) 3.29 1.82 - 7.42 K/uL    Lymphs (Absolute) 1.81 1.00 - 4.80 K/uL    Monos (Absolute) 0.44 0.00 - 0.85 K/uL    Eos (Absolute) 0.16 0.00 - 0.51 K/uL    Baso (Absolute) 0.02 0.00 - 0.12 K/uL    Immature Granulocytes (abs) 0.02 0.00 - 0.11 K/uL    NRBC (Absolute) 0.00 K/uL   COMP METABOLIC PANEL   Result Value Ref Range    Sodium 139 135 - 145 mmol/L    Potassium 4.4 3.6 - 5.5 mmol/L    Chloride 109 96 - 112 mmol/L    Co2 19 (L) 20 - 33 mmol/L    Anion Gap 11.0 7.0 - 16.0    Glucose 92 65 - 99 mg/dL    Bun 20 8 - 22 mg/dL    Creatinine 1.27 0.50 - 1.40 mg/dL    Calcium 10.0 8.5 - 10.5 mg/dL    AST(SGOT) 26  12 - 45 U/L    ALT(SGPT) 25 2 - 50 U/L    Alkaline Phosphatase 54 30 - 99 U/L    Total Bilirubin 0.6 0.1 - 1.5 mg/dL    Albumin 4.4 3.2 - 4.9 g/dL    Total Protein 7.1 6.0 - 8.2 g/dL    Globulin 2.7 1.9 - 3.5 g/dL    A-G Ratio 1.6 g/dL   LACTIC ACID   Result Value Ref Range    Lactic Acid 1.5 0.5 - 2.0 mmol/L   Influenza A/B By PCR (Adult - Flu Only)   Result Value Ref Range    Influenza virus A RNA Negative Negative    Influenza virus B, PCR Negative Negative   ESTIMATED GFR   Result Value Ref Range    GFR If African American >60 >60 mL/min/1.73 m 2    GFR If Non African American >60 >60 mL/min/1.73 m 2     All labs reviewed by me.    RADIOLOGY  DX-CHEST-PORTABLE (1 VIEW)   Final Result      1.  There is no acute cardiopulmonary process.      DX-SHOULDER 2+ RIGHT   Final Result      1.  Unremarkable 2 view imaging of the right shoulder.        The radiologist's interpretation of all radiological studies have been reviewed by me.    COURSE & MEDICAL DECISION MAKING  Pertinent Labs & Imaging studies reviewed. (See chart for details)    9:47 PM - Patient seen and examined at bedside. Patient currently has stable vital signs. Ordered Dx-shoulder, Dx-chest, Respiratory panel, CBC with differential, CMP, blood culture, Lactic acid, and Influenza A/B to evaluate his symptoms. The differential diagnoses include but are not limited to: cellulitis, COVID exposure, pneumonia, post-op pain, or abscess.    12:00 AM - Patient was reevaluated at bedside. Ultrasound performed at bedside, no deep abscess was found, so this is most likely a superficial skin ulcer. Patient states he currently is having pain from this area. He will be treated with Dilaudid 0.5 mg. Discussed lab and radiology results with the patient as detailed above. Patient is informed that they are now stable for discharge. Patient is advised of all return precautions. Patient verbalizes an understanding and agreement to this plan of care.     I verified that the  patient was wearing a mask and I was wearing appropriate PPE every time I entered the room. The patient's mask was on the patient at all times during my encounter except for a brief view of the oropharynx.      Medical Decision Making: At this point time I think the sore on the back of his shoulder is likely possible pressure sore from when he was sedated.  He has some mild erythema around it but no abscess below it.  Patient is already on clindamycin for his dental procedure.  At this point time because of his allergies I do not think his antibiotics need to be broadened.  Patient has possible exposure to coronavirus.  Given his cough fatigue and shortness of breath I think this is possible chest x-ray is negative for pneumonia.  He is not hypoxic does not have signs of sepsis.  At this point time I think the patient can be discharged home for self isolation.    The patient will return for new or worsening symptoms and is stable at the time of discharge.    The patient is referred to a primary physician for blood pressure management, diabetic screening, and for all other preventative health concerns.    DISPOSITION:  Patient will be discharged home in stable condition.    FOLLOW UP:  Your doctor    Schedule an appointment as soon as possible for a visit in 2 weeks        FINAL IMPRESSION  1. Cough    2. Skin ulcer of shoulder, limited to breakdown of skin (HCC)    3. Jaw pain          Cleo BURCH (Scribe), am scribing for, and in the presence of, Garfield Mcclendon M.D.    Electronically signed by: Cleo Murry (Jesseniaibmelinda), 3/17/2020    Garfield BURCH M.D. personally performed the services described in this documentation, as scribed by Cleo Murry in my presence, and it is both accurate and complete. C.    The note accurately reflects work and decisions made by me.  Garfield Mcclendon M.D.  3/18/2020  1:10 AM

## 2020-03-18 NOTE — ED NOTES
The patient presents to the ED via triage for evaluation of shortness of breath, cough, congestion and jaw pain. The patient reports that he has a long medical history and recently had jaw surgery in [place]. The patient reports that 6 days after discharge he developed respiratory symptoms. Due to symptoms and recent travel, the patient was placed in the PUI protocol and was roomed immediately and placed on airborne isolation.

## 2020-03-18 NOTE — DISCHARGE INSTRUCTIONS
STAY HOME UNTIL YOU ARE NOT COUGHING OR HAVING A FEVER for 48 hours. Return if you have increasing shortness of breath, chest pain, increasing redness around your wound or fever that will not go down.     Look into home grocery delivery. Or [employer] so that you are not going out to get groceries

## 2020-03-20 LAB
SARS-COV-2 RNA SPEC QL NAA+PROBE: NOT DETECTED
SPECIMEN SOURCE: NORMAL

## 2020-03-20 NOTE — ED NOTES
COVID-19 Test Follow Up  Date 3/20/20    Results for MANOJ MARISCAL (MRN [id number]) as of 3/20/2020 16:20   Ref. Range 3/17/2020 22:20   SARS-CoV-2 Source Unknown NP Swab   SARS-CoV-2, MURIEL Latest Ref Range: Not Detected  Not Detected       Patient tested negative for COVID-19. Attempted to inform patient of result, but there was no answer and VM was full.     Ema Delcid, PharmD

## 2020-03-23 LAB
BACTERIA BLD CULT: NORMAL
SIGNIFICANT IND 70042: NORMAL
SITE SITE: NORMAL
SOURCE SOURCE: NORMAL

## 2020-03-29 ENCOUNTER — APPOINTMENT (OUTPATIENT)
Dept: RADIOLOGY | Facility: MEDICAL CENTER | Age: 50
End: 2020-03-29
Attending: EMERGENCY MEDICINE
Payer: MEDICARE

## 2020-03-29 ENCOUNTER — HOSPITAL ENCOUNTER (EMERGENCY)
Facility: MEDICAL CENTER | Age: 50
End: 2020-03-29
Attending: EMERGENCY MEDICINE
Payer: MEDICARE

## 2020-03-29 VITALS
HEART RATE: 59 BPM | SYSTOLIC BLOOD PRESSURE: 118 MMHG | TEMPERATURE: 96.7 F | DIASTOLIC BLOOD PRESSURE: 73 MMHG | OXYGEN SATURATION: 95 % | HEIGHT: 72 IN | RESPIRATION RATE: 19 BRPM | BODY MASS INDEX: 25.98 KG/M2 | WEIGHT: 191.8 LBS

## 2020-03-29 DIAGNOSIS — L98.9 SKIN LESION OF BACK: ICD-10-CM

## 2020-03-29 DIAGNOSIS — I80.8 SUPERFICIAL THROMBOPHLEBITIS OF LEFT UPPER EXTREMITY: ICD-10-CM

## 2020-03-29 DIAGNOSIS — M25.511 RIGHT SHOULDER PAIN, UNSPECIFIED CHRONICITY: ICD-10-CM

## 2020-03-29 LAB
ALBUMIN SERPL BCP-MCNC: 5.2 G/DL (ref 3.2–4.9)
ALBUMIN/GLOB SERPL: 2.1 G/DL
ALP SERPL-CCNC: 72 U/L (ref 30–99)
ALT SERPL-CCNC: 33 U/L (ref 2–50)
ANION GAP SERPL CALC-SCNC: 14 MMOL/L (ref 7–16)
APTT PPP: 33.1 SEC (ref 24.7–36)
AST SERPL-CCNC: 22 U/L (ref 12–45)
BASOPHILS # BLD AUTO: 0.2 % (ref 0–1.8)
BASOPHILS # BLD: 0.01 K/UL (ref 0–0.12)
BILIRUB SERPL-MCNC: 0.6 MG/DL (ref 0.1–1.5)
BUN SERPL-MCNC: 26 MG/DL (ref 8–22)
CALCIUM SERPL-MCNC: 10.1 MG/DL (ref 8.5–10.5)
CHLORIDE SERPL-SCNC: 105 MMOL/L (ref 96–112)
CO2 SERPL-SCNC: 22 MMOL/L (ref 20–33)
CREAT SERPL-MCNC: 1.3 MG/DL (ref 0.5–1.4)
CRP SERPL HS-MCNC: 0.05 MG/DL (ref 0–0.75)
EOSINOPHIL # BLD AUTO: 0.11 K/UL (ref 0–0.51)
EOSINOPHIL NFR BLD: 2.4 % (ref 0–6.9)
ERYTHROCYTE [DISTWIDTH] IN BLOOD BY AUTOMATED COUNT: 42.9 FL (ref 35.9–50)
GLOBULIN SER CALC-MCNC: 2.5 G/DL (ref 1.9–3.5)
GLUCOSE SERPL-MCNC: 97 MG/DL (ref 65–99)
HCT VFR BLD AUTO: 47.8 % (ref 42–52)
HGB BLD-MCNC: 16.7 G/DL (ref 14–18)
IMM GRANULOCYTES # BLD AUTO: 0.01 K/UL (ref 0–0.11)
IMM GRANULOCYTES NFR BLD AUTO: 0.2 % (ref 0–0.9)
INR PPP: 1 (ref 0.87–1.13)
LYMPHOCYTES # BLD AUTO: 1.63 K/UL (ref 1–4.8)
LYMPHOCYTES NFR BLD: 35.5 % (ref 22–41)
MCH RBC QN AUTO: 32.9 PG (ref 27–33)
MCHC RBC AUTO-ENTMCNC: 34.9 G/DL (ref 33.7–35.3)
MCV RBC AUTO: 94.1 FL (ref 81.4–97.8)
MONOCYTES # BLD AUTO: 0.36 K/UL (ref 0–0.85)
MONOCYTES NFR BLD AUTO: 7.8 % (ref 0–13.4)
NEUTROPHILS # BLD AUTO: 2.47 K/UL (ref 1.82–7.42)
NEUTROPHILS NFR BLD: 53.9 % (ref 44–72)
NRBC # BLD AUTO: 0 K/UL
NRBC BLD-RTO: 0 /100 WBC
PLATELET # BLD AUTO: 213 K/UL (ref 164–446)
PMV BLD AUTO: 9.9 FL (ref 9–12.9)
POTASSIUM SERPL-SCNC: 4.3 MMOL/L (ref 3.6–5.5)
PROT SERPL-MCNC: 7.7 G/DL (ref 6–8.2)
PROTHROMBIN TIME: 13.4 SEC (ref 12–14.6)
RBC # BLD AUTO: 5.08 M/UL (ref 4.7–6.1)
SODIUM SERPL-SCNC: 141 MMOL/L (ref 135–145)
WBC # BLD AUTO: 4.6 K/UL (ref 4.8–10.8)

## 2020-03-29 PROCEDURE — 85025 COMPLETE CBC W/AUTO DIFF WBC: CPT

## 2020-03-29 PROCEDURE — 96375 TX/PRO/DX INJ NEW DRUG ADDON: CPT

## 2020-03-29 PROCEDURE — 80053 COMPREHEN METABOLIC PANEL: CPT

## 2020-03-29 PROCEDURE — 85730 THROMBOPLASTIN TIME PARTIAL: CPT

## 2020-03-29 PROCEDURE — 99284 EMERGENCY DEPT VISIT MOD MDM: CPT

## 2020-03-29 PROCEDURE — 700111 HCHG RX REV CODE 636 W/ 250 OVERRIDE (IP): Performed by: EMERGENCY MEDICINE

## 2020-03-29 PROCEDURE — 86140 C-REACTIVE PROTEIN: CPT

## 2020-03-29 PROCEDURE — 71045 X-RAY EXAM CHEST 1 VIEW: CPT

## 2020-03-29 PROCEDURE — 85610 PROTHROMBIN TIME: CPT

## 2020-03-29 PROCEDURE — 96374 THER/PROPH/DIAG INJ IV PUSH: CPT

## 2020-03-29 PROCEDURE — 93971 EXTREMITY STUDY: CPT | Mod: LT

## 2020-03-29 RX ORDER — DIPHENHYDRAMINE HYDROCHLORIDE 50 MG/ML
50 INJECTION INTRAMUSCULAR; INTRAVENOUS ONCE
Status: COMPLETED | OUTPATIENT
Start: 2020-03-29 | End: 2020-03-29

## 2020-03-29 RX ORDER — HYDROMORPHONE HYDROCHLORIDE 1 MG/ML
1 INJECTION, SOLUTION INTRAMUSCULAR; INTRAVENOUS; SUBCUTANEOUS ONCE
Status: COMPLETED | OUTPATIENT
Start: 2020-03-29 | End: 2020-03-29

## 2020-03-29 RX ADMIN — DIPHENHYDRAMINE HYDROCHLORIDE 50 MG: 50 INJECTION INTRAMUSCULAR; INTRAVENOUS at 19:43

## 2020-03-29 RX ADMIN — HYDROMORPHONE HYDROCHLORIDE 1 MG: 1 INJECTION, SOLUTION INTRAMUSCULAR; INTRAVENOUS; SUBCUTANEOUS at 20:00

## 2020-03-29 ASSESSMENT — LIFESTYLE VARIABLES: DO YOU DRINK ALCOHOL: NO

## 2020-03-29 ASSESSMENT — FIBROSIS 4 INDEX: FIB4 SCORE: 1.17

## 2020-03-30 NOTE — ED TRIAGE NOTES
"Chief Complaint   Patient presents with   • Cough   • Arm Pain     Pt states cough and congestion since the 10th.  Pt seen here on the 17th and was tested for coronavirus r/u.  Pt states that he has not been contacted about results yet.  Pt also c/o left arm pain with \"bumps.\"  Pt concerned for blood clots.  Recent dental surgery.  Mask on pt.  Pt speaking full sentences.  Triage process explained to patient.  Pt back to waiting room.  Pt instructed to inform RN if any changes or questions arise.    "

## 2020-03-30 NOTE — ED PROVIDER NOTES
"ED Provider Note    CHIEF COMPLAINT  Chief Complaint   Patient presents with   • Cough   • Arm Pain       HPI  Landon Allred is a 50 y.o. male who presents for evaluation of multiple issues including new left medial forearm pain and right posterior shoulder pain.  Patient notes he has had a cough as well and was recently evaluated in the emergency department for the same issues.  He notes that he has factor V Leiden and is on Xarelto, on 10 mg daily.  He notes that he was decreased from 20-10 [facility] despite the protest of his primary care physician.  It is unclear why his primary care physician protested.  Patient notes that he no longer \"gets any help from [facility]\" as they keep telling him to come to the emergency department at [facility].  Currently he states there is a painful bump on his distal medial left forearm which he thinks is a new \"DVT\" and wonders if he has cancer to his right posterior shoulder.  Patient notes a scabbed lesion which was \"draining pus\" recently and occurred after his recent dental surgery.  He notes no source of trauma or injury to the shoulder but thinks it was related nonetheless.  He notes it started as a raised wheal-like linear lesion which then evolved into a denuded area and then a scab.  He was told by his primary care physician this could be melanoma and to go to the emergency room to get \"lab tests.\"    REVIEW OF SYSTEMS  Constitutional: No measured fevers.  Chills.  Fatigue.  S   Skin: Rash to right posterior shoulder  HEENT: No sore throat, runny nose, sores, trouble swallowing, trouble speaking.  Neck: No neck pain, stiffness, or masses.  Chest: No pain or rashes  Pulm: Cough.  No pain with deep inspiration.  Gastrointestinal: No nausea, vomiting, diarrhea, constipation, bloating, melena, hematochezia or pain.  Musculoskeletal: No recent trauma.  Pain to left forearm in the focal area and diffuse pain to right " "shoulder.  Neurologic: No sensory or motor changes. No confusion or disorientation.  Heme: Bruises and bleeds easily due to anticoagulation  Immuno: No hx of recurrent infections      PAST MEDICAL HISTORY   has a past medical history of Allergy, Arthritis, Asthma, Claustrophobia, Clostridium difficile diarrhea, Clotting disorder ([facility]), Depression, DVT (deep venous thrombosis) ([facility]), GERD (gastroesophageal reflux disease), Hypertension, IBD (inflammatory bowel disease), Migraine, MRSA (methicillin resistant Staphylococcus aureus), Pancreatitis, PE (pulmonary embolism), Psychiatric disorder, and Seizure ([facility]).    SOCIAL HISTORY  Social History     Tobacco Use   • Smoking status: Never Smoker   • Smokeless tobacco: Never Used   Substance and Sexual Activity   • Alcohol use: No   • Drug use: No   • Sexual activity: Not on file       SURGICAL HISTORY   has a past surgical history that includes nerve ulnar repair or explore; dental extraction(s); closed rx nasal septal fracture; rectal exploration; femur orif; elbow orif; inguinal hernia repair (Right, 09/06/2019); and hernia repair (Right).    CURRENT MEDICATIONS  Home Medications    **Home medications have not yet been reviewed for this encounter**         ALLERGIES  Allergies   Allergen Reactions   • Ciprofloxacin Anaphylaxis   • Dilaudid [Hydromorphone] Itching     States \"I have an adverse reaction and need benadryl first\"   • Shellfish Allergy Anaphylaxis   • Tape Hives   • Augmentin Rash     rash   • Amitriptyline Hcl    • Asa [Aspirin]    • Baclofen    • Biofreeze    • Celery Oil      Celery causes hives   • Codeine    • Compazine [Prochlorperazine]      Hallucinations, dizziness   • Hydrocodone-Acetaminophen    • Ketorolac Tromethamine [Toradol]    • Morphine Vomiting   • Ondansetron [Zofran]    • Other Misc      Can take dilaudid if given benadryl first   • Oxycodone    • Oxycodone-Acetaminophen    • Oxycodone-Aspirin    • Reglan [Metoclopramide]      Has no " idea what his RX is   • Risperidone    • Tramadol    • Zofran [Ondansetron]        PHYSICAL EXAM  VITAL SIGNS: /73   Pulse (!) 59   Temp 35.9 °C (96.7 °F) (Oral)   Resp 19   Ht 1.829 m (6')   Wt 87 kg (191 lb 12.8 oz)   SpO2 95%   BMI 26.01 kg/m²    Gen: Alert somewhat anxious, otherwise in no apparent distress  HEENT: No signs of trauma, Bilateral external ears normal, Nose normal. Conjunctiva normal, Non-icteric.   Neck:  No tenderness, Supple, No masses  Lymphatic: No cervical lymphadenopathy noted.   Cardiovascular: Regular rate and rhythm, no murmurs.   Thorax & Lungs: Normal breath sounds, No respiratory distress, No wheezing bilateral chest rise  Abdomen: Bowel sounds normal, Soft, No tenderness, No masses, No pulsatile masses. No Guarding or rebound  Skin: Warm, Dry, No erythema, No rash.   Back: Approximately 1 x 2 cm black appearing eschar noted to right posterior shoulder.  There is a very faint halo of surrounding erythema but no induration or fluctuance noted.  Extremities: Patient unwilling/unable to range right shoulder due to pain emanating from the posterior lesion.  There is no specific erythema, ecchymosis, induration, or edema noted to shoulder.  Patient is able to range elbow and fingers on affected side without distress.  There is a subcentimeter tender nodular density in the medial portion of the distal forearm in the region of a superficial vein.  There is no overlying erythema or induration.  Neurologic: Alert , no facial droop, grossly normal coordination and strength  Psychiatric: Affect normal, Judgment normal, Mood normal.           LABS  Results for orders placed or performed during the hospital encounter of 03/29/20   CBC WITH DIFFERENTIAL   Result Value Ref Range    WBC 4.6 (L) 4.8 - 10.8 K/uL    RBC 5.08 4.70 - 6.10 M/uL    Hemoglobin 16.7 14.0 - 18.0 g/dL    Hematocrit 47.8 42.0 - 52.0 %    MCV 94.1 81.4 - 97.8 fL    MCH 32.9 27.0 - 33.0 pg    MCHC 34.9 33.7 - 35.3 g/dL     RDW 42.9 35.9 - 50.0 fL    Platelet Count 213 164 - 446 K/uL    MPV 9.9 9.0 - 12.9 fL    Neutrophils-Polys 53.90 44.00 - 72.00 %    Lymphocytes 35.50 22.00 - 41.00 %    Monocytes 7.80 0.00 - 13.40 %    Eosinophils 2.40 0.00 - 6.90 %    Basophils 0.20 0.00 - 1.80 %    Immature Granulocytes 0.20 0.00 - 0.90 %    Nucleated RBC 0.00 /100 WBC    Neutrophils (Absolute) 2.47 1.82 - 7.42 K/uL    Lymphs (Absolute) 1.63 1.00 - 4.80 K/uL    Monos (Absolute) 0.36 0.00 - 0.85 K/uL    Eos (Absolute) 0.11 0.00 - 0.51 K/uL    Baso (Absolute) 0.01 0.00 - 0.12 K/uL    Immature Granulocytes (abs) 0.01 0.00 - 0.11 K/uL    NRBC (Absolute) 0.00 K/uL   COMP METABOLIC PANEL   Result Value Ref Range    Sodium 141 135 - 145 mmol/L    Potassium 4.3 3.6 - 5.5 mmol/L    Chloride 105 96 - 112 mmol/L    Co2 22 20 - 33 mmol/L    Anion Gap 14.0 7.0 - 16.0    Glucose 97 65 - 99 mg/dL    Bun 26 (H) 8 - 22 mg/dL    Creatinine 1.30 0.50 - 1.40 mg/dL    Calcium 10.1 8.5 - 10.5 mg/dL    AST(SGOT) 22 12 - 45 U/L    ALT(SGPT) 33 2 - 50 U/L    Alkaline Phosphatase 72 30 - 99 U/L    Total Bilirubin 0.6 0.1 - 1.5 mg/dL    Albumin 5.2 (H) 3.2 - 4.9 g/dL    Total Protein 7.7 6.0 - 8.2 g/dL    Globulin 2.5 1.9 - 3.5 g/dL    A-G Ratio 2.1 g/dL   PROTHROMBIN TIME (INR)   Result Value Ref Range    PT 13.4 12.0 - 14.6 sec    INR 1.00 0.87 - 1.13   APTT   Result Value Ref Range    APTT 33.1 24.7 - 36.0 sec   CRP QUANTITIVE (NON-CARDIAC)   Result Value Ref Range    Stat C-Reactive Protein 0.05 0.00 - 0.75 mg/dL   ESTIMATED GFR   Result Value Ref Range    GFR If African American >60 >60 mL/min/1.73 m 2    GFR If Non African American 58 (A) >60 mL/min/1.73 m 2       RADIOLOGY  US-EXTREMITY VENOUS UPPER UNILAT LEFT   Final Result      DX-CHEST-PORTABLE (1 VIEW)   Final Result      No acute cardiac or pulmonary abnormalities are identified.          COURSE & MEDICAL DECISION MAKING  Patient arrives for evaluation of several issues which do not appear to have readily  "available explanations.  Is notable that the patient was seen and evaluated several days ago and that he had a recent negative test for the coronavirus.  He was also negative for all other viral forms of pathology tested at this hospital.  He states his cough has persisted since then however he is not febrile today and does not appear toxic or in respiratory distress.  He is concern for a new nodular density on his left forearm that appears to be superficial thrombophlebitis most likely related to a recent IV.  It does not appear infected however and I do not feel antibiotics will be necessary.  Patient was concerned that this would be considered a treatment failure for his Xarelto.  He notes that his other \"DVTs\" on his right arm had similar appearances and physical signs.  There is no erythema or vascular congestion noted to either extremity and is not have any chest pain or shortness of breath.  He does not have any vital sign abnormalities at this time.  I very much doubt pulmonary embolism and I also very much doubt a DVT although I did agree that screening with an ultrasound was reasonable given his hypercoagulable state.  It is notable that he is only on 10 mg of Xarelto daily which is not the typical dose.  I will perform screening labs and chest x-ray as well to ensure that he has not had any interval worsening of what is likely a mild respiratory illness.  Regarding the patient's lesion on his right posterior shoulder, this appears to be a healing wound, most reminiscent of a healing burn with an overlying eschar.  There are no convincing findings to suggest sepsis and, despite the fact that he has pain throughout her shoulder girdle I very much doubt a septic joint at this point.    11 PM  Had a very long discussion with the patient regarding his symptoms and the findings today.  I do not feel there is enough evidence to warrant CT imaging emergently of his right shoulder and I do not feel an MRI is " necessary as I do not suspect a septic joint.  I suspect this is a healing external lesion and, although I suppose neoplastic process is still on the differential diagnosis, this does not need to be ruled out emergently.  This can be followed up as an outpatient with his primary care physician who had the original concern.  I did reassure patient that I did not feel there was an emergent problem and that switching of his Xarelto from 10 mg to 20 mg was entirely up to both him and his primary care physician as there was no emergent need to do it.  I do not feel the presence of what appears to be a chronic superficial phlebitis in the left forearm warrants the risk of increasing his anticoagulation and I do not feel it represents a treatment failure.  Patient was instructed to contact his primary care physician [facility] him to switch to 10 mg daily.  He will return if his symptoms worsen or change.    FINAL IMPRESSION  1. Right shoulder pain, unspecified chronicity    2. Skin lesion of back    3. Superficial thrombophlebitis of left upper extremity        Electronically signed by: Jony Salinas M.D., 3/29/2020 6:39 PM

## 2020-07-06 ENCOUNTER — APPOINTMENT (OUTPATIENT)
Dept: OPHTHALMOLOGY | Facility: MEDICAL CENTER | Age: 50
End: 2020-07-06
Payer: COMMERCIAL

## 2020-07-24 ENCOUNTER — APPOINTMENT (OUTPATIENT)
Dept: RADIOLOGY | Facility: MEDICAL CENTER | Age: 50
End: 2020-07-24
Attending: EMERGENCY MEDICINE
Payer: COMMERCIAL

## 2020-07-24 ENCOUNTER — HOSPITAL ENCOUNTER (EMERGENCY)
Facility: MEDICAL CENTER | Age: 50
End: 2020-07-25
Attending: EMERGENCY MEDICINE
Payer: COMMERCIAL

## 2020-07-24 DIAGNOSIS — S83.92XA SPRAIN OF LEFT KNEE, UNSPECIFIED LIGAMENT, INITIAL ENCOUNTER: ICD-10-CM

## 2020-07-24 DIAGNOSIS — M25.562 ACUTE PAIN OF LEFT KNEE: ICD-10-CM

## 2020-07-24 PROCEDURE — 99284 EMERGENCY DEPT VISIT MOD MDM: CPT

## 2020-07-24 PROCEDURE — 73560 X-RAY EXAM OF KNEE 1 OR 2: CPT | Mod: LT

## 2020-07-24 ASSESSMENT — FIBROSIS 4 INDEX: FIB4 SCORE: 1.38

## 2020-07-24 ASSESSMENT — LIFESTYLE VARIABLES: DO YOU DRINK ALCOHOL: NO

## 2020-07-25 VITALS
RESPIRATION RATE: 16 BRPM | DIASTOLIC BLOOD PRESSURE: 71 MMHG | HEART RATE: 80 BPM | HEIGHT: 72 IN | TEMPERATURE: 98 F | SYSTOLIC BLOOD PRESSURE: 118 MMHG | BODY MASS INDEX: 21.67 KG/M2 | OXYGEN SATURATION: 99 % | WEIGHT: 160 LBS

## 2020-07-25 PROCEDURE — 96372 THER/PROPH/DIAG INJ SC/IM: CPT

## 2020-07-25 PROCEDURE — 700111 HCHG RX REV CODE 636 W/ 250 OVERRIDE (IP): Performed by: EMERGENCY MEDICINE

## 2020-07-25 RX ORDER — DIPHENHYDRAMINE HYDROCHLORIDE 50 MG/ML
50 INJECTION INTRAMUSCULAR; INTRAVENOUS ONCE
Status: COMPLETED | OUTPATIENT
Start: 2020-07-25 | End: 2020-07-25

## 2020-07-25 RX ADMIN — DIPHENHYDRAMINE HYDROCHLORIDE 50 MG: 50 INJECTION INTRAMUSCULAR; INTRAVENOUS at 00:17

## 2020-07-25 RX ADMIN — FENTANYL CITRATE 100 MCG: 50 INJECTION INTRAMUSCULAR; INTRAVENOUS at 00:17

## 2020-07-25 NOTE — ED PROVIDER NOTES
ED Provider Note     Scribed for Brooke Chacon D.O. by Dylan Dumont. 7/24/2020, 10:31 PM.     Primary care provider: Pcp Pt States None  Means of arrival: Walk in         History obtained from: Patient  History limited by: None    CHIEF COMPLAINT  Chief Complaint   Patient presents with   • Leg Pain     left knee pain       HPI  Landon Allred is a 50 y.o. male who presents to the emergency Department for constant severe left lower extremity pain which started tonight. He states he was walking and abruptly heard a pop and felt a painful snap in his left lower leg. He then fell on the ground and hit his head and back. He states the pain radiated both above and below his knee.  He states that he has had chronic bilateral knee problems for a while and wears a brace on his right knee but [facility] who can evaluate him and treat him due to all his medical issues.  He denies loss of consciousness.  He denies any other areas of significant injury.  Patient states that he has had an MRI of his right knee but not of his left knee is been having a tough time getting in to see an orthopedic surgeon.  [facility] states [facility] but they will not treat him.    REVIEW OF SYSTEMS.   See HPI for further details. All other system reviews are negative.    PAST MEDICAL HISTORY  Past Medical History:   Diagnosis Date   • Allergy    • Arthritis    • Asthma    • Claustrophobia    • Clostridium difficile diarrhea    • Clotting disorder (HCC)    • Depression    • DVT (deep venous thrombosis) ([facility])    • GERD (gastroesophageal reflux disease)    • Hypertension    • IBD (inflammatory bowel disease)    • Migraine    • MRSA (methicillin resistant Staphylococcus aureus)    • Pancreatitis    • PE (pulmonary embolism)    • Psychiatric disorder     PTSD, depression   • Seizure (HCC)        FAMILY HISTORY  Family History   Problem Relation Age of Onset   •  "Hypertension Mother    • Cancer Father    • Hypertension Father        SOCIAL HISTORY  Social History     Tobacco Use   • Smoking status: Never Smoker   • Smokeless tobacco: Never Used   Substance Use Topics   • Alcohol use: No   • Drug use: No      Social History     Substance and Sexual Activity   Drug Use No       SURGICAL HISTORY  Past Surgical History:   Procedure Laterality Date   • INGUINAL HERNIA REPAIR Right 09/06/2019   • DENTAL EXTRACTION(S)     • ELBOW ORIF     • FEMUR ORIF     • HERNIA REPAIR Right    • NERVE ULNAR REPAIR OR EXPLORE     • PB CLOSED RX NASAL SEPTAL FRACTURE     • RECTAL EXPLORATION         CURRENT MEDICATIONS  No current facility-administered medications for this encounter.     Current Outpatient Medications:   •  therapeutic multivitamin-minerals (THERAGRAN-M) Tab, Take 1 Tab by mouth every day., Disp: , Rfl:   •  Fluticasone Furoate-Vilanterol (BREO ELLIPTA) 200-25 MCG/INH AEROSOL POWDER, BREATH ACTIVATED, Inhale 1 Puff by mouth every day., Disp: , Rfl:   •  zonisamide (ZONEGRAN) 50 MG capsule, Take 50 mg by mouth 2 times a day., Disp: , Rfl:   •  esomeprazole (NEXIUM) 40 MG delayed-release capsule, Take 40 mg by mouth every morning before breakfast., Disp: , Rfl:   •  rivaroxaban (XARELTO) 10 MG Tab tablet, Take 10 mg by mouth with dinner., Disp: , Rfl:     ALLERGIES  Allergies   Allergen Reactions   • Ciprofloxacin Anaphylaxis   • Dilaudid [Hydromorphone] Itching     States \"I have an adverse reaction and need benadryl first\"   • Shellfish Allergy Anaphylaxis   • Tape Hives   • Augmentin Rash     rash   • Amitriptyline Hcl    • Asa [Aspirin]    • Baclofen    • Biofreeze    • Celery Oil      Celery causes hives   • Codeine    • Compazine [Prochlorperazine]      Hallucinations, dizziness   • Hydrocodone-Acetaminophen    • Ketorolac Tromethamine [Toradol]    • Morphine Vomiting   • Ondansetron [Zofran]    • Other Misc      Can take dilaudid if given benadryl first   • Oxycodone    • " Oxycodone-Acetaminophen    • Oxycodone-Aspirin    • Reglan [Metoclopramide]      Has no idea what his RX is   • Risperidone    • Tramadol    • Zofran [Ondansetron]        PHYSICAL EXAM  VITAL SIGNS: /95   Pulse (!) 104   Temp 36.7 °C (98.1 °F) (Temporal)   Resp 20   Ht 1.829 m (6')   Wt 72.6 kg (160 lb)   SpO2 98%   BMI 21.70 kg/m²     Constitutional: Patient is well developed, well nourished, very agitated and uncooperative in moderate distress.  His pain is way out of proportion to his physical exam.  HENT: Normocephalic, atraumatic.. Nose normal with no drainage. Oropharynx moist without erythema.  Eyes: PERRL, EOMI, Conjunctiva without erythema.   Neck: Supple with Normal range of motion in flexion, extension and lateral rotation. No tenderness along the bony prominences or paraspinal muscles.  Lymphatic: No lymphadenopathy noted.   Cardiovascular: Normal heart rate and Regular rhythm. No murmur  Thorax & Lungs: Clear and equal breath sounds with good excursion. No respiratory distress.  Abdomen: Bowel sounds normal in all four quadrants. Soft,nontender.   Skin: Warm, Dry, No erythema or rashes.  No contusions or abrasions.  Back: No cervical, thoracic, or lumbosacral tenderness.  Extremities: Peripheral pulses 4/4 left knee is tender to even minimal palpation.  There is some old bruising noted in the medial aspect.  There is no ballotable joint effusion.  There is limited range of motion secondary to pain.  There is no erythema, warmth.  There is no gross signs of trauma or infection.  He has limited range of motion secondary to pain.  Neurologic: Alert & oriented x 3, Normal motor function, Normal sensory function, No lateralizing or focal deficits noted. DTR's 4/4 bilaterally.  Psychiatric: Very odd affect, agitated and uncooperative.      RADIOLOGY/PROCEDURES  DX-KNEE 2- LEFT   Final Result         1.  No acute traumatic bony injury.          Results and radiologist interpretation reviewed by  me.     COURSE & MEDICAL DECISION MAKING  Pertinent Labs & Imaging studies reviewed. (See chart for details)    10:31 PM - Patient seen and evaluated at bedside.  X-ray was ordered and found to be negative for any acute abnormalities or subluxation.  Patient was refusing to be cooperative wearing his mask during the COVID epidemic so his treatment was denied by nursing staff.  Eventually he decided to put his mask on and did receive his pain medication and he was placed in the left knee immobilizer.  He is to follow-up with [facility] in 1 week for recheck and return if any problems or worsening.  He is discharged in stable and improved condition.    FINAL IMPRESSION  Acute left knee pain  Left knee sprain     Dylan BURCH (Scribe), am scribing for, and in the presence of, Brooke Chacon D.O..    Electronically signed by: Dylan Dumont (Scribe), 7/24/2020    Brooke BURCH D.O. personally performed the services described in this documentation, as scribed by Dylan Dumont in my presence, and it is both accurate and complete.    The note accurately reflects work and decisions made by me.  Brooke Chacon D.O.  7/25/2020  1:15 AM

## 2020-07-25 NOTE — ED NOTES
Pt refuses to wear mask.  Verbalized he can't because he has anxiety.  Explained to pt from doorway that it is hospital policy and if he would like to be treated he needs to wear the mask for everyone's safety.  Pt continues to refuse.  Explained to pt that pain medication has been ordered, but RN will not enter room to medicate pt unless he wears mask.  Pt continues to refuse, understands he will not be medicated at this time.  Roque, charge RN made aware.  Door closed.

## 2020-07-25 NOTE — ED NOTES
Agree with triage note. Pt wheeled to room and assisted into bed. Pt placed in gown. Pt continues to refuse to wear face mask. Pt educated on ER process.

## 2020-07-25 NOTE — DISCHARGE INSTRUCTIONS
Ice for 24 hours then moist heat or hot tub soaks in Epsom salts for both of your knees  [facility] and [facility] out [facility] they can [facility] you to see an orthopedic surgeon so that you can have an outpatient MRI performed and further evaluation and treatment.  Take Tylenol or ibuprofen for the pain.

## 2020-07-25 NOTE — ED TRIAGE NOTES
"Chief Complaint   Patient presents with   • Leg Pain     left knee pain     Pt in wheelchair to triage with above complaint.  Pt states he was walking and \"my knee all of a sudden gave out and I heard a pop.\"  Pt states he is unable to bear weight.  Pt states 10/10 pain.      Pt refusing to wear mask, pt states he has too high of anxiety.  Pt holding mask in front of face.     Pt instructed to triage process and advised to alert staff of any changes.  Pt returned to lobby.  Pt states understanding.     "

## 2020-07-25 NOTE — ED NOTES
Pt discharged. Pt provided information about knee pain, ligament sprain, cold therapy. Pt educated to follow-up w/ ortho, PCP, and to return to the hospital with any worsening symptoms. Pt wheeled to the lobby and friend drove him home.

## 2020-09-04 ENCOUNTER — HOSPITAL ENCOUNTER (OUTPATIENT)
Dept: RADIOLOGY | Facility: MEDICAL CENTER | Age: 50
End: 2020-09-04
Attending: PHYSICIAN ASSISTANT
Payer: COMMERCIAL

## 2020-09-04 ENCOUNTER — HOSPITAL ENCOUNTER (OUTPATIENT)
Dept: LAB | Facility: MEDICAL CENTER | Age: 50
End: 2020-09-04
Attending: PHYSICIAN ASSISTANT
Payer: COMMERCIAL

## 2020-09-04 DIAGNOSIS — R05.9 COUGH: ICD-10-CM

## 2020-09-04 LAB
BASOPHILS # BLD AUTO: 0.2 % (ref 0–1.8)
BASOPHILS # BLD: 0.01 K/UL (ref 0–0.12)
EOSINOPHIL # BLD AUTO: 0.02 K/UL (ref 0–0.51)
EOSINOPHIL NFR BLD: 0.4 % (ref 0–6.9)
ERYTHROCYTE [DISTWIDTH] IN BLOOD BY AUTOMATED COUNT: 46.3 FL (ref 35.9–50)
HCT VFR BLD AUTO: 51.2 % (ref 42–52)
HGB BLD-MCNC: 17.1 G/DL (ref 14–18)
IMM GRANULOCYTES # BLD AUTO: 0.01 K/UL (ref 0–0.11)
IMM GRANULOCYTES NFR BLD AUTO: 0.2 % (ref 0–0.9)
LYMPHOCYTES # BLD AUTO: 0.73 K/UL (ref 1–4.8)
LYMPHOCYTES NFR BLD: 15 % (ref 22–41)
MCH RBC QN AUTO: 32.9 PG (ref 27–33)
MCHC RBC AUTO-ENTMCNC: 33.4 G/DL (ref 33.7–35.3)
MCV RBC AUTO: 98.5 FL (ref 81.4–97.8)
MONOCYTES # BLD AUTO: 0.21 K/UL (ref 0–0.85)
MONOCYTES NFR BLD AUTO: 4.3 % (ref 0–13.4)
NEUTROPHILS # BLD AUTO: 3.88 K/UL (ref 1.82–7.42)
NEUTROPHILS NFR BLD: 79.9 % (ref 44–72)
NRBC # BLD AUTO: 0 K/UL
NRBC BLD-RTO: 0 /100 WBC
PLATELET # BLD AUTO: 165 K/UL (ref 164–446)
PMV BLD AUTO: 11.4 FL (ref 9–12.9)
RBC # BLD AUTO: 5.2 M/UL (ref 4.7–6.1)
WBC # BLD AUTO: 4.9 K/UL (ref 4.8–10.8)

## 2020-09-04 PROCEDURE — 36415 COLL VENOUS BLD VENIPUNCTURE: CPT

## 2020-09-04 PROCEDURE — 85025 COMPLETE CBC W/AUTO DIFF WBC: CPT

## 2020-09-04 PROCEDURE — 71046 X-RAY EXAM CHEST 2 VIEWS: CPT

## 2020-09-04 PROCEDURE — 80053 COMPREHEN METABOLIC PANEL: CPT

## 2020-09-08 ENCOUNTER — HOSPITAL ENCOUNTER (OUTPATIENT)
Facility: MEDICAL CENTER | Age: 50
End: 2020-09-08
Attending: PHYSICIAN ASSISTANT
Payer: MEDICARE

## 2020-09-08 LAB
ALBUMIN SERPL BCP-MCNC: 4.4 G/DL (ref 3.2–4.9)
ALBUMIN/GLOB SERPL: 2 G/DL
ALP SERPL-CCNC: 62 U/L (ref 30–99)
ALT SERPL-CCNC: 25 U/L (ref 2–50)
ANION GAP SERPL CALC-SCNC: 11 MMOL/L (ref 7–16)
AST SERPL-CCNC: 16 U/L (ref 12–45)
BILIRUB SERPL-MCNC: 0.5 MG/DL (ref 0.1–1.5)
BUN SERPL-MCNC: 22 MG/DL (ref 8–22)
CALCIUM SERPL-MCNC: 9.5 MG/DL (ref 8.5–10.5)
CHLORIDE SERPL-SCNC: 105 MMOL/L (ref 96–112)
CO2 SERPL-SCNC: 21 MMOL/L (ref 20–33)
CREAT SERPL-MCNC: 0.94 MG/DL (ref 0.5–1.4)
GLOBULIN SER CALC-MCNC: 2.2 G/DL (ref 1.9–3.5)
GLUCOSE SERPL-MCNC: 108 MG/DL (ref 65–99)
POTASSIUM SERPL-SCNC: 3.9 MMOL/L (ref 3.6–5.5)
PROT SERPL-MCNC: 6.6 G/DL (ref 6–8.2)
SODIUM SERPL-SCNC: 137 MMOL/L (ref 135–145)

## 2020-09-08 PROCEDURE — 80053 COMPREHEN METABOLIC PANEL: CPT

## 2020-09-16 ENCOUNTER — HOSPITAL ENCOUNTER (EMERGENCY)
Facility: MEDICAL CENTER | Age: 50
End: 2020-09-16
Attending: EMERGENCY MEDICINE
Payer: MEDICARE

## 2020-09-16 VITALS
HEART RATE: 88 BPM | SYSTOLIC BLOOD PRESSURE: 124 MMHG | HEIGHT: 72 IN | TEMPERATURE: 97.3 F | RESPIRATION RATE: 20 BRPM | WEIGHT: 160 LBS | BODY MASS INDEX: 21.67 KG/M2 | OXYGEN SATURATION: 96 % | DIASTOLIC BLOOD PRESSURE: 74 MMHG

## 2020-09-16 DIAGNOSIS — M54.81 OCCIPITAL NEURALGIA OF LEFT SIDE: ICD-10-CM

## 2020-09-16 LAB
ALBUMIN SERPL BCP-MCNC: 4.7 G/DL (ref 3.2–4.9)
ALBUMIN/GLOB SERPL: 1.8 G/DL
ALP SERPL-CCNC: 71 U/L (ref 30–99)
ALT SERPL-CCNC: 25 U/L (ref 2–50)
ANION GAP SERPL CALC-SCNC: 15 MMOL/L (ref 7–16)
AST SERPL-CCNC: 19 U/L (ref 12–45)
BASOPHILS # BLD AUTO: 0.1 % (ref 0–1.8)
BASOPHILS # BLD: 0.01 K/UL (ref 0–0.12)
BILIRUB SERPL-MCNC: 0.3 MG/DL (ref 0.1–1.5)
BUN SERPL-MCNC: 24 MG/DL (ref 8–22)
CALCIUM SERPL-MCNC: 9.6 MG/DL (ref 8.5–10.5)
CHLORIDE SERPL-SCNC: 105 MMOL/L (ref 96–112)
CO2 SERPL-SCNC: 22 MMOL/L (ref 20–33)
CREAT SERPL-MCNC: 1.04 MG/DL (ref 0.5–1.4)
EKG IMPRESSION: NORMAL
EOSINOPHIL # BLD AUTO: 0.03 K/UL (ref 0–0.51)
EOSINOPHIL NFR BLD: 0.4 % (ref 0–6.9)
ERYTHROCYTE [DISTWIDTH] IN BLOOD BY AUTOMATED COUNT: 45.1 FL (ref 35.9–50)
GLOBULIN SER CALC-MCNC: 2.6 G/DL (ref 1.9–3.5)
GLUCOSE SERPL-MCNC: 98 MG/DL (ref 65–99)
HCT VFR BLD AUTO: 46.7 % (ref 42–52)
HGB BLD-MCNC: 16.2 G/DL (ref 14–18)
IMM GRANULOCYTES # BLD AUTO: 0.03 K/UL (ref 0–0.11)
IMM GRANULOCYTES NFR BLD AUTO: 0.4 % (ref 0–0.9)
LYMPHOCYTES # BLD AUTO: 1.35 K/UL (ref 1–4.8)
LYMPHOCYTES NFR BLD: 19.7 % (ref 22–41)
MCH RBC QN AUTO: 33 PG (ref 27–33)
MCHC RBC AUTO-ENTMCNC: 34.7 G/DL (ref 33.7–35.3)
MCV RBC AUTO: 95.1 FL (ref 81.4–97.8)
MONOCYTES # BLD AUTO: 0.51 K/UL (ref 0–0.85)
MONOCYTES NFR BLD AUTO: 7.4 % (ref 0–13.4)
NEUTROPHILS # BLD AUTO: 4.92 K/UL (ref 1.82–7.42)
NEUTROPHILS NFR BLD: 72 % (ref 44–72)
NRBC # BLD AUTO: 0 K/UL
NRBC BLD-RTO: 0 /100 WBC
PLATELET # BLD AUTO: 198 K/UL (ref 164–446)
PMV BLD AUTO: 10.2 FL (ref 9–12.9)
POTASSIUM SERPL-SCNC: 3.9 MMOL/L (ref 3.6–5.5)
PROT SERPL-MCNC: 7.3 G/DL (ref 6–8.2)
RBC # BLD AUTO: 4.91 M/UL (ref 4.7–6.1)
SODIUM SERPL-SCNC: 142 MMOL/L (ref 135–145)
WBC # BLD AUTO: 6.9 K/UL (ref 4.8–10.8)

## 2020-09-16 PROCEDURE — 93005 ELECTROCARDIOGRAM TRACING: CPT | Performed by: EMERGENCY MEDICINE

## 2020-09-16 PROCEDURE — 96375 TX/PRO/DX INJ NEW DRUG ADDON: CPT

## 2020-09-16 PROCEDURE — 99284 EMERGENCY DEPT VISIT MOD MDM: CPT

## 2020-09-16 PROCEDURE — 93005 ELECTROCARDIOGRAM TRACING: CPT

## 2020-09-16 PROCEDURE — 80053 COMPREHEN METABOLIC PANEL: CPT

## 2020-09-16 PROCEDURE — 96376 TX/PRO/DX INJ SAME DRUG ADON: CPT

## 2020-09-16 PROCEDURE — 85025 COMPLETE CBC W/AUTO DIFF WBC: CPT

## 2020-09-16 PROCEDURE — 96374 THER/PROPH/DIAG INJ IV PUSH: CPT

## 2020-09-16 PROCEDURE — 700111 HCHG RX REV CODE 636 W/ 250 OVERRIDE (IP): Performed by: EMERGENCY MEDICINE

## 2020-09-16 RX ORDER — DEXAMETHASONE SODIUM PHOSPHATE 4 MG/ML
4 INJECTION, SOLUTION INTRA-ARTICULAR; INTRALESIONAL; INTRAMUSCULAR; INTRAVENOUS; SOFT TISSUE ONCE
Status: COMPLETED | OUTPATIENT
Start: 2020-09-16 | End: 2020-09-16

## 2020-09-16 RX ORDER — HYDROMORPHONE HYDROCHLORIDE 1 MG/ML
0.5 INJECTION, SOLUTION INTRAMUSCULAR; INTRAVENOUS; SUBCUTANEOUS ONCE
Status: COMPLETED | OUTPATIENT
Start: 2020-09-16 | End: 2020-09-16

## 2020-09-16 RX ORDER — DIPHENHYDRAMINE HYDROCHLORIDE 50 MG/ML
25 INJECTION INTRAMUSCULAR; INTRAVENOUS ONCE
Status: COMPLETED | OUTPATIENT
Start: 2020-09-16 | End: 2020-09-16

## 2020-09-16 RX ADMIN — HYDROMORPHONE HYDROCHLORIDE 0.5 MG: 1 INJECTION, SOLUTION INTRAMUSCULAR; INTRAVENOUS; SUBCUTANEOUS at 19:18

## 2020-09-16 RX ADMIN — DEXAMETHASONE SODIUM PHOSPHATE 4 MG: 4 INJECTION, SOLUTION INTRA-ARTICULAR; INTRALESIONAL; INTRAMUSCULAR; INTRAVENOUS; SOFT TISSUE at 19:17

## 2020-09-16 RX ADMIN — HYDROMORPHONE HYDROCHLORIDE 0.5 MG: 1 INJECTION, SOLUTION INTRAMUSCULAR; INTRAVENOUS; SUBCUTANEOUS at 20:31

## 2020-09-16 RX ADMIN — DIPHENHYDRAMINE HYDROCHLORIDE 25 MG: 50 INJECTION INTRAMUSCULAR; INTRAVENOUS at 19:16

## 2020-09-16 RX ADMIN — DIPHENHYDRAMINE HYDROCHLORIDE 25 MG: 50 INJECTION INTRAMUSCULAR; INTRAVENOUS at 20:30

## 2020-09-16 ASSESSMENT — FIBROSIS 4 INDEX: FIB4 SCORE: .969696969696969697

## 2020-09-16 NOTE — ED TRIAGE NOTES
Pt refuses to wear a mask due to medical reasons.  Placed in the corner away from others by the phones

## 2020-09-16 NOTE — ED TRIAGE NOTES
Pt to triage by WC  Chief Complaint   Patient presents with   • Migraine     to back of head, reports has a condition that he can't remember the name of   • Chest Pain   • Rectal Bleeding     1 episode yesterday.  on blood thinners   Pt states that he had an uncontrollable BM in the shower that had bright red blood    Pt has had multiple spinal injections over the course of last week getting ready to put a pain pump in.      Pt scheduled for an MRI of spine with sedation next week.    Pt states he is not allergic to dilaudid he just needs benadryl before hand

## 2020-09-17 NOTE — ED PROVIDER NOTES
ED Provider Note    Scribed for Dr. Duglas Beckham M.D. by Malachi Butt. 9/16/2020  6:43 PM    Primary care provider: Pcp Pt States None  Means of arrival: Walk-in  History obtained from: Patient  History limited by: None    CHIEF COMPLAINT  Chief Complaint   Patient presents with   • Migraine     to back of head, reports has a condition that he can't remember the name of   • Chest Pain   • Rectal Bleeding     1 episode yesterday.  on blood thinners       HPI  Landon Allred is a 50 y.o. male with a history of occipital neuralgia who presents to the Emergency Department for a moderate, worsening migraine headache, as well as chest pain and rectal bleeding onset 1 day ago. Patient states that last night he was in the shower when he had an episode of bowel incontinence with bright red blood in his stool. He notes that he felt fine until today when his migraine started worsening and his chest pain started. There are no known alleviating or exacerbating factors. Patient states that he called his Pain Management physician who allegedly told him to come to the ED. He is currently on blood thinners, and denies any associated fevers or cough. Patient notes that he has been being treated with spinal infections for his pain, and is scheduled to have a pain pump installed.     REVIEW OF SYSTEMS  Pertinent positives include migraine headache, incontinence, hematochezia, chest pain. Pertinent negatives include no fever or cough. As above, all other systems reviewed and are negative.   See HPI for further details.     PAST MEDICAL HISTORY   has a past medical history of Allergy, Arthritis, Asthma, Claustrophobia, Clostridium difficile diarrhea, Clotting disorder ([facility]), Depression, DVT (deep venous thrombosis) ([facility]), GERD (gastroesophageal reflux disease), Hypertension, IBD (inflammatory bowel disease), Migraine, MRSA (methicillin resistant Staphylococcus aureus), Pancreatitis, PE (pulmonary embolism), Psychiatric disorder,  "and Seizure (HCC).    SURGICAL HISTORY   has a past surgical history that includes nerve ulnar repair or explore; dental extraction(s); closed rx nasal septal fracture; rectal exploration; femur orif; elbow orif; inguinal hernia repair (Right, 09/06/2019); and hernia repair (Right).    SOCIAL HISTORY  Social History     Tobacco Use   • Smoking status: Never Smoker   • Smokeless tobacco: Never Used   Substance Use Topics   • Alcohol use: No   • Drug use: No      Social History     Substance and Sexual Activity   Drug Use No       FAMILY HISTORY  Family History   Problem Relation Age of Onset   • Hypertension Mother    • Cancer Father    • Hypertension Father        CURRENT MEDICATIONS  Home Medications     Reviewed by Rose Wright R.N. (Registered Nurse) on 09/16/20 at 1614  Med List Status: <None>   Medication Last Dose Status   esomeprazole (NEXIUM) 40 MG delayed-release capsule  Active   Fluticasone Furoate-Vilanterol (BREO ELLIPTA) 200-25 MCG/INH AEROSOL POWDER, BREATH ACTIVATED  Active   rivaroxaban (XARELTO) 10 MG Tab tablet  Active   therapeutic multivitamin-minerals (THERAGRAN-M) Tab  Active   zonisamide (ZONEGRAN) 50 MG capsule  Active                ALLERGIES  Allergies   Allergen Reactions   • Ciprofloxacin Anaphylaxis   • Dilaudid [Hydromorphone] Itching     States \"I have an adverse reaction and need benadryl first\"   • Shellfish Allergy Anaphylaxis   • Tape Hives   • Augmentin Rash     rash   • Amitriptyline Hcl    • Asa [Aspirin]    • Baclofen    • Biofreeze    • Celery Oil      Celery causes hives   • Codeine    • Compazine [Prochlorperazine]      Hallucinations, dizziness   • Hydrocodone-Acetaminophen    • Ketorolac Tromethamine [Toradol]    • Morphine Vomiting   • Ondansetron [Zofran]    • Other Misc      Can take dilaudid if given benadryl first   • Oxycodone    • Oxycodone-Acetaminophen    • Oxycodone-Aspirin    • Reglan [Metoclopramide]      Has no idea what his RX is   • Risperidone    • " Tramadol    • Zofran [Ondansetron]        PHYSICAL EXAM  VITAL SIGNS: /88   Pulse 94   Temp 36.3 °C (97.3 °F) (Temporal)   Resp 16   Ht 1.829 m (6')   Wt 72.6 kg (160 lb)   SpO2 97%   BMI 21.70 kg/m²     Constitutional: Well developed, Well nourished, mild distress, Non-toxic appearance.   HENT: Normocephalic, Atraumatic, Bilateral external ears normal, Oropharynx moist, No oral exudates.   Eyes: PERRLA, EOMI, Conjunctiva normal, No discharge.   Neck: Occipital and posterior neck tenderness to palpation, Supple, No stridor.   Lymphatic: No lymphadenopathy noted.   Cardiovascular: Normal heart rate, Normal rhythm.   Thorax & Lungs: Clear to auscultation bilaterally, No respiratory distress, No wheezing, No crackles.   Abdomen: Soft, No tenderness, No masses, No pulsatile masses.   Skin: Warm, Dry, No erythema, No rash.   Extremities:, No edema No cyanosis.   Musculoskeletal: No tenderness to palpation or major deformities noted.  Intact distal pulses  Neurologic: Awake, alert. Moves all extremities spontaneously.  Psychiatric: Affect normal, Judgment normal, Mood normal.     LABS  Results for orders placed or performed during the hospital encounter of 09/16/20   CBC WITH DIFFERENTIAL   Result Value Ref Range    WBC 6.9 4.8 - 10.8 K/uL    RBC 4.91 4.70 - 6.10 M/uL    Hemoglobin 16.2 14.0 - 18.0 g/dL    Hematocrit 46.7 42.0 - 52.0 %    MCV 95.1 81.4 - 97.8 fL    MCH 33.0 27.0 - 33.0 pg    MCHC 34.7 33.7 - 35.3 g/dL    RDW 45.1 35.9 - 50.0 fL    Platelet Count 198 164 - 446 K/uL    MPV 10.2 9.0 - 12.9 fL    Neutrophils-Polys 72.00 44.00 - 72.00 %    Lymphocytes 19.70 (L) 22.00 - 41.00 %    Monocytes 7.40 0.00 - 13.40 %    Eosinophils 0.40 0.00 - 6.90 %    Basophils 0.10 0.00 - 1.80 %    Immature Granulocytes 0.40 0.00 - 0.90 %    Nucleated RBC 0.00 /100 WBC    Neutrophils (Absolute) 4.92 1.82 - 7.42 K/uL    Lymphs (Absolute) 1.35 1.00 - 4.80 K/uL    Monos (Absolute) 0.51 0.00 - 0.85 K/uL    Eos (Absolute)  0.03 0.00 - 0.51 K/uL    Baso (Absolute) 0.01 0.00 - 0.12 K/uL    Immature Granulocytes (abs) 0.03 0.00 - 0.11 K/uL    NRBC (Absolute) 0.00 K/uL   COMP METABOLIC PANEL   Result Value Ref Range    Sodium 142 135 - 145 mmol/L    Potassium 3.9 3.6 - 5.5 mmol/L    Chloride 105 96 - 112 mmol/L    Co2 22 20 - 33 mmol/L    Anion Gap 15.0 7.0 - 16.0    Glucose 98 65 - 99 mg/dL    Bun 24 (H) 8 - 22 mg/dL    Creatinine 1.04 0.50 - 1.40 mg/dL    Calcium 9.6 8.5 - 10.5 mg/dL    AST(SGOT) 19 12 - 45 U/L    ALT(SGPT) 25 2 - 50 U/L    Alkaline Phosphatase 71 30 - 99 U/L    Total Bilirubin 0.3 0.1 - 1.5 mg/dL    Albumin 4.7 3.2 - 4.9 g/dL    Total Protein 7.3 6.0 - 8.2 g/dL    Globulin 2.6 1.9 - 3.5 g/dL    A-G Ratio 1.8 g/dL   ESTIMATED GFR   Result Value Ref Range    GFR If African American >60 >60 mL/min/1.73 m 2    GFR If Non African American >60 >60 mL/min/1.73 m 2   EKG (NOW)   Result Value Ref Range    Report       [facility]    Test Date:  2020  Pt Name:    MANOJ MARISCAL                 Department: ER  MRN:        [id number]                      Room:  Gender:     Male                         Technician: [id number]  :        1970                   Requested By:ER TRIAGE PROTOCOL  Order #:    [id number]                    Reading MD: SHANTELLE SAENZ MD    Measurements  Intervals                                Axis  Rate:       102                          P:          62  CO:         120                          QRS:        35  QRSD:       78                           T:          18  QT:         332  QTc:        433    Interpretive Statements  SINUS TACHYCARDIA  Compared to ECG 2019 19:00:38  Sinus rhythm no longer present  Electronically Signed On 2020 21:02:11 PDT by SHANTELLE SAENZ MD       All labs reviewed by me.    EKG  Interpreted by me as indicated above.     COURSE & MEDICAL DECISION MAKING  Pertinent Labs & Imaging studies reviewed. (See chart for details)    6:43  PM - Patient seen and examined at bedside. Patient will be treated with Dilaudid 0.5 mg, benadryl 25 mg, and Decadron 4 mg. Ordered CBC w/ diff, CMP, and EKG to evaluate his symptoms. The differential diagnoses include but are not limited to: occipital neuralgia, GI bleed.    9:03 PM -Patient was reevaluated at bedside; patient reports feeling improved. Discussed lab results with the patient and informed them they are reassuring, and encourages him to continue with his scheduled outpatient appointment. Return precautions were discussed with the patient, and they were cleared for discharge at this time. Patient was understanding and agreeable to discharge.     Decision Making:  Patient is presenting complaining of occipital headache which is been ongoing waxing and waning problem for him he is supposed to have a a pain pump inserted in the near future.  He had also complained of some rectal bleeding but that was yesterday and has not reoccurred.  His lab is reassuring he is treated with Dilaudid Benadryl and Decadron IV.  And his symptoms have resolved.  He is aware that he cannot be prescribed outpatient medications.  Apparently his physician is altering some of the medications and that may have precipitated increase in his pain he does have some narcotic dependence chronically under the circumstances I felt it appropriate to go ahead and administer narcotics on a one-time basis in the ER  The patient will return for new or worsening symptoms and is stable at the time of discharge.    The patient is referred to a primary physician for blood pressure management, diabetic screening, and for all other preventative health concerns.    DISPOSITION:  Patient will be discharged home in stable condition.    FOLLOW UP:  No follow-up provider specified.    OUTPATIENT MEDICATIONS:  New Prescriptions    No medications on file       FINAL IMPRESSION  1. Occipital neuralgia of left side          Malachi BURCH (Scribmelinda), am  scribing for, and in the presence of, Duglas Beckham M.D..    Electronically signed by: Malachi Butt (Scribe), 9/16/2020    IDuglas M.D. personally performed the services described in this documentation, as scribed by Malachi Butt in my presence, and it is both accurate and complete. C.    The note accurately reflects work and decisions made by me.  Duglas Beckham M.D.  9/17/2020  1:20 AM

## 2020-09-17 NOTE — ED TRIAGE NOTES
"Patient vital signs rechecked and documented per [facility]. Patient denies any new needs at this time.  Patient updated on wait times, thanked for patience. Pt informed to alert triage tech or triage RN with any needs and/or changes in condition; patient verbalized understanding. Patient stated \"pain is getting worse\".   "

## 2020-09-17 NOTE — ED NOTES
Received report from Sharon HOLLEY. Assumed pt care. Pt resting comfortably in bed, call light within reach

## 2020-09-19 ENCOUNTER — PRE-ADMISSION TESTING (OUTPATIENT)
Dept: ADMISSIONS | Facility: MEDICAL CENTER | Age: 50
End: 2020-09-19
Attending: PHYSICIAN ASSISTANT
Payer: COMMERCIAL

## 2020-09-19 DIAGNOSIS — Z01.812 PRE-OPERATIVE LABORATORY EXAMINATION: ICD-10-CM

## 2020-09-19 LAB
COVID ORDER STATUS COVID19: NORMAL
SARS-COV-2 RNA RESP QL NAA+PROBE: NOTDETECTED
SPECIMEN SOURCE: NORMAL

## 2020-09-19 PROCEDURE — C9803 HOPD COVID-19 SPEC COLLECT: HCPCS

## 2020-09-19 PROCEDURE — U0003 INFECTIOUS AGENT DETECTION BY NUCLEIC ACID (DNA OR RNA); SEVERE ACUTE RESPIRATORY SYNDROME CORONAVIRUS 2 (SARS-COV-2) (CORONAVIRUS DISEASE [COVID-19]), AMPLIFIED PROBE TECHNIQUE, MAKING USE OF HIGH THROUGHPUT TECHNOLOGIES AS DESCRIBED BY CMS-2020-01-R: HCPCS

## 2020-09-23 ENCOUNTER — HOSPITAL ENCOUNTER (OUTPATIENT)
Dept: RADIOLOGY | Facility: MEDICAL CENTER | Age: 50
End: 2020-09-23
Attending: PHYSICIAN ASSISTANT
Payer: COMMERCIAL

## 2020-09-23 ENCOUNTER — HOSPITAL ENCOUNTER (OUTPATIENT)
Dept: RADIOLOGY | Facility: MEDICAL CENTER | Age: 50
End: 2020-09-23
Attending: ORTHOPAEDIC SURGERY
Payer: COMMERCIAL

## 2020-09-23 ENCOUNTER — ANESTHESIA EVENT (OUTPATIENT)
Dept: RADIOLOGY | Facility: MEDICAL CENTER | Age: 50
End: 2020-09-23
Payer: COMMERCIAL

## 2020-09-23 ENCOUNTER — ANESTHESIA (OUTPATIENT)
Dept: RADIOLOGY | Facility: MEDICAL CENTER | Age: 50
End: 2020-09-23
Payer: COMMERCIAL

## 2020-09-23 VITALS
OXYGEN SATURATION: 96 % | HEIGHT: 72 IN | BODY MASS INDEX: 21.7 KG/M2 | TEMPERATURE: 98.2 F | DIASTOLIC BLOOD PRESSURE: 78 MMHG | SYSTOLIC BLOOD PRESSURE: 155 MMHG | HEART RATE: 92 BPM | RESPIRATION RATE: 20 BRPM

## 2020-09-23 VITALS
HEART RATE: 94 BPM | RESPIRATION RATE: 20 BRPM | OXYGEN SATURATION: 99 % | SYSTOLIC BLOOD PRESSURE: 158 MMHG | DIASTOLIC BLOOD PRESSURE: 83 MMHG

## 2020-09-23 DIAGNOSIS — M54.16 LUMBAR RADICULOPATHY: ICD-10-CM

## 2020-09-23 DIAGNOSIS — S83.211S BUCKET-HANDLE TEAR OF MEDIAL MENISCUS OF RIGHT KNEE AS CURRENT INJURY, SEQUELA: ICD-10-CM

## 2020-09-23 DIAGNOSIS — M54.12 BRACHIAL NEURITIS: ICD-10-CM

## 2020-09-23 PROCEDURE — 700111 HCHG RX REV CODE 636 W/ 250 OVERRIDE (IP): Performed by: ANESTHESIOLOGY

## 2020-09-23 PROCEDURE — 72141 MRI NECK SPINE W/O DYE: CPT

## 2020-09-23 PROCEDURE — 700105 HCHG RX REV CODE 258: Performed by: ANESTHESIOLOGY

## 2020-09-23 PROCEDURE — 73721 MRI JNT OF LWR EXTRE W/O DYE: CPT | Mod: RT

## 2020-09-23 PROCEDURE — 72148 MRI LUMBAR SPINE W/O DYE: CPT

## 2020-09-23 PROCEDURE — 72040 X-RAY EXAM NECK SPINE 2-3 VW: CPT

## 2020-09-23 PROCEDURE — 73721 MRI JNT OF LWR EXTRE W/O DYE: CPT | Mod: LT

## 2020-09-23 PROCEDURE — 72110 X-RAY EXAM L-2 SPINE 4/>VWS: CPT

## 2020-09-23 RX ORDER — DIPHENHYDRAMINE HYDROCHLORIDE 50 MG/ML
INJECTION INTRAMUSCULAR; INTRAVENOUS
Status: COMPLETED
Start: 2020-09-23 | End: 2020-09-23

## 2020-09-23 RX ORDER — DIPHENHYDRAMINE HYDROCHLORIDE 50 MG/ML
12.5 INJECTION INTRAMUSCULAR; INTRAVENOUS
Status: DISCONTINUED | OUTPATIENT
Start: 2020-09-23 | End: 2020-09-24 | Stop reason: HOSPADM

## 2020-09-23 RX ORDER — MEPERIDINE HYDROCHLORIDE 25 MG/ML
12.5 INJECTION INTRAMUSCULAR; INTRAVENOUS; SUBCUTANEOUS
Status: DISCONTINUED | OUTPATIENT
Start: 2020-09-23 | End: 2020-09-24 | Stop reason: HOSPADM

## 2020-09-23 RX ORDER — SODIUM CHLORIDE, SODIUM LACTATE, POTASSIUM CHLORIDE, CALCIUM CHLORIDE 600; 310; 30; 20 MG/100ML; MG/100ML; MG/100ML; MG/100ML
INJECTION, SOLUTION INTRAVENOUS CONTINUOUS
Status: DISCONTINUED | OUTPATIENT
Start: 2020-09-23 | End: 2020-09-24 | Stop reason: HOSPADM

## 2020-09-23 RX ORDER — HALOPERIDOL 5 MG/ML
1 INJECTION INTRAMUSCULAR
Status: DISCONTINUED | OUTPATIENT
Start: 2020-09-23 | End: 2020-09-24 | Stop reason: HOSPADM

## 2020-09-23 RX ORDER — LABETALOL HYDROCHLORIDE 5 MG/ML
5 INJECTION, SOLUTION INTRAVENOUS
Status: DISCONTINUED | OUTPATIENT
Start: 2020-09-23 | End: 2020-09-24 | Stop reason: HOSPADM

## 2020-09-23 RX ORDER — METOPROLOL TARTRATE 1 MG/ML
1 INJECTION, SOLUTION INTRAVENOUS
Status: DISCONTINUED | OUTPATIENT
Start: 2020-09-23 | End: 2020-09-24 | Stop reason: HOSPADM

## 2020-09-23 RX ORDER — DIPHENHYDRAMINE HYDROCHLORIDE 50 MG/ML
INJECTION INTRAMUSCULAR; INTRAVENOUS PRN
Status: DISCONTINUED | OUTPATIENT
Start: 2020-09-23 | End: 2020-09-23 | Stop reason: SURG

## 2020-09-23 RX ORDER — MIDAZOLAM HYDROCHLORIDE 1 MG/ML
INJECTION INTRAMUSCULAR; INTRAVENOUS
Status: COMPLETED
Start: 2020-09-23 | End: 2020-09-23

## 2020-09-23 RX ORDER — MIDAZOLAM HYDROCHLORIDE 1 MG/ML
1 INJECTION INTRAMUSCULAR; INTRAVENOUS
Status: DISCONTINUED | OUTPATIENT
Start: 2020-09-23 | End: 2020-09-24 | Stop reason: HOSPADM

## 2020-09-23 RX ADMIN — SODIUM CHLORIDE, POTASSIUM CHLORIDE, SODIUM LACTATE AND CALCIUM CHLORIDE: 600; 310; 30; 20 INJECTION, SOLUTION INTRAVENOUS at 13:10

## 2020-09-23 RX ADMIN — MIDAZOLAM 2 MG: 1 INJECTION INTRAMUSCULAR; INTRAVENOUS at 13:07

## 2020-09-23 RX ADMIN — PROPOFOL 200 MG: 10 INJECTION, EMULSION INTRAVENOUS at 13:07

## 2020-09-23 RX ADMIN — FENTANYL CITRATE 50 MCG: 50 INJECTION, SOLUTION INTRAMUSCULAR; INTRAVENOUS at 16:01

## 2020-09-23 RX ADMIN — DIPHENHYDRAMINE HYDROCHLORIDE 25 MG: 50 INJECTION, SOLUTION INTRAMUSCULAR; INTRAVENOUS at 13:07

## 2020-09-23 ASSESSMENT — PAIN DESCRIPTION - PAIN TYPE
TYPE: ACUTE PAIN
TYPE: CHRONIC PAIN

## 2020-09-23 ASSESSMENT — PAIN SCALES - GENERAL: PAIN_LEVEL: 1

## 2020-09-23 NOTE — ANESTHESIA TIME REPORT
Anesthesia Start and Stop Event Times     Date Time Event    9/23/2020 1302 Ready for Procedure     1306 Anesthesia Start     1517 Anesthesia Stop        Responsible Staff  09/23/20    Name Role Begin End    Hermann Phelps M.D. Anesth 1306 1517        Preop Diagnosis (Free Text):  Pre-op Diagnosis             Preop Diagnosis (Codes):    Post op Diagnosis  Medial meniscus tear      Premium Reason  A. 3PM - 7AM    Comments:

## 2020-09-23 NOTE — DISCHARGE INSTRUCTIONS
MRI ADULT DISCHARGE INSTRUCTIONS    You have been medicated today for your scan. Please follow the instructions below to ensure your safe recovery. If you have any questions or problems, feel free to call us at [phone] or [phone].     1.   Have someone stay with you to assist you as needed.    2.   Do not drive or operate any mechanical devices.    3.   Do not perform any activity that requires concentration. Make no major decisions over the next 24 hours.     4.   Be careful changing positions from laying down to sitting or standing, as you may become dizzy.     5.   Do not drink alcohol for 48 hours.    6.   There are no restrictions for eating your normal meals. Drink fluids.    7.   You may continue your usual medications for pain, tranquilizers, muscle relaxants or sedatives when awake.     8.   Tomorrow, you may continue your normal daily activities.     9.   Pressure dressing on 10 - 15 minutes. If swelling or bleeding occurs when removed, continue placing direct pressure on injection site for another 5 minutes, or until bleeding stops.   Midazolam (VERSED)  What is this medicine?  You were given MIDAZOLAM (CHELSEA julio) for your procedure today. This medication is a benzodiazepine. It is used to cause relaxation or sleep before surgery and to block the memory of the procedure.  This medicine may be used for other purposes; ask your health care provider or pharmacist if you have questions.  What side effects may I notice from receiving this medicine?  Side effects that you should report to your doctor or health care professional as soon as possible:  • allergic reactions like skin rash, itching or hives, swelling of the face, lips, or tongue  • breathing problems  • confusion  • dizziness or lightheadedness  • fast, irregular heartbeat  • halluninations during recovery  • numbness or tingling in the hands or feet  • pain, redness, or swelling at site where injected  • seizures  Side effects that usually  do not require medical attention (report to your doctor or health care professional if they continue or are bothersome):  • coughing  • headache  • hiccups  • involuntary eye and muscle movements  • loss of memory of events just before, during, and after use  • nausea, vomiting  • speech problems  • tiredness  • trouble sleeping or nightmares  This list may not describe all possible side effects. Call your doctor for medical advice about side effects. You may report side effects to FDA at 5-800-IFR-6345.    Fentanyl  What is this medicine?  You were given FENTANYL (FEN ta nil) for your procedure today, it is a pain reliever. It is used to treat breakthrough pain that your long acting pain medicine does not control. Do not use this medicine for a pain that will go away in a few days like pain from surgery, doctor or dentist visits.   This medicine may be used for other purposes; ask your health care provider or pharmacist if you have questions.  What side effects may I notice from receiving this medicine?  Side effects that you should report to your doctor or health care professional as soon as possible:  • allergic reactions like skin rash, itching or hives, swelling of the face, lips, or tongue  • breathing problems  • changes in vision  • confusion  • dry mouth  • feeling faint, lightheaded  • hallucination  • irregular heartbeat  • mouth pain, sores  • problems with balance, talking, walking  • trouble passing urine or change in the amount of urine  • unusual bleeding or bruising  • unusually weak or tired  Side effects that usually do not require medical attention (report to your doctor or health care professional if they continue or are bothersome):  • dizzy  • headache  • loss of appetite  • nausea, vomiting  • sweating  • tingling in mouth  This list may not describe all possible side effects. Call your doctor for medical advice about side effects. You may report side effects to FDA at 1-063-FDA-1381.    This  list may not describe all possible side effects. Call your doctor for medical advice about side effects. You may report side effects to FDA at 9-352-QML-0784.      I have been informed of and understand the above discharge instructions.

## 2020-09-23 NOTE — ANESTHESIA PROCEDURE NOTES
Airway    Date/Time: 9/23/2020 1:06 PM  Performed by: Hermann Phelps M.D.  Authorized by: Hermann Phelps M.D.     Location:  OR  Urgency:  Elective  Indications for Airway Management:  Anesthesia      Spontaneous Ventilation: absent    Sedation Level:  Deep  Preoxygenated: Yes    Final Airway Type:  Supraglottic airway  Final Supraglottic Airway:  Standard LMA    SGA Size:  4  Number of Attempts at Approach:  1

## 2020-09-23 NOTE — ANESTHESIA POSTPROCEDURE EVALUATION
Patient: Landon Allred    Procedure Summary     Date: 09/23/20 Room / Location: [facility] - [address]    Anesthesia Start: 1306 Anesthesia Stop:     Procedure: MR-CERVICAL SPINE-W/O Diagnosis: Bucket-handle tear of medial meniscus of right knee as current injury, sequela    Scheduled Providers:  Responsible Provider: Hermann Phelps M.D.    Anesthesia Type: general ASA Status: 2          Final Anesthesia Type: general  Last vitals  BP        Temp        Pulse       Resp        SpO2          Anesthesia Post Evaluation    Patient location during evaluation: PACU  Patient participation: complete - patient participated  Level of consciousness: awake and alert  Pain score: 1    Airway patency: patent  Anesthetic complications: no  Cardiovascular status: hemodynamically stable  Respiratory status: acceptable  Hydration status: euvolemic    PONV: none           Nurse Pain Score: 9 (NPRS)

## 2020-09-24 NOTE — PROGRESS NOTES
Pt discharged via wheelchair with adult to x-ray . Written instructions given, reviewed and all questions answered.

## 2020-10-05 ENCOUNTER — DOCUMENTATION (OUTPATIENT)
Dept: OPHTHALMOLOGY | Facility: MEDICAL CENTER | Age: 50
End: 2020-10-05

## 2020-10-05 ASSESSMENT — REFRACTION_WEARINGRX
OS_ADD: +2.75
OD_SPHERE: -0.50
OD_CYLINDER: +1.00
OD_AXIS: 090
OS_AXIS: 090
OS_CYLINDER: +0.75
OD_ADD: +2.75
OS_SPHERE: PLANO

## 2020-11-05 ENCOUNTER — HOSPITAL ENCOUNTER (EMERGENCY)
Facility: MEDICAL CENTER | Age: 50
End: 2020-11-06
Attending: EMERGENCY MEDICINE | Admitting: EMERGENCY MEDICINE
Payer: COMMERCIAL

## 2020-11-05 VITALS
RESPIRATION RATE: 18 BRPM | TEMPERATURE: 97.5 F | HEART RATE: 68 BPM | WEIGHT: 150 LBS | SYSTOLIC BLOOD PRESSURE: 109 MMHG | BODY MASS INDEX: 20.32 KG/M2 | HEIGHT: 72 IN | DIASTOLIC BLOOD PRESSURE: 60 MMHG | OXYGEN SATURATION: 99 %

## 2020-11-05 DIAGNOSIS — F41.0 PANIC ATTACK: ICD-10-CM

## 2020-11-05 DIAGNOSIS — R00.0 TACHYCARDIA: ICD-10-CM

## 2020-11-05 DIAGNOSIS — G89.4 CHRONIC PAIN SYNDROME: ICD-10-CM

## 2020-11-05 LAB
ALBUMIN SERPL BCP-MCNC: 4.9 G/DL (ref 3.2–4.9)
ALBUMIN/GLOB SERPL: 1.6 G/DL
ALP SERPL-CCNC: 79 U/L (ref 30–99)
ALT SERPL-CCNC: 29 U/L (ref 2–50)
ANION GAP SERPL CALC-SCNC: 17 MMOL/L (ref 7–16)
AST SERPL-CCNC: 24 U/L (ref 12–45)
BASOPHILS # BLD AUTO: 0.2 % (ref 0–1.8)
BASOPHILS # BLD: 0.01 K/UL (ref 0–0.12)
BILIRUB SERPL-MCNC: 0.7 MG/DL (ref 0.1–1.5)
BUN SERPL-MCNC: 23 MG/DL (ref 8–22)
CALCIUM SERPL-MCNC: 10.5 MG/DL (ref 8.5–10.5)
CHLORIDE SERPL-SCNC: 103 MMOL/L (ref 96–112)
CO2 SERPL-SCNC: 20 MMOL/L (ref 20–33)
CREAT SERPL-MCNC: 1.07 MG/DL (ref 0.5–1.4)
EOSINOPHIL # BLD AUTO: 0.05 K/UL (ref 0–0.51)
EOSINOPHIL NFR BLD: 0.9 % (ref 0–6.9)
ERYTHROCYTE [DISTWIDTH] IN BLOOD BY AUTOMATED COUNT: 44.1 FL (ref 35.9–50)
GLOBULIN SER CALC-MCNC: 3.1 G/DL (ref 1.9–3.5)
GLUCOSE SERPL-MCNC: 98 MG/DL (ref 65–99)
HCT VFR BLD AUTO: 46 % (ref 42–52)
HGB BLD-MCNC: 15.8 G/DL (ref 14–18)
IMM GRANULOCYTES # BLD AUTO: 0.02 K/UL (ref 0–0.11)
IMM GRANULOCYTES NFR BLD AUTO: 0.4 % (ref 0–0.9)
LYMPHOCYTES # BLD AUTO: 1.32 K/UL (ref 1–4.8)
LYMPHOCYTES NFR BLD: 23.8 % (ref 22–41)
MCH RBC QN AUTO: 32.8 PG (ref 27–33)
MCHC RBC AUTO-ENTMCNC: 34.3 G/DL (ref 33.7–35.3)
MCV RBC AUTO: 95.4 FL (ref 81.4–97.8)
MONOCYTES # BLD AUTO: 0.44 K/UL (ref 0–0.85)
MONOCYTES NFR BLD AUTO: 7.9 % (ref 0–13.4)
NEUTROPHILS # BLD AUTO: 3.7 K/UL (ref 1.82–7.42)
NEUTROPHILS NFR BLD: 66.8 % (ref 44–72)
NRBC # BLD AUTO: 0 K/UL
NRBC BLD-RTO: 0 /100 WBC
PLATELET # BLD AUTO: 263 K/UL (ref 164–446)
PMV BLD AUTO: 9.7 FL (ref 9–12.9)
POTASSIUM SERPL-SCNC: 3.4 MMOL/L (ref 3.6–5.5)
PROT SERPL-MCNC: 8 G/DL (ref 6–8.2)
RBC # BLD AUTO: 4.82 M/UL (ref 4.7–6.1)
SODIUM SERPL-SCNC: 140 MMOL/L (ref 135–145)
T4 FREE SERPL-MCNC: 0.93 NG/DL (ref 0.93–1.7)
TSH SERPL DL<=0.005 MIU/L-ACNC: 1.87 UIU/ML (ref 0.38–5.33)
WBC # BLD AUTO: 5.5 K/UL (ref 4.8–10.8)

## 2020-11-05 PROCEDURE — 99284 EMERGENCY DEPT VISIT MOD MDM: CPT

## 2020-11-05 PROCEDURE — 96372 THER/PROPH/DIAG INJ SC/IM: CPT

## 2020-11-05 PROCEDURE — 700105 HCHG RX REV CODE 258: Performed by: EMERGENCY MEDICINE

## 2020-11-05 PROCEDURE — 96374 THER/PROPH/DIAG INJ IV PUSH: CPT

## 2020-11-05 PROCEDURE — 96375 TX/PRO/DX INJ NEW DRUG ADDON: CPT

## 2020-11-05 PROCEDURE — 36415 COLL VENOUS BLD VENIPUNCTURE: CPT

## 2020-11-05 PROCEDURE — 80053 COMPREHEN METABOLIC PANEL: CPT

## 2020-11-05 PROCEDURE — 700111 HCHG RX REV CODE 636 W/ 250 OVERRIDE (IP): Performed by: EMERGENCY MEDICINE

## 2020-11-05 PROCEDURE — 700101 HCHG RX REV CODE 250: Performed by: EMERGENCY MEDICINE

## 2020-11-05 PROCEDURE — 85025 COMPLETE CBC W/AUTO DIFF WBC: CPT

## 2020-11-05 PROCEDURE — 84439 ASSAY OF FREE THYROXINE: CPT

## 2020-11-05 PROCEDURE — 84443 ASSAY THYROID STIM HORMONE: CPT

## 2020-11-05 RX ORDER — HYDROMORPHONE HYDROCHLORIDE 2 MG/1
2-4 TABLET ORAL EVERY 6 HOURS PRN
Status: SHIPPED | COMMUNITY
End: 2023-01-20

## 2020-11-05 RX ORDER — HYDROXYZINE HYDROCHLORIDE 25 MG/ML
25 INJECTION, SOLUTION INTRAMUSCULAR ONCE
Status: COMPLETED | OUTPATIENT
Start: 2020-11-05 | End: 2020-11-05

## 2020-11-05 RX ORDER — DIPHENHYDRAMINE HYDROCHLORIDE 50 MG/ML
25 INJECTION INTRAMUSCULAR; INTRAVENOUS ONCE
Status: COMPLETED | OUTPATIENT
Start: 2020-11-05 | End: 2020-11-05

## 2020-11-05 RX ORDER — SODIUM CHLORIDE 9 MG/ML
1000 INJECTION, SOLUTION INTRAVENOUS ONCE
Status: COMPLETED | OUTPATIENT
Start: 2020-11-05 | End: 2020-11-05

## 2020-11-05 RX ADMIN — HYDROXYZINE HYDROCHLORIDE 25 MG: 25 INJECTION, SOLUTION INTRAMUSCULAR at 21:30

## 2020-11-05 RX ADMIN — KETAMINE HYDROCHLORIDE 25 MG: 10 INJECTION, SOLUTION INTRAMUSCULAR; INTRAVENOUS at 20:14

## 2020-11-05 RX ADMIN — SODIUM CHLORIDE 1000 ML: 9 INJECTION, SOLUTION INTRAVENOUS at 21:28

## 2020-11-05 RX ADMIN — DIPHENHYDRAMINE HYDROCHLORIDE 25 MG: 50 INJECTION, SOLUTION INTRAMUSCULAR; INTRAVENOUS at 20:00

## 2020-11-05 ASSESSMENT — FIBROSIS 4 INDEX: FIB4 SCORE: 1.12

## 2020-11-06 NOTE — ED TRIAGE NOTES
Landon Allred  50 y.o.  Chief Complaint   Patient presents with   • Medication Reaction     pt reports his medication regimen got off schedule and he has now been having multiple panic attacks all day; pt diaphoretic in triage, shaking uncontrollably; pt referred to ER by his MD for eval   pt reports he missed 3 doses of his anti seizure medication (zonisamide); has not missed his dose of dilaudid; pt also c/o severe HA and back pain (r/t recent back surgery)

## 2020-11-06 NOTE — ED PROVIDER NOTES
"ED Provider Note    CHIEF COMPLAINT  Chief Complaint   Patient presents with   • Medication Reaction     pt reports his medication regimen got off schedule and he has now been having multiple panic attacks all day; pt diaphoretic in triage, shaking uncontrollably; pt referred to ER by his MD for eval       HPI  Landon Allred is a 50 y.o. male who presents transferred to the hospital from his pain doctor's office.  He states his pain doctor saw him today secondary to increased discomfort.  Patient had pain pump implanted 3 weeks ago, apparently this has Dilaudid in the pump.  Patient also takes oral Dilaudid.  Patient states there is some confusion that he had on his own medications, has been intermittently taking them.  He was concerned about possible withdrawal, possible medication reaction.  He feels shaky and anxious.  He states is chronic occipital neuralgia has given him a migraine headache.  Review of his chart shows frequent visits to the ER.  Patient states consideration for ketamine in the past has been withheld secondary to history of PTSD.  He states that willingness to try this therapy.  No chest pain, no pleurisy, no cough.  He denies fever or chills.  Patient also stated he stopped taking his seizure medication which he believes also may be making him feel shaky.  Patient has been seen multiple times previously at this emergency department for headache.  Patient describes his headache as typical migraine in response to his chronic pain exacerbating.  Patient requesting Benadryl with any medication to be given to him as he states \"I am sensitive to a lot of things\".  Interestingly, his allergy list states allergy to Dilaudid which he is currently taking 2 forms of via pain pump as well as oral.    REVIEW OF SYSTEMS    Constitutional: No fever  Respiratory: No pleuritic chest pain  Cardiac: No chest pain or syncope  Gastrointestinal: Nausea  Musculoskeletal: Chronic neck and back pain  Neurologic: " Chronic headaches.  States he is having migraine headache.  No acute numbness  Psychiatric: Anxious       All other systems are negative.       PAST MEDICAL HISTORY  Past Medical History:   Diagnosis Date   • Allergy    • Arthritis    • Asthma    • Claustrophobia    • Clostridium difficile diarrhea    • Clotting disorder ([facility])    • Depression    • DVT (deep venous thrombosis) ([facility])    • GERD (gastroesophageal reflux disease)    • Hypertension    • IBD (inflammatory bowel disease)    • Migraine    • MRSA (methicillin resistant Staphylococcus aureus)    • Pancreatitis    • PE (pulmonary embolism)    • Psychiatric disorder     PTSD, depression   • Seizure (HCC)        FAMILY HISTORY  Family History   Problem Relation Age of Onset   • Hypertension Mother    • Cancer Father    • Hypertension Father        SOCIAL HISTORY  Social History     Socioeconomic History   • Marital status: Single     Spouse name: Not on file   • Number of children: Not on file   • Years of education: Not on file   • Highest education level: Not on file   Occupational History   • Not on file   Social Needs   • Financial resource strain: Not on file   • Food insecurity     Worry: Not on file     Inability: Not on file   • Transportation needs     Medical: Not on file     Non-medical: Not on file   Tobacco Use   • Smoking status: Never Smoker   • Smokeless tobacco: Never Used   Substance and Sexual Activity   • Alcohol use: No   • Drug use: No   • Sexual activity: Not on file   Lifestyle   • Physical activity     Days per week: Not on file     Minutes per session: Not on file   • Stress: Not on file   Relationships   • Social connections     Talks on phone: Not on file     Gets together: Not on file     Attends Rastafari service: Not on file     Active member of club or organization: Not on file     Attends meetings of clubs or organizations: Not on file     Relationship status: Not on file   • Intimate partner violence     Fear of current or ex  "partner: Not on file     Emotionally abused: Not on file     Physically abused: Not on file     Forced sexual activity: Not on file   Other Topics Concern   • Not on file   Social History Narrative    48 y/o male, disabled       SURGICAL HISTORY  Past Surgical History:   Procedure Laterality Date   • INGUINAL HERNIA REPAIR Right 09/06/2019   • DENTAL EXTRACTION(S)     • ELBOW ORIF     • FEMUR ORIF     • HERNIA REPAIR Right    • NERVE ULNAR REPAIR OR EXPLORE     • PB CLOSED RX NASAL SEPTAL FRACTURE     • RECTAL EXPLORATION         CURRENT MEDICATIONS  Home Medications     Reviewed by India Jackson R.N. (Registered Nurse) on 11/05/20 at 1736  Med List Status: Not Addressed   Medication Last Dose Status   diphenhydrAMINE HCl (BENADRYL ALLERGY PO)  Active   esomeprazole (NEXIUM) 40 MG delayed-release capsule  Active   Fluticasone Furoate-Vilanterol (BREO ELLIPTA) 200-25 MCG/INH AEROSOL POWDER, BREATH ACTIVATED  Active   HYDROmorphone (DILAUDID) 2 MG Tab  Active   rivaroxaban (XARELTO) 10 MG Tab tablet  Active   therapeutic multivitamin-minerals (THERAGRAN-M) Tab  Active   zonisamide (ZONEGRAN) 50 MG capsule  Active                ALLERGIES  Allergies   Allergen Reactions   • Ciprofloxacin Anaphylaxis   • Dilaudid [Hydromorphone] Itching     States \"I have an adverse reaction and need benadryl first\"   • Shellfish Allergy Anaphylaxis   • Tape Hives   • Augmentin Rash     rash   • Amitriptyline Hcl    • Asa [Aspirin]    • Baclofen    • Biofreeze    • Celery Oil      Celery causes hives   • Codeine    • Compazine [Prochlorperazine]      Hallucinations, dizziness   • Hydrocodone-Acetaminophen    • Ketorolac Tromethamine [Toradol]    • Morphine Vomiting   • Ondansetron [Zofran]    • Other Misc      Can take dilaudid if given benadryl first   • Oxycodone    • Oxycodone-Acetaminophen    • Oxycodone-Aspirin    • Reglan [Metoclopramide]      Has no idea what his RX is   • Risperidone    • Tramadol    • Zofran [Ondansetron]  "       PHYSICAL EXAM  VITAL SIGNS: /74   Pulse 65   Temp 36.1 °C (97 °F)   Resp (!) 21   Ht 1.829 m (6')   Wt 68 kg (150 lb)   SpO2 100%   BMI 20.34 kg/m²   Constitutional:  Non-toxic appearance.  Well-nourished.  Anxious  HENT: No facial swelling, no epistaxis.  Occipital tenderness which patient states is typical area of pain  Eyes: Anicteric, no conjunctivitis.   Pupils are 2.5 mm bilateral  Cardiovascular: Tachycardic heart rate, Normal rhythm  Pulmonary:  No wheezing, No rales.  Equal air movement  Gastrointestinal: Soft, No tenderness, No masses  Skin: Warm, Dry, No cyanosis.  No asymmetric swelling  Neurologic: Speech is clear, follows commands, facial expression is symmetrical.  Strength sensation appear intact  Psychiatric: Anxious, pressured speech  Musculoskeletal: Neck tenderness, low back tenderness, patient states these areas of chronic pain    EKG/Labs  Results for orders placed or performed during the hospital encounter of 11/05/20   CBC WITH DIFFERENTIAL   Result Value Ref Range    WBC 5.5 4.8 - 10.8 K/uL    RBC 4.82 4.70 - 6.10 M/uL    Hemoglobin 15.8 14.0 - 18.0 g/dL    Hematocrit 46.0 42.0 - 52.0 %    MCV 95.4 81.4 - 97.8 fL    MCH 32.8 27.0 - 33.0 pg    MCHC 34.3 33.7 - 35.3 g/dL    RDW 44.1 35.9 - 50.0 fL    Platelet Count 263 164 - 446 K/uL    MPV 9.7 9.0 - 12.9 fL    Neutrophils-Polys 66.80 44.00 - 72.00 %    Lymphocytes 23.80 22.00 - 41.00 %    Monocytes 7.90 0.00 - 13.40 %    Eosinophils 0.90 0.00 - 6.90 %    Basophils 0.20 0.00 - 1.80 %    Immature Granulocytes 0.40 0.00 - 0.90 %    Nucleated RBC 0.00 /100 WBC    Neutrophils (Absolute) 3.70 1.82 - 7.42 K/uL    Lymphs (Absolute) 1.32 1.00 - 4.80 K/uL    Monos (Absolute) 0.44 0.00 - 0.85 K/uL    Eos (Absolute) 0.05 0.00 - 0.51 K/uL    Baso (Absolute) 0.01 0.00 - 0.12 K/uL    Immature Granulocytes (abs) 0.02 0.00 - 0.11 K/uL    NRBC (Absolute) 0.00 K/uL   COMP METABOLIC PANEL   Result Value Ref Range    Sodium 140 135 - 145 mmol/L  "   Potassium 3.4 (L) 3.6 - 5.5 mmol/L    Chloride 103 96 - 112 mmol/L    Co2 20 20 - 33 mmol/L    Anion Gap 17.0 (H) 7.0 - 16.0    Glucose 98 65 - 99 mg/dL    Bun 23 (H) 8 - 22 mg/dL    Creatinine 1.07 0.50 - 1.40 mg/dL    Calcium 10.5 8.5 - 10.5 mg/dL    AST(SGOT) 24 12 - 45 U/L    ALT(SGPT) 29 2 - 50 U/L    Alkaline Phosphatase 79 30 - 99 U/L    Total Bilirubin 0.7 0.1 - 1.5 mg/dL    Albumin 4.9 3.2 - 4.9 g/dL    Total Protein 8.0 6.0 - 8.2 g/dL    Globulin 3.1 1.9 - 3.5 g/dL    A-G Ratio 1.6 g/dL   TSH   Result Value Ref Range    TSH 1.870 0.380 - 5.330 uIU/mL   FREE THYROXINE   Result Value Ref Range    Free T-4 0.93 0.93 - 1.70 ng/dL   ESTIMATED GFR   Result Value Ref Range    GFR If African American >60 >60 mL/min/1.73 m 2    GFR If Non African American >60 >60 mL/min/1.73 m 2         COURSE & MEDICAL DECISION MAKING  Pertinent Labs & Imaging studies reviewed. (See chart for details)  Patient was concerned about his thyroid gland, these tests came back normal.  He has normal hemoglobin and white blood cell count, stable electrolyte balance, good renal function and good liver function.  Patient's heart rate is normalized, currently 65.  We discussed benefits and risks of ketamine for pain control, he states he never tried this in the past.  He did state he felt anxious during the treatment and clean but did little for his pain however he is visibly more relaxed and his heart rate has normalized.  Patient requested Benadryl prior to the ketamine as he states this helps him with itching as he is \"sensitive to a lot of medicines\".  Patient has been given a dose of Atarax as well to help calm his anxiety.  He has requested benzodiazepine, I am unwilling to give this medication to him based on my review of his past chart.  Patient states he has oral diazepam at home he can take if needed.  I have informed this patient I would not be giving him IV narcotics, my attempt to control his pain was limited to ketamine.  " Although he states no improvement, visually does appear better.  Patient has both psychiatrist as well as a pain specialist, is encouraged to call them tomorrow.  I have recommended he take his medication exactly how prescribed and to not deviate from the dosing.  Etiology of his of anxiety panic attack and tremulousness today is unknown, suspect multifactorial including psychiatric disease, possible medication withdrawal, possible narcotic or benzo dependence.  Patient is stable for discharge.    FINAL IMPRESSION     1. Chronic pain syndrome     2. Panic attack     3. Tachycardia                     Electronically signed by: Duglas Long M.D., 11/5/2020 8:31 PM

## 2020-11-06 NOTE — DISCHARGE INSTRUCTIONS
Follow-up with your pain doctor and psychiatrist as soon as possible.  Return for any concerns.  Please take your medication as prescribed by your doctors, you are on medication which if stopped suddenly could cause withdrawal symptoms.

## 2020-11-30 ENCOUNTER — DOCUMENTATION (OUTPATIENT)
Dept: OPHTHALMOLOGY | Facility: MEDICAL CENTER | Age: 50
End: 2020-11-30

## 2020-11-30 ASSESSMENT — REFRACTION_WEARINGRX
OS_AXIS: 090
OD_SPHERE: -0.50
OD_ADD: +2.75
OS_SPHERE: PLANO
OD_CYLINDER: +1.00
OS_ADD: +2.75
OD_AXIS: 090
OS_CYLINDER: +0.75

## 2020-12-02 ENCOUNTER — TELEPHONE (OUTPATIENT)
Dept: OPHTHALMOLOGY | Facility: MEDICAL CENTER | Age: 50
End: 2020-12-02

## 2020-12-02 NOTE — TELEPHONE ENCOUNTER
Patient called to schedule FV with Dr. Velazco.  Returned patient call, but no answer, and VM is full.

## 2020-12-14 ENCOUNTER — DOCUMENTATION (OUTPATIENT)
Dept: OPHTHALMOLOGY | Facility: MEDICAL CENTER | Age: 50
End: 2020-12-14

## 2020-12-14 ASSESSMENT — REFRACTION_WEARINGRX
OS_SPHERE: PLANO
OS_CYLINDER: +0.75
OD_SPHERE: -0.50
OS_ADD: +2.75
OS_AXIS: 090
OD_CYLINDER: +1.00
OD_ADD: +2.75
OD_AXIS: 090

## 2021-01-25 ENCOUNTER — TELEPHONE (OUTPATIENT)
Dept: OPHTHALMOLOGY | Facility: MEDICAL CENTER | Age: 51
End: 2021-01-25

## 2021-01-25 NOTE — TELEPHONE ENCOUNTER
"Patient left a message saying that he had called our office previously and not received a call back, however, there have been multiple encounters entered in Epic stating that we have tried to reach the patient and that his VM is full.  During returned phone call, patient repeatedly cursed and complained about several [facility].  Patient asked to see Dr. Velazco to have a new prescription for glasses issued.  The patient had previous appointments scheduled with Dr. Velazco, but canceled, stating that he \"cannot wear a mask due to PTSD\".  I offered the patient an appointment for 1/26, but explained that Dr. Velazco would require him to wear some sort of mask, and even offered to allow him to wear a face shield instead of a mask.  The patient again stated that he won't wear a mask, and he has a \"note stating he isn't required to wear one\".  Dr. Velazco suggested that the [facility] follow up with [facility], since it will strictly be following him for glasses.  Patient states that he \"is not allowed to set foot on government property\".  I explained to the patient that we are willing to see him, however due to the close proximity at which the eye exam occurs, we would require him to wear a mask.  He said he will reach back out to the [facility].  I also informed the initial referring physician's office, in the hopes that they will be able to follow him for glasses rx in the future.  "

## 2021-03-31 ENCOUNTER — HOSPITAL ENCOUNTER (OUTPATIENT)
Dept: RADIOLOGY | Facility: MEDICAL CENTER | Age: 51
End: 2021-03-31
Attending: PHYSICIAN ASSISTANT
Payer: COMMERCIAL

## 2021-03-31 DIAGNOSIS — M54.2 CERVICALGIA: ICD-10-CM

## 2021-03-31 DIAGNOSIS — M54.50 LOW BACK PAIN, UNSPECIFIED BACK PAIN LATERALITY, UNSPECIFIED CHRONICITY, UNSPECIFIED WHETHER SCIATICA PRESENT: ICD-10-CM

## 2021-03-31 PROCEDURE — 72081 X-RAY EXAM ENTIRE SPI 1 VW: CPT

## 2021-03-31 PROCEDURE — 72050 X-RAY EXAM NECK SPINE 4/5VWS: CPT

## 2021-10-08 ENCOUNTER — HOSPITAL ENCOUNTER (OUTPATIENT)
Dept: RADIOLOGY | Facility: MEDICAL CENTER | Age: 51
End: 2021-10-08
Attending: PSYCHIATRY & NEUROLOGY
Payer: COMMERCIAL

## 2021-10-08 ENCOUNTER — HOSPITAL ENCOUNTER (OUTPATIENT)
Dept: LAB | Facility: MEDICAL CENTER | Age: 51
End: 2021-10-08
Attending: PSYCHIATRY & NEUROLOGY
Payer: COMMERCIAL

## 2021-10-08 DIAGNOSIS — G40.209 COMPLEX PARTIAL SEIZURES WITH CONSCIOUSNESS IMPAIRED (HCC): ICD-10-CM

## 2021-10-08 LAB
ANION GAP SERPL CALC-SCNC: 12 MMOL/L (ref 7–16)
BASOPHILS # BLD AUTO: 0.3 % (ref 0–1.8)
BASOPHILS # BLD: 0.02 K/UL (ref 0–0.12)
BUN SERPL-MCNC: 20 MG/DL (ref 8–22)
CALCIUM SERPL-MCNC: 9.8 MG/DL (ref 8.5–10.5)
CHLORIDE SERPL-SCNC: 106 MMOL/L (ref 96–112)
CO2 SERPL-SCNC: 24 MMOL/L (ref 20–33)
CREAT SERPL-MCNC: 1.18 MG/DL (ref 0.5–1.4)
EOSINOPHIL # BLD AUTO: 0.11 K/UL (ref 0–0.51)
EOSINOPHIL NFR BLD: 1.8 % (ref 0–6.9)
ERYTHROCYTE [DISTWIDTH] IN BLOOD BY AUTOMATED COUNT: 49.3 FL (ref 35.9–50)
GLUCOSE SERPL-MCNC: 94 MG/DL (ref 65–99)
HCT VFR BLD AUTO: 48.5 % (ref 42–52)
HGB BLD-MCNC: 15.7 G/DL (ref 14–18)
IMM GRANULOCYTES # BLD AUTO: 0.02 K/UL (ref 0–0.11)
IMM GRANULOCYTES NFR BLD AUTO: 0.3 % (ref 0–0.9)
LYMPHOCYTES # BLD AUTO: 1.7 K/UL (ref 1–4.8)
LYMPHOCYTES NFR BLD: 27.3 % (ref 22–41)
MCH RBC QN AUTO: 32.4 PG (ref 27–33)
MCHC RBC AUTO-ENTMCNC: 32.4 G/DL (ref 33.7–35.3)
MCV RBC AUTO: 100.2 FL (ref 81.4–97.8)
MONOCYTES # BLD AUTO: 0.48 K/UL (ref 0–0.85)
MONOCYTES NFR BLD AUTO: 7.7 % (ref 0–13.4)
NEUTROPHILS # BLD AUTO: 3.9 K/UL (ref 1.82–7.42)
NEUTROPHILS NFR BLD: 62.6 % (ref 44–72)
NRBC # BLD AUTO: 0 K/UL
NRBC BLD-RTO: 0 /100 WBC
PLATELET # BLD AUTO: 197 K/UL (ref 164–446)
PMV BLD AUTO: 11 FL (ref 9–12.9)
POTASSIUM SERPL-SCNC: 4.2 MMOL/L (ref 3.6–5.5)
RBC # BLD AUTO: 4.84 M/UL (ref 4.7–6.1)
SODIUM SERPL-SCNC: 142 MMOL/L (ref 135–145)
WBC # BLD AUTO: 6.2 K/UL (ref 4.8–10.8)

## 2021-10-08 PROCEDURE — 85025 COMPLETE CBC W/AUTO DIFF WBC: CPT

## 2021-10-08 PROCEDURE — 71046 X-RAY EXAM CHEST 2 VIEWS: CPT

## 2021-10-08 PROCEDURE — 36415 COLL VENOUS BLD VENIPUNCTURE: CPT

## 2021-10-08 PROCEDURE — 80048 BASIC METABOLIC PNL TOTAL CA: CPT

## 2021-10-20 ENCOUNTER — TELEPHONE (OUTPATIENT)
Dept: RADIOLOGY | Facility: MEDICAL CENTER | Age: 51
End: 2021-10-20
Payer: MEDICARE

## 2021-10-27 NOTE — TELEPHONE ENCOUNTER
"MRI NURSING NOTES:    No CB.  Called Pt again.  Claims he \"never wears a mask and has tested negative for COVID 11 times.\"    Reinforced Anes protocol.  Pt states Supervisor of admitting and Luz Marina in scheduling informed him to test COVID @ [facility].  Pt informed I was not updated on this. Message sent to Schedule and admitting supervisors via secure e mail re [place] testing option for pt.  In the interim, Pt is set up for COVID test @ [place] 10.29.2021 @ 1420.  Reinforced anesthesia instructions and MRI/anes appt info.  Pt vu.    "

## 2021-10-29 ENCOUNTER — PRE-ADMISSION TESTING (OUTPATIENT)
Dept: ADMISSIONS | Facility: MEDICAL CENTER | Age: 51
End: 2021-10-29
Attending: PSYCHIATRY & NEUROLOGY
Payer: COMMERCIAL

## 2021-10-29 DIAGNOSIS — Z01.811 PRE-OPERATIVE RESPIRATORY EXAMINATION: ICD-10-CM

## 2021-10-29 PROCEDURE — U0005 INFEC AGEN DETEC AMPLI PROBE: HCPCS

## 2021-10-29 PROCEDURE — U0003 INFECTIOUS AGENT DETECTION BY NUCLEIC ACID (DNA OR RNA); SEVERE ACUTE RESPIRATORY SYNDROME CORONAVIRUS 2 (SARS-COV-2) (CORONAVIRUS DISEASE [COVID-19]), AMPLIFIED PROBE TECHNIQUE, MAKING USE OF HIGH THROUGHPUT TECHNOLOGIES AS DESCRIBED BY CMS-2020-01-R: HCPCS

## 2021-10-30 LAB — COVID ORDER STATUS COVID19: NORMAL

## 2021-10-31 LAB
SARS-COV-2 RNA RESP QL NAA+PROBE: NOTDETECTED
SPECIMEN SOURCE: NORMAL

## 2021-11-01 ENCOUNTER — HOSPITAL ENCOUNTER (OUTPATIENT)
Dept: RADIOLOGY | Facility: MEDICAL CENTER | Age: 51
End: 2021-11-01
Attending: PSYCHIATRY & NEUROLOGY
Payer: COMMERCIAL

## 2021-11-01 ENCOUNTER — ANESTHESIA (OUTPATIENT)
Dept: RADIOLOGY | Facility: MEDICAL CENTER | Age: 51
End: 2021-11-01
Payer: COMMERCIAL

## 2021-11-01 ENCOUNTER — ANESTHESIA EVENT (OUTPATIENT)
Dept: RADIOLOGY | Facility: MEDICAL CENTER | Age: 51
End: 2021-11-01
Payer: COMMERCIAL

## 2021-11-01 VITALS
HEIGHT: 72 IN | BODY MASS INDEX: 26.41 KG/M2 | SYSTOLIC BLOOD PRESSURE: 106 MMHG | OXYGEN SATURATION: 95 % | TEMPERATURE: 98.3 F | DIASTOLIC BLOOD PRESSURE: 75 MMHG | HEART RATE: 82 BPM | WEIGHT: 195 LBS | RESPIRATION RATE: 18 BRPM

## 2021-11-01 DIAGNOSIS — G40.209 LOCALZ-RLTD SYMPTOMATIC EPILEPSY W CMPLX PART SZ, NOTINTRAC, WO STATUS (HCC): ICD-10-CM

## 2021-11-01 DIAGNOSIS — F40.240 CLAUSTROPHOBIA: ICD-10-CM

## 2021-11-01 PROCEDURE — 700111 HCHG RX REV CODE 636 W/ 250 OVERRIDE (IP): Performed by: ANESTHESIOLOGY

## 2021-11-01 PROCEDURE — 700105 HCHG RX REV CODE 258: Performed by: ANESTHESIOLOGY

## 2021-11-01 PROCEDURE — 70551 MRI BRAIN STEM W/O DYE: CPT | Mod: MH

## 2021-11-01 PROCEDURE — A9270 NON-COVERED ITEM OR SERVICE: HCPCS | Performed by: ANESTHESIOLOGY

## 2021-11-01 PROCEDURE — 700102 HCHG RX REV CODE 250 W/ 637 OVERRIDE(OP): Performed by: ANESTHESIOLOGY

## 2021-11-01 PROCEDURE — 72141 MRI NECK SPINE W/O DYE: CPT | Mod: MH

## 2021-11-01 PROCEDURE — 700101 HCHG RX REV CODE 250: Performed by: ANESTHESIOLOGY

## 2021-11-01 RX ORDER — DIPHENHYDRAMINE HYDROCHLORIDE 50 MG/ML
INJECTION INTRAMUSCULAR; INTRAVENOUS PRN
Status: DISCONTINUED | OUTPATIENT
Start: 2021-11-01 | End: 2021-11-01 | Stop reason: SURG

## 2021-11-01 RX ORDER — DIPHENHYDRAMINE HYDROCHLORIDE 50 MG/ML
12.5 INJECTION INTRAMUSCULAR; INTRAVENOUS
Status: DISCONTINUED | OUTPATIENT
Start: 2021-11-01 | End: 2021-11-02 | Stop reason: HOSPADM

## 2021-11-01 RX ORDER — SODIUM CHLORIDE, SODIUM LACTATE, POTASSIUM CHLORIDE, CALCIUM CHLORIDE 600; 310; 30; 20 MG/100ML; MG/100ML; MG/100ML; MG/100ML
INJECTION, SOLUTION INTRAVENOUS CONTINUOUS
Status: DISCONTINUED | OUTPATIENT
Start: 2021-11-01 | End: 2021-11-02 | Stop reason: HOSPADM

## 2021-11-01 RX ORDER — MIDAZOLAM HYDROCHLORIDE 1 MG/ML
INJECTION INTRAMUSCULAR; INTRAVENOUS
Status: COMPLETED
Start: 2021-11-01 | End: 2021-11-01

## 2021-11-01 RX ORDER — LIDOCAINE HYDROCHLORIDE 20 MG/ML
INJECTION, SOLUTION EPIDURAL; INFILTRATION; INTRACAUDAL; PERINEURAL PRN
Status: DISCONTINUED | OUTPATIENT
Start: 2021-11-01 | End: 2021-11-01 | Stop reason: SURG

## 2021-11-01 RX ORDER — FLUTICASONE PROPIONATE 50 MCG
2 SPRAY, SUSPENSION (ML) NASAL DAILY
COMMUNITY

## 2021-11-01 RX ADMIN — LIDOCAINE HYDROCHLORIDE 50 MG: 20 INJECTION, SOLUTION EPIDURAL; INFILTRATION; INTRACAUDAL at 15:16

## 2021-11-01 RX ADMIN — MIDAZOLAM 2 MG: 1 INJECTION INTRAMUSCULAR; INTRAVENOUS at 15:16

## 2021-11-01 RX ADMIN — EPHEDRINE SULFATE 50 MG: 50 INJECTION INTRAMUSCULAR; INTRAVENOUS; SUBCUTANEOUS at 15:16

## 2021-11-01 RX ADMIN — LIDOCAINE HYDROCHLORIDE 15 ML: 20 SOLUTION OROPHARYNGEAL at 17:20

## 2021-11-01 RX ADMIN — SODIUM CHLORIDE, POTASSIUM CHLORIDE, SODIUM LACTATE AND CALCIUM CHLORIDE: 600; 310; 30; 20 INJECTION, SOLUTION INTRAVENOUS at 15:16

## 2021-11-01 RX ADMIN — DIPHENHYDRAMINE HYDROCHLORIDE 25 MG: 50 INJECTION, SOLUTION INTRAMUSCULAR; INTRAVENOUS at 15:16

## 2021-11-01 RX ADMIN — PROPOFOL 200 MG: 10 INJECTION, EMULSION INTRAVENOUS at 15:16

## 2021-11-01 ASSESSMENT — FIBROSIS 4 INDEX: FIB4 SCORE: 0.95

## 2021-11-01 ASSESSMENT — PAIN SCALES - GENERAL
PAINLEVEL: 10=SEVERE PAIN
PAIN_LEVEL: 4

## 2021-11-01 NOTE — ANESTHESIA TIME REPORT
Anesthesia Start and Stop Event Times     Date Time Event    11/1/2021 1457 Ready for Procedure     1516 Anesthesia Start     1610 Anesthesia Stop        Responsible Staff  11/01/21    Name Role Begin End    Hermann Phelps M.D. Anesth 1516 1610        Preop Diagnosis (Free Text):  Pre-op Diagnosis             Preop Diagnosis (Codes):    Premium Reason  A. 3PM - 7AM    Comments:

## 2021-11-01 NOTE — ANESTHESIA POSTPROCEDURE EVALUATION
Patient: Landon Allred    Procedure Summary     Date: 11/01/21 Room / Location: [facility] - [address]    Anesthesia Start: 1516 Anesthesia Stop:     Procedure: MR-CERVICAL SPINE-W/O Diagnosis:       Localz-rltd symptomatic epilepsy w cmplx part sz, notintrac, wo status (HCC)      Claustrophobia    Scheduled Providers:  Responsible Provider: Hermann Phelps M.D.    Anesthesia Type: general ASA Status: 2          Final Anesthesia Type: general  Last vitals  BP   102/55   Temp   36.6 °C (97.9 °F)    Pulse   82   Resp   18    SpO2   96 %      Anesthesia Post Evaluation    Patient location during evaluation: PACU  Patient participation: complete - patient participated  Level of consciousness: awake and alert  Pain score: 4    Airway patency: patent  Anesthetic complications: no  Cardiovascular status: hemodynamically stable  Respiratory status: acceptable  Hydration status: euvolemic    PONV: none          No complications documented.     Nurse Pain Score: 10 (NPRS)

## 2021-11-01 NOTE — DISCHARGE INSTRUCTIONS
MRI ADULT DISCHARGE INSTRUCTIONS    You have been medicated today for your scan. Please follow the instructions below to ensure your safe recovery. If you have any questions or problems, feel free to call us at [phone] or [phone].     1.   Have someone stay with you to assist you as needed.    2.   Do not drive or operate any mechanical devices.    3.   Do not perform any activity that requires concentration. Make no major decisions over the next 24 hours.     4.   Be careful changing positions from laying down to sitting or standing, as you may become dizzy.     5.   Do not drink alcohol for 48 hours.    6.   There are no restrictions for eating your normal meals. Drink fluids.    7.   You may continue your usual medications for pain, tranquilizers, muscle relaxants or sedatives when awake.     8.   Tomorrow, you may continue your normal daily activities.     9.   Pressure dressing on 10 - 15 minutes. If swelling or bleeding occurs when removed, continue placing direct pressure on injection site for another 5 minutes, or until bleeding stops.   Midazolam (VERSED)  What is this medicine?  You were given MIDAZOLAM (CHELSEA julio) for your procedure today. This medication is a benzodiazepine. It is used to cause relaxation or sleep before surgery and to block the memory of the procedure.  This medicine may be used for other purposes; ask your health care provider or pharmacist if you have questions.  What side effects may I notice from receiving this medicine?  Side effects that you should report to your doctor or health care professional as soon as possible:  • allergic reactions like skin rash, itching or hives, swelling of the face, lips, or tongue  • breathing problems  • confusion  • dizziness or lightheadedness  • fast, irregular heartbeat  • halluninations during recovery  • numbness or tingling in the hands or feet  • pain, redness, or swelling at site where injected  • seizures  Side effects that usually  do not require medical attention (report to your doctor or health care professional if they continue or are bothersome):  • coughing  • headache  • hiccups  • involuntary eye and muscle movements  • loss of memory of events just before, during, and after use  • nausea, vomiting  • speech problems  • tiredness  • trouble sleeping or nightmares  This list may not describe all possible side effects. Call your doctor for medical advice about side effects. You may report side effects to FDA at 8-890-JHS-1830.    Fentanyl  What is this medicine?  You were given FENTANYL (FEN ta nil) for your procedure today, it is a pain reliever. It is used to treat breakthrough pain that your long acting pain medicine does not control. Do not use this medicine for a pain that will go away in a few days like pain from surgery, doctor or dentist visits.   This medicine may be used for other purposes; ask your health care provider or pharmacist if you have questions.  What side effects may I notice from receiving this medicine?  Side effects that you should report to your doctor or health care professional as soon as possible:  • allergic reactions like skin rash, itching or hives, swelling of the face, lips, or tongue  • breathing problems  • changes in vision  • confusion  • dry mouth  • feeling faint, lightheaded  • hallucination  • irregular heartbeat  • mouth pain, sores  • problems with balance, talking, walking  • trouble passing urine or change in the amount of urine  • unusual bleeding or bruising  • unusually weak or tired  Side effects that usually do not require medical attention (report to your doctor or health care professional if they continue or are bothersome):  • dizzy  • headache  • loss of appetite  • nausea, vomiting  • sweating  • tingling in mouth  This list may not describe all possible side effects. Call your doctor for medical advice about side effects. You may report side effects to FDA at 2-149-FDA-1815.    I  have been informed of and understand the above discharge instructions.

## 2021-11-01 NOTE — ANESTHESIA PREPROCEDURE EVALUATION
Relevant Problems   No relevant active problems       Physical Exam    Airway   Mallampati: II  TM distance: >3 FB  Neck ROM: full       Cardiovascular - normal exam  Rhythm: regular  Rate: normal  (-) murmur     Dental - normal exam           Pulmonary - normal exam  Breath sounds clear to auscultation     Abdominal    Neurological - normal exam                 Anesthesia Plan    ASA 2       Plan - general       Airway plan will be LMA          Induction: intravenous      Pertinent diagnostic labs and testing reviewed    Informed Consent:    Anesthetic plan and risks discussed with patient.    Use of blood products discussed with: patient whom consented to blood products.

## 2021-11-01 NOTE — ANESTHESIA PROCEDURE NOTES
Airway    Date/Time: 11/1/2021 3:16 PM  Performed by: Hermann Phelps M.D.  Authorized by: Hermann Phelps M.D.     Location:  OR  Urgency:  Elective  Indications for Airway Management:  Anesthesia      Spontaneous Ventilation: absent    Sedation Level:  Deep  Preoxygenated: Yes    Final Airway Type:  Supraglottic airway  Final Supraglottic Airway:  Standard LMA    SGA Size:  4  Number of Attempts at Approach:  1

## 2021-11-02 NOTE — PROGRESS NOTES
Pt to outpatient imaging department for MRI of brain and cervical spine without contrast under general anesthesia.  POC discussed.  PIV started by ultrasound guidance.  [employer] rep to imaging department to check patient's pain pump and verify reservoir completely empty, as well as turn pain pump off. Pt spoke with Dr Phelps, consent signed.  MRI completed.  Pt taken to recovery.  Pt medicated for throat pain/GI upset, see MAR.  VSS.  PIV removed.  Discharge instructions provided, all questions answered.  Pt discharged in stable condition with responsible adult, friend Ander.

## 2021-11-03 ENCOUNTER — APPOINTMENT (OUTPATIENT)
Dept: RADIOLOGY | Facility: MEDICAL CENTER | Age: 51
End: 2021-11-03
Attending: PSYCHIATRY & NEUROLOGY
Payer: COMMERCIAL

## 2022-01-10 ENCOUNTER — HOSPITAL ENCOUNTER (OUTPATIENT)
Dept: RADIOLOGY | Facility: MEDICAL CENTER | Age: 52
End: 2022-01-10
Attending: PSYCHIATRY & NEUROLOGY
Payer: COMMERCIAL

## 2022-01-10 ENCOUNTER — HOSPITAL ENCOUNTER (OUTPATIENT)
Dept: RADIOLOGY | Facility: MEDICAL CENTER | Age: 52
End: 2022-01-10
Attending: EMERGENCY MEDICINE
Payer: COMMERCIAL

## 2022-01-10 ENCOUNTER — HOSPITAL ENCOUNTER (OUTPATIENT)
Dept: LAB | Facility: MEDICAL CENTER | Age: 52
End: 2022-01-10
Attending: EMERGENCY MEDICINE
Payer: COMMERCIAL

## 2022-01-10 ENCOUNTER — PRE-ADMISSION TESTING (OUTPATIENT)
Dept: ADMISSIONS | Facility: MEDICAL CENTER | Age: 52
End: 2022-01-10
Attending: PSYCHIATRY & NEUROLOGY
Payer: COMMERCIAL

## 2022-01-10 DIAGNOSIS — R07.9 CHEST PAIN, UNSPECIFIED TYPE: ICD-10-CM

## 2022-01-10 DIAGNOSIS — F40.240 CLAUSTROPHOBIA: ICD-10-CM

## 2022-01-10 DIAGNOSIS — G40.209 LOCALZ-RLTD SYMPTOMATIC EPILEPSY W CMPLX PART SZ, NOTINTRAC, WO STATUS (HCC): ICD-10-CM

## 2022-01-10 DIAGNOSIS — Z01.812 PRE-OPERATIVE LABORATORY EXAMINATION: ICD-10-CM

## 2022-01-10 DIAGNOSIS — Z01.818 PREOP EXAMINATION: ICD-10-CM

## 2022-01-10 DIAGNOSIS — S46.211A BICEPS STRAIN, RIGHT, INITIAL ENCOUNTER: ICD-10-CM

## 2022-01-10 DIAGNOSIS — Z01.811 PRE-OPERATIVE RESPIRATORY EXAMINATION: ICD-10-CM

## 2022-01-10 LAB
ALBUMIN SERPL BCP-MCNC: 5 G/DL (ref 3.2–4.9)
ALBUMIN/GLOB SERPL: 1.7 G/DL
ALP SERPL-CCNC: 80 U/L (ref 30–99)
ALT SERPL-CCNC: 23 U/L (ref 2–50)
ANION GAP SERPL CALC-SCNC: 15 MMOL/L (ref 7–16)
AST SERPL-CCNC: 15 U/L (ref 12–45)
BASOPHILS # BLD AUTO: 0.2 % (ref 0–1.8)
BASOPHILS # BLD: 0.01 K/UL (ref 0–0.12)
BILIRUB SERPL-MCNC: 0.4 MG/DL (ref 0.1–1.5)
BUN SERPL-MCNC: 19 MG/DL (ref 8–22)
CALCIUM SERPL-MCNC: 10.4 MG/DL (ref 8.5–10.5)
CHLORIDE SERPL-SCNC: 107 MMOL/L (ref 96–112)
CO2 SERPL-SCNC: 20 MMOL/L (ref 20–33)
COVID ORDER STATUS COVID19: NORMAL
CREAT SERPL-MCNC: 1.09 MG/DL (ref 0.5–1.4)
EOSINOPHIL # BLD AUTO: 0.05 K/UL (ref 0–0.51)
EOSINOPHIL NFR BLD: 1.1 % (ref 0–6.9)
ERYTHROCYTE [DISTWIDTH] IN BLOOD BY AUTOMATED COUNT: 46.9 FL (ref 35.9–50)
GLOBULIN SER CALC-MCNC: 3 G/DL (ref 1.9–3.5)
GLUCOSE SERPL-MCNC: 95 MG/DL (ref 65–99)
HCT VFR BLD AUTO: 51.8 % (ref 42–52)
HGB BLD-MCNC: 17.1 G/DL (ref 14–18)
IMM GRANULOCYTES # BLD AUTO: 0.01 K/UL (ref 0–0.11)
IMM GRANULOCYTES NFR BLD AUTO: 0.2 % (ref 0–0.9)
LYMPHOCYTES # BLD AUTO: 1.13 K/UL (ref 1–4.8)
LYMPHOCYTES NFR BLD: 24.5 % (ref 22–41)
MCH RBC QN AUTO: 33.1 PG (ref 27–33)
MCHC RBC AUTO-ENTMCNC: 33 G/DL (ref 33.7–35.3)
MCV RBC AUTO: 100.2 FL (ref 81.4–97.8)
MONOCYTES # BLD AUTO: 0.27 K/UL (ref 0–0.85)
MONOCYTES NFR BLD AUTO: 5.8 % (ref 0–13.4)
NEUTROPHILS # BLD AUTO: 3.15 K/UL (ref 1.82–7.42)
NEUTROPHILS NFR BLD: 68.2 % (ref 44–72)
NRBC # BLD AUTO: 0 K/UL
NRBC BLD-RTO: 0 /100 WBC
PLATELET # BLD AUTO: 175 K/UL (ref 164–446)
PMV BLD AUTO: 11 FL (ref 9–12.9)
POTASSIUM SERPL-SCNC: 3.9 MMOL/L (ref 3.6–5.5)
PROT SERPL-MCNC: 8 G/DL (ref 6–8.2)
RBC # BLD AUTO: 5.17 M/UL (ref 4.7–6.1)
SODIUM SERPL-SCNC: 142 MMOL/L (ref 135–145)
WBC # BLD AUTO: 4.6 K/UL (ref 4.8–10.8)

## 2022-01-10 PROCEDURE — 85025 COMPLETE CBC W/AUTO DIFF WBC: CPT

## 2022-01-10 PROCEDURE — U0005 INFEC AGEN DETEC AMPLI PROBE: HCPCS

## 2022-01-10 PROCEDURE — 80053 COMPREHEN METABOLIC PANEL: CPT

## 2022-01-10 PROCEDURE — 71046 X-RAY EXAM CHEST 2 VIEWS: CPT

## 2022-01-10 PROCEDURE — U0003 INFECTIOUS AGENT DETECTION BY NUCLEIC ACID (DNA OR RNA); SEVERE ACUTE RESPIRATORY SYNDROME CORONAVIRUS 2 (SARS-COV-2) (CORONAVIRUS DISEASE [COVID-19]), AMPLIFIED PROBE TECHNIQUE, MAKING USE OF HIGH THROUGHPUT TECHNOLOGIES AS DESCRIBED BY CMS-2020-01-R: HCPCS

## 2022-01-10 PROCEDURE — 36415 COLL VENOUS BLD VENIPUNCTURE: CPT

## 2022-01-11 LAB
SARS-COV-2 RNA RESP QL NAA+PROBE: NOTDETECTED
SPECIMEN SOURCE: NORMAL

## 2022-01-12 ENCOUNTER — HOSPITAL ENCOUNTER (OUTPATIENT)
Dept: RADIOLOGY | Facility: MEDICAL CENTER | Age: 52
End: 2022-01-12
Attending: EMERGENCY MEDICINE
Payer: COMMERCIAL

## 2022-01-12 ENCOUNTER — ANESTHESIA (OUTPATIENT)
Dept: RADIOLOGY | Facility: MEDICAL CENTER | Age: 52
End: 2022-01-12
Payer: COMMERCIAL

## 2022-01-12 ENCOUNTER — ANESTHESIA EVENT (OUTPATIENT)
Dept: RADIOLOGY | Facility: MEDICAL CENTER | Age: 52
End: 2022-01-12
Payer: COMMERCIAL

## 2022-01-12 VITALS
RESPIRATION RATE: 18 BRPM | WEIGHT: 180.78 LBS | HEIGHT: 72 IN | TEMPERATURE: 98.1 F | HEART RATE: 76 BPM | DIASTOLIC BLOOD PRESSURE: 62 MMHG | BODY MASS INDEX: 24.49 KG/M2 | SYSTOLIC BLOOD PRESSURE: 120 MMHG | OXYGEN SATURATION: 98 %

## 2022-01-12 DIAGNOSIS — S46.211A BICEPS STRAIN, RIGHT, INITIAL ENCOUNTER: ICD-10-CM

## 2022-01-12 DIAGNOSIS — S46.211A BICEPS MUSCLE STRAIN, RIGHT, INITIAL ENCOUNTER: ICD-10-CM

## 2022-01-12 PROCEDURE — 700105 HCHG RX REV CODE 258: Performed by: ANESTHESIOLOGY

## 2022-01-12 PROCEDURE — 700102 HCHG RX REV CODE 250 W/ 637 OVERRIDE(OP): Performed by: ANESTHESIOLOGY

## 2022-01-12 PROCEDURE — 700111 HCHG RX REV CODE 636 W/ 250 OVERRIDE (IP): Performed by: ANESTHESIOLOGY

## 2022-01-12 PROCEDURE — 73218 MRI UPPER EXTREMITY W/O DYE: CPT | Mod: RT

## 2022-01-12 PROCEDURE — A9270 NON-COVERED ITEM OR SERVICE: HCPCS | Performed by: ANESTHESIOLOGY

## 2022-01-12 PROCEDURE — 73221 MRI JOINT UPR EXTREM W/O DYE: CPT | Mod: RT

## 2022-01-12 RX ORDER — DEXAMETHASONE SODIUM PHOSPHATE 4 MG/ML
INJECTION, SOLUTION INTRA-ARTICULAR; INTRALESIONAL; INTRAMUSCULAR; INTRAVENOUS; SOFT TISSUE PRN
Status: DISCONTINUED | OUTPATIENT
Start: 2022-01-12 | End: 2022-01-12 | Stop reason: SURG

## 2022-01-12 RX ORDER — HYDROMORPHONE HYDROCHLORIDE 1 MG/ML
0.2 INJECTION, SOLUTION INTRAMUSCULAR; INTRAVENOUS; SUBCUTANEOUS
Status: DISCONTINUED | OUTPATIENT
Start: 2022-01-12 | End: 2022-01-13 | Stop reason: HOSPADM

## 2022-01-12 RX ORDER — HALOPERIDOL 5 MG/ML
1 INJECTION INTRAMUSCULAR
Status: DISCONTINUED | OUTPATIENT
Start: 2022-01-12 | End: 2022-01-13 | Stop reason: HOSPADM

## 2022-01-12 RX ORDER — SODIUM CHLORIDE, SODIUM LACTATE, POTASSIUM CHLORIDE, CALCIUM CHLORIDE 600; 310; 30; 20 MG/100ML; MG/100ML; MG/100ML; MG/100ML
INJECTION, SOLUTION INTRAVENOUS CONTINUOUS
Status: DISCONTINUED | OUTPATIENT
Start: 2022-01-12 | End: 2022-01-13 | Stop reason: HOSPADM

## 2022-01-12 RX ORDER — DIPHENHYDRAMINE HYDROCHLORIDE 50 MG/ML
12.5 INJECTION INTRAMUSCULAR; INTRAVENOUS
Status: COMPLETED | OUTPATIENT
Start: 2022-01-12 | End: 2022-01-12

## 2022-01-12 RX ORDER — ONDANSETRON 2 MG/ML
4 INJECTION INTRAMUSCULAR; INTRAVENOUS
Status: DISCONTINUED | OUTPATIENT
Start: 2022-01-12 | End: 2022-01-13 | Stop reason: HOSPADM

## 2022-01-12 RX ORDER — HYDROMORPHONE HYDROCHLORIDE 1 MG/ML
0.1 INJECTION, SOLUTION INTRAMUSCULAR; INTRAVENOUS; SUBCUTANEOUS
Status: DISCONTINUED | OUTPATIENT
Start: 2022-01-12 | End: 2022-01-13 | Stop reason: HOSPADM

## 2022-01-12 RX ORDER — SODIUM CHLORIDE, SODIUM LACTATE, POTASSIUM CHLORIDE, CALCIUM CHLORIDE 600; 310; 30; 20 MG/100ML; MG/100ML; MG/100ML; MG/100ML
INJECTION, SOLUTION INTRAVENOUS
Status: DISCONTINUED | OUTPATIENT
Start: 2022-01-12 | End: 2022-01-12 | Stop reason: SURG

## 2022-01-12 RX ORDER — DIPHENHYDRAMINE HYDROCHLORIDE 50 MG/ML
INJECTION INTRAMUSCULAR; INTRAVENOUS PRN
Status: DISCONTINUED | OUTPATIENT
Start: 2022-01-12 | End: 2022-01-12 | Stop reason: SURG

## 2022-01-12 RX ORDER — MIDAZOLAM HYDROCHLORIDE 1 MG/ML
INJECTION INTRAMUSCULAR; INTRAVENOUS
Status: DISCONTINUED
Start: 2022-01-12 | End: 2022-01-13 | Stop reason: HOSPADM

## 2022-01-12 RX ORDER — HYDROMORPHONE HYDROCHLORIDE 1 MG/ML
0.4 INJECTION, SOLUTION INTRAMUSCULAR; INTRAVENOUS; SUBCUTANEOUS
Status: DISCONTINUED | OUTPATIENT
Start: 2022-01-12 | End: 2022-01-13 | Stop reason: HOSPADM

## 2022-01-12 RX ORDER — MEPERIDINE HYDROCHLORIDE 25 MG/ML
12.5 INJECTION INTRAMUSCULAR; INTRAVENOUS; SUBCUTANEOUS
Status: DISCONTINUED | OUTPATIENT
Start: 2022-01-12 | End: 2022-01-13 | Stop reason: HOSPADM

## 2022-01-12 RX ADMIN — PROPOFOL 200 MG: 10 INJECTION, EMULSION INTRAVENOUS at 15:20

## 2022-01-12 RX ADMIN — DIPHENHYDRAMINE HYDROCHLORIDE 25 MG: 50 INJECTION, SOLUTION INTRAMUSCULAR; INTRAVENOUS at 15:08

## 2022-01-12 RX ADMIN — SODIUM CHLORIDE, POTASSIUM CHLORIDE, SODIUM LACTATE AND CALCIUM CHLORIDE: 600; 310; 30; 20 INJECTION, SOLUTION INTRAVENOUS at 15:07

## 2022-01-12 RX ADMIN — FENTANYL CITRATE 50 MCG: 50 INJECTION, SOLUTION INTRAMUSCULAR; INTRAVENOUS at 15:27

## 2022-01-12 RX ADMIN — MIDAZOLAM 2 MG: 1 INJECTION INTRAMUSCULAR; INTRAVENOUS at 15:07

## 2022-01-12 RX ADMIN — DEXAMETHASONE SODIUM PHOSPHATE 12 MG: 4 INJECTION, SOLUTION INTRA-ARTICULAR; INTRALESIONAL; INTRAMUSCULAR; INTRAVENOUS; SOFT TISSUE at 15:25

## 2022-01-12 RX ADMIN — DIPHENHYDRAMINE HYDROCHLORIDE 25 MG: 50 INJECTION, SOLUTION INTRAMUSCULAR; INTRAVENOUS at 15:07

## 2022-01-12 RX ADMIN — LIDOCAINE HYDROCHLORIDE 30 ML: 20 SOLUTION OROPHARYNGEAL at 16:20

## 2022-01-12 RX ADMIN — DIPHENHYDRAMINE HYDROCHLORIDE 12.5 MG: 50 INJECTION, SOLUTION INTRAMUSCULAR; INTRAVENOUS at 17:22

## 2022-01-12 RX ADMIN — FENTANYL CITRATE 50 MCG: 50 INJECTION, SOLUTION INTRAMUSCULAR; INTRAVENOUS at 17:40

## 2022-01-12 RX ADMIN — FENTANYL CITRATE 50 MCG: 50 INJECTION, SOLUTION INTRAMUSCULAR; INTRAVENOUS at 15:20

## 2022-01-12 ASSESSMENT — FIBROSIS 4 INDEX: FIB4 SCORE: 0.91

## 2022-01-12 ASSESSMENT — PAIN SCALES - GENERAL: PAIN_LEVEL: 0

## 2022-01-12 ASSESSMENT — PAIN DESCRIPTION - PAIN TYPE: TYPE: ACUTE PAIN

## 2022-01-12 NOTE — ANESTHESIA PREPROCEDURE EVALUATION
Date/Time: 01/12/22 1445    Procedure: MR-SHOULDER-W/O    Diagnosis: Biceps strain, right, initial encounter [S46.211A]    Indications: IV SEDATION MRI HUMERUS PROXIMAL FOREARM ASSESSMENT FOR DISTAL BICEPS TEAR    Location: [facility] - MRI - [address]          Relevant Problems   No relevant active problems       Physical Exam    Airway   Mallampati: II  TM distance: >3 FB  Neck ROM: full       Cardiovascular - normal exam  Rhythm: regular  Rate: normal  (-) murmur     Dental - normal exam           Pulmonary - normal exam  Breath sounds clear to auscultation     Abdominal    Neurological - normal exam                 Anesthesia Plan    ASA 3       Plan - general       Airway plan will be LMA          Induction: intravenous    Postoperative Plan: Postoperative administration of opioids is intended.    Pertinent diagnostic labs and testing reviewed    Informed Consent:    Anesthetic plan and risks discussed with patient.    Use of blood products discussed with: patient whom consented to blood products.

## 2022-01-12 NOTE — ANESTHESIA PROCEDURE NOTES
Airway    Date/Time: 1/12/2022 3:20 PM  Performed by: Giovanni Berumen M.D.  Authorized by: Giovanni Berumen M.D.     Location:  OR  Urgency:  Elective  Indications for Airway Management:  Anesthesia      Spontaneous Ventilation: absent    Sedation Level:  Deep  Preoxygenated: Yes    Final Airway Type:  Supraglottic airway  Final Supraglottic Airway:  Standard LMA    SGA Size:  4  Number of Attempts at Approach:  1

## 2022-01-13 NOTE — ANESTHESIA TIME REPORT
Anesthesia Start and Stop Event Times     Date Time Event    1/12/2022 1430 Ready for Procedure     1507 Anesthesia Start     1635 Anesthesia Stop        Responsible Staff  01/12/22    Name Role Begin End    Giovanni Berumen M.D. Anesth 1507 1635        Preop Diagnosis (Free Text):  Pre-op Diagnosis             Preop Diagnosis (Codes):    Premium Reason  A. 3PM - 7AM    Comments:

## 2022-01-13 NOTE — ANESTHESIA POSTPROCEDURE EVALUATION
Patient: Landon Allred    Procedure Summary     Date: 01/12/22 Room / Location: [facility] MRI - [address]    Anesthesia Start: 1507 Anesthesia Stop: 1635    Procedure: MR-SHOULDER-W/O Diagnosis:       Biceps strain, right, initial encounter      (IV SEDATION MRI HUMERUS PROXIMAL FOREARM ASSESSMENT FOR DISTAL BICEPS TEAR)    Scheduled Providers:  Responsible Provider: Giovanni Berumen M.D.    Anesthesia Type: general ASA Status: 3          Final Anesthesia Type: general  Last vitals  BP   Blood Pressure: 126/82    Temp   36.8 °C (98.2 °F)    Pulse   75   Resp   18    SpO2   95 %      Anesthesia Post Evaluation    Patient location during evaluation: PACU  Patient participation: complete - patient participated  Level of consciousness: awake and alert  Pain score: 0    Airway patency: patent  Anesthetic complications: no  Cardiovascular status: adequate and hemodynamically stable  Respiratory status: acceptable  Hydration status: acceptable    PONV: none          No complications documented.     Nurse Pain Score: 10 (NPRS)

## 2022-01-13 NOTE — DISCHARGE INSTRUCTIONS
MRI ADULT DISCHARGE INSTRUCTIONS    You have been medicated today for your scan. Please follow the instructions below to ensure your safe recovery. If you have any questions or problems, feel free to call us at [phone] or [phone].     1.   Have someone stay with you to assist you as needed.    2.   Do not drive or operate any mechanical devices.    3.   Do not perform any activity that requires concentration. Make no major decisions over the next 24 hours.     4.   Be careful changing positions from laying down to sitting or standing, as you may become dizzy.     5.   Do not drink alcohol for 48 hours.    6.   There are no restrictions for eating your normal meals. Drink fluids.    7.   You may continue your usual medications for pain, tranquilizers, muscle relaxants or sedatives when awake.     8.   Tomorrow, you may continue your normal daily activities.     9.   Pressure dressing on 10 - 15 minutes. If swelling or bleeding occurs when removed, continue placing direct pressure on injection site for another 5 minutes, or until bleeding stops.     I have been informed of and understand the above discharge instructions. Midazolam (VERSED)  What is this medicine?  You were given MIDAZOLAM (CHELSEA julio) for your procedure today. This medication is a benzodiazepine. It is used to cause relaxation or sleep before surgery and to block the memory of the procedure.  This medicine may be used for other purposes; ask your health care provider or pharmacist if you have questions.  What side effects may I notice from receiving this medicine?  Side effects that you should report to your doctor or health care professional as soon as possible:  • allergic reactions like skin rash, itching or hives, swelling of the face, lips, or tongue  • breathing problems  • confusion  • dizziness or lightheadedness  • fast, irregular heartbeat  • halluninations during recovery  • numbness or tingling in the hands or feet  • pain, redness,  or swelling at site where injected  • seizures  Side effects that usually do not require medical attention (report to your doctor or health care professional if they continue or are bothersome):  • coughing  • headache  • hiccups  • involuntary eye and muscle movements  • loss of memory of events just before, during, and after use  • nausea, vomiting  • speech problems  • tiredness  • trouble sleeping or nightmares  This list may not describe all possible side effects. Call your doctor for medical advice about side effects. You may report side effects to FDA at 2-810-QNJ-9626.    Fentanyl  What is this medicine?  You were given FENTANYL (FEN ta nil) for your procedure today, it is a pain reliever. It is used to treat breakthrough pain that your long acting pain medicine does not control. Do not use this medicine for a pain that will go away in a few days like pain from surgery, doctor or dentist visits.   This medicine may be used for other purposes; ask your health care provider or pharmacist if you have questions.  What side effects may I notice from receiving this medicine?  Side effects that you should report to your doctor or health care professional as soon as possible:  • allergic reactions like skin rash, itching or hives, swelling of the face, lips, or tongue  • breathing problems  • changes in vision  • confusion  • dry mouth  • feeling faint, lightheaded  • hallucination  • irregular heartbeat  • mouth pain, sores  • problems with balance, talking, walking  • trouble passing urine or change in the amount of urine  • unusual bleeding or bruising  • unusually weak or tired  Side effects that usually do not require medical attention (report to your doctor or health care professional if they continue or are bothersome):  • dizzy  • headache  • loss of appetite  • nausea, vomiting  • sweating  • tingling in mouth  This list may not describe all possible side effects. Call your doctor for medical advice about  side effects. You may report side effects to FDA at 0-413-FDA-2824.

## 2022-05-10 ENCOUNTER — OFFICE VISIT (OUTPATIENT)
Dept: OPHTHALMOLOGY | Facility: MEDICAL CENTER | Age: 52
End: 2022-05-10
Payer: COMMERCIAL

## 2022-05-10 DIAGNOSIS — R68.89 SUSPECTED GLAUCOMA OF BOTH EYES: ICD-10-CM

## 2022-05-10 DIAGNOSIS — H52.03 HYPEROPIA OF BOTH EYES WITH ASTIGMATISM: ICD-10-CM

## 2022-05-10 DIAGNOSIS — H52.203 HYPEROPIA OF BOTH EYES WITH ASTIGMATISM: ICD-10-CM

## 2022-05-10 DIAGNOSIS — S06.9X9S TRAUMATIC BRAIN INJURY WITH LOSS OF CONSCIOUSNESS, SEQUELA (HCC): ICD-10-CM

## 2022-05-10 DIAGNOSIS — M54.81 BILATERAL OCCIPITAL NEURALGIA: ICD-10-CM

## 2022-05-10 DIAGNOSIS — H52.4 ACCOMMODATIVE INSUFFICIENCY: ICD-10-CM

## 2022-05-10 PROCEDURE — 92250 FUNDUS PHOTOGRAPHY W/I&R: CPT | Performed by: OPHTHALMOLOGY

## 2022-05-10 PROCEDURE — 92015 DETERMINE REFRACTIVE STATE: CPT | Performed by: OPHTHALMOLOGY

## 2022-05-10 PROCEDURE — 92014 COMPRE OPH EXAM EST PT 1/>: CPT | Mod: 25 | Performed by: OPHTHALMOLOGY

## 2022-05-10 ASSESSMENT — VISUAL ACUITY
OD_CC: J1
OD_CC+: -3
OS_SC: J5
METHOD: SNELLEN - LINEAR
OD_SC: J16
OS_SC: 20/20
OS_CC: J1
OD_SC: 20/20
OS_CC+: -4
OD_CC: 20/20
OS_CC: 20/20
OD_SC+: -2
CORRECTION_TYPE: GLASSES

## 2022-05-10 ASSESSMENT — REFRACTION_MANIFEST
OS_CYLINDER: +1.25
OD_AXIS: 099
OD_CYLINDER: +1.50
OS_AXIS: 079
OD_SPHERE: -1.00
OS_SPHERE: -0.50

## 2022-05-10 ASSESSMENT — ENCOUNTER SYMPTOMS
HEADACHES: 1
DOUBLE VISION: 1
EYE PAIN: 1
BLURRED VISION: 1

## 2022-05-10 ASSESSMENT — SLIT LAMP EXAM - LIDS
COMMENTS: NORMAL
COMMENTS: NORMAL

## 2022-05-10 ASSESSMENT — REFRACTION_WEARINGRX
OD_HPRISM: 1.50
OS_ADD: +0.00
OD_ADD: +0.25
OD_AXIS: 085
OS_VPRISM: +2.25
OS_HPRISM: 0.00
OD_CYLINDER: +0.75
OD_VPRISM: +2.25
OS_AXIS: 094
OD_SPHERE: +0.00
OS_SPHERE: -0.75
OS_CYLINDER: +1.25

## 2022-05-10 ASSESSMENT — REFRACTION
OD_AXIS: 100
OD_CYLINDER: +1.50
OS_SPHERE: -0.25
OS_CYLINDER: +1.25
OS_AXIS: 080
OD_SPHERE: -0.75

## 2022-05-10 ASSESSMENT — EXTERNAL EXAM - LEFT EYE: OS_EXAM: NORMAL

## 2022-05-10 ASSESSMENT — CUP TO DISC RATIO
OD_RATIO: 0.1
OS_RATIO: 0.1

## 2022-05-10 ASSESSMENT — CONF VISUAL FIELD
OS_NORMAL: 1
OD_NORMAL: 1

## 2022-05-10 ASSESSMENT — TONOMETRY
OD_IOP_MMHG: 9
OS_IOP_MMHG: 14
IOP_METHOD: ICARE

## 2022-05-10 ASSESSMENT — EXTERNAL EXAM - RIGHT EYE: OD_EXAM: NORMAL

## 2022-05-10 NOTE — ASSESSMENT & PLAN NOTE
1/29/2020 - History of multiple concussions with headaches and traumatic brain injury. Obtained OCT NFL thicken ss and ganglion cell thickness that was unremarkable  With OD 97 and OS 98. Normal ganglion cell thickness. Therefore no evidence of traumatic optic neuropathy or retrograde axonal degeneration.   5/10/2022 - OCT NFL thickness at 87 OU with normal color vision.

## 2022-05-10 NOTE — ASSESSMENT & PLAN NOTE
1/29/2020 gave new glasses rx.   5/10/2022 - some progression to myopia. Probably related to early NS. Will adjust rx.

## 2022-05-10 NOTE — ASSESSMENT & PLAN NOTE
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 Most likely a combination of early NS cataracts, presbyopia and the traumatic brain injury. Also current rx over corrected and the reading rx significantly undercorrected. Was able to correct to 20/20 vision at distance and near with new glasses rx. Discussed importance of wearing glasses at all times to relax accommodation and prevent spasm at both distance and near. Since needs both distance and reading correction recommended progressive lenses.   5/10/2022 - States having difficulty with the current progressive lenses, especially when on the computer. Is till showing signs of accommodative insufficiency and as well the distance rx has changed. Will give a new rx progressive, but los full frame computer reading.

## 2022-05-10 NOTE — ASSESSMENT & PLAN NOTE
1/29/2020 - Suspect that some of his occipital very pinpoint headaches associated with stiff neck and discomfort radiating to the periorbital region is secondary to occipital neuralgia. Long history of multiple head injuries and attempts to control pain to the point where he has to come to the ER for control. By history has had some nerve blocks, but in his description the seem to involve mor shoulder girdle. Possible attempts or discussion for further blocks bu Dr Rogers in Neurology at [facility], but having difficulty getting back to be seen. By description from patient uncertain if the blocks were in the region of the occipital nerve. Therefore recommended referral to Dr Molina in Rehab medicine.   5/10/2022 -

## 2022-05-10 NOTE — PROGRESS NOTES
Peds/Neuro Ophthalmology:   Sarmad Velazco M.D.    Date & Time note created:    5/10/2022   1:12 PM     Referring MD / APRN:  Pcp Pt States None, No att. providers found    Patient ID:  Name:             Landon Allred   YOB: 1970  Age:                 52 y.o.  male   MRN:               [id number]    Chief Complaint/Reason for Visit:     Other (Follow up on double vision) and Loss of Vision      History of Present Illness:    Landon Allred is a 52 y.o. male   Pt is here for a follow up. He states thathis glasses arent helping. He states that he can only see big items and not small. He is currently experiencing migraines frequently. He is also experencing double vision      Review of Systems:  Review of Systems   Eyes: Positive for blurred vision, double vision and pain.   Neurological: Positive for headaches.   All other systems reviewed and are negative.      Past Medical History:   Past Medical History:   Diagnosis Date   • Allergy    • Arthritis    • Asthma    • Claustrophobia    • Clostridium difficile diarrhea    • Clotting disorder (Formerly [facility])    • Depression    • DVT (deep venous thrombosis) (Formerly [facility])    • GERD (gastroesophageal reflux disease)    • Hypertension    • IBD (inflammatory bowel disease)    • Migraine    • MRSA (methicillin resistant Staphylococcus aureus)    • Pancreatitis    • PE (pulmonary embolism)    • Psychiatric disorder     PTSD, depression   • Seizure (Formerly [facility])        Past Surgical History:  Past Surgical History:   Procedure Laterality Date   • INGUINAL HERNIA REPAIR Right 09/06/2019   • DENTAL EXTRACTION(S)     • HERNIA REPAIR Right    • NERVE ULNAR REPAIR OR EXPLORE     • ORIF, ELBOW     • ORIF, FRACTURE, FEMUR     • WA CLOSED RX NASAL SEPTAL FRACTURE     • RECTAL EXPLORATION         Current Outpatient Medications:  Current Outpatient Medications   Medication Sig Dispense Refill   • diazepam (VALIUM) 10 MG tablet Take 15 mg by mouth.     • albuterol (PROVENTIL)  "2.5mg/3ml Nebu Soln solution for nebulization 3 mL     • fluticasone (FLONASE) 50 MCG/ACT nasal spray Administer 2 Sprays into affected nostril(S) every day.     • diazePAM (VALIUM) 5 MG Tab Take 10 mg by mouth every 6 hours as needed for Anxiety.     • HYDROmorphone (DILAUDID) 2 MG Tab Take 2-4 mg by mouth every 6 hours as needed for Severe Pain.     • diphenhydrAMINE HCl (BENADRYL ALLERGY PO) Take  by mouth.     • therapeutic multivitamin-minerals (THERAGRAN-M) Tab Take 1 Tab by mouth every day.     • Fluticasone Furoate-Vilanterol (BREO) 200-25 MCG/INH AEROSOL POWDER, BREATH ACTIVATED Inhale 1 Puff by mouth every day.     • zonisamide (ZONEGRAN) 50 MG capsule Take 50 mg by mouth 2 times a day.     • esomeprazole (NEXIUM) 40 MG delayed-release capsule Take 40 mg by mouth every morning before breakfast.     • rivaroxaban (XARELTO) 10 MG Tab tablet Take 20 mg by mouth with dinner.       No current facility-administered medications for this visit.       Allergies:  Allergies   Allergen Reactions   • Ciprofloxacin Anaphylaxis   • Dilaudid [Hydromorphone] Itching     States \"I have an adverse reaction and need benadryl first\"   • Shellfish Allergy Anaphylaxis   • Tape Hives   • Amoxicillin-Pot Clavulanate Rash     Other reaction(s): .Unknown  rash     • Augmentin Rash     rash   • Acetaminophen      Other reaction(s): .Unknown  \"makes me deathly ill\"  \"makes me deathly ill\"     • Amitriptyline Hcl    • Apra    • Asa [Aspirin]    • Baclofen    • Biofreeze    • Carisoprodol      Other reaction(s): Unknown   • Celery Oil      Celery causes hives   • Codeine    • Compazine      Other reaction(s): Unknown   • Compazine [Prochlorperazine]      Hallucinations, dizziness   • Cyclobenzaprine    • Eszopiclone    • Hydrocodone-Acetaminophen    • Hydrocodone-Acetaminophen    • Kdc:Yellow Dye+Aspirin+Oxycodone    • Ketamine      Other reaction(s): .Unknown  Pt reports he \"gets violent\"   • Ketorolac Tromethamine [Toradol]    • " Lunesta      Other reaction(s): Unknown   • Methocarbamol    • Morphine Vomiting   • Nitroglycerin      Other reaction(s): Unknown   • Ondansetron [Zofran]    • Other Misc      Can take dilaudid if given benadryl first   • Oxycodone    • Oxycodone-Acetaminophen    • Oxycodone-Acetaminophen      Other reaction(s): .Unknown   • Oxycodone-Aspirin    • Propofol    • Propoxyphene      Other reaction(s): Unknown   • Quetiapine      Other reaction(s): Unknown   • Reglan [Metoclopramide]      Has no idea what his RX is   • Risperidone    • Tramadol    • Tylenol      Other reaction(s): Unknown   • Zofran [Ondansetron]        Family History:  Family History   Problem Relation Age of Onset   • Hypertension Mother    • Cancer Father    • Hypertension Father        Social History:  Social History     Socioeconomic History   • Marital status: Single     Spouse name: Not on file   • Number of children: Not on file   • Years of education: Not on file   • Highest education level: Not on file   Occupational History   • Not on file   Tobacco Use   • Smoking status: Never Smoker   • Smokeless tobacco: Never Used   Vaping Use   • Vaping Use: Never used   Substance and Sexual Activity   • Alcohol use: No   • Drug use: No   • Sexual activity: Not on file   Other Topics Concern   • Not on file   Social History Narrative    48 y/o male, disabled     Social Determinants of Health     Financial Resource Strain: Not on file   Food Insecurity: Not on file   Transportation Needs: Not on file   Physical Activity: Not on file   Stress: Not on file   Social Connections: Not on file   Intimate Partner Violence: Not on file   Housing Stability: Not on file          Physical Exam:  Physical Exam    Oriented x 3  Weight/BMI: There is no height or weight on file to calculate BMI.  There were no vitals taken for this visit.    Base Eye Exam     Visual Acuity (Snellen - Linear)       Right Left    Dist sc 20/20 -2 20/20    Dist cc 20/20 -3 20/20 -4     Near sc J16 J5    Near cc J1 J1    Correction: Glasses          Tonometry ([facility], 10:35 AM)       Right Left    Pressure 9 14          Pupils       Pupils    Right PERRL    Left PERRL          Visual Fields       Right Left     Full Full          Extraocular Movement       Right Left     Full, Ortho Full, Ortho          Neuro/Psych     Oriented x3: Yes    Mood/Affect: Normal          Dilation     Both eyes: Tropicamide (MYDRIACYL) 1% ophthalmic solution, Phenylephrine (NEOSYNEPHRINE) ophthalmic solution 2.5%, Cyclopentolate (CYCLOGYL) 1% ophthalmic solution @ 1:07 PM            Additional Tests     Color       Right Left    Ishihara 9/9 9/9          Stereo     Fly: +    Animals: 0/3    Circles: 0/9            Slit Lamp and Fundus Exam     External Exam       Right Left    External Normal Normal          Slit Lamp Exam       Right Left    Lids/Lashes Normal Normal    Conjunctiva/Sclera White and quiet White and quiet    Cornea Clear Clear    Anterior Chamber Deep and quiet Deep and quiet    Iris Round and reactive Round and reactive    Lens Nuclear sclerosis Nuclear sclerosis    Vitreous Normal Normal          Fundus Exam       Right Left    Disc Normal Normal    C/D Ratio 0.1 0.1    Macula Normal Normal    Vessels Normal Normal    Periphery Normal Normal            Refraction     Wearing Rx       Sphere Cylinder Axis Add Horz Prism Vert Prism    Right +0.00 +0.75 085 +0.25 1.50 +2.25    Left -0.75 +1.25 094 +0.00 0.00 +2.25          Manifest Refraction       Sphere Cylinder Axis    Right -1.00 +1.50 099    Left -0.50 +1.25 079          Cycloplegic Refraction (Auto)       Sphere Cylinder Axis    Right -0.75 +1.50 100    Left -0.25 +1.25 080          Final Rx       Sphere Cylinder Axis Add    Right -0.75 +1.50 100 +2.25    Left -0.50 +1.25 080 +2.25          Final Rx #2       Sphere Cylinder Axis Add    Right +1.25 +1.25 100     Left +1.50 +1.25 080     Type: near                Pertinent Lab/Test/Imaging  Review:      Assessment and Plan:     Accommodative insufficiency  1/29/2020 - accommodative insufficieny and probable episodes of accommodative spasm leading to some of the blurry vision and worsening reading. Most likely a combination of early NS cataracts, presbyopia and the traumatic brain injury. Also current rx over corrected and the reading rx significantly undercorrected. Was able to correct to 20/20 vision at distance and near with new glasses rx. Discussed importance of wearing glasses at all times to relax accommodation and prevent spasm at both distance and near. Since needs both distance and reading correction recommended progressive lenses.   5/10/2022 - States having difficulty with the current progressive lenses, especially when on the computer. Is till showing signs of accommodative insufficiency and as well the distance rx has changed. Will give a new rx progressive, but los full frame computer reading.     Bilateral occipital neuralgia  1/29/2020 - Suspect that some of his occipital very pinpoint headaches associated with stiff neck and discomfort radiating to the periorbital region is secondary to occipital neuralgia. Long history of multiple head injuries and attempts to control pain to the point where he has to come to the ER for control. By history has had some nerve blocks, but in his description the seem to involve mor shoulder girdle. Possible attempts or discussion for further blocks bu Dr Rogers in Neurology at [facility], but having difficulty getting back to be seen. By description from patient uncertain if the blocks were in the region of the occipital nerve. Therefore recommended referral to Dr Molina in Rehab medicine.   5/10/2022 -     Hyperopia of both eyes with astigmatism  1/29/2020 gave new glasses rx.   5/10/2022 - some progression to myopia. Probably related to early NS. Will adjust rx.     Traumatic brain injury (HCC)  1/29/2020 - History of multiple concussions with  headaches and traumatic brain injury. Obtained OCT NFL thicken ss and ganglion cell thickness that was unremarkable  With OD 97 and OS 98. Normal ganglion cell thickness. Therefore no evidence of traumatic optic neuropathy or retrograde axonal degeneration.   5/10/2022 - OCT NFL thickness at 87 OU with normal color vision.         Sarmad Velazco M.D.

## 2022-10-03 ENCOUNTER — APPOINTMENT (OUTPATIENT)
Dept: RADIOLOGY | Facility: MEDICAL CENTER | Age: 52
End: 2022-10-03
Attending: PSYCHIATRY & NEUROLOGY
Payer: MEDICARE

## 2022-10-03 ENCOUNTER — APPOINTMENT (OUTPATIENT)
Dept: LAB | Facility: MEDICAL CENTER | Age: 52
End: 2022-10-03
Attending: STUDENT IN AN ORGANIZED HEALTH CARE EDUCATION/TRAINING PROGRAM
Payer: MEDICARE

## 2022-10-05 ENCOUNTER — HOSPITAL ENCOUNTER (OUTPATIENT)
Dept: RADIOLOGY | Facility: MEDICAL CENTER | Age: 52
End: 2022-10-05
Attending: ORTHOPAEDIC SURGERY
Payer: COMMERCIAL

## 2022-10-05 ENCOUNTER — ANESTHESIA EVENT (OUTPATIENT)
Dept: RADIOLOGY | Facility: MEDICAL CENTER | Age: 52
End: 2022-10-05
Payer: COMMERCIAL

## 2022-10-05 ENCOUNTER — HOSPITAL ENCOUNTER (OUTPATIENT)
Dept: LAB | Facility: MEDICAL CENTER | Age: 52
End: 2022-10-05
Attending: STUDENT IN AN ORGANIZED HEALTH CARE EDUCATION/TRAINING PROGRAM
Payer: MEDICARE

## 2022-10-05 ENCOUNTER — ANESTHESIA (OUTPATIENT)
Dept: RADIOLOGY | Facility: MEDICAL CENTER | Age: 52
End: 2022-10-05
Payer: COMMERCIAL

## 2022-10-05 VITALS
HEIGHT: 72 IN | TEMPERATURE: 96.9 F | DIASTOLIC BLOOD PRESSURE: 75 MMHG | HEART RATE: 61 BPM | SYSTOLIC BLOOD PRESSURE: 116 MMHG | RESPIRATION RATE: 20 BRPM | BODY MASS INDEX: 24.13 KG/M2 | WEIGHT: 178.13 LBS | OXYGEN SATURATION: 93 %

## 2022-10-05 DIAGNOSIS — S83.271A COMPLEX TEAR OF LATERAL MENISCUS OF RIGHT KNEE AS CURRENT INJURY, INITIAL ENCOUNTER: ICD-10-CM

## 2022-10-05 DIAGNOSIS — S83.272A COMPLEX TEAR OF LATERAL MENISCUS OF LEFT KNEE AS CURRENT INJURY, INITIAL ENCOUNTER: ICD-10-CM

## 2022-10-05 DIAGNOSIS — M79.641 PAIN OF RIGHT HAND: ICD-10-CM

## 2022-10-05 LAB
BUN BLD-MCNC: 32 MG/DL (ref 8–22)
CA-I BLD ISE-SCNC: 1.27 MMOL/L (ref 1.1–1.3)
CHLORIDE BLD-SCNC: 108 MMOL/L (ref 96–112)
CO2 BLD-SCNC: 27 MMOL/L (ref 20–33)
CREAT BLD-MCNC: 1.3 MG/DL (ref 0.5–1.4)
GLUCOSE BLD-MCNC: 89 MG/DL (ref 65–99)
HCT VFR BLD CALC: 52 % (ref 42–52)
HGB BLD-MCNC: 17.7 G/DL (ref 14–18)
POTASSIUM BLD-SCNC: 4.5 MMOL/L (ref 3.6–5.5)
SODIUM BLD-SCNC: 145 MMOL/L (ref 135–145)

## 2022-10-05 PROCEDURE — 73721 MRI JNT OF LWR EXTRE W/O DYE: CPT | Mod: RT

## 2022-10-05 PROCEDURE — 700111 HCHG RX REV CODE 636 W/ 250 OVERRIDE (IP): Performed by: STUDENT IN AN ORGANIZED HEALTH CARE EDUCATION/TRAINING PROGRAM

## 2022-10-05 PROCEDURE — 85014 HEMATOCRIT: CPT

## 2022-10-05 PROCEDURE — 01922 ANES N-INVAS IMG/RADJ THER: CPT | Performed by: STUDENT IN AN ORGANIZED HEALTH CARE EDUCATION/TRAINING PROGRAM

## 2022-10-05 PROCEDURE — 700105 HCHG RX REV CODE 258: Performed by: STUDENT IN AN ORGANIZED HEALTH CARE EDUCATION/TRAINING PROGRAM

## 2022-10-05 PROCEDURE — 80047 BASIC METABLC PNL IONIZED CA: CPT

## 2022-10-05 PROCEDURE — 73721 MRI JNT OF LWR EXTRE W/O DYE: CPT | Mod: LT

## 2022-10-05 PROCEDURE — 700101 HCHG RX REV CODE 250: Performed by: STUDENT IN AN ORGANIZED HEALTH CARE EDUCATION/TRAINING PROGRAM

## 2022-10-05 RX ORDER — ALBUTEROL SULFATE 2.5 MG/3ML
2.5 SOLUTION RESPIRATORY (INHALATION)
Status: DISCONTINUED | OUTPATIENT
Start: 2022-10-05 | End: 2022-10-06 | Stop reason: HOSPADM

## 2022-10-05 RX ORDER — HYDRALAZINE HYDROCHLORIDE 20 MG/ML
5 INJECTION INTRAMUSCULAR; INTRAVENOUS
Status: DISCONTINUED | OUTPATIENT
Start: 2022-10-05 | End: 2022-10-06 | Stop reason: HOSPADM

## 2022-10-05 RX ORDER — LIDOCAINE HYDROCHLORIDE 20 MG/ML
INJECTION, SOLUTION EPIDURAL; INFILTRATION; INTRACAUDAL; PERINEURAL PRN
Status: DISCONTINUED | OUTPATIENT
Start: 2022-10-05 | End: 2022-10-05 | Stop reason: SURG

## 2022-10-05 RX ORDER — DEXAMETHASONE SODIUM PHOSPHATE 4 MG/ML
INJECTION, SOLUTION INTRA-ARTICULAR; INTRALESIONAL; INTRAMUSCULAR; INTRAVENOUS; SOFT TISSUE PRN
Status: DISCONTINUED | OUTPATIENT
Start: 2022-10-05 | End: 2022-10-05 | Stop reason: SURG

## 2022-10-05 RX ORDER — HALOPERIDOL 5 MG/ML
1 INJECTION INTRAMUSCULAR
Status: DISCONTINUED | OUTPATIENT
Start: 2022-10-05 | End: 2022-10-06 | Stop reason: HOSPADM

## 2022-10-05 RX ORDER — DIPHENHYDRAMINE HYDROCHLORIDE 50 MG/ML
12.5 INJECTION INTRAMUSCULAR; INTRAVENOUS
Status: DISCONTINUED | OUTPATIENT
Start: 2022-10-05 | End: 2022-10-06 | Stop reason: HOSPADM

## 2022-10-05 RX ORDER — MIDAZOLAM HYDROCHLORIDE 1 MG/ML
INJECTION INTRAMUSCULAR; INTRAVENOUS
Status: COMPLETED
Start: 2022-10-05 | End: 2022-10-05

## 2022-10-05 RX ORDER — DIPHENHYDRAMINE HYDROCHLORIDE 50 MG/ML
INJECTION INTRAMUSCULAR; INTRAVENOUS
Status: COMPLETED
Start: 2022-10-05 | End: 2022-10-05

## 2022-10-05 RX ORDER — SODIUM CHLORIDE, SODIUM LACTATE, POTASSIUM CHLORIDE, CALCIUM CHLORIDE 600; 310; 30; 20 MG/100ML; MG/100ML; MG/100ML; MG/100ML
INJECTION, SOLUTION INTRAVENOUS CONTINUOUS
Status: DISCONTINUED | OUTPATIENT
Start: 2022-10-05 | End: 2022-10-06 | Stop reason: HOSPADM

## 2022-10-05 RX ADMIN — MIDAZOLAM 2 MG: 1 INJECTION INTRAMUSCULAR; INTRAVENOUS at 14:31

## 2022-10-05 RX ADMIN — PROPOFOL 30 MG: 10 INJECTION, EMULSION INTRAVENOUS at 14:35

## 2022-10-05 RX ADMIN — LIDOCAINE HYDROCHLORIDE 50 MG: 20 INJECTION, SOLUTION EPIDURAL; INFILTRATION; INTRACAUDAL at 14:36

## 2022-10-05 RX ADMIN — DEXAMETHASONE SODIUM PHOSPHATE 6 MG: 4 INJECTION, SOLUTION INTRA-ARTICULAR; INTRALESIONAL; INTRAMUSCULAR; INTRAVENOUS; SOFT TISSUE at 14:38

## 2022-10-05 RX ADMIN — SODIUM CHLORIDE, POTASSIUM CHLORIDE, SODIUM LACTATE AND CALCIUM CHLORIDE: 600; 310; 30; 20 INJECTION, SOLUTION INTRAVENOUS at 14:32

## 2022-10-05 RX ADMIN — DIPHENHYDRAMINE HYDROCHLORIDE 50 MG: 50 INJECTION, SOLUTION INTRAMUSCULAR; INTRAVENOUS at 14:31

## 2022-10-05 RX ADMIN — PROPOFOL 120 MG: 10 INJECTION, EMULSION INTRAVENOUS at 14:36

## 2022-10-05 ASSESSMENT — PAIN SCALES - GENERAL: PAIN_LEVEL: 2

## 2022-10-05 ASSESSMENT — FIBROSIS 4 INDEX
FIB4 SCORE: 0.93
FIB4 SCORE: 0.93

## 2022-10-05 NOTE — ANESTHESIA PREPROCEDURE EVALUATION
Date/Time: 10/05/22 1400    Procedure: MR-KNEE-W/O    Diagnosis: Complex tear of lateral meniscus of right knee as current injury, initial encounter [Z09.035N]    [facility]: [facility] - MRI - [address]          Relevant Problems   No relevant active problems       Physical Exam    Airway   Mallampati: II  TM distance: >3 FB  Neck ROM: full       Cardiovascular - normal exam  Rhythm: regular  Rate: normal  (-) murmur     Dental - normal exam           Pulmonary - normal exam  Breath sounds clear to auscultation     Abdominal    Neurological - normal exam                 Anesthesia Plan    ASA 2       Plan - general       Airway plan will be LMA          Induction: inhalational    Postoperative Plan: Postoperative administration of opioids is intended.    Pertinent diagnostic labs and testing reviewed    Informed Consent:    Anesthetic plan and risks discussed with patient.    Use of blood products discussed with: patient whom consented to blood products.

## 2022-10-05 NOTE — DISCHARGE INSTRUCTIONS
MRI ADULT DISCHARGE INSTRUCTIONS    You have been medicated today for your scan. Please follow the instructions below to ensure your safe recovery. If you have any questions or problems, feel free to call us at [phone] or [phone].     1.   Have someone stay with you to assist you as needed.    2.   Do not drive or operate any mechanical devices.    3.   Do not perform any activity that requires concentration. Make no major decisions over the next 24 hours.     4.   Be careful changing positions from laying down to sitting or standing, as you may become dizzy.     5.   Do not drink alcohol for 48 hours.    6.   There are no restrictions for eating your normal meals. Drink fluids.    7.   You may continue your usual medications for pain, tranquilizers, muscle relaxants or sedatives when awake.     8.   Tomorrow, you may continue your normal daily activities.     9.   Pressure dressing on 10 - 15 minutes. If swelling or bleeding occurs when removed, continue placing direct pressure on injection site for another 5 minutes, or until bleeding stops.     I have been informed of and understand the above discharge instructions.

## 2022-10-05 NOTE — ANESTHESIA PROCEDURE NOTES
Airway    Date/Time: 10/5/2022 2:37 PM  Performed by: Jose Santiago D.O.  Authorized by: Jose Santiago D.O.     Location:  OR  Urgency:  Elective  Indications for Airway Management:  Anesthesia      Spontaneous Ventilation: absent    Sedation Level:  Deep  Preoxygenated: Yes    Final Airway Type:  Supraglottic airway  Final Supraglottic Airway:  Standard LMA    SGA Size:  4  Number of Attempts at Approach:  1

## 2022-10-05 NOTE — PROGRESS NOTES
Patient to MRI outpatient dept. Patient informed process and plan of care during this visit.  Anesthesiologist Jose Santiago spoke with Patient and discussed provider's plan of care.  Consent obtained.  MRI completed without incident.  Patient tolerated diet and activities once awake, alert, and appropriate.  DC instructions discussed with Patient and .  Questions encouraged and answered.  Defer to Provider for further instruction.  Patient discharged in stable condition to responsible adult (friend Danni)

## 2022-10-05 NOTE — ANESTHESIA TIME REPORT
Anesthesia Start and Stop Event Times     Date Time Event    10/5/2022 1344 Ready for Procedure     1432 Anesthesia Start     1538 Anesthesia Stop        Responsible Staff  10/05/22    Name Role Begin End    Jose Santiago D.O. Anesth 1432 1538        Overtime Reason:  no overtime (within assigned shift)    Comments:

## 2022-10-05 NOTE — ANESTHESIA POSTPROCEDURE EVALUATION
Patient: Landon Allred    Procedure Summary     Date: 10/05/22 Room / Location: [address]    Anesthesia Start: 1432 Anesthesia Stop: 1538    Procedure: MR-KNEE-W/O Diagnosis: Complex tear of lateral meniscus of right knee as current injury, initial encounter    Scheduled Providers:  Responsible Provider: Jose Santiago D.O.    Anesthesia Type: general ASA Status: 2          Final Anesthesia Type: general  Last vitals  BP   Blood Pressure: 110/73    Temp   36.1 °C (96.9 °F)    Pulse   76   Resp   20    SpO2   93 %      Anesthesia Post Evaluation    Patient location during evaluation: PACU  Patient participation: complete - patient participated  Level of consciousness: awake and alert  Pain score: 2    Airway patency: patent  Anesthetic complications: no  Cardiovascular status: hemodynamically stable  Respiratory status: acceptable  Hydration status: euvolemic    PONV: none          No notable events documented.     Nurse Pain Score: 10 (NPRS)

## 2022-11-16 ENCOUNTER — APPOINTMENT (OUTPATIENT)
Dept: OPHTHALMOLOGY | Facility: MEDICAL CENTER | Age: 52
End: 2022-11-16
Payer: COMMERCIAL

## 2022-12-14 ENCOUNTER — APPOINTMENT (OUTPATIENT)
Dept: OPHTHALMOLOGY | Facility: MEDICAL CENTER | Age: 52
End: 2022-12-14
Payer: COMMERCIAL

## 2023-01-24 ENCOUNTER — APPOINTMENT (OUTPATIENT)
Dept: OPHTHALMOLOGY | Facility: MEDICAL CENTER | Age: 53
End: 2023-01-24
Payer: COMMERCIAL

## 2023-02-01 PROBLEM — F43.10 POST-TRAUMATIC STRESS SYNDROME: Status: ACTIVE | Noted: 2023-02-01

## 2023-02-01 PROBLEM — B02.8 HERPES ZOSTER WITH COMPLICATION: Status: ACTIVE | Noted: 2023-02-01

## 2023-02-01 PROBLEM — M54.12 CERVICAL RADICULOPATHY: Status: ACTIVE | Noted: 2023-02-01

## 2023-02-01 PROBLEM — M54.2 CERVICALGIA: Status: ACTIVE | Noted: 2023-02-01

## 2023-02-01 PROBLEM — B02.9 SHINGLES: Status: ACTIVE | Noted: 2023-02-01

## 2023-02-01 PROBLEM — B02.29 POSTHERPETIC NEURALGIA: Status: ACTIVE | Noted: 2023-02-01

## 2023-02-01 PROBLEM — G72.49 INFLAMMATORY AND IMMUNE MYOPATHIES: Status: ACTIVE | Noted: 2023-02-01

## 2023-02-01 PROBLEM — M54.16 LUMBAR RADICULOPATHY: Status: ACTIVE | Noted: 2023-02-01

## 2023-02-01 PROBLEM — M54.50 LOW BACK PAIN: Status: ACTIVE | Noted: 2023-02-01

## 2023-02-01 PROBLEM — S13.9XXA NECK SPRAIN: Status: ACTIVE | Noted: 2023-02-01

## 2023-02-09 ENCOUNTER — OFFICE VISIT (OUTPATIENT)
Dept: OPHTHALMOLOGY | Facility: MEDICAL CENTER | Age: 53
End: 2023-02-09
Payer: COMMERCIAL

## 2023-02-09 DIAGNOSIS — H10.13 ALLERGIC CONJUNCTIVITIS OF BOTH EYES: ICD-10-CM

## 2023-02-09 DIAGNOSIS — H52.203 HYPEROPIA OF BOTH EYES WITH ASTIGMATISM: ICD-10-CM

## 2023-02-09 DIAGNOSIS — B02.8 HERPES ZOSTER WITH COMPLICATION: ICD-10-CM

## 2023-02-09 DIAGNOSIS — H52.03 HYPEROPIA OF BOTH EYES WITH ASTIGMATISM: ICD-10-CM

## 2023-02-09 PROCEDURE — 99214 OFFICE O/P EST MOD 30 MIN: CPT | Performed by: OPHTHALMOLOGY

## 2023-02-09 RX ORDER — POLYVINYL ALCOHOL, POVIDONE 14; 6 MG/ML; MG/ML
1 SOLUTION/ DROPS OPHTHALMIC 2 TIMES DAILY
Qty: 50 EACH | Refills: 3 | Status: SHIPPED | OUTPATIENT
Start: 2023-02-09 | End: 2023-02-09

## 2023-02-09 RX ORDER — CROMOLYN SODIUM 40 MG/ML
1 SOLUTION/ DROPS OPHTHALMIC 2 TIMES DAILY
Qty: 10 ML | Refills: 5 | Status: SHIPPED | OUTPATIENT
Start: 2023-02-09 | End: 2023-02-09

## 2023-02-09 RX ORDER — CROMOLYN SODIUM 40 MG/ML
1 SOLUTION/ DROPS OPHTHALMIC 2 TIMES DAILY
Qty: 10 ML | Refills: 3 | Status: SHIPPED | OUTPATIENT
Start: 2023-02-09 | End: 2023-05-10

## 2023-02-09 RX ORDER — POLYVINYL ALCOHOL, POVIDONE 14; 6 MG/ML; MG/ML
1 SOLUTION/ DROPS OPHTHALMIC 2 TIMES DAILY
Qty: 50 EACH | Refills: 3 | Status: SHIPPED | OUTPATIENT
Start: 2023-02-09

## 2023-02-09 ASSESSMENT — VISUAL ACUITY
OS_CC: 20/40
OD_CC: 20/20
OS_SC: 20/20
CORRECTION_TYPE: GLASSES
METHOD: SNELLEN - LINEAR
OD_SC: 20/20

## 2023-02-09 ASSESSMENT — REFRACTION_WEARINGRX
OS_CYLINDER: +1.25
OS_SPHERE: +1.50
OS_SPHERE: PLANO
OD_SPHERE: +1.25
OD_CYLINDER: +1.50
OD_ADD: +3.00
OD_SPHERE: -0.75
OD_AXIS: 094
OS_AXIS: 080
OD_CYLINDER: +1.25
SPECS_TYPE: TRIFOCAL
OS_ADD: +2.25
SPECS_TYPE: NEAR
OS_CYLINDER: +1.25
OS_CYLINDER: +0.75
OS_AXIS: 083
OS_AXIS: 080
OS_SPHERE: -0.50
OD_AXIS: 100
OD_CYLINDER: +1.25
OD_SPHERE: -0.75
OD_AXIS: 100
OS_ADD: +3.00
OD_ADD: +2.25

## 2023-02-09 ASSESSMENT — TONOMETRY
OD_IOP_MMHG: 12
OS_IOP_MMHG: 9

## 2023-02-09 ASSESSMENT — CONF VISUAL FIELD
OD_NORMAL: 1
OS_SUPERIOR_NASAL_RESTRICTION: 0
OS_INFERIOR_NASAL_RESTRICTION: 0
OD_INFERIOR_TEMPORAL_RESTRICTION: 0
OS_INFERIOR_TEMPORAL_RESTRICTION: 0
OD_SUPERIOR_TEMPORAL_RESTRICTION: 0
OS_SUPERIOR_TEMPORAL_RESTRICTION: 0
OD_INFERIOR_NASAL_RESTRICTION: 0
OD_SUPERIOR_NASAL_RESTRICTION: 0
OS_NORMAL: 1

## 2023-02-09 ASSESSMENT — EXTERNAL EXAM - LEFT EYE: OS_EXAM: NORMAL

## 2023-02-09 ASSESSMENT — REFRACTION_MANIFEST
METHOD_AUTOREFRACTION: 1
OD_CYLINDER: +1.75
OD_SPHERE: -1.00
OD_AXIS: 098
OS_AXIS: 074
OS_CYLINDER: +1.00
OS_SPHERE: -0.25

## 2023-02-09 ASSESSMENT — CUP TO DISC RATIO
OS_RATIO: 0.1
OD_RATIO: 0.1

## 2023-02-09 ASSESSMENT — SLIT LAMP EXAM - LIDS
COMMENTS: NORMAL
COMMENTS: NORMAL

## 2023-02-09 ASSESSMENT — EXTERNAL EXAM - RIGHT EYE: OD_EXAM: NORMAL

## 2023-02-09 NOTE — PROGRESS NOTES
Peds/Neuro Ophthalmology:   Sarmad Velazco M.D.    Date & Time note created:    2/9/2023   4:20 PM     Referring MD / APRN:  ARIEL Guerrero, No att. providers found    Patient ID:  Name:             Landon Allred   YOB: 1970  Age:                 52 y.o.  male   MRN:               [id number]    Chief Complaint/Reason for Visit:     Other (Traumatic brain injury/Accommodative insufficiency.)      History of Present Illness:    Landon Allred is a 52 y.o. male   Follow up for TBI and accommodative insufficiency.Patient having shadowing without glasses but feels vision better with glasses on right eye but worse left eye with glasses.Recent diagnosis of shingles.      Review of Systems:  Review of Systems   Eyes:         Shadowing without glasses.     Past Medical History:   Past Medical History:   Diagnosis Date    Allergy     Arthritis     Asthma     Claustrophobia     Clostridium difficile diarrhea     Clotting disorder (HCC)     Depression     DVT (deep venous thrombosis) (HCC)     GERD (gastroesophageal reflux disease)     Hypertension     IBD (inflammatory bowel disease)     Migraine     MRSA (methicillin resistant Staphylococcus aureus)     Pancreatitis     PE (pulmonary embolism)     Psychiatric disorder     PTSD, depression    Seizure (HCC)        Past Surgical History:  Past Surgical History:   Procedure Laterality Date    INGUINAL HERNIA REPAIR Right 09/06/2019    DENTAL EXTRACTION(S)      HERNIA REPAIR Right     NERVE ULNAR REPAIR OR EXPLORE      ORIF, ELBOW      ORIF, FRACTURE, FEMUR      [place] CLOSED RX NASAL SEPTAL FRACTURE      RECTAL EXPLORATION         Current Outpatient Medications:  Current Outpatient Medications   Medication Sig Dispense Refill    cromolyn (OPTICROM) 4 % ophthalmic solution Administer 1 Drop into both eyes 2 times a day for 90 days. 10 mL 3    polyvinyl alcohol-povidone (REFRESH) 1.4-0.6 % ophthalmic solution Administer 1 Drop into both eyes  "2 times a day. 50 Each 3    valacyclovir (VALTREX) 1 GM Tab Take 1 Tablet by mouth 3 times a day. 21 Tablet 0    ALBUTEROL INH       promethazine (PHENERGAN) 25 MG Tab Take 25 mg by mouth every 6 hours as needed for Nausea/Vomiting.      clonazePAM (KLONOPIN) 1 MG Tab Take 1 mg by mouth 2 times a day.      fluticasone (FLONASE) 50 MCG/ACT nasal spray Administer 2 Sprays into affected nostril(S) every day.      diphenhydrAMINE HCl (BENADRYL ALLERGY PO) Take  by mouth.      therapeutic multivitamin-minerals (THERAGRAN-M) Tab Take 1 Tab by mouth every day.      Fluticasone Furoate-Vilanterol (BREO) 200-25 MCG/INH AEROSOL POWDER, BREATH ACTIVATED Inhale 1 Puff by mouth every day.      zonisamide (ZONEGRAN) 50 MG capsule Take 50 mg by mouth 2 times a day.      esomeprazole (NEXIUM) 40 MG delayed-release capsule Take 40 mg by mouth every morning before breakfast.      rivaroxaban (XARELTO) 10 MG Tab tablet Take 20 mg by mouth with dinner.       No current facility-administered medications for this visit.       Allergies:  Allergies   Allergen Reactions    Ciprofloxacin Anaphylaxis    Dilaudid [Hydromorphone] Itching     States \"I have an adverse reaction and need benadryl first\"    Shellfish Allergy Anaphylaxis    Tape Hives    Amoxicillin-Pot Clavulanate Rash     Other reaction(s): .Unknown  rash      Augmentin Rash and Unspecified     rash    Acetaminophen Unspecified     Other reaction(s): .Unknown  \"makes me deathly ill\"  \"makes me deathly ill\"      Amitriptyline Hcl     Apra     Aspirin Unspecified    Baclofen Unspecified    Biofreeze     Carisoprodol      Other reaction(s): Unknown    Celery Oil      Celery causes hives    Codeine     Compazine      Other reaction(s): Unknown    Compazine [Prochlorperazine]      Hallucinations, dizziness    Cyclobenzaprine     Eszopiclone     Hydrocodone-Acetaminophen     Kdc:Yellow Dye+Aspirin+Oxycodone     Ketamine      Other reaction(s): .Unknown  Pt reports he \"gets violent\"    " Ketorolac Tromethamine [Toradol]     Lunesta      Other reaction(s): Unknown    Methocarbamol     Morphine Vomiting    Nitroglycerin      Other reaction(s): Unknown    Ondansetron [Zofran]     Other Misc      Can take dilaudid if given benadryl first    Oxycodone     Oxycodone-Acetaminophen     Oxycodone-Aspirin     Propofol     Propoxyphene      Other reaction(s): Unknown    Quetiapine      Other reaction(s): Unknown    Reglan [Metoclopramide]      Has no idea what his RX is    Risperidone     Tramadol     Tylenol      Other reaction(s): Unknown    Zofran [Ondansetron]        Family History:  Family History   Problem Relation Age of Onset    Hypertension Mother     Cancer Father     Hypertension Father        Social History:  Social History     Socioeconomic History    Marital status: Single     Spouse name: Not on file    Number of children: Not on file    Years of education: Not on file    Highest education level: Not on file   Occupational History    Not on file   Tobacco Use    Smoking status: Never    Smokeless tobacco: Never   Vaping Use    Vaping Use: Never used   Substance and Sexual Activity    Alcohol use: No    Drug use: No    Sexual activity: Not on file   Other Topics Concern    Not on file   Social History Narrative    51 y/o male, disabled     Social Determinants of Health     Financial Resource Strain: High Risk    Difficulty of Paying Living Expenses: Very hard   Food Insecurity: Food Insecurity Present    Worried About Running Out of Food in the Last Year: Often true    Ran Out of Food in the Last Year: Often true   Transportation Needs: Unmet Transportation Needs    Lack of Transportation (Medical): Yes    Lack of Transportation (Non-Medical): Yes   Physical Activity: Inactive    Days of Exercise per Week: 0 days    Minutes of Exercise per Session: 0 min   Stress: Stress Concern Present    Feeling of Stress : Very much   Social Connections: Socially Isolated    Frequency of Communication with  Friends and Family: Never    Frequency of Social Gatherings with Friends and Family: Never    Attends Spiritism Services: Never    Active Member of Clubs or Organizations: No    Attends Club or Organization Meetings: Never    Marital Status: Never    Intimate Partner Violence: Not At Risk    Fear of Current or Ex-Partner: No    Emotionally Abused: No    Physically Abused: No    Sexually Abused: No   Housing Stability: High Risk    Unable to Pay for Housing in the Last Year: No    Number of Places Lived in the Last Year: 3    Unstable Housing in the Last Year: Yes          Physical Exam:  Physical Exam    Oriented x 3  Weight/BMI: There is no height or weight on file to calculate BMI.  There were no vitals taken for this visit.    Base Eye Exam       Visual Acuity (Snellen - Linear)         Right Left    Dist sc 20/20 20/20    Dist cc 20/20 20/40      Correction: Glasses              Tonometry (i care, 1:40 PM)         Right Left    Pressure 12 9              Pupils         Pupils    Right PERRL    Left PERRL              Visual Fields         Right Left     Full Full              Extraocular Movement         Right Left     Full, Ortho Full, Ortho              Neuro/Psych       Oriented x3: Yes    Mood/Affect: Normal                  Additional Tests       Color         Right Left    [place] 9/9 9/9              Stereo       Fly: +    Animals: 3/3    Circles: 8/9                  Slit Lamp and Fundus Exam       External Exam         Right Left    External Normal Normal              Slit Lamp Exam         Right Left    Lids/Lashes Normal Normal    Conjunctiva/Sclera White and quiet White and quiet    Cornea Clear Clear    Anterior Chamber Deep and quiet Deep and quiet    Iris Round and reactive Round and reactive    Lens Clear Clear    Vitreous Normal Normal              Fundus Exam         Right Left    Disc Normal Normal    C/D Ratio 0.1 0.1    Macula Normal Normal    Vessels Normal Normal    Periphery  Normal Normal                  Refraction       Wearing Rx         Sphere Cylinder Axis Add    Right -0.75 +1.25 094 +3.00    Left [place] +0.75 083 +3.00      Age: 3yrs    Type: Trifocal              Wearing Rx #2         Sphere Cylinder Axis Add    Right +1.25 +1.25 100     Left +1.50 +1.25 080       Type: near              Wearing Rx #3         Sphere Cylinder Axis Add    Right -0.75 +1.50 100 +2.25    Left -0.50 +1.25 080 +2.25              Manifest Refraction (Auto)         Sphere Cylinder Axis    Right -1.00 +1.75 098    Left -0.25 +1.00 074              Final Rx         Sphere Cylinder Axis Add    Right -0.75 +1.50 100 +2.25    Left -0.50 +1.25 080 +2.25              Final Rx #2         Sphere Cylinder Axis Add    Right +1.25 +1.25 100     Left +1.50 +1.25 080       Type: near                    Pertinent Lab/Test/Imaging Review:      Assessment and Plan:     Hyperopia of both eyes with astigmatism  1/29/2020 gave new glasses rx.   5/10/2022 - some progression to myopia. Probably related to early NS. Will adjust rx.   2/9/2023-never got new glasses Rx.  In trial frame near acuity excellent.  Will give Rx.    Herpes zoster with complication  2/9/2023-no evidence of zoster ophthalmicus    Allergic conjunctivitis of both eyes  2/9/2023-variable episodes of allergic conjunctivitis.  We will re-rx Crolom and refresh    30 minutes total time spent this including reviewing old glasses Rx, reflecting, demonstrating improvement in acuity with trial frame measurements, discussing Accommodative insufficiency, counseling and patient on getting new glasses Rx, and formulating note in epic    Sarmad Velazco M.D.

## 2023-02-10 NOTE — ASSESSMENT & PLAN NOTE
1/29/2020 gave new glasses rx.   5/10/2022 - some progression to myopia. Probably related to early NS. Will adjust rx.   2/9/2023-never got new glasses Rx.  In trial frame near acuity excellent.  Will give Rx.

## 2023-04-12 ENCOUNTER — OFFICE VISIT (OUTPATIENT)
Dept: OPHTHALMOLOGY | Facility: MEDICAL CENTER | Age: 53
End: 2023-04-12
Payer: COMMERCIAL

## 2023-04-12 DIAGNOSIS — S06.9X9S TRAUMATIC BRAIN INJURY WITH LOSS OF CONSCIOUSNESS, SEQUELA (HCC): ICD-10-CM

## 2023-04-12 DIAGNOSIS — H52.4 ACCOMMODATIVE INSUFFICIENCY: ICD-10-CM

## 2023-04-12 DIAGNOSIS — M54.81 BILATERAL OCCIPITAL NEURALGIA: ICD-10-CM

## 2023-04-12 DIAGNOSIS — H10.13 ALLERGIC CONJUNCTIVITIS OF BOTH EYES: ICD-10-CM

## 2023-04-12 DIAGNOSIS — B02.8 HERPES ZOSTER WITH COMPLICATION: ICD-10-CM

## 2023-04-12 PROCEDURE — 99213 OFFICE O/P EST LOW 20 MIN: CPT | Performed by: OPHTHALMOLOGY

## 2023-04-12 ASSESSMENT — REFRACTION_WEARINGRX
OS_AXIS: 087
OS_ADD: +2.25
OD_CYLINDER: +1.50
OD_ADD: +2.50
OD_ADD: +2.25
OS_AXIS: 080
OS_CYLINDER: +0.75
OD_SPHERE: -0.75
OS_CYLINDER: +1.25
OS_CYLINDER: +1.25
OS_ADD: +2.75
OS_SPHERE: PLANO
SPECS_TYPE: TRIFOCAL
OD_CYLINDER: +1.25
OD_SPHERE: +1.25
SPECS_TYPE: NEAR
OS_SPHERE: +1.50
OS_AXIS: 080
OD_AXIS: 100
OS_SPHERE: -0.50
OD_AXIS: 094
OD_AXIS: 100
OD_CYLINDER: +1.25
OD_SPHERE: -0.75

## 2023-04-12 ASSESSMENT — VISUAL ACUITY
OD_SC: 20/20-2
OD_CC: J1+
OD_SC: J3
OS_CC: J1+
OS_SC: J3
OD_CC: 20/20
METHOD: SNELLEN - LINEAR
CORRECTION_TYPE: GLASSES
OS_CC: 20/25
OS_SC: 20/20

## 2023-04-12 ASSESSMENT — REFRACTION_MANIFEST
METHOD_AUTOREFRACTION: 1
OD_CYLINDER: +1.50
OS_CYLINDER: +1.00
OD_SPHERE: -0.75
OS_AXIS: 080
OD_AXIS: 101
OS_SPHERE: -0.25

## 2023-04-12 ASSESSMENT — CUP TO DISC RATIO
OD_RATIO: 0.1
OS_RATIO: 0.1

## 2023-04-12 ASSESSMENT — CONF VISUAL FIELD
OS_SUPERIOR_NASAL_RESTRICTION: 0
OS_INFERIOR_TEMPORAL_RESTRICTION: 0
OS_INFERIOR_NASAL_RESTRICTION: 0
OD_NORMAL: 1
OD_SUPERIOR_NASAL_RESTRICTION: 0
OD_INFERIOR_TEMPORAL_RESTRICTION: 0
OD_SUPERIOR_TEMPORAL_RESTRICTION: 0
OS_NORMAL: 1
OS_SUPERIOR_TEMPORAL_RESTRICTION: 0
OD_INFERIOR_NASAL_RESTRICTION: 0

## 2023-04-12 ASSESSMENT — ENCOUNTER SYMPTOMS
BLURRED VISION: 1
EYE PAIN: 1

## 2023-04-12 ASSESSMENT — TONOMETRY
OS_IOP_MMHG: SOFT
OD_IOP_MMHG: SOFT

## 2023-04-12 ASSESSMENT — EXTERNAL EXAM - RIGHT EYE: OD_EXAM: NORMAL

## 2023-04-12 ASSESSMENT — SLIT LAMP EXAM - LIDS
COMMENTS: NORMAL
COMMENTS: NORMAL

## 2023-04-12 ASSESSMENT — EXTERNAL EXAM - LEFT EYE: OS_EXAM: NORMAL

## 2023-04-12 NOTE — ASSESSMENT & PLAN NOTE
1/29/2020 - History of multiple concussions with headaches and traumatic brain injury. Obtained OCT NFL thicken ss and ganglion cell thickness that was unremarkable  With OD 97 and OS 98. Normal ganglion cell thickness. Therefore no evidence of traumatic optic neuropathy or retrograde axonal degeneration.   5/10/2022 - OCT NFL thickness at 87 OU with normal color vision.   4/12/2023-suspect that this is also causing some exacerbation of his accommodative insufficiency.  Therefore gave the Rx for full frame computer glasses

## 2023-04-12 NOTE — ASSESSMENT & PLAN NOTE
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 Most likely a combination of early NS cataracts, presbyopia and the traumatic brain injury. Also current rx over corrected and the reading rx significantly undercorrected. Was able to correct to 20/20 vision at distance and near with new glasses rx. Discussed importance of wearing glasses at all times to relax accommodation and prevent spasm at both distance and near. Since needs both distance and reading correction recommended progressive lenses.   5/10/2022 - States having difficulty with the current progressive lenses, especially when on the computer. Is till showing signs of accommodative insufficiency and as well the distance rx has changed. Will give a new rx progressive, but los full frame computer reading.   4/12/2023-never got full frame computer glasses.  Had episode when going from inside with difficulty and near vision to going outside in bright sunlight then a being able to read.  Discussed that one can accommodate better in bright sunlight with pupils becoming smaller and easier lens shifting.  In trial frame did well with the full frame computer lenses and a trial frame in the office.  Therefore gave new Rx

## 2023-04-12 NOTE — PROGRESS NOTES
Peds/Neuro Ophthalmology:   Sarmad Velazco M.D.    Date & Time note created:    4/12/2023   4:24 PM     Referring MD / APRN:  No primary care provider on file., No att. providers found    Patient ID:  Name:             Landon Allred   YOB: 1970  Age:                 53 y.o.  male   MRN:               [id number]    Chief Complaint/Reason for Visit:     Other (Blurred vision)      History of Present Illness:    Landon Allred is a 53 y.o. 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      Review of Systems:  Review of Systems   Eyes:  Positive for blurred vision and pain.   All other systems reviewed and are negative.    Past Medical History:   Past Medical History:   Diagnosis Date    Allergy     Arthritis     Asthma     Claustrophobia     Clostridium difficile diarrhea     Clotting disorder (HCC)     Depression     DVT (deep venous thrombosis) (HCC)     GERD (gastroesophageal reflux disease)     Hypertension     IBD (inflammatory bowel disease)     Migraine     MRSA (methicillin resistant Staphylococcus aureus)     Pancreatitis     PE (pulmonary embolism)     Psychiatric disorder     PTSD, depression    Seizure (HCC)        Past Surgical History:  Past Surgical History:   Procedure Laterality Date    INGUINAL HERNIA REPAIR Right 09/06/2019    DENTAL EXTRACTION(S)      HERNIA REPAIR Right     NERVE ULNAR REPAIR OR EXPLORE      ORIF, ELBOW      ORIF, FRACTURE, FEMUR      [place] CLOSED RX NASAL SEPTAL FRACTURE      RECTAL EXPLORATION         Current Outpatient Medications:  Current Outpatient Medications   Medication Sig Dispense Refill    cromolyn (OPTICROM) 4 % ophthalmic solution Administer 1 Drop into both eyes 2 times a day for 90 days. 10 mL 3    polyvinyl alcohol-povidone (REFRESH) 1.4-0.6 % ophthalmic solution Administer 1 Drop  "into both eyes 2 times a day. 50 Each 3    valacyclovir (VALTREX) 1 GM Tab Take 1 Tablet by mouth 3 times a day. 21 Tablet 0    ALBUTEROL INH       promethazine (PHENERGAN) 25 MG Tab Take 25 mg by mouth every 6 hours as needed for Nausea/Vomiting.      clonazePAM (KLONOPIN) 1 MG Tab Take 1 mg by mouth 2 times a day.      fluticasone (FLONASE) 50 MCG/ACT nasal spray Administer 2 Sprays into affected nostril(S) every day.      diphenhydrAMINE HCl (BENADRYL ALLERGY PO) Take  by mouth.      therapeutic multivitamin-minerals (THERAGRAN-M) Tab Take 1 Tab by mouth every day.      Fluticasone Furoate-Vilanterol (BREO) 200-25 MCG/INH AEROSOL POWDER, BREATH ACTIVATED Inhale 1 Puff by mouth every day.      zonisamide (ZONEGRAN) 50 MG capsule Take 50 mg by mouth 2 times a day.      esomeprazole (NEXIUM) 40 MG delayed-release capsule Take 40 mg by mouth every morning before breakfast.      rivaroxaban (XARELTO) 10 MG Tab tablet Take 20 mg by mouth with dinner.       No current facility-administered medications for this visit.       Allergies:  Allergies   Allergen Reactions    Ciprofloxacin Anaphylaxis    Dilaudid [Hydromorphone] Itching     States \"I have an adverse reaction and need benadryl first\"    Shellfish Allergy Anaphylaxis    Tape Hives    Amoxicillin-Pot Clavulanate Rash     Other reaction(s): .Unknown  rash      Augmentin Rash and Unspecified     rash    Acetaminophen Unspecified     Other reaction(s): .Unknown  \"makes me deathly ill\"  \"makes me deathly ill\"      Amitriptyline Hcl     Apra     Aspirin Unspecified    Baclofen Unspecified    Biofreeze     Carisoprodol      Other reaction(s): Unknown    Celery Oil      Celery causes hives    Codeine     Compazine      Other reaction(s): Unknown    Compazine [Prochlorperazine]      Hallucinations, dizziness    Cyclobenzaprine     Eszopiclone     Hydrocodone-Acetaminophen     Kdc:Yellow Dye+Aspirin+Oxycodone     Ketamine      Other reaction(s): .Unknown  Pt reports he " "\"gets violent\"    Ketorolac Tromethamine [Toradol]     Lunesta      Other reaction(s): Unknown    Methocarbamol     Morphine Vomiting    Nitroglycerin      Other reaction(s): Unknown    Ondansetron [Zofran]     Other Misc      Can take dilaudid if given benadryl first    Oxycodone     Oxycodone-Acetaminophen     Oxycodone-Aspirin     Propofol     Propoxyphene      Other reaction(s): Unknown    Quetiapine      Other reaction(s): Unknown    Reglan [Metoclopramide]      Has no idea what his RX is    Risperidone     Tramadol     Tylenol      Other reaction(s): Unknown    Zofran [Ondansetron]        Family History:  Family History   Problem Relation Age of Onset    Hypertension Mother     Cancer Father     Hypertension Father        Social History:  Social History     Socioeconomic History    Marital status: Single     Spouse name: Not on file    Number of children: Not on file    Years of education: Not on file    Highest education level: Not on file   Occupational History    Not on file   Tobacco Use    Smoking status: Never    Smokeless tobacco: Never   Vaping Use    Vaping Use: Never used   Substance and Sexual Activity    Alcohol use: No    Drug use: No    Sexual activity: Not on file   Other Topics Concern    Not on file   Social History Narrative    53 y/o male, disabled     Social Determinants of Health     Financial Resource Strain: High Risk    Difficulty of Paying Living Expenses: Very hard   Food Insecurity: Food Insecurity Present    Worried About Running Out of Food in the Last Year: Often true    Ran Out of Food in the Last Year: Often true   Transportation Needs: Unmet Transportation Needs    Lack of Transportation (Medical): Yes    Lack of Transportation (Non-Medical): Yes   Physical Activity: Inactive    Days of Exercise per Week: 0 days    Minutes of Exercise per Session: 0 min   Stress: Stress Concern Present    Feeling of Stress : Very much   Social Connections: Socially Isolated    Frequency of " Communication with Friends and Family: Never    Frequency of Social Gatherings with Friends and Family: Never    Attends Alevism Services: Never    Active Member of Clubs or Organizations: No    Attends Club or Organization Meetings: Never    Marital Status: Never    Intimate Partner Violence: Not At Risk    Fear of Current or Ex-Partner: No    Emotionally Abused: No    Physically Abused: No    Sexually Abused: No   Housing Stability: High Risk    Unable to Pay for Housing in the Last Year: No    Number of Places Lived in the Last Year: 3    Unstable Housing in the Last Year: Yes          Physical Exam:  Physical Exam    Oriented x 3  Weight/BMI: There is no height or weight on file to calculate BMI.  There were no vitals taken for this visit.    Base Eye Exam       Visual Acuity (Snellen - Linear)         Right Left    Dist sc 20/20-2 20/20    Dist cc 20/20 20/25    Near sc J3 J3    Near cc J1+ J1+      Correction: Glasses              Tonometry (4:20 PM)         Right Left    Pressure soft soft              Pupils         Pupils    Right PERRL    Left PERRL              Visual Fields         Right Left     Full Full              Extraocular Movement         Right Left     Full, Ortho Full, Ortho              Neuro/Psych       Oriented x3: Yes    Mood/Affect: Normal                  Slit Lamp and Fundus Exam       External Exam         Right Left    External Normal Normal              Slit Lamp Exam         Right Left    Lids/Lashes Normal Normal    Conjunctiva/Sclera White and quiet White and quiet    Cornea Clear Clear    Anterior Chamber Deep and quiet Deep and quiet    Iris Round and reactive Round and reactive    Lens Clear Clear    Vitreous Normal Normal              Fundus Exam         Right Left    Disc Normal Normal    C/D Ratio 0.1 0.1    Macula Normal Normal    Vessels Normal Normal    Periphery Normal Normal                  Refraction       Wearing Rx         Sphere Cylinder Axis Add    Right  -0.75 +1.25 094 +2.50    Left [place] +0.75 087 +2.75      Age: 3yrs    Type: Trifocal              Wearing Rx #2         Sphere Cylinder Axis Add    Right -0.75 +1.50 100 +2.25    Left -0.50 +1.25 080 +2.25              Wearing Rx #3         Sphere Cylinder Axis Add    Right +1.25 +1.25 100     Left +1.50 +1.25 080       Type: near              Manifest Refraction (Auto)         Sphere Cylinder Axis    Right -0.75 +1.50 101    Left -0.25 +1.00 080              Final Rx         Sphere Cylinder Axis Add    Right -0.75 +1.50 100 +2.75    Left -0.50 +1.25 080 +2.75              Final Rx #2         Sphere Cylinder Axis Add    Right +1.50 +1.25 100     Left +1.75 +1.25 080       Type: near                    Pertinent Lab/Test/Imaging Review:      Assessment and Plan:     Allergic conjunctivitis of both eyes  2/9/2023-variable episodes of allergic conjunctivitis.  We will re-rx Fernando and refresh  4/12/2023 -     Bilateral occipital neuralgia  1/29/2020 - Suspect that some of his occipital very pinpoint headaches associated with stiff neck and discomfort radiating to the periorbital region is secondary to occipital neuralgia. Long history of multiple head injuries and attempts to control pain to the point where he has to come to the ER for control. By history has had some nerve blocks, but in his description the seem to involve mor shoulder girdle. Possible attempts or discussion for further blocks bu Dr Rogers in Neurology at [facility], but having difficulty getting back to be seen. By description from patient uncertain if the blocks were in the region of the occipital nerve. Therefore recommended referral to Dr Molina in Rehab medicine.   5/10/2022 -     Traumatic brain injury (HCC)  1/29/2020 - History of multiple concussions with headaches and traumatic brain injury. Obtained OCT NFL thicken ss and ganglion cell thickness that was unremarkable  With OD 97 and OS 98. Normal ganglion cell thickness. Therefore no  evidence of traumatic optic neuropathy or retrograde axonal degeneration.   5/10/2022 - OCT NFL thickness at 87 OU with normal color vision.   4/12/2023-suspect that this is also causing some exacerbation of his accommodative insufficiency.  Therefore gave the Rx for full frame computer glasses    Accommodative insufficiency  1/29/2020 - accommodative insufficieny and probable episodes of accommodative spasm leading to some of the blurry vision and worsening reading. Most likely a combination of early NS cataracts, presbyopia and the traumatic brain injury. Also current rx over corrected and the reading rx significantly undercorrected. Was able to correct to 20/20 vision at distance and near with new glasses rx. Discussed importance of wearing glasses at all times to relax accommodation and prevent spasm at both distance and near. Since needs both distance and reading correction recommended progressive lenses.   5/10/2022 - States having difficulty with the current progressive lenses, especially when on the computer. Is till showing signs of accommodative insufficiency and as well the distance rx has changed. Will give a new rx progressive, but los full frame computer reading.   4/12/2023-never got full frame computer glasses.  Had episode when going from inside with difficulty and near vision to going outside in bright sunlight then a being able to read.  Discussed that one can accommodate better in bright sunlight with pupils becoming smaller and easier lens shifting.  In trial frame did well with the full frame computer lenses and a trial frame in the office.  Therefore gave new Rx    Herpes zoster with complication  2/9/2023-no evidence of zoster ophthalmicus  4/12/2023-states that has had a few outbreaks of zoster since his last visit with me.  However no ocular involvement        Sarmad Velazco M.D.

## 2023-04-12 NOTE — ASSESSMENT & PLAN NOTE
2/9/2023-no evidence of zoster ophthalmicus  4/12/2023-states that has had a few outbreaks of zoster since his last visit with me.  However no ocular involvement

## 2023-04-12 NOTE — ASSESSMENT & PLAN NOTE
1/29/2020 - Suspect that some of his occipital very pinpoint headaches associated with stiff neck and discomfort radiating to the periorbital region is secondary to occipital neuralgia. Long history of multiple head injuries and attempts to control pain to the point where he has to come to the ER for control. By history has had some nerve blocks, but in his description the seem to involve mor shoulder girdle. Possible attempts or discussion for further blocks bu Dr Rogers in Neurology at [facility], but having difficulty getting back to be seen. By description from patient uncertain if the blocks were in the region of the occipital nerve. Therefore recommended referral to Dr Molina in Rehab medicine.   5/10/2022 -

## 2023-07-12 ENCOUNTER — OFFICE VISIT (OUTPATIENT)
Dept: OPHTHALMOLOGY | Facility: MEDICAL CENTER | Age: 53
End: 2023-07-12
Payer: MEDICARE

## 2023-07-12 DIAGNOSIS — H52.4 ACCOMMODATIVE INSUFFICIENCY: ICD-10-CM

## 2023-07-12 PROCEDURE — 99213 OFFICE O/P EST LOW 20 MIN: CPT | Performed by: OPHTHALMOLOGY

## 2023-07-12 ASSESSMENT — REFRACTION_WEARINGRX
OD_SPHERE: -0.75
OS_CYLINDER: +1.25
OD_CYLINDER: +1.25
OD_CYLINDER: +1.50
OD_SPHERE: +1.50
OD_CYLINDER: +1.50
OD_ADD: +2.75
OS_AXIS: 080
OD_AXIS: 100
OS_SPHERE: +1.75
OD_ADD: +2.75
OS_CYLINDER: +1.25
OS_ADD: +2.75
OS_ADD: +2.75
OD_AXIS: 100
OS_SPHERE: -0.50
OD_AXIS: 100
SPECS_TYPE: NEAR
OS_AXIS: 080
OD_SPHERE: -0.75
OS_CYLINDER: +1.25
OS_SPHERE: -0.50
OS_AXIS: 080

## 2023-07-12 ASSESSMENT — TONOMETRY
OS_IOP_MMHG: 9
IOP_METHOD: I-CARE
OD_IOP_MMHG: 9

## 2023-07-12 ASSESSMENT — REFRACTION_MANIFEST
OD_AXIS: 102
OD_SPHERE: -0.50
METHOD_AUTOREFRACTION: 1
OS_AXIS: 067
OD_CYLINDER: +1.25
OS_CYLINDER: +0.75
OS_SPHERE: +0.00

## 2023-07-12 ASSESSMENT — CONF VISUAL FIELD
OS_SUPERIOR_TEMPORAL_RESTRICTION: 0
OS_SUPERIOR_NASAL_RESTRICTION: 0
OS_NORMAL: 1
OD_INFERIOR_TEMPORAL_RESTRICTION: 0
OS_INFERIOR_TEMPORAL_RESTRICTION: 0
OS_INFERIOR_NASAL_RESTRICTION: 0
OD_NORMAL: 1
OD_INFERIOR_NASAL_RESTRICTION: 0
OD_SUPERIOR_TEMPORAL_RESTRICTION: 0
OD_SUPERIOR_NASAL_RESTRICTION: 0

## 2023-07-12 ASSESSMENT — VISUAL ACUITY
OD_SC: 20/20
OS_SC: 20/20
METHOD: SNELLEN - LINEAR

## 2023-07-12 ASSESSMENT — SLIT LAMP EXAM - LIDS
COMMENTS: NORMAL
COMMENTS: NORMAL

## 2023-07-12 ASSESSMENT — ENCOUNTER SYMPTOMS
PHOTOPHOBIA: 1
DIZZINESS: 1
HEADACHES: 1
BLURRED VISION: 1
EYE PAIN: 1

## 2023-07-12 ASSESSMENT — CUP TO DISC RATIO
OD_RATIO: 0.1
OS_RATIO: 0.1

## 2023-07-12 ASSESSMENT — EXTERNAL EXAM - RIGHT EYE: OD_EXAM: NORMAL

## 2023-07-12 ASSESSMENT — EXTERNAL EXAM - LEFT EYE: OS_EXAM: NORMAL

## 2023-07-12 NOTE — ASSESSMENT & PLAN NOTE
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 Most likely a combination of early NS cataracts, presbyopia and the traumatic brain injury. Also current rx over corrected and the reading rx significantly undercorrected. Was able to correct to 20/20 vision at distance and near with new glasses rx. Discussed importance of wearing glasses at all times to relax accommodation and prevent spasm at both distance and near. Since needs both distance and reading correction recommended progressive lenses.   5/10/2022 - States having difficulty with the current progressive lenses, especially when on the computer. Is till showing signs of accommodative insufficiency and as well the distance rx has changed. Will give a new rx progressive, but los full frame computer reading.   4/12/2023-never got full frame computer glasses.  Had episode when going from inside with difficulty and near vision to going outside in bright sunlight then a being able to read.  Discussed that one can accommodate better in bright sunlight with pupils becoming smaller and easier lens shifting.  In trial frame did well with the full frame computer lenses and a trial frame in the office.  Therefore gave new Rx  7/12/2023 - [facility], needed more specifics regarding progressive, polarized UV etc. Gave new rx with that information included   Alert and interactive, no focal deficits

## 2023-07-12 NOTE — PROGRESS NOTES
Peds/Neuro Ophthalmology:   Sarmad Velazco M.D.    Date & Time note created:    7/12/2023   12:22 PM     Referring MD / APRN:  Pcp Pt States None, No att. providers found    Patient ID:  Name:             Landon Allred   YOB: 1970  Age:                 53 y.o.  male   MRN:               [id number]    Chief Complaint/Reason for Visit:     Other (3 month follow up for Accommodative insufficiency in both eyes)      History of Present Illness:    Landon Allred is a 53 y.o. 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 Pt states vision is always blurry not able to see well when he is working outside. At this time pt does not wear glasses. [facility]. Pt has not been able to get refresh or Crolom due to [facility] not [facility]. Pt feels that eyes are getting worse. Eyes are dry, and light senstive all the time. Pt has constant headaches and migraines due to eyes and neck. Lately has been very dizzy.         Review of Systems:  Review of Systems   Eyes:  Positive for blurred vision, photophobia and pain.        Accommodative insufficiency in both eyes   Neurological:  Positive for dizziness and headaches.   All other systems reviewed and are negative.      Past Medical History:   Past Medical History:   Diagnosis Date    Allergy     Arthritis     Asthma     Claustrophobia     Clostridium difficile diarrhea     Clotting disorder (HCC)     Depression     DVT (deep venous thrombosis) ([facility])     GERD (gastroesophageal reflux disease)     Hypertension     IBD (inflammatory bowel disease)     Migraine     MRSA (methicillin resistant Staphylococcus aureus)     Pancreatitis     PE (pulmonary embolism)     Psychiatric disorder     PTSD, depression    Seizure ([facility])        Past Surgical History:  Past Surgical History:   Procedure Laterality Date    INGUINAL HERNIA REPAIR Right 09/06/2019    DENTAL EXTRACTION(S)      HERNIA REPAIR Right     NERVE ULNAR  "REPAIR OR EXPLORE      ORIF, ELBOW      ORIF, FRACTURE, FEMUR      [facility]OS[facility] NASAL SEPTAL FRACTURE      RECTAL EXPLORATION         Current Outpatient Medications:  Current Outpatient Medications   Medication Sig Dispense Refill    ALBUTEROL INH       promethazine (PHENERGAN) 25 MG Tab Take 25 mg by mouth every 6 hours as needed for Nausea/Vomiting.      clonazePAM (KLONOPIN) 1 MG Tab Take 1 mg by mouth 2 times a day.      fluticasone (FLONASE) 50 MCG/ACT nasal spray Administer 2 Sprays into affected nostril(S) every day.      diphenhydrAMINE HCl (BENADRYL ALLERGY PO) Take  by mouth.      Fluticasone Furoate-Vilanterol (BREO) 200-25 MCG/INH AEROSOL POWDER, BREATH ACTIVATED Inhale 1 Puff by mouth every day.      zonisamide (ZONEGRAN) 50 MG capsule Take 50 mg by mouth 2 times a day.      esomeprazole (NEXIUM) 40 MG delayed-release capsule Take 40 mg by mouth every morning before breakfast.      rivaroxaban (XARELTO) 10 MG Tab tablet Take 20 mg by mouth with dinner.      polyvinyl alcohol-povidone (REFRESH) 1.4-0.6 % ophthalmic solution Administer 1 Drop into both eyes 2 times a day. (Patient not taking: Reported on 7/12/2023) 50 Each 3    valacyclovir (VALTREX) 1 GM Tab Take 1 Tablet by mouth 3 times a day. (Patient not taking: Reported on 7/12/2023) 21 Tablet 0    therapeutic multivitamin-minerals (THERAGRAN-M) Tab Take 1 Tab by mouth every day. (Patient not taking: Reported on 7/12/2023)       No current facility-administered medications for this visit.       Allergies:  Allergies   Allergen Reactions    Ciprofloxacin Anaphylaxis    Dilaudid [Hydromorphone] Itching     States \"I have an adverse reaction and need benadryl first\"    Shellfish Allergy Anaphylaxis    Tape Hives    Amoxicillin-Pot Clavulanate Rash     Other reaction(s): .Unknown  rash      Augmentin Rash and Unspecified     rash    Acetaminophen Unspecified     Other reaction(s): .Unknown  \"makes me deathly ill\"  \"makes me deathly ill\"      " "Amitriptyline Hcl     Apra     Aspirin Unspecified    Baclofen Unspecified    Biofreeze     Carisoprodol      Other reaction(s): Unknown    Celery Oil      Celery causes hives    Codeine     Compazine      Other reaction(s): Unknown    Compazine [Prochlorperazine]      Hallucinations, dizziness    Cyclobenzaprine     Eszopiclone     Hydrocodone-Acetaminophen     Kdc:Yellow Dye+Aspirin+Oxycodone     Ketamine      Other reaction(s): .Unknown  Pt reports he \"gets violent\"    Ketorolac Tromethamine [Toradol]     Lunesta      Other reaction(s): Unknown    Methocarbamol     Morphine Vomiting    Nitroglycerin      Other reaction(s): Unknown    Ondansetron [Zofran]     Other Misc      Can take dilaudid if given benadryl first    Oxycodone     Oxycodone-Acetaminophen     Oxycodone-Aspirin     Propofol     Propoxyphene      Other reaction(s): Unknown    Quetiapine      Other reaction(s): Unknown    Reglan [Metoclopramide]      Has no idea what his RX is    Risperidone     Tramadol     Tylenol      Other reaction(s): Unknown    Zofran [Ondansetron]        Family History:  Family History   Problem Relation Age of Onset    Hypertension Mother     Cancer Father     Hypertension Father        Social History:  Social History     Socioeconomic History    Marital status: Single     Spouse name: Not on file    Number of children: Not on file    Years of education: Not on file    Highest education level: Not on file   Occupational History    Not on file   Tobacco Use    Smoking status: Never    Smokeless tobacco: Never   Vaping Use    Vaping Use: Never used   Substance and Sexual Activity    Alcohol use: No    Drug use: No    Sexual activity: Not on file   Other Topics Concern    Not on file   Social History Narrative    53 y/o male, disabled     Social Determinants of Health     Financial Resource Strain: High Risk (2/1/2023)    Overall Financial Resource Strain (CARDIA)     Difficulty of Paying Living Expenses: Very hard   Food " Insecurity: Food Insecurity Present (2/1/2023)    Hunger Vital Sign     Worried About Running Out of Food in the Last Year: Often true     Ran Out of Food in the Last Year: Often true   Transportation Needs: Unmet Transportation Needs (2/1/2023)    PRAPARE - Transportation     Lack of Transportation (Medical): Yes     Lack of Transportation (Non-Medical): Yes   Physical Activity: Inactive (2/1/2023)    Exercise Vital Sign     Days of Exercise per Week: 0 days     Minutes of Exercise per Session: 0 min   Stress: Stress Concern Present (2/1/2023)    [employer] [place] [employer] - Occupational Stress Questionnaire     Feeling of Stress : Very much   Social Connections: Socially Isolated (2/1/2023)    Social Connection and Isolation Panel [NHANES]     Frequency of Communication with Friends and Family: Never     Frequency of Social Gatherings with Friends and Family: Never     Attends Sikhism Services: Never     Active Member of Clubs or Organizations: No     Attends Club or Organization Meetings: Never     Marital Status: Never    Intimate Partner Violence: Not At Risk (2/1/2023)    Humiliation, Afraid, Rape, and Kick questionnaire     Fear of Current or Ex-Partner: No     Emotionally Abused: No     Physically Abused: No     Sexually Abused: No   Housing Stability: High Risk (2/1/2023)    Housing Stability Vital Sign     Unable to Pay for Housing in the Last Year: No     Number of Places Lived in the Last Year: 3     Unstable Housing in the Last Year: Yes          Physical Exam:  Physical Exam    Oriented x 3  Weight/BMI: There is no height or weight on file to calculate BMI.  There were no vitals taken for this visit.    Base Eye Exam       Visual Acuity (Snellen - Linear)         Right Left    Dist sc 20/20 20/20              Tonometry (i-care, 11:16 AM)         Right Left    Pressure 9 9              Pupils         Pupils    Right PERRL    Left PERRL              Visual Fields         Right Left      Full Full              Neuro/Psych       Oriented x3: Yes    Mood/Affect: Normal                  Additional Tests       Color         Right Left    [place] 12/12 12/12                  Slit Lamp and Fundus Exam       External Exam         Right Left    External Normal Normal              Slit Lamp Exam         Right Left    Lids/Lashes Normal Normal    Conjunctiva/Sclera White and quiet White and quiet    Cornea Clear Clear    Anterior Chamber Deep and quiet Deep and quiet    Iris Round and reactive Round and reactive    Lens Clear Clear    Vitreous Normal Normal              Fundus Exam         Right Left    Disc Normal Normal    C/D Ratio 0.1 0.1    Macula Normal Normal    Vessels Normal Normal    Periphery Normal Normal                  Refraction       Wearing Rx         Sphere Cylinder Axis Add    Right -0.75 +1.50 100 +2.75    Left -0.50 +1.25 080 +2.75              Wearing Rx #2         Sphere Cylinder Axis Add    Right +1.50 +1.25 100     Left +1.75 +1.25 080       Type: near              Wearing Rx #3         Sphere Cylinder Axis Add    Right -0.75 +1.50 100 +2.75    Left -0.50 +1.25 080 +2.75              Manifest Refraction (Auto)         Sphere Cylinder Axis    Right -0.50 +1.25 102    Left +0.00 +0.75 067              Final Rx         Sphere Cylinder Axis Add    Right -0.75 +1.50 100 +2.75    Left -0.50 +1.25 080 +2.75      Type: Progressive with antireflective, UV coating              Final Rx #2         Sphere Cylinder Axis Add    Right +1.50 +1.25 100     Left +1.75 +1.25 080       Type: full frame near              Final Rx #3         Sphere Cylinder Axis Add    Right -0.75 +1.50 100 +2.75    Left -0.50 +1.25 080 +2.75      Type: progressive polarized sun glass gray, anti reflective UV coating                    Pertinent Lab/Test/Imaging Review:      Assessment and Plan:     Accommodative insufficiency  1/29/2020 - 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 Most likely a combination of early NS cataracts, presbyopia and the traumatic brain injury. Also current rx over corrected and the reading rx significantly undercorrected. Was able to correct to 20/20 vision at distance and near with new glasses rx. Discussed importance of wearing glasses at all times to relax accommodation and prevent spasm at both distance and near. Since needs both distance and reading correction recommended progressive lenses.   5/10/2022 - States having difficulty with the current progressive lenses, especially when on the computer. Is till showing signs of accommodative insufficiency and as well the distance rx has changed. Will give a new rx progressive, but los full frame computer reading.   4/12/2023-never got full frame computer glasses.  Had episode when going from inside with difficulty and near vision to going outside in bright sunlight then a being able to read.  Discussed that one can accommodate better in bright sunlight with pupils becoming smaller and easier lens shifting.  In trial frame did well with the full frame computer lenses and a trial frame in the office.  Therefore gave new Rx  7/12/2023 - [facility], needed more specifics regarding progressive, polarized UV etc. Gave new rx with that information included        Sarmad Velazco M.D.

## 2023-09-05 ENCOUNTER — APPOINTMENT (OUTPATIENT)
Dept: RADIOLOGY | Facility: MEDICAL CENTER | Age: 53
End: 2023-09-05
Attending: STUDENT IN AN ORGANIZED HEALTH CARE EDUCATION/TRAINING PROGRAM
Payer: MEDICARE

## 2023-09-05 ENCOUNTER — HOSPITAL ENCOUNTER (EMERGENCY)
Facility: MEDICAL CENTER | Age: 53
End: 2023-09-06
Attending: STUDENT IN AN ORGANIZED HEALTH CARE EDUCATION/TRAINING PROGRAM
Payer: MEDICARE

## 2023-09-05 DIAGNOSIS — K58.9 IRRITABLE BOWEL SYNDROME WITHOUT DIARRHEA: ICD-10-CM

## 2023-09-05 DIAGNOSIS — R07.9 ACUTE CHEST PAIN: ICD-10-CM

## 2023-09-05 LAB
ABO + RH BLD: NORMAL
ABO GROUP BLD: NORMAL
ALBUMIN SERPL BCP-MCNC: 4.5 G/DL (ref 3.2–4.9)
ALBUMIN/GLOB SERPL: 1.6 G/DL
ALP SERPL-CCNC: 58 U/L (ref 30–99)
ALT SERPL-CCNC: 51 U/L (ref 2–50)
ANION GAP SERPL CALC-SCNC: 12 MMOL/L (ref 7–16)
AST SERPL-CCNC: 39 U/L (ref 12–45)
BASOPHILS # BLD AUTO: 0.2 % (ref 0–1.8)
BASOPHILS # BLD: 0.01 K/UL (ref 0–0.12)
BILIRUB SERPL-MCNC: 0.4 MG/DL (ref 0.1–1.5)
BLD GP AB SCN SERPL QL: NORMAL
BUN SERPL-MCNC: 18 MG/DL (ref 8–22)
CALCIUM ALBUM COR SERPL-MCNC: 8.8 MG/DL (ref 8.5–10.5)
CALCIUM SERPL-MCNC: 9.2 MG/DL (ref 8.5–10.5)
CHLORIDE SERPL-SCNC: 109 MMOL/L (ref 96–112)
CO2 SERPL-SCNC: 19 MMOL/L (ref 20–33)
CREAT SERPL-MCNC: 1.08 MG/DL (ref 0.5–1.4)
D DIMER PPP IA.FEU-MCNC: <0.27 UG/ML (FEU) (ref 0–0.5)
EKG IMPRESSION: NORMAL
EOSINOPHIL # BLD AUTO: 0.2 K/UL (ref 0–0.51)
EOSINOPHIL NFR BLD: 3.4 % (ref 0–6.9)
ERYTHROCYTE [DISTWIDTH] IN BLOOD BY AUTOMATED COUNT: 44.4 FL (ref 35.9–50)
GFR SERPLBLD CREATININE-BSD FMLA CKD-EPI: 82 ML/MIN/1.73 M 2
GLOBULIN SER CALC-MCNC: 2.9 G/DL (ref 1.9–3.5)
GLUCOSE SERPL-MCNC: 94 MG/DL (ref 65–99)
HCT VFR BLD AUTO: 46.9 % (ref 42–52)
HGB BLD-MCNC: 16.2 G/DL (ref 14–18)
IMM GRANULOCYTES # BLD AUTO: 0.02 K/UL (ref 0–0.11)
IMM GRANULOCYTES NFR BLD AUTO: 0.3 % (ref 0–0.9)
LIPASE SERPL-CCNC: 35 U/L (ref 11–82)
LYMPHOCYTES # BLD AUTO: 1.7 K/UL (ref 1–4.8)
LYMPHOCYTES NFR BLD: 29.3 % (ref 22–41)
MCH RBC QN AUTO: 32.9 PG (ref 27–33)
MCHC RBC AUTO-ENTMCNC: 34.5 G/DL (ref 32.3–36.5)
MCV RBC AUTO: 95.3 FL (ref 81.4–97.8)
MONOCYTES # BLD AUTO: 0.46 K/UL (ref 0–0.85)
MONOCYTES NFR BLD AUTO: 7.9 % (ref 0–13.4)
NEUTROPHILS # BLD AUTO: 3.42 K/UL (ref 1.82–7.42)
NEUTROPHILS NFR BLD: 58.9 % (ref 44–72)
NRBC # BLD AUTO: 0 K/UL
NRBC BLD-RTO: 0 /100 WBC (ref 0–0.2)
PLATELET # BLD AUTO: 170 K/UL (ref 164–446)
PMV BLD AUTO: 9.6 FL (ref 9–12.9)
POTASSIUM SERPL-SCNC: 3.9 MMOL/L (ref 3.6–5.5)
PROT SERPL-MCNC: 7.4 G/DL (ref 6–8.2)
RBC # BLD AUTO: 4.92 M/UL (ref 4.7–6.1)
RH BLD: NORMAL
SODIUM SERPL-SCNC: 140 MMOL/L (ref 135–145)
TROPONIN T SERPL-MCNC: <6 NG/L (ref 6–19)
WBC # BLD AUTO: 5.8 K/UL (ref 4.8–10.8)

## 2023-09-05 PROCEDURE — 86850 RBC ANTIBODY SCREEN: CPT

## 2023-09-05 PROCEDURE — 93005 ELECTROCARDIOGRAM TRACING: CPT | Performed by: STUDENT IN AN ORGANIZED HEALTH CARE EDUCATION/TRAINING PROGRAM

## 2023-09-05 PROCEDURE — 700111 HCHG RX REV CODE 636 W/ 250 OVERRIDE (IP): Mod: JZ | Performed by: STUDENT IN AN ORGANIZED HEALTH CARE EDUCATION/TRAINING PROGRAM

## 2023-09-05 PROCEDURE — A9270 NON-COVERED ITEM OR SERVICE: HCPCS | Performed by: STUDENT IN AN ORGANIZED HEALTH CARE EDUCATION/TRAINING PROGRAM

## 2023-09-05 PROCEDURE — 36415 COLL VENOUS BLD VENIPUNCTURE: CPT

## 2023-09-05 PROCEDURE — 86900 BLOOD TYPING SEROLOGIC ABO: CPT

## 2023-09-05 PROCEDURE — 96374 THER/PROPH/DIAG INJ IV PUSH: CPT

## 2023-09-05 PROCEDURE — 700102 HCHG RX REV CODE 250 W/ 637 OVERRIDE(OP): Performed by: STUDENT IN AN ORGANIZED HEALTH CARE EDUCATION/TRAINING PROGRAM

## 2023-09-05 PROCEDURE — 85379 FIBRIN DEGRADATION QUANT: CPT

## 2023-09-05 PROCEDURE — 85025 COMPLETE CBC W/AUTO DIFF WBC: CPT

## 2023-09-05 PROCEDURE — 84484 ASSAY OF TROPONIN QUANT: CPT

## 2023-09-05 PROCEDURE — 74176 CT ABD & PELVIS W/O CONTRAST: CPT

## 2023-09-05 PROCEDURE — 86901 BLOOD TYPING SEROLOGIC RH(D): CPT

## 2023-09-05 PROCEDURE — 99285 EMERGENCY DEPT VISIT HI MDM: CPT

## 2023-09-05 PROCEDURE — 83690 ASSAY OF LIPASE: CPT

## 2023-09-05 PROCEDURE — 80053 COMPREHEN METABOLIC PANEL: CPT

## 2023-09-05 PROCEDURE — 71045 X-RAY EXAM CHEST 1 VIEW: CPT

## 2023-09-05 PROCEDURE — 93005 ELECTROCARDIOGRAM TRACING: CPT

## 2023-09-05 RX ORDER — HALOPERIDOL 5 MG/ML
5 INJECTION INTRAMUSCULAR ONCE
Status: COMPLETED | OUTPATIENT
Start: 2023-09-05 | End: 2023-09-05

## 2023-09-05 RX ORDER — DIPHENHYDRAMINE HCL 25 MG
25 TABLET ORAL ONCE
Status: COMPLETED | OUTPATIENT
Start: 2023-09-05 | End: 2023-09-05

## 2023-09-05 RX ADMIN — DIPHENHYDRAMINE HYDROCHLORIDE 25 MG: 25 TABLET ORAL at 22:14

## 2023-09-05 RX ADMIN — HALOPERIDOL LACTATE 5 MG: 5 INJECTION, SOLUTION INTRAMUSCULAR at 22:14

## 2023-09-05 ASSESSMENT — FIBROSIS 4 INDEX: FIB4 SCORE: 0.87

## 2023-09-06 VITALS
DIASTOLIC BLOOD PRESSURE: 79 MMHG | TEMPERATURE: 98.1 F | OXYGEN SATURATION: 95 % | SYSTOLIC BLOOD PRESSURE: 134 MMHG | RESPIRATION RATE: 18 BRPM | HEART RATE: 85 BPM | BODY MASS INDEX: 25.06 KG/M2 | HEIGHT: 72 IN | WEIGHT: 185 LBS

## 2023-09-06 NOTE — ED PROVIDER NOTES
ED Provider Note    CHIEF COMPLAINT  Chief Complaint   Patient presents with    Bloody Stools     Started yesterday, described as bright red. All blood per patient. No stool    Chest Pain     Left sided, constant stabbing. Also c/o LUQ pain. Coughing up blood since yesterday. Patient on 5 liters of oxygen 24/7.        EXTERNAL RECORDS REVIEWED  External ED Note visit on 4/25/2023 for closed head injury    HPI/ROS  LIMITATION TO HISTORY   Select: : None  OUTSIDE HISTORIAN(S):      Landon Allred is a 53 y.o. male who presents with bloody stool starting yesterday multiple bright red bloody stools.  Patient also is complaining of chest pain which is left-sided constant and stabbing.  Patient reports that he coughs up blood chronically, unknown reasoning.  Patient reports chronic pain from fibromyalgia.  Patient endorses diffuse abdominal pain.    PAST MEDICAL HISTORY   has a past medical history of Allergy, Arthritis, Asthma, Claustrophobia, Clostridium difficile diarrhea, Clotting disorder ([facility]), Depression, DVT (deep venous thrombosis) ([facility]), GERD (gastroesophageal reflux disease), Hypertension, IBD (inflammatory bowel disease), Migraine, MRSA (methicillin resistant Staphylococcus aureus), Pancreatitis, PE (pulmonary embolism), Psychiatric disorder, and Seizure ([facility]).    SURGICAL HISTORY   has a past surgical history that includes nerve ulnar repair or explore; dental extraction(s); closed rx nasal septal fracture; rectal exploration; orif, fracture, femur; orif, elbow; inguinal hernia repair (Right, 09/06/2019); and hernia repair (Right).    FAMILY HISTORY  Family History   Problem Relation Age of Onset    Hypertension Mother     Cancer Father     Hypertension Father        SOCIAL HISTORY  Social History     Tobacco Use    Smoking status: Never    Smokeless tobacco: Never   Vaping Use    Vaping Use: Never used   Substance and Sexual Activity    Alcohol use: No    Drug use: No    Sexual activity: Not on file  "      CURRENT MEDICATIONS  Home Medications       Reviewed by Mihir Rae R.N. (Registered Nurse) on 09/05/23 at 2001  Med List Status: Partial     Medication Last Dose Status   ALBUTEROL INH  Active   clonazePAM (KLONOPIN) 1 MG Tab  Active   diphenhydrAMINE HCl (BENADRYL ALLERGY PO)  Active   esomeprazole (NEXIUM) 40 MG delayed-release capsule  Active   fluticasone (FLONASE) 50 MCG/ACT nasal spray  Active   Fluticasone Furoate-Vilanterol (BREO) 200-25 MCG/INH AEROSOL POWDER, BREATH ACTIVATED  Active   polyvinyl alcohol-povidone (REFRESH) 1.4-0.6 % ophthalmic solution  Active   promethazine (PHENERGAN) 25 MG Tab  Active   rivaroxaban (XARELTO) 10 MG Tab tablet  Active   therapeutic multivitamin-minerals (THERAGRAN-M) Tab  Active   valacyclovir (VALTREX) 1 GM Tab  Active   zonisamide (ZONEGRAN) 50 MG capsule  Active                    ALLERGIES  Allergies   Allergen Reactions    Ciprofloxacin Anaphylaxis    Dilaudid [Hydromorphone] Itching     States \"I have an adverse reaction and need benadryl first\"    Shellfish Allergy Anaphylaxis    Tape Hives    Amoxicillin-Pot Clavulanate Rash     Other reaction(s): .Unknown  rash      Augmentin Rash and Unspecified     rash    Acetaminophen Unspecified     Other reaction(s): .Unknown  \"makes me deathly ill\"  \"makes me deathly ill\"      Amitriptyline Hcl     Apra     Aspirin Unspecified    Baclofen Unspecified    Biofreeze     Carisoprodol      Other reaction(s): Unknown    Celery Oil      Celery causes hives    Codeine     Compazine      Other reaction(s): Unknown    Compazine [Prochlorperazine]      Hallucinations, dizziness    Cyclobenzaprine     Eszopiclone     Hydrocodone-Acetaminophen     Kdc:Yellow Dye+Aspirin+Oxycodone     Ketamine      Other reaction(s): .Unknown  Pt reports he \"gets violent\"    Ketorolac Tromethamine [Toradol]     Lunesta      Other reaction(s): Unknown    Methocarbamol     Morphine Vomiting    Nitroglycerin      Other reaction(s): Unknown    " Ondansetron [Zofran]     Other Misc      Can take dilaudid if given benadryl first    Oxycodone     Oxycodone-Acetaminophen     Oxycodone-Aspirin     Propofol     Propoxyphene      Other reaction(s): Unknown    Quetiapine      Other reaction(s): Unknown    Reglan [Metoclopramide]      Has no idea what his RX is    Risperidone     Tramadol     Tylenol      Other reaction(s): Unknown    Zofran [Ondansetron]        PHYSICAL EXAM  VITAL SIGNS: /85   Pulse 88   Temp 36.4 °C (97.5 °F) (Temporal)   Resp 16   Ht 1.829 m (6')   Wt 83.9 kg (185 lb)   SpO2 98%   BMI 25.09 kg/m²    Vitals and nursing note reviewed.  Nursing at bedside during exam as chaperone.  Constitutional:       Comments: Patient is lying in bed supine, pleasant, conversant, speaking in complete sentences   HENT:      Head: Normocephalic and atraumatic.   Eyes:      Extraocular Movements: Extraocular movements intact.      Conjunctiva/sclera: Conjunctivae normal.      Pupils: Pupils are equal, round, and reactive to light.   Cardiovascular:      Pulses: Normal pulses.      Comments: HR 88  Pulmonary:      Effort: Pulmonary effort is normal. No respiratory distress.   Abdominal:      Comments: Abdomen is soft, left lower quadrant tenderness to palpation with left upper quadrant tenderness to palpation  Rectal exam benign, no masses, no bleeding, Hemoccult negative  Musculoskeletal:         General: No swelling. Normal range of motion.      Cervical back: Normal range of motion. No rigidity.   Skin:     General: Skin is warm and dry.      Capillary Refill: Capillary refill takes less than 2 seconds.   Neurological:      Mental Status: Alert.       DIAGNOSTIC STUDIES / PROCEDURES  EKG  I have independently interpreted this EKG  No ischemia or arrhythmia    LABS  Mild dehydration with low bicarb    RADIOLOGY  I have independently interpreted the diagnostic imaging associated with this visit and am waiting the final reading from the radiologist.    My preliminary interpretation is as follows: No pneumonia, pneumothorax, pulmonary edema  Radiologist interpretation: No acute intra-abdominal process    COURSE & MEDICAL DECISION MAKING      INITIAL ASSESSMENT, COURSE AND PLAN  Care Narrative: Mild depression in bicarb likely due to dehydration, otherwise, CMP demonstrates no evidence of acute kidney injury, acute electrolyte abnormality, acute liver failure, CBC demonstrates no evidence of acute anemia or leukocytosis.  Lipase negative, pancreatitis inconsistent with patient presentation at this time.  Troponin negative, ACS inconsistent with patient presentation at this time.  Patient given Benadryl for allergy to Haldol and Haldol for nausea given that no medicines work for him he reports, not Compazine, Zofran or Reglan.  Significant GI bleeding consistent with patient presentation at this time.  Disposition pending CT imaging to rule out acute intra-abdominal process.    Electronically signed by: Jose Alejandro Phillip M.D., 9/5/2023 10:19 PM    CMP demonstrates no evidence of acute kidney injury, acute electrolyte abnormality, acute liver failure, CBC demonstrates no evidence of acute anemia or leukocytosis.  Mild dehydration.  D-dimer negative, PE inconsistent with patient presentation at this time.  Lipase negative, pancreatitis inconsistent with patient presentation at this time.  Troponin negative, EKG nonischemic, ACS inconsistent with patient presentation at this time.    Patient responded very well to Haldol, nausea and pain has resolved.  Patient does not have an allergy to Haldol.    CT abdomen pelvis without evidence of acute intra-abdominal process.  Chest x-ray without acute cardiopulmonary process.  Disposition Home with PCP follow-up.  Patient likely suffering from exacerbation of irritable bowel syndrome.    This dictation has been created using voice recognition software. I am continuously working with the software to minimize the number of  voice recognition errors and I have made every attempt to manually correct the errors within my dictation. However errors  related to this voice recognition software may still exist and should be interpreted within the appropriate context.     Electronically signed by: Jose Alejandro Phillip M.D., 9/5/2023 11:41 PM            DISPOSITION AND DISCUSSIONS    Escalation of care considered, and ultimately not performed:acute inpatient care management, however at this time, the patient is most appropriate for outpatient management    Decision tools and prescription drugs considered including, but not limited to: Antibiotics not indicated in the absence of bacterial infection and Pain Medications   over-the-counter pain medications are appropriate, narcotics not indicated at this time  .    FINAL DIAGNOSIS  1. Irritable bowel syndrome without diarrhea    2. Acute chest pain           Electronically signed by: Jose Alejandro Phillip M.D., 9/5/2023 10:00 PM

## 2023-09-06 NOTE — ED NOTES
Bedside report to LEYDI Villa. Pt awake and breathing with even, unlabored respirations on 5L NC at time of repot.

## 2023-09-06 NOTE — ED NOTES
Bedside report received from Justina HOLLEY. Pt resting comfortably and aware of POC with call light available and in reach. Pt on 5L NC Fall precautions in place and appropriate for pt. Pt reports no needs at this time. Appropriate equipment in room.

## 2023-09-06 NOTE — ED TRIAGE NOTES
Landon Allred  53 y.o.  Male  Chief Complaint   Patient presents with    Bloody Stools     Started yesterday, described as bright red. All blood per patient. No stool    Chest Pain     Left sided, constant stabbing. Also c/o LUQ pain. Coughing up blood since yesterday. Patient on 5 liters of oxygen 24/7.

## 2024-02-12 ENCOUNTER — APPOINTMENT (OUTPATIENT)
Dept: OPHTHALMOLOGY | Facility: MEDICAL CENTER | Age: 54
End: 2024-02-12
Payer: COMMERCIAL

## 2024-03-25 ENCOUNTER — OFFICE VISIT (OUTPATIENT)
Dept: OPHTHALMOLOGY | Facility: MEDICAL CENTER | Age: 54
End: 2024-03-25
Payer: MEDICARE

## 2024-03-25 DIAGNOSIS — H10.13 ALLERGIC CONJUNCTIVITIS OF BOTH EYES: ICD-10-CM

## 2024-03-25 DIAGNOSIS — H52.03 HYPEROPIA OF BOTH EYES WITH ASTIGMATISM: ICD-10-CM

## 2024-03-25 DIAGNOSIS — M54.81 BILATERAL OCCIPITAL NEURALGIA: ICD-10-CM

## 2024-03-25 DIAGNOSIS — H52.203 HYPEROPIA OF BOTH EYES WITH ASTIGMATISM: ICD-10-CM

## 2024-03-25 DIAGNOSIS — R68.89 SUSPECTED GLAUCOMA OF BOTH EYES: ICD-10-CM

## 2024-03-25 DIAGNOSIS — S06.9X9S TRAUMATIC BRAIN INJURY WITH LOSS OF CONSCIOUSNESS, SEQUELA (HCC): ICD-10-CM

## 2024-03-25 DIAGNOSIS — H52.4 ACCOMMODATIVE INSUFFICIENCY: ICD-10-CM

## 2024-03-25 PROCEDURE — 99214 OFFICE O/P EST MOD 30 MIN: CPT | Mod: 25 | Performed by: OPHTHALMOLOGY

## 2024-03-25 PROCEDURE — 92250 FUNDUS PHOTOGRAPHY W/I&R: CPT | Performed by: OPHTHALMOLOGY

## 2024-03-25 ASSESSMENT — REFRACTION_WEARINGRX
OS_AXIS: 087
OS_ADD: +2.75
OS_SPHERE: -0.50
OD_AXIS: 100
OD_ADD: +2.25
OD_AXIS: 092
OS_ADD: +2.50
OD_SPHERE: -0.75
OD_ADD: +2.75
OS_SPHERE: PLANO
OD_CYLINDER: +1.50
OS_CYLINDER: +0.75
OD_SPHERE: -1.00
OS_AXIS: 080
OS_CYLINDER: +1.25
OD_CYLINDER: +1.25

## 2024-03-25 ASSESSMENT — REFRACTION_MANIFEST
METHOD_AUTOREFRACTION: 1
OS_AXIS: 071
OD_AXIS: 099
OS_SPHERE: -0.25
OD_CYLINDER: +1.25
OS_CYLINDER: +1.00
OD_SPHERE: -0.75

## 2024-03-25 ASSESSMENT — TONOMETRY
OS_IOP_MMHG: 10
OD_IOP_MMHG: 10

## 2024-03-25 ASSESSMENT — CUP TO DISC RATIO
OD_RATIO: 0.1
OS_RATIO: 0.1

## 2024-03-25 ASSESSMENT — EXTERNAL EXAM - LEFT EYE: OS_EXAM: NORMAL

## 2024-03-25 ASSESSMENT — EXTERNAL EXAM - RIGHT EYE: OD_EXAM: NORMAL

## 2024-03-25 ASSESSMENT — CONF VISUAL FIELD
OD_NORMAL: 1
OS_SUPERIOR_NASAL_RESTRICTION: 0
OD_SUPERIOR_TEMPORAL_RESTRICTION: 0
OS_INFERIOR_NASAL_RESTRICTION: 0
OD_SUPERIOR_NASAL_RESTRICTION: 0
OS_SUPERIOR_TEMPORAL_RESTRICTION: 0
OS_INFERIOR_TEMPORAL_RESTRICTION: 0
OS_NORMAL: 1
OD_INFERIOR_TEMPORAL_RESTRICTION: 0
OD_INFERIOR_NASAL_RESTRICTION: 0

## 2024-03-25 ASSESSMENT — VISUAL ACUITY
METHOD: SNELLEN - LINEAR
CORRECTION_TYPE: GLASSES
OD_CC: 20/20
OS_CC: 20/20
OD_CC: J1+
OS_CC: J1+

## 2024-03-25 ASSESSMENT — ENCOUNTER SYMPTOMS: BLURRED VISION: 1

## 2024-03-25 ASSESSMENT — SLIT LAMP EXAM - LIDS
COMMENTS: NORMAL
COMMENTS: NORMAL

## 2024-03-25 NOTE — ASSESSMENT & PLAN NOTE
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 Most likely a combination of early NS cataracts, presbyopia and the traumatic brain injury. Also current rx over corrected and the reading rx significantly undercorrected. Was able to correct to 20/20 vision at distance and near with new glasses rx. Discussed importance of wearing glasses at all times to relax accommodation and prevent spasm at both distance and near. Since needs both distance and reading correction recommended progressive lenses.   5/10/2022 - States having difficulty with the current progressive lenses, especially when on the computer. Is till showing signs of accommodative insufficiency and as well the distance rx has changed. Will give a new rx progressive, but los full frame computer reading.   4/12/2023-never got full frame computer glasses.  Had episode when going from inside with difficulty and near vision to going outside in bright sunlight then a being able to read.  Discussed that one can accommodate better in bright sunlight with pupils becoming smaller and easier lens shifting.  In trial frame did well with the full frame computer lenses and a trial frame in the office.  Therefore gave new Rx  7/12/2023 - [facility], needed more specifics regarding progressive, polarized UV etc. Gave new rx with that information included  3/25/2024 -never got new glasses Rx with progressive.  Марина

## 2024-03-25 NOTE — PROGRESS NOTES
Peds/Neuro Ophthalmology:   Sarmad Velazco M.D.    Date & Time note created:    3/25/2024   4:20 PM     Referring MD / APRN:  Pcp Pt States None, No att. providers found    Patient ID:  Name:             Landon Allred   YOB: 1970  Age:                 54 y.o.  male   MRN:               [id number]    Chief Complaint/Reason for Visit:     Other (Black out vision)      History of Present Illness:    Landon Allred is a 54 y.o. male   Follow up for TBI.Patient refuses to give more info.        Review of Systems:  Review of Systems   Eyes:  Positive for blurred vision.   All other systems reviewed and are negative.      Past Medical History:   Past Medical History:   Diagnosis Date    Allergy     Arthritis     Asthma     Claustrophobia     Clostridium difficile diarrhea     Clotting disorder (HCC)     Depression     DVT (deep venous thrombosis) (HCC)     GERD (gastroesophageal reflux disease)     Hypertension     IBD (inflammatory bowel disease)     Migraine     MRSA (methicillin resistant Staphylococcus aureus)     Pancreatitis     PE (pulmonary embolism)     Psychiatric disorder     PTSD, depression    Seizure (HCC)        Past Surgical History:  Past Surgical History:   Procedure Laterality Date    INGUINAL HERNIA REPAIR Right 09/06/2019    DENTAL EXTRACTION(S)      HERNIA REPAIR Right     NERVE ULNAR REPAIR OR EXPLORE      ORIF, ELBOW      ORIF, FRACTURE, FEMUR      MD CLOSED RX NASAL SEPTAL FRACTURE      RECTAL EXPLORATION         Current Outpatient Medications:  Current Outpatient Medications   Medication Sig Dispense Refill    albuterol 108 (90 Base) MCG/ACT Aero Soln inhalation aerosol Inhale 2 Puffs.      budesonide (RINOCORT AQUA) 32 MCG/ACT nasal spray SQUIRT 1 SPRAY IN EACH NOSTRIL TWICE A DAY FOR NASAL ALLERGIES      diazePAM 5 MG/5ML Solution diazePAM      polyvinyl alcohol-povidone (REFRESH) 1.4-0.6 % ophthalmic solution Administer 1 Drop into both eyes 2 times a day. 50  "Each 3    valacyclovir (VALTREX) 1 GM Tab Take 1 Tablet by mouth 3 times a day. 21 Tablet 0    ALBUTEROL INH       promethazine (PHENERGAN) 25 MG Tab Take 25 mg by mouth every 6 hours as needed for Nausea/Vomiting.      clonazePAM (KLONOPIN) 1 MG Tab Take 1 mg by mouth 2 times a day.      fluticasone (FLONASE) 50 MCG/ACT nasal spray Administer 2 Sprays into affected nostril(S) every day.      diphenhydrAMINE HCl (BENADRYL ALLERGY PO) Take  by mouth.      therapeutic multivitamin-minerals (THERAGRAN-M) Tab Take 1 Tablet by mouth every day.      Fluticasone Furoate-Vilanterol (BREO) 200-25 MCG/INH AEROSOL POWDER, BREATH ACTIVATED Inhale 1 Puff by mouth every day.      zonisamide (ZONEGRAN) 50 MG capsule Take 50 mg by mouth 2 times a day.      esomeprazole (NEXIUM) 40 MG delayed-release capsule Take 40 mg by mouth every morning before breakfast.      rivaroxaban (XARELTO) 10 MG Tab tablet Take 20 mg by mouth with dinner.       No current facility-administered medications for this visit.       Allergies:  Allergies   Allergen Reactions    Capsaicin-Turpentine Anaphylaxis and Rash     08/12/2009 21:56 [facility] - BERNADETTE NGUYEN<br/><br/>patient was admitted to ER, taken via ambulance, after<br/>applying capsaicin 0.075% - severe skin reaction<br/>(hyperventilation, rash, right nose bleed, extreme skin<br/>sensitivity)    Ciprofloxacin Anaphylaxis    Dilaudid [Hydromorphone] Itching     States \"I have an adverse reaction and need benadryl first\"    Doxycycline Anaphylaxis    Haloperidol Unspecified and Anaphylaxis    Shellfish Allergy Anaphylaxis    Tape Hives    Wasp Venom Anaphylaxis    Amoxicillin-Pot Clavulanate Rash     Other reaction(s): .Unknown  rash      Augmentin Rash and Unspecified     rash    Dalteparin Diarrhea     03/28/2013 21:07 [facility] - MANOJ WILKINSON<br/><br/>Santiago states he had constant diarrhea after starting the<br/>dalteparin.  Was able to complete his bridge therapy, but<br/>stated that he would refuse to use it " "in the future due to<br/>this ADR.    Sulfamethoxazole W-Trimethoprim Hives     04/18/2014 16:26 [facility] - ROSE COHEN<br/><br/>Pt admitted 4/18/2014 for urticaria but he had bactrim,<br/>nuclear medicine, dilaudid, phototherapy from dermatology,<br/>and other meds so it is unclear whether bactrim caused the<br/>rash.    Acetaminophen Unspecified     Other reaction(s): .Unknown  \"makes me deathly ill\"  \"makes me deathly ill\"      Amitriptyline Hcl     Apra     Aspirin Unspecified    Baclofen Unspecified    Biofreeze     Carisoprodol      Other reaction(s): Unknown    Celery Oil      Celery causes hives    Codeine     Compazine      Other reaction(s): Unknown    Compazine [Prochlorperazine]      Hallucinations, dizziness    Cyclobenzaprine     Duloxetine Unspecified and Hives    Enoxaparin Unspecified and Vomiting    Eszopiclone     Hydrocodone-Acetaminophen     Kdc:Yellow Dye+Aspirin+Oxycodone     Ketamine      Other reaction(s): .Unknown  Pt reports he \"gets violent\"    Ketorolac Tromethamine     Ketorolac Tromethamine [Toradol]     Latex Unspecified    Lunesta      Other reaction(s): Unknown    Menthol     Methocarbamol     Morphine Vomiting    Nitroglycerin      Other reaction(s): Unknown    Ondansetron [Zofran]     Other Misc      Can take dilaudid if given benadryl first    Oxycodone     Oxycodone-Acetaminophen     Oxycodone-Aspirin     Propofol     Propoxyphene      Other reaction(s): Unknown    Quetiapine      Other reaction(s): Unknown    Reglan [Metoclopramide]      Has no idea what his RX is    Risperidone     Tramadol     Tylenol      Other reaction(s): Unknown    Zofran [Ondansetron]     Rabeprazole Unspecified, Nausea and Vomiting     cannot remember   cannot remember   06/05/2007 23:16 [facility] ANTWAN KING<br/><br/>Updated using auto clean up process from [facility]*4*29.  Changed reactant from RABEPRAZOLE (free text) to RABEPRAZOLE(file - PSNDF(50.6,) <br/><br/><br/>04/16/2007 23:18 [facility] ANTWAN KING " DANIELA<br/><br/>Changed from RABEPRAZOLE (File 120.82) to free text by patch RA*4*36 <br/><br/><br/>02/21/2007 00:57 [facility] - JOSE WHITMAN<br/><br/>Updated using clean up process.  Changed reactant from RABEPRAZOLE (ingredient) to RABEPRAZOLE(file - PSNDF(50.6,) <br/><br/><br/>01/17/2002 18:01 [facility] JUAN GASPAR<br/><br/>reported by Dr. Antwan Kinney on lansoprazole request form    06/05/2007 23:16 [facility] ANTWAN KING<br/><br/>Updated using auto clean up process from RA*4*29.  Changed reactant from RABEPRAZOLE (free text) to RABEPRAZOLE(file - PSNDF(50.6,) <br/><br/><br/>04/16/2007 23:18 [facility] ANTWAN BRADFORD<br/><br/>Changed from RABEPRAZOLE (File 120.82) to free text by patch [facility]*4*36 <br/><br/><br/>02/21/2007 00:57 [facility] - JOSE WHITMAN<br/><br/>Updated using clean up process.  Changed reactant from RABEPRAZOLE (ingredient) to RABEPRAZOLE(file - PSNDF(50.6,) <br/><br/><br/>01/17/2002 18:01 [facility] JUAN GASPAR<br/><br/>reported by Dr. Antwan Kinney on lansoprazole request form    cannot remember cannot remember 06/05/2007 23:16 [facility] ANTWAN BRADFORD<br/><br/>Updated using auto clean up process from RA*4*29.  Changed reactant from RABEPRAZOLE (free text) to RABEPRAZOLE(file - PSNDF(50.6,) <br/><br/><br/>04/16/2007 23:18 [facility] - ANTWAN BRADFORD<br/><br/>Changed from RABEPRAZOLE (File 120.82) to free text by patch [facility]*4*36 <br/><br/><br/>02/21/2007 00:57 [facility] - JOSE WHITMAN<br/><br/>Updated using clean up process.  Changed reactant from RABEPRAZOLE (ingredient) to RABEPRAZOLE(file - PSNDF(50.6,) <br/><br/><br/>01/17/2002 18:01 [facility] - JUAN MURRAY<br/><br/>reported by Dr. Antwan Kinney on lansoprazole request form      06/05/2007 23:16 [facility] - ANTWAN BRADFORD<br/><br/>Updated using auto clean up process from [facility]*4*29.  Changed reactant from RABEPRAZOLE (free text) to RABEPRAZOLE(file - PSNDF(50.6,) <br/><br/><br/>04/16/2007 23:18 [facility] - ANTWAN BRADFORD<br/><br/>Changed from RABEPRAZOLE (File 120.82) to free text by patch  [facility]*4*36 <br/><br/><br/>02/21/2007 00:57 [email or web address] - JOSE WHITMAN<br/><br/>Updated using clean up process.  Changed reactant from RABEPRAZOLE (ingredient) to RABEPRAZOLE(file - PSNDF(50.6,) <br/><br/><br/>01/17/2002 18:01 [facility] [[email or web address] - JUAN MURRAY<br/><br/>reported by Dr. Austin Kinney on lansoprazole request form       Family History:  Family History   Problem Relation Age of Onset    Hypertension Mother     Cancer Father     Hypertension Father        Social History:  Social History     Socioeconomic History    Marital status: Single     Spouse name: Not on file    Number of children: Not on file    Years of education: Not on file    Highest education level: Not on file   Occupational History    Not on file   Tobacco Use    Smoking status: Never    Smokeless tobacco: Never   Vaping Use    Vaping Use: Never used   Substance and Sexual Activity    Alcohol use: No    Drug use: No    Sexual activity: Not on file   Other Topics Concern    Not on file   Social History Narrative    53 y/o male, disabled     Social Determinants of Health     Financial Resource Strain: High Risk (2/1/2023)    Overall Financial Resource Strain (CARDIA)     Difficulty of Paying Living Expenses: Very hard   Food Insecurity: Food Insecurity Present (2/1/2023)    Hunger Vital Sign     Worried About Running Out of Food in the Last Year: Often true     Ran Out of Food in the Last Year: Often true   Transportation Needs: Unmet Transportation Needs (2/1/2023)    PRAPARE - Transportation     Lack of Transportation (Medical): Yes     Lack of Transportation (Non-Medical): Yes   Physical Activity: Inactive (2/1/2023)    Exercise Vital Sign     Days of Exercise per Week: 0 days     Minutes of Exercise per Session: 0 min   Stress: Stress Concern Present (2/1/2023)    [employer] [place] [employer] - Occupational Stress Questionnaire     Feeling of Stress : Very much   Social Connections: Socially Isolated (2/1/2023)    Social Connection and Isolation  Panel [NHANES]     Frequency of Communication with Friends and Family: Never     Frequency of Social Gatherings with Friends and Family: Never     Attends Religion Services: Never     Active Member of Clubs or Organizations: No     Attends Club or Organization Meetings: Never     Marital Status: Never    Intimate Partner Violence: Not At Risk (2/1/2023)    Humiliation, Afraid, Rape, and Kick questionnaire     Fear of Current or Ex-Partner: No     Emotionally Abused: No     Physically Abused: No     Sexually Abused: No   Housing Stability: High Risk (2/1/2023)    Housing Stability Vital Sign     Unable to Pay for Housing in the Last Year: No     Number of Places Lived in the Last Year: 3     Unstable Housing in the Last Year: Yes          Physical Exam:  Physical Exam    Oriented x 3  Weight/BMI: There is no height or weight on file to calculate BMI.  There were no vitals taken for this visit.    Base Eye Exam       Visual Acuity (Snellen - Linear)         Right Left    Dist cc 20/20 20/20    Near cc J1+ J1+      Correction: Glasses              Tonometry (i care, 3:31 PM)         Right Left    Pressure 10 10              Pupils         Pupils    Right PERRL    Left PERRL              Visual Fields         Right Left     Full Full              Extraocular Movement         Right Left     Full, Ortho Full, Ortho              Neuro/Psych       Oriented x3: Yes    Mood/Affect: Normal                  Additional Tests       Stereo       Fly: +    Animals: 3/3    Circles: 7/9                  Slit Lamp and Fundus Exam       External Exam         Right Left    External Normal Normal              Slit Lamp Exam         Right Left    Lids/Lashes Normal Normal    Conjunctiva/Sclera White and quiet White and quiet    Cornea Clear Clear    Anterior Chamber Deep and quiet Deep and quiet    Iris Round and reactive Round and reactive    Lens Clear Clear    Vitreous Normal Normal              Fundus Exam         Right Left     Disc Normal Normal    C/D Ratio 0.1 0.1    Macula Normal Normal    Vessels Normal Normal    Periphery Normal Normal                  Refraction       Wearing Rx         Sphere Cylinder Axis Add    Right -1.00 +1.25 092 +2.25    Left [place] +0.75 087 +2.50              Wearing Rx #2         Sphere Cylinder Axis Add    Right -0.75 +1.50 100 +2.75    Left -0.50 +1.25 080 +2.75      Type: Progressive with antireflective, UV coating              Manifest Refraction (Auto)         Sphere Cylinder Axis    Right -0.75 +1.25 099    Left -0.25 +1.00 071              Final Rx         Sphere Cylinder Axis Add    Right -0.75 +1.50 100 +2.75    Left -0.50 +1.25 080 +2.75      Type: Progressive with antireflective, UV coating                    Pertinent Lab/Test/Imaging Review:      Assessment and Plan:     Accommodative insufficiency  1/29/2020 - accommodative insufficieny and probable episodes of accommodative spasm leading to some of the blurry vision and worsening reading. Most likely a combination of early NS cataracts, presbyopia and the traumatic brain injury. Also current rx over corrected and the reading rx significantly undercorrected. Was able to correct to 20/20 vision at distance and near with new glasses rx. Discussed importance of wearing glasses at all times to relax accommodation and prevent spasm at both distance and near. Since needs both distance and reading correction recommended progressive lenses.   5/10/2022 - States having difficulty with the current progressive lenses, especially when on the computer. Is till showing signs of accommodative insufficiency and as well the distance rx has changed. Will give a new rx progressive, but los full frame computer reading.   4/12/2023-never got full frame computer glasses.  Had episode when going from inside with difficulty and near vision to going outside in bright sunlight then a being able to read.  Discussed that one can accommodate better in bright sunlight  with pupils becoming smaller and easier lens shifting.  In trial frame did well with the full frame computer lenses and a trial frame in the office.  Therefore gave new Rx  7/12/2023 - [facility], needed more specifics regarding progressive, polarized UV etc. Gave new rx with that information included  3/25/2024 -never got new glasses Rx with progressive.  Regave    Allergic conjunctivitis of both eyes  2/9/2023-variable episodes of allergic conjunctivitis.  We will re-rx Crolom and refresh  3/25/2024 -     Bilateral occipital neuralgia  1/29/2020 - Suspect that some of his occipital very pinpoint headaches associated with stiff neck and discomfort radiating to the periorbital region is secondary to occipital neuralgia. Long history of multiple head injuries and attempts to control pain to the point where he has to come to the ER for control. By history has had some nerve blocks, but in his description the seem to involve mor shoulder girdle. Possible attempts or discussion for further blocks bu Dr Rogers in Neurology at [facility], but having difficulty getting back to be seen. By description from patient uncertain if the blocks were in the region of the occipital nerve. Therefore recommended referral to Dr Molina in Rehab medicine.   3/25/2024-now going down to [place] for care.  Gave note to give to primary to refer to physical medicine and rehab for occipital nerve block      Hyperopia of both eyes with astigmatism  1/29/2020 gave new glasses rx.   5/10/2022 - some progression to myopia. Probably related to early NS. Will adjust rx.   2/9/2023-never got new glasses Rx.  In trial frame near acuity excellent.  Will give Rx.  3/24/2024 -regave Rx never got    Traumatic brain injury (HCC)  1/29/2020 - History of multiple concussions with headaches and traumatic brain injury. Obtained OCT NFL thicken ss and ganglion cell thickness that was unremarkable  With OD 97 and OS 98. Normal  ganglion cell thickness. Therefore no evidence of traumatic optic neuropathy or retrograde axonal degeneration.   5/10/2022 - OCT NFL thickness at 87 OU with normal color vision.   4/12/2023-suspect that this is also causing some exacerbation of his accommodative insufficiency.  Therefore gave the Rx for full frame computer glasses  3/25/2024 -a few episodes over the last month or so where he was driving for a long period of time and then at desk developed bilateral significant blurry vision where he had to pull over the side of the road.  He was not wearing his glasses at that time.  Suspect this is related to accommodative spasm because of uncorrected astigmatic air as well as his progressive presbyopia.  Discussed importance of wearing glasses full-time.  Visual acuity best corrected 20/20 both eyes in trial frame.  Because of these episodes reevaluate optic nerve.  There is no significant change in optic nerve appearance over the past few years.  No progressive atrophy or damage.        Sarmad Velazco M.D.

## 2024-03-25 NOTE — ASSESSMENT & PLAN NOTE
1/29/2020 - History of multiple concussions with headaches and traumatic brain injury. Obtained OCT NFL thicken ss and ganglion cell thickness that was unremarkable  With OD 97 and OS 98. Normal ganglion cell thickness. Therefore no evidence of traumatic optic neuropathy or retrograde axonal degeneration.   5/10/2022 - OCT NFL thickness at 87 OU with normal color vision.   4/12/2023-suspect that this is also causing some exacerbation of his accommodative insufficiency.  Therefore gave the Rx for full frame computer glasses  3/25/2024 -a few episodes over the last month or so where he was driving for a long period of time and then at desk developed bilateral significant blurry vision where he had to pull over the side of the road.  He was not wearing his glasses at that time.  Suspect this is related to accommodative spasm because of uncorrected astigmatic air as well as his progressive presbyopia.  Discussed importance of wearing glasses full-time.  Visual acuity best corrected 20/20 both eyes in trial frame.  Because of these episodes reevaluate optic nerve.  There is no significant change in optic nerve appearance over the past few years.  No progressive atrophy or damage.

## 2024-03-25 NOTE — ASSESSMENT & PLAN NOTE
2/9/2023-variable episodes of allergic conjunctivitis.  We will re-rx Crolom and refresh  3/25/2024 -

## 2024-03-25 NOTE — ASSESSMENT & PLAN NOTE
1/29/2020 gave new glasses rx.   5/10/2022 - some progression to myopia. Probably related to early NS. Will adjust rx.   2/9/2023-never got new glasses Rx.  In trial frame near acuity excellent.  Will give Rx.  3/24/2024 -regave Rx never got

## 2024-03-25 NOTE — ASSESSMENT & PLAN NOTE
1/29/2020 - Suspect that some of his occipital very pinpoint headaches associated with stiff neck and discomfort radiating to the periorbital region is secondary to occipital neuralgia. Long history of multiple head injuries and attempts to control pain to the point where he has to come to the ER for control. By history has had some nerve blocks, but in his description the seem to involve mor shoulder girdle. Possible attempts or discussion for further blocks bu Dr Rogers in Neurology at [facility], but having difficulty getting back to be seen. By description from patient uncertain if the blocks were in the region of the occipital nerve. Therefore recommended referral to Dr Molina in Rehab medicine.   3/25/2024-now going down to [place] for care.  Gave note to give to primary to refer to physical medicine and rehab for occipital nerve block

## 2024-04-03 ENCOUNTER — TELEPHONE (OUTPATIENT)
Dept: OPHTHALMOLOGY | Facility: MEDICAL CENTER | Age: 54
End: 2024-04-03
Payer: COMMERCIAL

## 2024-05-18 ENCOUNTER — APPOINTMENT (OUTPATIENT)
Dept: RADIOLOGY | Facility: MEDICAL CENTER | Age: 54
End: 2024-05-18
Attending: PHYSICAL MEDICINE & REHABILITATION
Payer: COMMERCIAL

## 2024-05-28 ENCOUNTER — HOSPITAL ENCOUNTER (OUTPATIENT)
Dept: RADIOLOGY | Facility: MEDICAL CENTER | Age: 54
End: 2024-05-28
Attending: PHYSICAL MEDICINE & REHABILITATION
Payer: COMMERCIAL

## 2024-05-28 ENCOUNTER — ANESTHESIA EVENT (OUTPATIENT)
Dept: RADIOLOGY | Facility: MEDICAL CENTER | Age: 54
End: 2024-05-28
Payer: COMMERCIAL

## 2024-05-28 ENCOUNTER — ANESTHESIA (OUTPATIENT)
Dept: RADIOLOGY | Facility: MEDICAL CENTER | Age: 54
End: 2024-05-28
Payer: COMMERCIAL

## 2024-05-28 ENCOUNTER — HOSPITAL ENCOUNTER (OUTPATIENT)
Dept: RADIOLOGY | Facility: MEDICAL CENTER | Age: 54
End: 2024-05-28
Payer: COMMERCIAL

## 2024-05-28 VITALS
TEMPERATURE: 97 F | WEIGHT: 189.04 LBS | RESPIRATION RATE: 18 BRPM | BODY MASS INDEX: 25.61 KG/M2 | OXYGEN SATURATION: 99 % | HEART RATE: 66 BPM | HEIGHT: 72 IN

## 2024-05-28 DIAGNOSIS — Z01.89 ENCOUNTER FOR OTHER SPECIFIED SPECIAL EXAMINATIONS: ICD-10-CM

## 2024-05-28 DIAGNOSIS — G89.4 CHRONIC PAIN SYNDROME: ICD-10-CM

## 2024-05-28 DIAGNOSIS — M54.42 CHRONIC BILATERAL LOW BACK PAIN WITH BILATERAL SCIATICA: ICD-10-CM

## 2024-05-28 DIAGNOSIS — M54.41 CHRONIC BILATERAL LOW BACK PAIN WITH BILATERAL SCIATICA: ICD-10-CM

## 2024-05-28 DIAGNOSIS — G89.29 CHRONIC BILATERAL LOW BACK PAIN WITH BILATERAL SCIATICA: ICD-10-CM

## 2024-05-28 DIAGNOSIS — M50.30 DDD (DEGENERATIVE DISC DISEASE), CERVICAL: ICD-10-CM

## 2024-05-28 DIAGNOSIS — M54.2 CERVICALGIA: ICD-10-CM

## 2024-05-28 RX ORDER — MIDAZOLAM HYDROCHLORIDE 1 MG/ML
1 INJECTION INTRAMUSCULAR; INTRAVENOUS
Status: DISCONTINUED | OUTPATIENT
Start: 2024-05-28 | End: 2024-05-29 | Stop reason: HOSPADM

## 2024-05-28 RX ORDER — PSEUDOEPHEDRINE HYDROCHLORIDE 60 MG/1
60 TABLET, FILM COATED ORAL
COMMUNITY

## 2024-05-28 RX ORDER — DIPHENHYDRAMINE HYDROCHLORIDE 50 MG/ML
INJECTION INTRAMUSCULAR; INTRAVENOUS PRN
Status: DISCONTINUED | OUTPATIENT
Start: 2024-05-28 | End: 2024-05-28 | Stop reason: SURG

## 2024-05-28 RX ORDER — HYDROMORPHONE HYDROCHLORIDE 2 MG/ML
INJECTION, SOLUTION INTRAMUSCULAR; INTRAVENOUS; SUBCUTANEOUS PRN
Status: DISCONTINUED | OUTPATIENT
Start: 2024-05-28 | End: 2024-05-28 | Stop reason: SURG

## 2024-05-28 RX ORDER — DIPHENHYDRAMINE HYDROCHLORIDE 50 MG/ML
12.5 INJECTION INTRAMUSCULAR; INTRAVENOUS
Status: DISCONTINUED | OUTPATIENT
Start: 2024-05-28 | End: 2024-05-29 | Stop reason: HOSPADM

## 2024-05-28 RX ORDER — HYDRALAZINE HYDROCHLORIDE 20 MG/ML
5 INJECTION INTRAMUSCULAR; INTRAVENOUS
Status: DISCONTINUED | OUTPATIENT
Start: 2024-05-28 | End: 2024-05-29 | Stop reason: HOSPADM

## 2024-05-28 RX ORDER — MIDAZOLAM HYDROCHLORIDE 1 MG/ML
INJECTION INTRAMUSCULAR; INTRAVENOUS
Status: COMPLETED
Start: 2024-05-28 | End: 2024-05-28

## 2024-05-28 RX ORDER — MIDAZOLAM HYDROCHLORIDE 1 MG/ML
INJECTION INTRAMUSCULAR; INTRAVENOUS PRN
Status: DISCONTINUED | OUTPATIENT
Start: 2024-05-28 | End: 2024-05-28 | Stop reason: SURG

## 2024-05-28 RX ORDER — HYDROMORPHONE HYDROCHLORIDE 1 MG/ML
INJECTION, SOLUTION INTRAMUSCULAR; INTRAVENOUS; SUBCUTANEOUS
Status: DISCONTINUED
Start: 2024-05-28 | End: 2024-05-29 | Stop reason: HOSPADM

## 2024-05-28 RX ORDER — DEXAMETHASONE SODIUM PHOSPHATE 4 MG/ML
INJECTION, SOLUTION INTRA-ARTICULAR; INTRALESIONAL; INTRAMUSCULAR; INTRAVENOUS; SOFT TISSUE PRN
Status: DISCONTINUED | OUTPATIENT
Start: 2024-05-28 | End: 2024-05-28 | Stop reason: SURG

## 2024-05-28 RX ORDER — LIDOCAINE HYDROCHLORIDE 20 MG/ML
INJECTION, SOLUTION EPIDURAL; INFILTRATION; INTRACAUDAL; PERINEURAL PRN
Status: DISCONTINUED | OUTPATIENT
Start: 2024-05-28 | End: 2024-05-28 | Stop reason: SURG

## 2024-05-28 RX ORDER — SODIUM CHLORIDE, SODIUM LACTATE, POTASSIUM CHLORIDE, CALCIUM CHLORIDE 600; 310; 30; 20 MG/100ML; MG/100ML; MG/100ML; MG/100ML
INJECTION, SOLUTION INTRAVENOUS CONTINUOUS
Status: DISCONTINUED | OUTPATIENT
Start: 2024-05-28 | End: 2024-05-29 | Stop reason: HOSPADM

## 2024-05-28 RX ORDER — MEPERIDINE HYDROCHLORIDE 25 MG/ML
12.5 INJECTION INTRAMUSCULAR; INTRAVENOUS; SUBCUTANEOUS
Status: DISCONTINUED | OUTPATIENT
Start: 2024-05-28 | End: 2024-05-29 | Stop reason: HOSPADM

## 2024-05-28 RX ORDER — EPHEDRINE SULFATE 50 MG/ML
5 INJECTION, SOLUTION INTRAVENOUS
Status: DISCONTINUED | OUTPATIENT
Start: 2024-05-28 | End: 2024-05-29 | Stop reason: HOSPADM

## 2024-05-28 RX ADMIN — PROPOFOL 200 MG: 10 INJECTION, EMULSION INTRAVENOUS at 14:30

## 2024-05-28 RX ADMIN — FENTANYL CITRATE 50 MCG: 50 INJECTION, SOLUTION INTRAMUSCULAR; INTRAVENOUS at 14:27

## 2024-05-28 RX ADMIN — HYDROMORPHONE HYDROCHLORIDE 0.5 MG: 2 INJECTION INTRAMUSCULAR; INTRAVENOUS; SUBCUTANEOUS at 14:27

## 2024-05-28 RX ADMIN — FENTANYL CITRATE 50 MCG: 50 INJECTION, SOLUTION INTRAMUSCULAR; INTRAVENOUS at 14:55

## 2024-05-28 RX ADMIN — DIPHENHYDRAMINE HYDROCHLORIDE 12.5 MG: 50 INJECTION, SOLUTION INTRAMUSCULAR; INTRAVENOUS at 14:26

## 2024-05-28 RX ADMIN — LIDOCAINE HYDROCHLORIDE 80 MG: 20 INJECTION, SOLUTION EPIDURAL; INFILTRATION; INTRACAUDAL at 14:30

## 2024-05-28 RX ADMIN — DEXAMETHASONE SODIUM PHOSPHATE 8 MG: 4 INJECTION INTRA-ARTICULAR; INTRALESIONAL; INTRAMUSCULAR; INTRAVENOUS; SOFT TISSUE at 14:32

## 2024-05-28 RX ADMIN — SODIUM CHLORIDE, POTASSIUM CHLORIDE, SODIUM LACTATE AND CALCIUM CHLORIDE: 600; 310; 30; 20 INJECTION, SOLUTION INTRAVENOUS at 14:19

## 2024-05-28 RX ADMIN — MIDAZOLAM HYDROCHLORIDE 2 MG: 1 INJECTION, SOLUTION INTRAMUSCULAR; INTRAVENOUS at 14:25

## 2024-05-28 ASSESSMENT — PAIN SCALES - GENERAL: PAIN_LEVEL: 1

## 2024-05-28 ASSESSMENT — FIBROSIS 4 INDEX
FIB4 SCORE: 1.16
FIB4 SCORE: 1.16

## 2024-05-28 ASSESSMENT — PAIN DESCRIPTION - PAIN TYPE: TYPE: CHRONIC PAIN

## 2024-05-28 NOTE — ANESTHESIA PROCEDURE NOTES
Airway    Date/Time: 5/28/2024 2:31 PM    Performed by: Rob Duong M.D.  Authorized by: Rob Duong M.D.    Location:  OR  Urgency:  Elective  Indications for Airway Management:  Anesthesia      Spontaneous Ventilation: absent    Sedation Level:  Deep  Preoxygenated: Yes    Mask Difficulty Assessment:  0 - not attempted  Final Airway Type:  Supraglottic airway  Final Supraglottic Airway:  Standard LMA    SGA Size:  4  Cuff Pressure (cm H2O):  17  Number of Attempts at Approach:  1

## 2024-05-28 NOTE — ANESTHESIA PREPROCEDURE EVALUATION
Date/Time: 05/28/24 1445    Scheduled providers: Rob Duong M.D.    Procedure: MR-LUMBAR SPINE-W/O    Diagnosis:       Chronic pain syndrome [G89.4]      Chronic bilateral low back pain with bilateral sciatica [M54.42, M54.41, G89.29]    Indications: other    Location: [facility] - MRI - [address]          53 yo with medical problems    Lengthy list of medication allergies  Reports Dilaudid is ok if benadryl given 1st  Propofol is ok    P Med Hx:  H/o PE (pulmonary embolism)  H/o DVT (deep venous thrombosis) (HCC)  Migraine  Psychiatric disorder  H/o Pancreatitis  Claustrophobia  MRSA (methicillin resistant Staphylococcus aureus)  Allergy  Asthma  Arthritis  IBD (inflammatory bowel disease)  Clotting disorder   Depression  GERD (gastroesophageal reflux disease)  Seizure   Hypertension    P Surg Hx:  Ulnar nerve repair   Nasal closed reduction  Rectal exploration  ORIF Femur  ORIF Elbow  Inguinal hernia repair    Non-smoker    NPO    Relevant Problems   No relevant active problems       Physical Exam    Airway   Mallampati: II  TM distance: >3 FB  Neck ROM: full       Cardiovascular - normal exam  Rhythm: regular  Rate: normal  (-) murmur     Dental - normal exam           Pulmonary - normal exam  Breath sounds clear to auscultation     Abdominal    Neurological - normal exam                   Anesthesia Plan    ASA 2       Plan - general       Airway plan will be LMA    (Start in CT Scan and transition to MRI)      Induction: intravenous    Postoperative Plan: Postoperative administration of opioids is intended.    Pertinent diagnostic labs and testing reviewed    Informed Consent:    Anesthetic plan and risks discussed with patient.    Use of blood products discussed with: patient whom consented to blood products.

## 2024-05-28 NOTE — ANESTHESIA POSTPROCEDURE EVALUATION
Patient: Landon Allred    Procedure Summary       Date: 05/28/24 Room / Location: [facility] - MRI - [address]    Anesthesia Start: 1422 Anesthesia Stop: 1550    Procedure: MR-LUMBAR SPINE-W/O Diagnosis:       Chronic pain syndrome      Chronic bilateral low back pain with bilateral sciatica      (other)    Scheduled Providers: Rob Duong M.D. Responsible Provider: Rob Duong M.D.    Anesthesia Type: general ASA Status: 2            Final Anesthesia Type: general  Last vitals  BP   NIBP: 123/61    Temp   35.9 °C (96.7 °F)    Pulse   64   Resp        SpO2   95 %      Anesthesia Post Evaluation    Patient location during evaluation: PACU  Patient participation: complete - patient participated  Level of consciousness: lethargic  Pain score: 1    Airway patency: patent  Anesthetic complications: no  Cardiovascular status: hemodynamically stable  Respiratory status: acceptable  Hydration status: euvolemic    PONV: none          No notable events documented.     Nurse Pain Score: 10 (NPRS)

## 2024-05-28 NOTE — PROGRESS NOTES
MRI NURSING NOTES:    Pt walked from parking lot to in front [facility] and refused to walk to [facility] for check in until wheelchair provided.      CT done under [facility].  Transported from CT to MRI via MRI kiet - Dr Duong manually bagged pt during transport c o2 source.      Pt currently in MRI for C & L spines, s contrast.

## 2024-05-28 NOTE — DISCHARGE INSTRUCTIONS
MRI ADULT DISCHARGE INSTRUCTIONS    You have been medicated today for your scan. Please follow the instructions below to ensure your safe recovery. If you have any questions or problems, feel free to call us at [phone] or [phone].     1.   Have someone stay with you to assist you as needed.    2.   Do not drive or operate any mechanical devices.    3.   Do not perform any activity that requires concentration. Make no major decisions over the next 24 hours.     4.   Be careful changing positions from laying down to sitting or standing, as you may become dizzy.     5.   Do not drink alcohol for 48 hours.    6.   There are no restrictions for eating your normal meals. Drink fluids.    7.   You may continue your usual medications for pain, tranquilizers, muscle relaxants or sedatives when awake.     8.   Tomorrow, you may continue your normal daily activities.     9.   Pressure dressing on 10 - 15 minutes. If swelling or bleeding occurs when removed, continue placing direct pressure on injection site for another 5 minutes, or until bleeding stops.     Midazolam (VERSED)  What is this medicine?  You were given MIDAZOLAM (CHELSEA julio) for your procedure today. This medication is a benzodiazepine. It is used to cause relaxation or sleep before surgery and to block the memory of the procedure.  This medicine may be used for other purposes; ask your health care provider or pharmacist if you have questions.  What side effects may I notice from receiving this medicine?  Side effects that you should report to your doctor or health care professional as soon as possible:  allergic reactions like skin rash, itching or hives, swelling of the face, lips, or tongue  breathing problems  confusion  dizziness or lightheadedness  fast, irregular heartbeat  halluninations during recovery  numbness or tingling in the hands or feet  pain, redness, or swelling at site where injected  seizures  Side effects that usually do not require  medical attention (report to your doctor or health care professional if they continue or are bothersome):  coughing  headache  hiccups  involuntary eye and muscle movements  loss of memory of events just before, during, and after use  nausea, vomiting  speech problems  tiredness  trouble sleeping or nightmares  This list may not describe all possible side effects. Call your doctor for medical advice about side effects. You may report side effects to FDA at 4-134-PEG-9767.    I have been informed of and understand the above discharge instructions.     Fentanyl (Duragesic) Injection  What is this medication?  FENTANYL (FEN ta nil) treats severe pain. It may also be used to cause drowsiness before a procedure. It is often prescribed when other pain medications do not work well enough or cannot be tolerated. It works by blocking pain signals in the brain. It belongs to a group of medications called opioids.  This medicine may be used for other purposes; ask your health care provider or pharmacist if you have questions.  COMMON BRAND NAME(S): Sublimaze  What should I tell my care team before I take this medication?  They need to know if you have any of these conditions:  Brain tumor  Drug abuse or addiction  Gallbladder disease  Head injury  Heart disease  If you often drink alcohol  Kidney disease  Liver disease  Low blood pressure  Lung or breathing disease, like asthma  Mental illness  Problems urinating  Seizures  Stomach or intestine problems  An allergic or unusual reaction to fentanyl, other medications, foods, dyes, or preservatives  Pregnant or trying to get pregnant  Breast-feeding  How should I use this medication?  This medication is injected into a vein or muscle. It is given in a hospital or clinic.  Talk to your care team about the use of this medication in children. Special care may be needed.  Overdosage: If you think you have taken too much of this medicine contact a poison control center or emergency  room at once.  NOTE: This medicine is only for you. Do not share this medicine with others.  What if I miss a dose?  This does not apply. This medication is not for regular use.  What may interact with this medication?  Do not take this medication with any of the following:  Mifepristone  This medication may also interact with the following:  Alcohol  Antihistamines for allergy, cough and cold  Antiviral medications for HIV or AIDS  Atropine  Certain antibiotics like clarithromycin, erythromycin, rifampin  Certain medications for anxiety or sleep  Certain medications for bladder problems like oxybutynin, tolterodine  Certain medications for blood pressure, heart disease, irregular heart beat  Certain medications for depression like amitriptyline, fluoxetine, sertraline  Certain medications for diabetes like pioglitazone, troglitazone  Certain medications for fungal infections like ketoconazole and itraconazole  Certain medications for migraine headache like almotriptan, eletriptan, frovatriptan, naratriptan, rizatriptan, sumatriptan, zolmitriptan  Certain medications for nausea or vomiting like aprepitant, dolasetron, granisetron, ondansetron  Certain medications for seizures like phenobarbital, phenytoin, primidone  Certain medications for stomach problems like dicyclomine, hyoscyamine  Certain medications for travel sickness like scopolamine  Certain medications for Parkinson's disease like benztropine, trihexyphenidyl  Cimetidine  Diuretics  General anesthetics like halothane, isoflurane, methoxyflurane, propofol  Grapefruit juice  Ipratropium  Linezolid  Local anesthetics like lidocaine, pramoxine, tetracaine  MAOIs like Carbex, Eldepryl, Marplan, Nardil, and Parnate  Medications that relax muscles for surgery  Methylene blue  Other narcotic medications for pain or cough  Phenothiazines like chlorpromazine, mesoridazine, prochlorperazine, thioridazine  Guyton's wort  Steroid medications like prednisone or  cortisone  This list may not describe all possible interactions. Give your health care provider a list of all the medicines, herbs, non-prescription drugs, or dietary supplements you use. Also tell them if you smoke, drink alcohol, or use illegal drugs. Some items may interact with your medicine.  What should I watch for while using this medication?  Tell your care team if your pain does not go away, if it gets worse, or if you have new or a different type of pain. You may develop tolerance to this medication. Tolerance means that you will need a higher dose of the medication for pain relief. Tolerance is normal and is expected if you take this medication for a long time.  Taking this medication with other substances that cause drowsiness, such as alcohol, benzodiazepines, or other opioids can cause serious side effects. Give your care team a list of all medications you use. They will tell you how much medication to take. Do not take more medication than directed. Call emergency services if you have problems breathing or staying awake.  Children may be at higher risk for side effects. Stop giving this medication and call emergency services right away if your child has slow or noisy breathing, has confusion, is unusually sleepy, or not able to wake up.  Long term use of this medication may cause your brain and body to depend on it. This can happen even when used as directed by your care team. You and your care team will work together to determine how long you will need to take this medication. If your care team wants you to stop this medication, the dose will be slowly lowered over time to reduce the risk of side effects.  Naloxone is an emergency medication used for an opioid overdose. An overdose can happen if you take too much of an opioid. It can also happen if an opioid is taken with some other medications or substances such as alcohol. Know the symptoms of an overdose, such as trouble breathing, unusually tired  or sleepy, or not being able to respond or wake up. Make sure to tell caregivers and close contacts where your naloxone is stored. Make sure they know how to use it. After naloxone is given, the person giving it must call emergency services. Naloxone is a temporary treatment. Repeat doses may be needed.  This medication may affect your coordination, reaction time, or judgment. Do not drive or operate machinery until you know how this medication affects you. Sit up or stand slowly to reduce the risk of dizzy or fainting spells. Drinking alcohol with this medication can increase the risk of these side effects.  This medication will cause constipation. If you do not have a bowel movement for 3 days, call your care team.  Your mouth may get dry. Chewing sugarless gum or sucking hard candy and drinking plenty of water may help. Contact your care team if the problem does not go away or is severe.  Talk to your care team if you are pregnant or think you might be pregnant. This medication can cause serious birth defects if taken during pregnancy. Prolonged use of this medication during pregnancy can cause withdrawal in a .  Talk to your care team before breastfeeding. Changes to your treatment plan may be needed. If you breastfeed while taking this medication, seek medical care right away if you notice the child has slow or noisy breathing, is unusually sleepy or not able to wake up, or is limp.  Long-term use of this medication may cause infertility. Talk to your care team if you are concerned about your fertility.  What side effects may I notice from receiving this medication?  Side effects that you should report to your care team as soon as possible:  Allergic reactions--skin rash, itching, hives, swelling of the face, lips, tongue, or throat  CNS depression--slow or shallow breathing, shortness of breath, feeling faint, dizziness, confusion, trouble staying awake  Low adrenal gland function--nausea, vomiting,  loss of appetite, unusual weakness or fatigue, dizziness  Low blood pressure--dizziness, feeling faint or lightheaded, blurry vision  Muscle stiffness  Side effects that usually do not require medical attention (report to your care team if they continue or are bothersome):  Dizziness  Drowsiness  Nausea  Vomiting  This list may not describe all possible side effects. Call your doctor for medical advice about side effects. You may report side effects to FDA at 0-039-RYM-6972.  Where should I keep my medication?  This medication is given in a hospital or clinic. It will not be stored at home.  NOTE: This sheet is a summary. It may not cover all possible information. If you have questions about this medicine, talk to your doctor, pharmacist, or health care provider.  © 2023 [employer]/Gold Standard (2021-12-22 00:00:00)  Hydromorphone Injection  What is this medication?    HYDROMORPHONE(DILAUDID) (pete droe MOR fone) treats severe pain. It is prescribed when other pain medications have not worked or cannot be tolerated. It works by blocking pain signals in the brain. It belongs to a group of medications called opioids.  This medicine may be used for other purposes; ask your health care provider or pharmacist if you have questions.  COMMON BRAND NAME(S): Dilaudid, Dilaudid-HP, Simplist Dilaudid  What should I tell my care team before I take this medication?  They need to know if you have any of these conditions:  Brain tumor  Drug abuse or addiction  Head injury  Heart disease  If you often drink alcohol  Kidney disease  Liver disease  Lung disease, asthma, or breathing problems  Seizures  Stomach or intestinal problems  Taken an MAOI like Marplan, Nardil, or Parnate in the last 14 days  An unusual or allergic reaction to hydromorphone, other medicines, foods, dyes, or preservatives  Pregnant or trying to get pregnant  Breast-feeding  How should I use this medication?  This medication is for injection into a vein, into a  muscle, or under the skin. This medication is given in a hospital or clinic.  In rare cases, you might get this medication at home. You will be taught how to give this medication. Use exactly as directed. Take your medication at regular intervals. Do not take your medication more often than directed.  It is important that you put your used needles and syringes in a special sharps container. Do not put them in a trash can. If you do not have a sharps container, call your pharmacist or care team to get one.  Talk to your care team about the use of this medication in children. Special care may be needed.  Overdosage: If you think you have taken too much of this medicine contact a poison control center or emergency room at once.  NOTE: This medicine is only for you. Do not share this medicine with others.  What if I miss a dose?  If you miss a dose, use it as soon as you can. If it is almost time for your next dose, use only that dose. Do not use double or extra doses.  What may interact with this medication?  This medication may interact with the following:  Alcohol  Antihistamines for allergy, cough and cold  Certain medications for anxiety or sleep  Certain medications for depression like amitriptyline, fluoxetine, sertraline  Certain medications for seizures like phenobarbital, primidone  General anesthetics like halothane, isoflurane, methoxyflurane, propofol  Local anesthetics like lidocaine, pramoxine, tetracaine  MAOIs like Carbex, Eldepryl, Marplan, Nardil, and Parnate  Medications that relax muscles for surgery  Other narcotic medications for pain or cough  Phenothiazines like chlorpromazine, mesoridazine, prochlorperazine, thioridazine  This list may not describe all possible interactions. Give your health care provider a list of all the medicines, herbs, non-prescription drugs, or dietary supplements you use. Also tell them if you smoke, drink alcohol, or use illegal drugs. Some items may interact with your  medicine.  What should I watch for while using this medication?  Tell your care team if your pain does not go away, if it gets worse, or if you have new or a different type of pain. You may develop tolerance to this medication. Tolerance means that you will need a higher dose of the medication for pain relief. Tolerance is normal and is expected if you take this medication for a long time.  Taking this medication with other substances that cause drowsiness, such as alcohol, benzodiazepines, or other opioids can cause serious side effects. Give your care team a list of all medications you use. They will tell you how much medication to take. Do not take more medication than directed. Call emergency services if you have problems breathing or staying awake.  Long term use of this medication may cause your brain and body to depend on it. This can happen even when used as directed by your care team. You and your care team will work together to determine how long you will need to take this medication. If your care team wants you to stop this medication, the dose will be slowly lowered over time to reduce the risk of side effects.  Naloxone is an emergency medication used for an opioid overdose. An overdose can happen if you take too much of an opioid. It can also happen if an opioid is taken with some other medications or substances such as alcohol. Know the symptoms of an overdose, such as trouble breathing, unusually tired or sleepy, or not being able to respond or wake up. Make sure to tell caregivers and close contacts where your naloxone is stored. Make sure they know how to use it. After naloxone is given, the person giving it must call emergency services. Naloxone is a temporary treatment. Repeat doses may be needed.  This medication may affect your coordination, reaction time, or judgment. Do not drive or operate machinery until you know how this medication affects you. Sit up or stand slowly to reduce the risk of  dizzy or fainting spells. Drinking alcohol with this medication can increase the risk of these side effects.  This medication will cause constipation. If you do not have a bowel movement for 3 days, call your care team.  Your mouth may get dry. Chewing sugarless gum or sucking hard candy and drinking plenty of water may help. Contact your care team if the problem does not go away or is severe.  Talk to your care team if you are pregnant or think you might be pregnant. This medication can cause serious birth defects if taken during pregnancy. Prolonged use of this medication during pregnancy can cause withdrawal in a .  Talk to your care team before breastfeeding. Changes to your treatment plan may be needed. If you breastfeed while taking this medication, seek medical care right away if you notice the child has slow or noisy breathing, is unusually sleepy or not able to wake up, or is limp.  Long-term use of this medication may cause infertility. Talk to your care team if you are concerned about your fertility.  What side effects may I notice from receiving this medication?  Side effects that you should report to your care team as soon as possible:  Allergic reactions--skin rash, itching, hives, swelling of the face, lips, tongue, or throat  CNS depression--slow or shallow breathing, shortness of breath, feeling faint, dizziness, confusion, trouble staying awake  Low adrenal gland function--nausea, vomiting, loss of appetite, unusual weakness or fatigue, dizziness  Low blood pressure--dizziness, feeling faint or lightheaded, blurry vision  Side effects that usually do not require medical attention (report to your care team if they continue or are bothersome):  Constipation  Dizziness  Drowsiness  Dry mouth  Headache  Nausea  Vomiting  This list may not describe all possible side effects. Call your doctor for medical advice about side effects. You may report side effects to FDA at 8-142-FDA-4747.  Where should  I keep my medication?  Keep out of the reach of children and pets. This medication can be abused. Keep your medication in a safe place to protect it from theft. Do not share this medication with anyone. Selling or giving away this medication is dangerous and against the law.  If you are using this medication at home, you will be instructed on how to store this medication.  This medication may cause accidental overdose and death if it is taken by other adults, children, or pets. Flush any unused medication down the toilet to reduce the chance of harm. Do not use the medication after the expiration date.  NOTE: This sheet is a summary. It may not cover all possible information. If you have questions about this medicine, talk to your doctor, pharmacist, or health care provider.  © 2023 [employer]/Gold Standard (2022-06-28 00:00:00)

## 2024-05-29 NOTE — PROGRESS NOTES
MRI NURSING NOTES:    Patient to MRI Outpatient Dept. Patient informed of process and plan of care during this visit. Anesthesiologist, Dr. Duong spoke with patient and discussed providers plan of care. Patient agreed. CT Maxillofacial Without IV Contrast and MRI Cervical and Lumbar Without IV Contrast completed. Patient awoke from anesthesia without discomfort and /or issues/concerns. Patient tolerated diet and activities once awake and appropriate. DC instructions discussed. Questions encouraged. Questions answered &/ or deferred to provider. Patient DC'd in stable condition with responsible adult Homero Wilson (friend).

## 2024-06-04 ENCOUNTER — OFFICE VISIT (OUTPATIENT)
Dept: OPHTHALMOLOGY | Facility: MEDICAL CENTER | Age: 54
End: 2024-06-04
Payer: COMMERCIAL

## 2024-06-04 DIAGNOSIS — H52.4 ACCOMMODATIVE INSUFFICIENCY: ICD-10-CM

## 2024-06-04 DIAGNOSIS — H52.203 HYPEROPIA OF BOTH EYES WITH ASTIGMATISM: ICD-10-CM

## 2024-06-04 DIAGNOSIS — H52.03 HYPEROPIA OF BOTH EYES WITH ASTIGMATISM: ICD-10-CM

## 2024-06-04 PROCEDURE — 99213 OFFICE O/P EST LOW 20 MIN: CPT | Performed by: OPHTHALMOLOGY

## 2024-06-04 ASSESSMENT — CONF VISUAL FIELD
OS_SUPERIOR_NASAL_RESTRICTION: 0
OD_SUPERIOR_TEMPORAL_RESTRICTION: 0
OD_NORMAL: 1
OD_SUPERIOR_NASAL_RESTRICTION: 0
OS_SUPERIOR_TEMPORAL_RESTRICTION: 0
OS_INFERIOR_TEMPORAL_RESTRICTION: 0
OD_INFERIOR_TEMPORAL_RESTRICTION: 0
OD_INFERIOR_NASAL_RESTRICTION: 0
OS_NORMAL: 1
OS_INFERIOR_NASAL_RESTRICTION: 0

## 2024-06-04 ASSESSMENT — CUP TO DISC RATIO
OD_RATIO: 0.1
OS_RATIO: 0.1

## 2024-06-04 ASSESSMENT — TONOMETRY
OD_IOP_MMHG: 9
IOP_METHOD: I-CARE
OS_IOP_MMHG: 10

## 2024-06-04 ASSESSMENT — REFRACTION_MANIFEST
OS_CYLINDER: +1.00
OD_CYLINDER: +1.50
OS_AXIS: 074
OD_AXIS: 101
OD_SPHERE: -0.75
METHOD_AUTOREFRACTION: 1
OS_SPHERE: -0.25

## 2024-06-04 ASSESSMENT — ENCOUNTER SYMPTOMS
BLURRED VISION: 1
DOUBLE VISION: 1
HEADACHES: 1
EYE PAIN: 1
PHOTOPHOBIA: 1

## 2024-06-04 ASSESSMENT — SLIT LAMP EXAM - LIDS
COMMENTS: NORMAL
COMMENTS: NORMAL

## 2024-06-04 ASSESSMENT — VISUAL ACUITY
METHOD: SNELLEN - LINEAR
OS_SC: 20/20
OD_SC: 20/25

## 2024-06-04 ASSESSMENT — EXTERNAL EXAM - RIGHT EYE: OD_EXAM: NORMAL

## 2024-06-04 ASSESSMENT — EXTERNAL EXAM - LEFT EYE: OS_EXAM: NORMAL

## 2024-06-04 NOTE — PROGRESS NOTES
Peds/Neuro Ophthalmology:   Sarmad Velazco M.D.    Date & Time note created:    6/4/2024   12:51 PM     Referring MD / APRN:  Pcp Pt States None, No att. providers found    Patient ID:  Name:             Landon Allred   YOB: 1970  Age:                 54 y.o.  male   MRN:               [id number]    Chief Complaint/Reason for Visit:     Other (2 month f.v. for accommodative insufficiency )      History of Present Illness:    Landon Allred is a 54 y.o. male   2 month f.v accommodative insufficiency. Pt states he's still having eye pain/ strain, headaches/ migraines and double vision. Pt says his vision changes often and his vision can either be very clear or very blurry.       Other  Associated symptoms include headaches.       Review of Systems:  Review of Systems   Eyes:  Positive for blurred vision, double vision, photophobia and pain.        Accommodative insufficiency    Neurological:  Positive for headaches.   All other systems reviewed and are negative.      Past Medical History:   Past Medical History:   Diagnosis Date    Allergy     Arthritis     Asthma     Claustrophobia     Clostridium difficile diarrhea     Clotting disorder (HCC)     Depression     DVT (deep venous thrombosis) ([facility])     GERD (gastroesophageal reflux disease)     Hypertension     IBD (inflammatory bowel disease)     Migraine     MRSA (methicillin resistant Staphylococcus aureus)     Pancreatitis     PE (pulmonary embolism)     Psychiatric disorder     PTSD, depression    Seizure ([facility])        Past Surgical History:  Past Surgical History:   Procedure Laterality Date    INGUINAL HERNIA REPAIR Right 09/06/2019    DENTAL EXTRACTION(S)      HERNIA REPAIR Right     NERVE ULNAR REPAIR OR EXPLORE      ORIF, ELBOW      ORIF, FRACTURE, FEMUR      [place] CLOSED RX NASAL SEPTAL FRACTURE      RECTAL EXPLORATION         Current Outpatient Medications:  Current Outpatient Medications   Medication Sig Dispense Refill     "pseudoephedrine (SUDAFED) 60 MG Tab Take 60 mg by mouth 1 time a day as needed for Congestion.      albuterol 108 (90 Base) MCG/ACT Aero Soln inhalation aerosol Inhale 2 Puffs.      budesonide (RINOCORT AQUA) 32 MCG/ACT nasal spray SQUIRT 1 SPRAY IN EACH NOSTRIL TWICE A DAY FOR NASAL ALLERGIES      diazePAM 5 MG/5ML Solution diazePAM      polyvinyl alcohol-povidone (REFRESH) 1.4-0.6 % ophthalmic solution Administer 1 Drop into both eyes 2 times a day. 50 Each 3    valacyclovir (VALTREX) 1 GM Tab Take 1 Tablet by mouth 3 times a day. 21 Tablet 0    ALBUTEROL INH       promethazine (PHENERGAN) 25 MG Tab Take 25 mg by mouth every 6 hours as needed for Nausea/Vomiting.      clonazePAM (KLONOPIN) 1 MG Tab Take 1 mg by mouth 2 times a day.      fluticasone (FLONASE) 50 MCG/ACT nasal spray Administer 2 Sprays into affected nostril(S) every day.      diphenhydrAMINE HCl (BENADRYL ALLERGY PO) Take 50 mg by mouth every day.      therapeutic multivitamin-minerals (THERAGRAN-M) Tab Take 1 Tablet by mouth every day.      Fluticasone Furoate-Vilanterol (BREO) 200-25 MCG/INH AEROSOL POWDER, BREATH ACTIVATED Inhale 1 Puff by mouth every day.      zonisamide (ZONEGRAN) 50 MG capsule Take 50 mg by mouth 2 times a day.      esomeprazole (NEXIUM) 40 MG delayed-release capsule Take 40 mg by mouth every morning before breakfast.      rivaroxaban (XARELTO) 10 MG Tab tablet Take 20 mg by mouth with dinner.       No current facility-administered medications for this visit.       Allergies:  Allergies   Allergen Reactions    Capsaicin-Turpentine Anaphylaxis and Rash     08/12/2009 21:56 [facility]<br/><br/>patient was admitted to ER, taken via ambulance, after<br/>applying capsaicin 0.075% - severe skin reaction<br/>(hyperventilation, rash, right nose bleed, extreme skin<br/>sensitivity)    Ciprofloxacin Anaphylaxis    Dilaudid [Hydromorphone] Itching     States \"I have an adverse reaction and need benadryl first\"    Doxycycline " "Anaphylaxis    Haloperidol Unspecified and Anaphylaxis    Shellfish Allergy Anaphylaxis    Tape Hives    Wasp Venom Anaphylaxis    Amoxicillin-Pot Clavulanate Rash     Other reaction(s): .Unknown  rash      Augmentin Rash and Unspecified     rash    Dalteparin Diarrhea     03/28/2013 21:07 [facility] - MANOJ WILKINSON<br/><br/>Vet states he had constant diarrhea after starting the<br/>dalteparin.  Was able to complete his bridge therapy, but<br/>stated that he would refuse to use it in the future due to<br/>this ADR.    Sulfamethoxazole W-Trimethoprim Hives     04/18/2014 16:26 [facility] - ROSE COHEN<br/><br/>Pt admitted 4/18/2014 for urticaria but he had bactrim,<br/>nuclear medicine, dilaudid, phototherapy from dermatology,<br/>and other meds so it is unclear whether bactrim caused the<br/>rash.    Acetaminophen Unspecified     Other reaction(s): .Unknown  \"makes me deathly ill\"  \"makes me deathly ill\"      Amitriptyline Hcl     Apra     Aspirin Unspecified    Baclofen Unspecified    Biofreeze     Carisoprodol      Other reaction(s): Unknown    Celery Oil      Celery causes hives    Codeine     Compazine      Other reaction(s): Unknown    Compazine [Prochlorperazine]      Hallucinations, dizziness    Cyclobenzaprine     Duloxetine Unspecified and Hives    Enoxaparin Unspecified and Vomiting    Eszopiclone     Hydrocodone-Acetaminophen     Kdc:Yellow Dye+Aspirin+Oxycodone     Ketamine      Other reaction(s): .Unknown  Pt reports he \"gets violent\"    Ketorolac Tromethamine     Ketorolac Tromethamine [Toradol]     Latex Unspecified    Lunesta      Other reaction(s): Unknown    Menthol     Methocarbamol     Morphine Vomiting    Nitroglycerin      Other reaction(s): Unknown    Ondansetron [Zofran]     Other Misc      Can take dilaudid if given benadryl first    Oxycodone     Oxycodone-Acetaminophen     Oxycodone-Aspirin     Propofol     Propoxyphene      Other reaction(s): Unknown    Quetiapine      Other reaction(s): Unknown    " Reglan [Metoclopramide]      Has no idea what his RX is    Risperidone     Tramadol     Tylenol      Other reaction(s): Unknown    Zofran [Ondansetron]     Rabeprazole Unspecified, Nausea and Vomiting     cannot remember   cannot remember   06/05/2007 23:16 [facility] - AUSTIN BRADFORD<br/><br/>Updated using auto clean up process from RA*4*29.  Changed reactant from RABEPRAZOLE (free text) to RABEPRAZOLE(file - PSNDF(50.6,) <br/><br/><br/>04/16/2007 23:18 [facility] - [place] BRAD[place]<br/><br/>Changed from RABEPRAZOLE (File 120.82) to free text by patch [facility]*4*36 <br/><br/><br/>02/21/2007 00:57 [facility] - JOSE WHITMAN<br/><br/>Updated using clean up process.  Changed reactant from RABEPRAZOLE (ingredient) to RABEPRAZOLE(file - PSNDF(50.6,) <br/><br/><br/>01/17/2002 18:01 [facility] - JUAN MURRAY<br/><br/>reported by Dr. Austin Kinney on lansoprazole request form    06/05/2007 23:16 [facility] A[place] BRADFORD<br/><br/>Updated using auto clean up process from RA*4*29.  Changed reactant from RABEPRAZOLE (free text) to RABEPRAZOLE(file - PSNDF(50.6,) <br/><br/><br/>04/16/2007 23:18 [facility] [place] BRADFORD<br/><br/>Changed from RABEPRAZOLE (File 120.82) to free text by patch [facility]*4*36 <br/><br/><br/>02/21/2007 00:57 [facility] - JOSE WHITMAN<br/><br/>Updated using clean up process.  Changed reactant from RABEPRAZOLE (ingredient) to RABEPRAZOLE(file - PSNDF(50.6,) <br/><br/><br/>01/17/2002 18:01 [facility] - JUAN MURRAY<br/><br/>reported by Dr. Austin Kinney on lansoprazole request form    cannot remember cannot remember 06/05/2007 23:16 [facility] - [place] BR[facility]<br/><br/>Updated using auto clean up process from RA*4*29.  Changed reactant from RABEPRAZOLE (free text) to RABEPRAZOLE(file - PSNDF(50.6,) <br/><br/><br/>04/16/2007 23:18 [facility] - [place] BR[facility]<br/><br/>Changed from RABEPRAZOLE (File 120.82) to free text by patch RA*4*36 <br/><br/><br/>02/21/2007 00:57 [facility] - JOSE WHITMAN<br/><br/>Updated using clean up process.  Changed reactant from RABEPRAZOLE  (ingredient) to RABEPRAZOLE(file - PSNDF(50.6,) <br/><br/><br/>01/17/2002 18:01 [facility] JUAN GASPAR<br/><br/>reported by Dr. Austin Kinney on lansoprazole request form      06/05/2007 23:16 [facility] - [place] BRAD[place]<br/><br/>Updated using auto clean up process from RA*4*29.  Changed reactant from RABEPRAZOLE (free text) to RABEPRAZOLE(file - PSNDF(50.6,) <br/><br/><br/>04/16/2007 23:18 [facility] - [place] BRAD[facility]<br/><br/>Changed from RABEPRAZOLE (File 120.82) to free text by patch RA*4*36 <br/><br/><br/>02/21/2007 00:57 [facility] - JOSE WHITMAN<br/><br/>Updated using clean up process.  Changed reactant from RABEPRAZOLE (ingredient) to RABEPRAZOLE(file - PSNDF(50.6,) <br/><br/><br/>01/17/2002 18:01 [facility] JUAN GASPAR<br/><br/>reported by Dr. Austin Kinney on lansoprazole request form       Family History:  Family History   Problem Relation Age of Onset    Hypertension Mother     Cancer Father     Hypertension Father        Social History:  Social History     Socioeconomic History    Marital status: Single     Spouse name: Not on file    Number of children: Not on file    Years of education: Not on file    Highest education level: Not on file   Occupational History    Not on file   Tobacco Use    Smoking status: Never    Smokeless tobacco: Never   Vaping Use    Vaping status: Never Used   Substance and Sexual Activity    Alcohol use: No    Drug use: No    Sexual activity: Not on file   Other Topics Concern    Not on file   Social History Narrative    51 y/o male, disabled     Social Determinants of Health     Financial Resource Strain: High Risk (2/1/2023)    Overall Financial Resource Strain (CARDIA)     Difficulty of Paying Living Expenses: Very hard   Food Insecurity: Food Insecurity Present (2/1/2023)    Hunger Vital Sign     Worried About Running Out of Food in the Last Year: Often true     Ran Out of Food in the Last Year: Often true   Transportation Needs: Unmet Transportation Needs (2/1/2023)    PRAPARE - Transportation      Lack of Transportation (Medical): Yes     Lack of Transportation (Non-Medical): Yes   Physical Activity: Inactive (2/1/2023)    Exercise Vital Sign     Days of Exercise per Week: 0 days     Minutes of Exercise per Session: 0 min   Stress: Stress Concern Present (2/1/2023)    [employer] [place] [employer] - Occupational Stress Questionnaire     Feeling of Stress : Very much   Social Connections: Socially Isolated (2/1/2023)    Social Connection and Isolation Panel [NHANES]     Frequency of Communication with Friends and Family: Never     Frequency of Social Gatherings with Friends and Family: Never     Attends Islam Services: Never     Active Member of Clubs or Organizations: No     Attends Club or Organization Meetings: Never     Marital Status: Never    Intimate Partner Violence: Low Risk  (11/1/2023)    Received from [facility]    History of Abuse     History of Abuse: Denies   Housing Stability: High Risk (2/1/2023)    Housing Stability Vital Sign     Unable to Pay for Housing in the Last Year: No     Number of Places Lived in the Last Year: 3     Unstable Housing in the Last Year: Yes          Physical Exam:  Physical Exam    Oriented x 3  Weight/BMI: There is no height or weight on file to calculate BMI.  There were no vitals taken for this visit.    Base Eye Exam       Visual Acuity (Snellen - Linear)         Right Left    Dist sc 20/25 20/20              Tonometry (i-care, 10:10 AM)         Right Left    Pressure 9 10              Pupils         Pupils    Right PERRL    Left PERRL              Visual Fields         Right Left     Full Full              Neuro/Psych       Oriented x3: Yes    Mood/Affect: Normal                  Additional Tests       Stereo       Fly: +    Animals: 3/3    Circles: 7/9                  Slit Lamp and Fundus Exam       External Exam         Right Left    External Normal Normal              Slit Lamp Exam         Right Left    Lids/Lashes Normal  Normal    Conjunctiva/Sclera White and quiet White and quiet    Cornea Clear Clear    Anterior Chamber Deep and quiet Deep and quiet    Iris Round and reactive Round and reactive    Lens Clear Clear    Vitreous Normal Normal              Fundus Exam         Right Left    Disc Normal Normal    C/D Ratio 0.1 0.1    Macula Normal Normal    Vessels Normal Normal    Periphery Normal Normal                  Refraction       Manifest Refraction (Auto)         Sphere Cylinder Axis    Right -0.75 +1.50 101    Left -0.25 +1.00 074              Final Rx         Sphere Cylinder Axis Add    Right -0.50 +1.00 100 +2.00    Left [place] +0.50 075 +2.00                    Pertinent Lab/Test/Imaging Review:      Assessment and Plan:     Accommodative insufficiency  1/29/2020 - accommodative insufficieny and probable episodes of accommodative spasm leading to some of the blurry vision and worsening reading. Most likely a combination of early NS cataracts, presbyopia and the traumatic brain injury. Also current rx over corrected and the reading rx significantly undercorrected. Was able to correct to 20/20 vision at distance and near with new glasses rx. Discussed importance of wearing glasses at all times to relax accommodation and prevent spasm at both distance and near. Since needs both distance and reading correction recommended progressive lenses.   5/10/2022 - States having difficulty with the current progressive lenses, especially when on the computer. Is till showing signs of accommodative insufficiency and as well the distance rx has changed. Will give a new rx progressive, but los full frame computer reading.   4/12/2023-never got full frame computer glasses.  Had episode when going from inside with difficulty and near vision to going outside in bright sunlight then a being able to read.  Discussed that one can accommodate better in bright sunlight with pupils becoming smaller and easier lens shifting.  In trial frame did well  with the full frame computer lenses and a trial frame in the office.  Therefore gave new Rx  7/12/2023 - [facility] needed more specifics regarding progressive, polarized UV etc. Gave new rx with that information included  3/25/2024 -never got new glasses Rx with progressive.  Regave  6/4/2024-having difficulty getting glasses Rx filled.  Went to multiple optometric centers.  They tried in trial frames.  On examination today there is a minimal adjustment in his glasses.  New Rx given with continued progressive at    Hyperopia of both eyes with astigmatism  1/29/2020 gave new glasses rx.   5/10/2022 - some progression to myopia. Probably related to early NS. Will adjust rx.   2/9/2023-never got new glasses Rx.  In trial frame near acuity excellent.  Will give Rx.  3/24/2024 -regave Rx never got  6/4/2024-adjusted Rx slightly        Sarmad Velazco M.D.

## 2024-06-04 NOTE — ASSESSMENT & PLAN NOTE
1/29/2020 gave new glasses rx.   5/10/2022 - some progression to myopia. Probably related to early NS. Will adjust rx.   2/9/2023-never got new glasses Rx.  In trial frame near acuity excellent.  Will give Rx.  3/24/2024 -regave Rx never got  6/4/2024-adjusted Rx slightly

## 2024-06-04 NOTE — ASSESSMENT & PLAN NOTE
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 Most likely a combination of early NS cataracts, presbyopia and the traumatic brain injury. Also current rx over corrected and the reading rx significantly undercorrected. Was able to correct to 20/20 vision at distance and near with new glasses rx. Discussed importance of wearing glasses at all times to relax accommodation and prevent spasm at both distance and near. Since needs both distance and reading correction recommended progressive lenses.   5/10/2022 - States having difficulty with the current progressive lenses, especially when on the computer. Is till showing signs of accommodative insufficiency and as well the distance rx has changed. Will give a new rx progressive, but los full frame computer reading.   4/12/2023-never got full frame computer glasses.  Had episode when going from inside with difficulty and near vision to going outside in bright sunlight then a being able to read.  Discussed that one can accommodate better in bright sunlight with pupils becoming smaller and easier lens shifting.  In trial frame did well with the full frame computer lenses and a trial frame in the office.  Therefore gave new Rx  7/12/2023 - [facility], needed more specifics regarding progressive, polarized UV etc. Gave new rx with that information included  3/25/2024 -never got new glasses Rx with progressive.  Марина  6/4/2024-having difficulty getting glasses Rx filled.  Went to multiple optometric centers.  They tried in trial frames.  On examination today there is a minimal adjustment in his glasses.  New Rx given with continued progressive at

## 2024-06-28 PROBLEM — F43.10 POST-TRAUMATIC STRESS SYNDROME: Chronic | Status: ACTIVE | Noted: 2023-02-01

## 2024-06-28 PROBLEM — M54.81 BILATERAL OCCIPITAL NEURALGIA: Chronic | Status: ACTIVE | Noted: 2020-01-29

## 2024-06-28 PROBLEM — B02.9 SHINGLES: Status: RESOLVED | Noted: 2023-02-01 | Resolved: 2024-06-28

## 2024-07-24 ENCOUNTER — APPOINTMENT (OUTPATIENT)
Dept: OPHTHALMOLOGY | Facility: MEDICAL CENTER | Age: 54
End: 2024-07-24
Payer: MEDICARE

## 2024-09-16 ENCOUNTER — APPOINTMENT (OUTPATIENT)
Dept: OPHTHALMOLOGY | Facility: MEDICAL CENTER | Age: 54
End: 2024-09-16
Payer: MEDICARE

## 2024-09-16 DIAGNOSIS — H52.03 HYPEROPIA OF BOTH EYES WITH ASTIGMATISM: ICD-10-CM

## 2024-09-16 DIAGNOSIS — H52.203 HYPEROPIA OF BOTH EYES WITH ASTIGMATISM: ICD-10-CM

## 2024-09-16 DIAGNOSIS — B02.8 HERPES ZOSTER WITH COMPLICATION: ICD-10-CM

## 2024-09-16 DIAGNOSIS — H52.4 ACCOMMODATIVE INSUFFICIENCY: ICD-10-CM

## 2024-09-16 DIAGNOSIS — S06.9X9S TRAUMATIC BRAIN INJURY WITH LOSS OF CONSCIOUSNESS, SEQUELA (HCC): ICD-10-CM

## 2024-09-16 PROCEDURE — 99213 OFFICE O/P EST LOW 20 MIN: CPT | Performed by: OPHTHALMOLOGY

## 2024-09-16 ASSESSMENT — REFRACTION_WEARINGRX
OD_CYLINDER: +1.00
OS_CYLINDER: +0.50
OS_AXIS: 075
OS_SPHERE: PLANO
OD_AXIS: 100
OD_SPHERE: -0.50
OD_ADD: +2.00
OS_ADD: +2.00

## 2024-09-16 ASSESSMENT — REFRACTION_MANIFEST
OS_CYLINDER: +1.00
OD_CYLINDER: +1.25
METHOD_AUTOREFRACTION: 1
OS_AXIS: 075
OD_SPHERE: -0.25
OD_AXIS: 102
OS_SPHERE: PLANO

## 2024-09-16 ASSESSMENT — VISUAL ACUITY
OS_CC: J1+
CORRECTION_TYPE: GLASSES
OS_CC: 20/20
METHOD: SNELLEN - LINEAR
OD_CC: 20/20
OD_CC: J1+

## 2024-09-16 ASSESSMENT — TONOMETRY
OD_IOP_MMHG: 10
OS_IOP_MMHG: 9

## 2024-09-16 ASSESSMENT — EXTERNAL EXAM - RIGHT EYE: OD_EXAM: NORMAL

## 2024-09-16 ASSESSMENT — SLIT LAMP EXAM - LIDS
COMMENTS: NORMAL
COMMENTS: NORMAL

## 2024-09-16 ASSESSMENT — CUP TO DISC RATIO
OD_RATIO: 0.1
OS_RATIO: 0.1

## 2024-09-16 ASSESSMENT — ENCOUNTER SYMPTOMS: BLURRED VISION: 1

## 2024-09-16 ASSESSMENT — EXTERNAL EXAM - LEFT EYE: OS_EXAM: NORMAL

## 2024-09-16 NOTE — PROGRESS NOTES
"Peds/Neuro Ophthalmology:   Sarmad Velazco M.D.    Date & Time note created:    9/16/2024   3:33 PM     Referring MD / APRN:  Pcp Pt States None, No att. providers found    Patient ID:  Name:             Landon Allred   YOB: 1970  Age:                 54 y.o.  male   MRN:               [id number]    Chief Complaint/Reason for Visit:     Other (Accommodative insufficiency/)      History of Present Illness:    Landon Allred is a 54 y.o. male   Follow up accommodative insuffiencey.Complaining of \"I Can't See\"    Other        Review of Systems:  Review of Systems   Eyes:  Positive for blurred vision.   All other systems reviewed and are negative.      Past Medical History:   Past Medical History:   Diagnosis Date    Allergy     Arthritis     Asthma     Claustrophobia     Clostridium difficile diarrhea     Clotting disorder ([facility])     Depression     DVT (deep venous thrombosis) ([facility])     GERD (gastroesophageal reflux disease)     Hypertension     IBD (inflammatory bowel disease)     Migraine     MRSA (methicillin resistant Staphylococcus aureus)     Pancreatitis     PE (pulmonary embolism)     Psychiatric disorder     PTSD, depression    Seizure ([facility])        Past Surgical History:  Past Surgical History:   Procedure Laterality Date    INGUINAL HERNIA REPAIR Right 09/06/2019    DENTAL EXTRACTION(S)      HERNIA REPAIR Right     NERVE ULNAR REPAIR OR EXPLORE      ORIF, ELBOW      ORIF, FRACTURE, FEMUR      MT CLOSED RX NASAL SEPTAL FRACTURE      RECTAL EXPLORATION         Current Outpatient Medications:  Current Outpatient Medications   Medication Sig Dispense Refill    chlorhexidine 4 % solution Apply  topically.      pseudoephedrine (SUDAFED) 60 MG Tab Take 60 mg by mouth 1 time a day as needed for Congestion.      albuterol 108 (90 Base) MCG/ACT Aero Soln inhalation aerosol Inhale 2 Puffs.      budesonide (RINOCORT AQUA) 32 MCG/ACT nasal spray SQUIRT 1 SPRAY IN EACH NOSTRIL TWICE A DAY " "FOR NASAL ALLERGIES      diazePAM 5 MG/5ML Solution diazePAM      polyvinyl alcohol-povidone (REFRESH) 1.4-0.6 % ophthalmic solution Administer 1 Drop into both eyes 2 times a day. 50 Each 3    valacyclovir (VALTREX) 1 GM Tab Take 1 Tablet by mouth 3 times a day. 21 Tablet 0    ALBUTEROL INH       promethazine (PHENERGAN) 25 MG Tab Take 25 mg by mouth every 6 hours as needed for Nausea/Vomiting.      clonazePAM (KLONOPIN) 1 MG Tab Take 1 mg by mouth 2 times a day.      fluticasone (FLONASE) 50 MCG/ACT nasal spray Administer 2 Sprays into affected nostril(S) every day.      diphenhydrAMINE HCl (BENADRYL ALLERGY PO) Take 50 mg by mouth every day.      therapeutic multivitamin-minerals (THERAGRAN-M) Tab Take 1 Tablet by mouth every day.      Fluticasone Furoate-Vilanterol (BREO) 200-25 MCG/INH AEROSOL POWDER, BREATH ACTIVATED Inhale 1 Puff by mouth every day.      zonisamide (ZONEGRAN) 50 MG capsule Take 50 mg by mouth 2 times a day.      esomeprazole (NEXIUM) 40 MG delayed-release capsule Take 40 mg by mouth every morning before breakfast.      rivaroxaban (XARELTO) 10 MG Tab tablet Take 20 mg by mouth with dinner.       No current facility-administered medications for this visit.       Allergies:  Allergies   Allergen Reactions    Capsaicin-Turpentine Anaphylaxis and Rash     08/12/2009 21:56 BERNADETTE CUMMINGS<br/><br/>patient was admitted to ER, taken via ambulance, after<br/>applying capsaicin 0.075% - severe skin reaction<br/>(hyperventilation, rash, right nose bleed, extreme skin<br/>sensitivity)    Ciprofloxacin Anaphylaxis    Dilaudid [Hydromorphone] Itching     States \"I have an adverse reaction and need benadryl first\"    Doxycycline Anaphylaxis    Haloperidol Unspecified and Anaphylaxis    Shellfish Allergy Anaphylaxis    Tape Hives    Wasp Venom Anaphylaxis    Amoxicillin-Pot Clavulanate Rash     Other reaction(s): .Unknown  rash      Augmentin Rash and Unspecified     rash    Dalteparin Diarrhea     " "03/28/2013 21:07 [facility] - MANOJ WILKINSON<br/><br/>Vet states he had constant diarrhea after starting the<br/>dalteparin.  Was able to complete his bridge therapy, but<br/>stated that he would refuse to use it in the future due to<br/>this ADR.    Sulfamethoxazole W-Trimethoprim Hives     04/18/2014 16:26 [facility] - ROSE COHEN<br/><br/>Pt admitted 4/18/2014 for urticaria but he had bactrim,<br/>nuclear medicine, dilaudid, phototherapy from dermatology,<br/>and other meds so it is unclear whether bactrim caused the<br/>rash.    Acetaminophen Unspecified     Other reaction(s): .Unknown  \"makes me deathly ill\"  \"makes me deathly ill\"      Amitriptyline Hcl     Apra     Aspirin Unspecified    Baclofen Unspecified    Biofreeze     Carisoprodol      Other reaction(s): Unknown    Celery Oil      Celery causes hives    Codeine     Compazine      Other reaction(s): Unknown    Compazine [Prochlorperazine]      Hallucinations, dizziness    Cyclobenzaprine     Duloxetine Unspecified and Hives    Enoxaparin Unspecified and Vomiting    Eszopiclone     Hydrocodone-Acetaminophen     Kdc:Yellow Dye+Aspirin+Oxycodone     Ketamine      Other reaction(s): .Unknown  Pt reports he \"gets violent\"    Ketorolac Tromethamine     Ketorolac Tromethamine [Toradol]     Latex Unspecified    Lunesta      Other reaction(s): Unknown    Menthol     Methocarbamol     Morphine Vomiting    Nitroglycerin      Other reaction(s): Unknown    Ondansetron [Zofran]     Other Misc      Can take dilaudid if given benadryl first    Oxycodone     Oxycodone-Acetaminophen     Oxycodone-Aspirin     Propofol     Propoxyphene      Other reaction(s): Unknown    Quetiapine      Other reaction(s): Unknown    Reglan [Metoclopramide]      Has no idea what his RX is    Risperidone     Tramadol     Tylenol      Other reaction(s): Unknown    Zofran [Ondansetron]     Rabeprazole Unspecified, Nausea and Vomiting     cannot remember   cannot remember   06/05/2007 23:16 [facility] - " AUSTIN BRADFORD<br/><br/>Updated using auto clean up process from RA*4*29.  Changed reactant from RABEPRAZOLE (free text) to RABEPRAZOLE(file - PSNDF(50.6,) <br/><br/><br/>04/16/2007 23:18 [facility] [place] BRADFORD<br/><br/>Changed from RABEPRAZOLE (File 120.82) to free text by patch [facility]*4*36 <br/><br/><br/>02/21/2007 00:57 [facility] - JOSE WHITMAN<br/><br/>Updated using clean up process.  Changed reactant from RABEPRAZOLE (ingredient) to RABEPRAZOLE(file - PSNDF(50.6,) <br/><br/><br/>01/17/2002 18:01 [facility] - JUAN MURRAY<br/><br/>reported by Dr. Austin Kinney on lansoprazole request form    06/05/2007 23:16 [facility] [place] BRAD[place]<br/><br/>Updated using auto clean up process from RA*4*29.  Changed reactant from RABEPRAZOLE (free text) to RABEPRAZOLE(file - PSNDF(50.6,) <br/><br/><br/>04/16/2007 23:18 [facility] [place] BRADF[place]<br/><br/>Changed from RABEPRAZOLE (File 120.82) to free text by patch [facility]*4*36 <br/><br/><br/>02/21/2007 00:57 [facility] - JOSE WHITMAN<br/><br/>Updated using clean up process.  Changed reactant from RABEPRAZOLE (ingredient) to RABEPRAZOLE(file - PSNDF(50.6,) <br/><br/><br/>01/17/2002 18:01 [facility] - JUAN MURRAY<br/><br/>reported by Dr. Austin Kinney on lansoprazole request form    cannot remember cannot remember 06/05/2007 23:16 [facility] - [place] BRAD[place]<br/><br/>Updated using auto clean up process from [facility]*4*29.  Changed reactant from RABEPRAZOLE (free text) to RABEPRAZOLE(file - PSNDF(50.6,) <br/><br/><br/>04/16/2007 23:18 [facility] - [place] BRAD[place]<br/><br/>Changed from RABEPRAZOLE (File 120.82) to free text by patch RA*4*36 <br/><br/><br/>02/21/2007 00:57 [facility] - JOSE WHITMAN<br/><br/>Updated using clean up process.  Changed reactant from RABEPRAZOLE (ingredient) to RABEPRAZOLE(file - PSNDF(50.6,) <br/><br/><br/>01/17/2002 18:01 [facility] - JUAN MURRAY<br/><br/>reported by Dr. Austin Kinney on lansoprazole request form      06/05/2007 23:16 [facility] - [place] BRAD[place]<br/><br/>Updated using auto clean up process from  [facility]*4*29.  Changed reactant from RABEPRAZOLE (free text) to RABEPRAZOLE(file - PSNDF(50.6,) <br/><br/><br/>04/16/2007 23:18 [facility] - [place] BRADFORD<br/><br/>Changed from RABEPRAZOLE (File 120.82) to free text by patch [facility]*4*36 <br/><br/><br/>02/21/2007 00:57 [facility] - JOSE WHITMAN<br/><br/>Updated using clean up process.  Changed reactant from RABEPRAZOLE (ingredient) to RABEPRAZOLE(file - PSNDF(50.6,) <br/><br/><br/>01/17/2002 18:01 [facility] - JUAN MURRAY<br/><br/>reported by Dr. Austin Kinney on lansoprazole request form       Family History:  Family History   Problem Relation Age of Onset    Hypertension Mother     Cancer Father     Hypertension Father        Social History:  Social History     Socioeconomic History    Marital status: Single     Spouse name: Not on file    Number of children: Not on file    Years of education: Not on file    Highest education level: Not on file   Occupational History    Not on file   Tobacco Use    Smoking status: Never    Smokeless tobacco: Never   Vaping Use    Vaping status: Never Used   Substance and Sexual Activity    Alcohol use: No    Drug use: No    Sexual activity: Not on file   Other Topics Concern    Not on file   Social History Narrative    53 y/o male, disabled     Social Determinants of Health     Financial Resource Strain: High Risk (2/1/2023)    Overall Financial Resource Strain (CARDIA)     Difficulty of Paying Living Expenses: Very hard   Food Insecurity: Food Insecurity Present (2/1/2023)    Hunger Vital Sign     Worried About Running Out of Food in the Last Year: Often true     Ran Out of Food in the Last Year: Often true   Transportation Needs: Unmet Transportation Needs (2/1/2023)    PRAPARE - Transportation     Lack of Transportation (Medical): Yes     Lack of Transportation (Non-Medical): Yes   Physical Activity: Inactive (2/1/2023)    Exercise Vital Sign     Days of Exercise per Week: 0 days     Minutes of Exercise per Session: 0 min   Stress: Stress Concern  Present (2/1/2023)    [facility] Occupational [facility] - Occupational Stress Questionnaire     Feeling of Stress : Very much   Social Connections: Socially Isolated (2/1/2023)    Social Connection and Isolation Panel [NHANES]     Frequency of Communication with Friends and Family: Never     Frequency of Social Gatherings with Friends and Family: Never     Attends Restoration Services: Never     Active Member of Clubs or Organizations: No     Attends Club or Organization Meetings: Never     Marital Status: Never    Intimate Partner Violence: Low Risk  (11/1/2023)    [facility]    History of Abuse     History of Abuse: Denies   Housing Stability: High Risk (2/1/2023)    Housing Stability Vital Sign     Unable to Pay for Housing in the Last Year: No     Number of Places Lived in the Last Year: 3     Unstable Housing in the Last Year: Yes          Physical Exam:  Physical Exam    Oriented x 3  Weight/BMI: There is no height or weight on file to calculate BMI.  There were no vitals taken for this visit.    Base Eye Exam       Visual Acuity (Snellen - Linear)         Right Left    Dist cc 20/20 20/20    Near cc J1+ J1+      Correction: Glasses              Tonometry (i care, 1:36 PM)         Right Left    Pressure 10 9              Pupils         Pupils    Right PERRL    Left PERRL              Neuro/Psych       Oriented x3: Yes    Mood/Affect: Normal                  Slit Lamp and Fundus Exam       External Exam         Right Left    External Normal Normal              Slit Lamp Exam         Right Left    Lids/Lashes Normal Normal    Conjunctiva/Sclera White and quiet White and quiet    Cornea Clear Clear    Anterior Chamber Deep and quiet Deep and quiet    Iris Round and reactive Round and reactive    Lens Clear Clear    Vitreous Normal Normal              Fundus Exam         Right Left    Disc Normal Normal    C/D Ratio 0.1  0.1    Macula Normal Normal    Vessels Normal Normal    Periphery Normal Normal                  Refraction       Wearing Rx         Sphere Cylinder Axis Add    Right -0.50 +1.00 100 +2.00    Left Langtry +0.50 075 +2.00              Manifest Refraction (Auto)         Sphere Cylinder Axis    Right -0.25 +1.25 102    Left Langtry +1.00 075              Final Rx         Sphere Cylinder Axis    Right +1.50 +1.00 100    Left +2.00 +0.50 075      Type: computer                    Pertinent Lab/Test/Imaging Review:      Assessment and Plan:     Accommodative insufficiency  1/29/2020 - accommodative insufficieny and probable episodes of accommodative spasm leading to some of the blurry vision and worsening reading. Most likely a combination of early NS cataracts, presbyopia and the traumatic brain injury. Also current rx over corrected and the reading rx significantly undercorrected. Was able to correct to 20/20 vision at distance and near with new glasses rx. Discussed importance of wearing glasses at all times to relax accommodation and prevent spasm at both distance and near. Since needs both distance and reading correction recommended progressive lenses.   5/10/2022 - States having difficulty with the current progressive lenses, especially when on the computer. Is till showing signs of accommodative insufficiency and as well the distance rx has changed. Will give a new rx progressive, but los full frame computer reading.   4/12/2023-never got full frame computer glasses.  Had episode when going from inside with difficulty and near vision to going outside in bright sunlight then a being able to read.  Discussed that one can accommodate better in bright sunlight with pupils becoming smaller and easier lens shifting.  In trial frame did well with the full frame computer lenses and a trial frame in the office.  Therefore gave new Rx  7/12/2023 - [facility] needed more specifics regarding progressive,  polarized UV etc. Gave new rx with that information included  3/25/2024 -never got new glasses Rx with progressive.  Regave  6/4/2024-having difficulty getting glasses Rx filled.  Went to multiple optometric centers.  They tried in trial frames.  On examination today there is a minimal adjustment in his glasses.  New Rx given with continued progressive at +2.00.  9/16/2024-overall showing some improvement.  However states that has to hold his chin up significantly when on the computer.  Thus demonstrated progressive lens that he is currently wearing.  At distance he is 20/10.  Gave Rx for frame computer    Herpes zoster with complication  2/9/2023-no evidence of zoster ophthalmicus  4/12/2023-states that has had a few outbreaks of zoster since his last visit with me.  However no ocular involvement  9/16/2024-no ophthalmic involvement    Hyperopia of both eyes with astigmatism  1/29/2020 gave new glasses rx.   5/10/2022 - some progression to myopia. Probably related to early NS. Will adjust rx.   2/9/2023-never got new glasses Rx.  In trial frame near acuity excellent.  Will give Rx.  3/24/2024 -regave Rx never got  6/4/2024-adjusted Rx slightly  9/16/2024-visual acuity 201/0 with current Rx letter progressive.  At near with head chin elevation J1.  Gave full frame readers    Traumatic brain injury ([facility])  1/29/2020 - History of multiple concussions with headaches and traumatic brain injury. Obtained OCT NFL thicken ss and ganglion cell thickness that was unremarkable  With OD 97 and OS 98. Normal ganglion cell thickness. Therefore no evidence of traumatic optic neuropathy or retrograde axonal degeneration.   5/10/2022 - OCT NFL thickness at 87 OU with normal color vision.   4/12/2023-suspect that this is also causing some exacerbation of his accommodative insufficiency.  Therefore gave the Rx for full frame computer glasses  3/25/2024 -a few episodes over the last month or so where he was driving for a long period of  time and then at desk developed bilateral significant blurry vision where he had to pull over the side of the road.  He was not wearing his glasses at that time.  Suspect this is related to accommodative spasm because of uncorrected astigmatic air as well as his progressive presbyopia.  Discussed importance of wearing glasses full-time.  Visual acuity best corrected 20/20 both eyes in trial frame.  Because of these episodes reevaluate optic nerve.  There is no significant change in optic nerve appearance over the past few years.  No progressive atrophy or damage.  9/16/2024-evaluate OCT next visit        Sarmad Velazco M.D.

## 2024-09-16 NOTE — ASSESSMENT & PLAN NOTE
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 Most likely a combination of early NS cataracts, presbyopia and the traumatic brain injury. Also current rx over corrected and the reading rx significantly undercorrected. Was able to correct to 20/20 vision at distance and near with new glasses rx. Discussed importance of wearing glasses at all times to relax accommodation and prevent spasm at both distance and near. Since needs both distance and reading correction recommended progressive lenses.   5/10/2022 - States having difficulty with the current progressive lenses, especially when on the computer. Is till showing signs of accommodative insufficiency and as well the distance rx has changed. Will give a new rx progressive, but los full frame computer reading.   4/12/2023-never got full frame computer glasses.  Had episode when going from inside with difficulty and near vision to going outside in bright sunlight then a being able to read.  Discussed that one can accommodate better in bright sunlight with pupils becoming smaller and easier lens shifting.  In trial frame did well with the full frame computer lenses and a trial frame in the office.  Therefore gave new Rx  7/12/2023 - [facility], needed more specifics regarding progressive, polarized UV etc. Gave new rx with that information included  3/25/2024 -never got new glasses Rx with progressive.  Марина  6/4/2024-having difficulty getting glasses Rx filled.  Went to multiple optometric centers.  They tried in trial frames.  On examination today there is a minimal adjustment in his glasses.  New Rx given with continued progressive at +2.00.  9/16/2024-overall showing some improvement.  However states that has to hold his chin up significantly when on the computer.  Thus demonstrated progressive lens that he is currently wearing.  At distance  he is 20/10.  Gave Rx for [employer]

## 2024-09-16 NOTE — ASSESSMENT & PLAN NOTE
2/9/2023-no evidence of zoster ophthalmicus  4/12/2023-states that has had a few outbreaks of zoster since his last visit with me.  However no ocular involvement  9/16/2024-no ophthalmic involvement

## 2024-09-16 NOTE — ASSESSMENT & PLAN NOTE
1/29/2020 gave new glasses rx.   5/10/2022 - some progression to myopia. Probably related to early NS. Will adjust rx.   2/9/2023-never got new glasses Rx.  In trial frame near acuity excellent.  Will give Rx.  3/24/2024 -regave Rx never got  6/4/2024-adjusted Rx slightly  9/16/2024-visual acuity 201/0 with current Rx letter progressive.  At near with head chin elevation J1.  Gave full frame readers

## 2024-09-16 NOTE — ASSESSMENT & PLAN NOTE
1/29/2020 - History of multiple concussions with headaches and traumatic brain injury. Obtained OCT NFL thicken ss and ganglion cell thickness that was unremarkable  With OD 97 and OS 98. Normal ganglion cell thickness. Therefore no evidence of traumatic optic neuropathy or retrograde axonal degeneration.   5/10/2022 - OCT NFL thickness at 87 OU with normal color vision.   4/12/2023-suspect that this is also causing some exacerbation of his accommodative insufficiency.  Therefore gave the Rx for full frame computer glasses  3/25/2024 -a few episodes over the last month or so where he was driving for a long period of time and then at desk developed bilateral significant blurry vision where he had to pull over the side of the road.  He was not wearing his glasses at that time.  Suspect this is related to accommodative spasm because of uncorrected astigmatic air as well as his progressive presbyopia.  Discussed importance of wearing glasses full-time.  Visual acuity best corrected 20/20 both eyes in trial frame.  Because of these episodes reevaluate optic nerve.  There is no significant change in optic nerve appearance over the past few years.  No progressive atrophy or damage.  9/16/2024-evaluate OCT next visit

## 2024-12-19 PROBLEM — Z01.818 PREOPERATIVE CLEARANCE: Status: ACTIVE | Noted: 2024-12-19

## 2024-12-19 PROBLEM — T85.840A: Status: ACTIVE | Noted: 2024-12-19

## 2025-01-22 ENCOUNTER — HOSPITAL ENCOUNTER (EMERGENCY)
Facility: MEDICAL CENTER | Age: 55
End: 2025-01-22
Attending: EMERGENCY MEDICINE
Payer: MEDICARE

## 2025-01-22 VITALS
OXYGEN SATURATION: 97 % | DIASTOLIC BLOOD PRESSURE: 86 MMHG | SYSTOLIC BLOOD PRESSURE: 134 MMHG | BODY MASS INDEX: 25.73 KG/M2 | WEIGHT: 190 LBS | HEART RATE: 69 BPM | TEMPERATURE: 99 F | HEIGHT: 72 IN | RESPIRATION RATE: 20 BRPM

## 2025-01-22 DIAGNOSIS — G89.29 OTHER CHRONIC PAIN: ICD-10-CM

## 2025-01-22 DIAGNOSIS — R10.9 ACUTE ABDOMINAL PAIN: ICD-10-CM

## 2025-01-22 LAB
ALBUMIN SERPL BCP-MCNC: 4.6 G/DL (ref 3.2–4.9)
ALBUMIN/GLOB SERPL: 1.7 G/DL
ALP SERPL-CCNC: 62 U/L (ref 30–99)
ALT SERPL-CCNC: 25 U/L (ref 2–50)
ANION GAP SERPL CALC-SCNC: 10 MMOL/L (ref 7–16)
AST SERPL-CCNC: 17 U/L (ref 12–45)
BASOPHILS # BLD AUTO: 0 % (ref 0–1.8)
BASOPHILS # BLD: 0 K/UL (ref 0–0.12)
BILIRUB SERPL-MCNC: 0.6 MG/DL (ref 0.1–1.5)
BUN SERPL-MCNC: 24 MG/DL (ref 8–22)
CALCIUM ALBUM COR SERPL-MCNC: 8.8 MG/DL (ref 8.5–10.5)
CALCIUM SERPL-MCNC: 9.3 MG/DL (ref 8.4–10.2)
CHLORIDE SERPL-SCNC: 107 MMOL/L (ref 96–112)
CO2 SERPL-SCNC: 22 MMOL/L (ref 20–33)
CREAT SERPL-MCNC: 1.19 MG/DL (ref 0.5–1.4)
EOSINOPHIL # BLD AUTO: 0 K/UL (ref 0–0.51)
EOSINOPHIL NFR BLD: 0 % (ref 0–6.9)
ERYTHROCYTE [DISTWIDTH] IN BLOOD BY AUTOMATED COUNT: 44.9 FL (ref 35.9–50)
ETHANOL BLD-MCNC: <10.1 MG/DL
GFR SERPLBLD CREATININE-BSD FMLA CKD-EPI: 72 ML/MIN/1.73 M 2
GLOBULIN SER CALC-MCNC: 2.7 G/DL (ref 1.9–3.5)
GLUCOSE SERPL-MCNC: 136 MG/DL (ref 65–99)
HCT VFR BLD AUTO: 48.5 % (ref 42–52)
HGB BLD-MCNC: 16.5 G/DL (ref 14–18)
IMM GRANULOCYTES # BLD AUTO: 0.03 K/UL (ref 0–0.11)
IMM GRANULOCYTES NFR BLD AUTO: 0.6 % (ref 0–0.9)
LIPASE SERPL-CCNC: 28 U/L (ref 11–82)
LYMPHOCYTES # BLD AUTO: 0.66 K/UL (ref 1–4.8)
LYMPHOCYTES NFR BLD: 13.1 % (ref 22–41)
MCH RBC QN AUTO: 32.6 PG (ref 27–33)
MCHC RBC AUTO-ENTMCNC: 34 G/DL (ref 32.3–36.5)
MCV RBC AUTO: 95.8 FL (ref 81.4–97.8)
MONOCYTES # BLD AUTO: 0.07 K/UL (ref 0–0.85)
MONOCYTES NFR BLD AUTO: 1.4 % (ref 0–13.4)
NEUTROPHILS # BLD AUTO: 4.28 K/UL (ref 1.82–7.42)
NEUTROPHILS NFR BLD: 84.9 % (ref 44–72)
NRBC # BLD AUTO: 0 K/UL
NRBC BLD-RTO: 0 /100 WBC (ref 0–0.2)
PLATELET # BLD AUTO: 213 K/UL (ref 164–446)
PMV BLD AUTO: 9.6 FL (ref 9–12.9)
POTASSIUM SERPL-SCNC: 4.4 MMOL/L (ref 3.6–5.5)
PROT SERPL-MCNC: 7.3 G/DL (ref 6–8.2)
RBC # BLD AUTO: 5.06 M/UL (ref 4.7–6.1)
SODIUM SERPL-SCNC: 139 MMOL/L (ref 135–145)
WBC # BLD AUTO: 5 K/UL (ref 4.8–10.8)

## 2025-01-22 PROCEDURE — 96372 THER/PROPH/DIAG INJ SC/IM: CPT | Mod: XU

## 2025-01-22 PROCEDURE — 36415 COLL VENOUS BLD VENIPUNCTURE: CPT

## 2025-01-22 PROCEDURE — 83690 ASSAY OF LIPASE: CPT

## 2025-01-22 PROCEDURE — 96375 TX/PRO/DX INJ NEW DRUG ADDON: CPT

## 2025-01-22 PROCEDURE — 85025 COMPLETE CBC W/AUTO DIFF WBC: CPT

## 2025-01-22 PROCEDURE — 700111 HCHG RX REV CODE 636 W/ 250 OVERRIDE (IP): Mod: JZ | Performed by: EMERGENCY MEDICINE

## 2025-01-22 PROCEDURE — 80053 COMPREHEN METABOLIC PANEL: CPT

## 2025-01-22 PROCEDURE — 96374 THER/PROPH/DIAG INJ IV PUSH: CPT

## 2025-01-22 PROCEDURE — 99285 EMERGENCY DEPT VISIT HI MDM: CPT

## 2025-01-22 PROCEDURE — 82077 ASSAY SPEC XCP UR&BREATH IA: CPT

## 2025-01-22 PROCEDURE — 700105 HCHG RX REV CODE 258: Performed by: EMERGENCY MEDICINE

## 2025-01-22 RX ORDER — HYDROMORPHONE HYDROCHLORIDE 1 MG/ML
1 INJECTION, SOLUTION INTRAMUSCULAR; INTRAVENOUS; SUBCUTANEOUS ONCE
Status: COMPLETED | OUTPATIENT
Start: 2025-01-22 | End: 2025-01-22

## 2025-01-22 RX ORDER — DIPHENHYDRAMINE HYDROCHLORIDE 50 MG/ML
25 INJECTION INTRAMUSCULAR; INTRAVENOUS ONCE
Status: COMPLETED | OUTPATIENT
Start: 2025-01-22 | End: 2025-01-22

## 2025-01-22 RX ORDER — SODIUM CHLORIDE 9 MG/ML
1000 INJECTION, SOLUTION INTRAVENOUS ONCE
Status: COMPLETED | OUTPATIENT
Start: 2025-01-22 | End: 2025-01-22

## 2025-01-22 RX ORDER — HYDROMORPHONE HYDROCHLORIDE 2 MG/ML
2 INJECTION, SOLUTION INTRAMUSCULAR; INTRAVENOUS; SUBCUTANEOUS ONCE
Status: COMPLETED | OUTPATIENT
Start: 2025-01-22 | End: 2025-01-22

## 2025-01-22 RX ORDER — DIPHENHYDRAMINE HYDROCHLORIDE 50 MG/ML
50 INJECTION INTRAMUSCULAR; INTRAVENOUS ONCE
Status: DISCONTINUED | OUTPATIENT
Start: 2025-01-22 | End: 2025-01-22

## 2025-01-22 RX ORDER — DIPHENHYDRAMINE HYDROCHLORIDE 50 MG/ML
50 INJECTION INTRAMUSCULAR; INTRAVENOUS ONCE
Status: COMPLETED | OUTPATIENT
Start: 2025-01-22 | End: 2025-01-22

## 2025-01-22 RX ADMIN — HYDROMORPHONE HYDROCHLORIDE 1 MG: 1 INJECTION, SOLUTION INTRAMUSCULAR; INTRAVENOUS; SUBCUTANEOUS at 18:20

## 2025-01-22 RX ADMIN — HYDROMORPHONE HYDROCHLORIDE 2 MG: 2 INJECTION, SOLUTION INTRAMUSCULAR; INTRAVENOUS; SUBCUTANEOUS at 20:21

## 2025-01-22 RX ADMIN — SODIUM CHLORIDE 1000 ML: 9 INJECTION, SOLUTION INTRAVENOUS at 18:23

## 2025-01-22 RX ADMIN — DIPHENHYDRAMINE HYDROCHLORIDE 50 MG: 50 INJECTION, SOLUTION INTRAMUSCULAR; INTRAVENOUS at 21:29

## 2025-01-22 RX ADMIN — DIPHENHYDRAMINE HYDROCHLORIDE 25 MG: 50 INJECTION, SOLUTION INTRAMUSCULAR; INTRAVENOUS at 18:19

## 2025-01-22 ASSESSMENT — FIBROSIS 4 INDEX: FIB4 SCORE: 1.18

## 2025-01-23 NOTE — ED TRIAGE NOTES
"BP (!) 138/91   Pulse 95   Temp 37.2 °C (99 °F) (Temporal)   Resp 18   Ht 1.829 m (6')   Wt 86.2 kg (190 lb)   SpO2 95%   BMI 25.77 kg/m²   Chief Complaint   Patient presents with    Pain     Sent here from GI Dr Campbell   pt strong Hx of pancreatitis  was sent here for further evaluation   was a pt for weeks at [facility] and finally d/c'ed from there    saw GI and sent here      Loss of Appetite     Has not been able to eat since being d/c'ed from hospital   is unable to go back to them for treatment     Pt c/o severe abdomen pain  10/10 due to chronic pancreatitis and not being able \"eat anything\"   \"I'm in need of admission and pain medication\" per pt     "

## 2025-01-23 NOTE — DISCHARGE INSTRUCTIONS
Your lab test at this time are normal.  You have been given pain medication here, please continue follow-up with your GI specialist.

## 2025-01-23 NOTE — ED PROVIDER NOTES
ER Provider Note    Scribed for Ankur Sher D.O. by Choco Bond. 1/22/2025  5:20 PM    Primary Care Provider: None pertinent    CHIEF COMPLAINT  Chief Complaint   Patient presents with    Pain     Sent here from GI Dr Campbell   pt strong Hx of pancreatitis  was sent here for further evaluation   was a pt for weeks at [facility] and finally d/c'ed from there    saw GI and sent here      Loss of Appetite     Has not been able to eat since being d/c'ed from hospital   is unable to go back to them for treatment        HPI/ROS    Landon Allred is a 54 y.o. male with a history of chronic pancreatitis who presents to the Emergency Department for evaluation of worsening abdominal pain onset 14 days ago. Patient was discharged from [facility] at onset, and since has been not drinking or eating, even after their subsequent treatment. He was seen earlier today at [facility], who advised patient to present to the ED for a workup of labs and possible hospitalization. Patient reports severe abdominal pain, which radiates to his chest and back. He reports associated difficulty walking, describing that he is short of breath after ten feet. In addition, patient reports loss of appetite, dehydration, vomiting, diarrhea, and significant abdominal bloating, but denies any weight loss. No alleviating or exacerbating factors noted.  He used to be established with pain management who implanted an intrathecal pain pump. However, he is currently without a specialist secondary to a belief of being untreatable. Patient adds that [facility], but [facility] ago.    ROS as per HPI.    PAST MEDICAL HISTORY  Past Medical History:   Diagnosis Date    Allergy     Arthritis     Asthma     Claustrophobia     Clostridium difficile diarrhea     Clotting disorder (HCC)     Depression     DVT (deep venous thrombosis) (HCC)     GERD (gastroesophageal reflux disease)     Hypertension     IBD  (inflammatory bowel disease)     Migraine     MRSA (methicillin resistant Staphylococcus aureus)     Pancreatitis     PE (pulmonary embolism)     Psychiatric disorder     PTSD, depression    Seizure (HCC)        SURGICAL HISTORY  Past Surgical History:   Procedure Laterality Date    INGUINAL HERNIA REPAIR Right 09/06/2019    DENTAL EXTRACTION(S)      HERNIA REPAIR Right     NERVE ULNAR REPAIR OR EXPLORE      ORIF, ELBOW      ORIF, FRACTURE, FEMUR      CO CLOSED RX NASAL SEPTAL FRACTURE      RECTAL EXPLORATION         FAMILY HISTORY  Family History   Problem Relation Age of Onset    Hypertension Mother     Cancer Father     Hypertension Father        SOCIAL HISTORY   reports that he has never smoked. He has never used smokeless tobacco. He reports that he does not drink alcohol and does not use drugs.    CURRENT MEDICATIONS  Discharge Medication List as of 1/22/2025 10:05 PM        CONTINUE these medications which have NOT CHANGED    Details   diphenhydrAMINE (BENADRYL) 25 MG capsule Take 50 mg by mouth 2 times a day., Historical Med      EPINEPHrine (EPIPEN) 0.3 MG/0.3ML Solution Auto-injector solution for injection Inject  into the shoulder, thigh, or buttocks., Historical Med             ALLERGIES  Capsaicin-turpentine, Ciprofloxacin, Dilaudid [hydromorphone], Doxycycline, Haloperidol, Shellfish allergy, Tape, Wasp venom, Amoxicillin-pot clavulanate, Augmentin, Dalteparin, Sulfamethoxazole w-trimethoprim, Acetaminophen, Amitriptyline hcl, Apra, Aspirin, Baclofen, Biofreeze, Capsaicin, Carisoprodol, Celery oil, Codeine, Compazine, Cyclobenzaprine, Doxepin, Duloxetine, Duloxetine hcl, Enoxaparin, Eszopiclone, Hydrocodone-acetaminophen, Kdc:yellow dye+aspirin+oxycodone, Ketamine, Ketorolac tromethamine, Ketorolac tromethamine [toradol], Latex, Lunesta, Menthol, Methocarbamol, Morphine, Nitroglycerin, Ondansetron [zofran], Other misc, Oxycodone, Oxycodone-acetaminophen, Oxycodone-aspirin, Prochlorperazine, Propofol,  Propoxyphene, Quetiapine, Reglan [metoclopramide], Risperidone, Tramadol, Tylenol, Zofran [ondansetron], and Rabeprazole    PHYSICAL EXAM  BP (!) 138/91   Pulse 95   Temp 37.2 °C (99 °F) (Temporal)   Resp 18   Ht 1.829 m (6')   Wt 86.2 kg (190 lb)   SpO2 95%   BMI 25.77 kg/m²     General: No acute distress.  HENT: Normocephalic, Mucus membranes are moist.   Chest: Lungs have even and unlabored respirations, Clear to auscultation.   Cardiovascular: Regular rate and regular rhythm, No peripheral cyanosis.  Abdomen: Non distended. Tenderness diffusely in the abdomen.   Neuro: Awake, Conversive, Able to relay recent events.  Psychiatric: Calm and cooperative.     EXTERNAL RECORDS REVIEWED  Review of patient's past medical records show admissions to [facility] and several ER visits for similar complaints.     INITIAL ASSESSMENT  This patient has a history of chronic pancreatitis, has been admitted through [facility], and has had multiple ER visits. He has multiple drug allergies, and the only thing he seems to take it is Dilaudid, and with that he gets Benadryl since Dilaudid gives him itching. He was seen by Dr. Campbell at [facility] earlier today, who was concerned because patient has not been eating or drinking, and is concerned for dehydration, electrolytes, and renal problems. His goal is to get an MRCP or ERCP through the [facility]. At this time, I evaluate his lipase, evaluate for electrolyte abnormality, kidney problems, and treat his symptoms.     ED Observation Status? No; Patient does not meet criteria for ED Observation.     DIAGNOSTIC STUDIES    Labs:   Results for orders placed or performed during the hospital encounter of 01/22/25   CBC WITH DIFFERENTIAL    Collection Time: 01/22/25  6:16 PM   Result Value Ref Range    WBC 5.0 4.8 - 10.8 K/uL    RBC 5.06 4.70 - 6.10 M/uL    Hemoglobin 16.5 14.0 - 18.0 g/dL    Hematocrit 48.5 42.0 - 52.0 %    MCV 95.8 81.4 - 97.8 fL    MCH 32.6 27.0 -  33.0 pg    MCHC 34.0 32.3 - 36.5 g/dL    RDW 44.9 35.9 - 50.0 fL    Platelet Count 213 164 - 446 K/uL    MPV 9.6 9.0 - 12.9 fL    Neutrophils-Polys 84.90 (H) 44.00 - 72.00 %    Lymphocytes 13.10 (L) 22.00 - 41.00 %    Monocytes 1.40 0.00 - 13.40 %    Eosinophils 0.00 0.00 - 6.90 %    Basophils 0.00 0.00 - 1.80 %    Immature Granulocytes 0.60 0.00 - 0.90 %    Nucleated RBC 0.00 0.00 - 0.20 /100 WBC    Neutrophils (Absolute) 4.28 1.82 - 7.42 K/uL    Lymphs (Absolute) 0.66 (L) 1.00 - 4.80 K/uL    Monos (Absolute) 0.07 0.00 - 0.85 K/uL    Eos (Absolute) 0.00 0.00 - 0.51 K/uL    Baso (Absolute) 0.00 0.00 - 0.12 K/uL    Immature Granulocytes (abs) 0.03 0.00 - 0.11 K/uL    NRBC (Absolute) 0.00 K/uL   COMP METABOLIC PANEL    Collection Time: 01/22/25  6:16 PM   Result Value Ref Range    Sodium 139 135 - 145 mmol/L    Potassium 4.4 3.6 - 5.5 mmol/L    Chloride 107 96 - 112 mmol/L    Co2 22 20 - 33 mmol/L    Anion Gap 10.0 7.0 - 16.0    Glucose 136 (H) 65 - 99 mg/dL    Bun 24 (H) 8 - 22 mg/dL    Creatinine 1.19 0.50 - 1.40 mg/dL    Calcium 9.3 8.4 - 10.2 mg/dL    Correct Calcium 8.8 8.5 - 10.5 mg/dL    AST(SGOT) 17 12 - 45 U/L    ALT(SGPT) 25 2 - 50 U/L    Alkaline Phosphatase 62 30 - 99 U/L    Total Bilirubin 0.6 0.1 - 1.5 mg/dL    Albumin 4.6 3.2 - 4.9 g/dL    Total Protein 7.3 6.0 - 8.2 g/dL    Globulin 2.7 1.9 - 3.5 g/dL    A-G Ratio 1.7 g/dL   LIPASE    Collection Time: 01/22/25  6:16 PM   Result Value Ref Range    Lipase 28 11 - 82 U/L   ETHYL ALCOHOL (BLOOD)    Collection Time: 01/22/25  6:16 PM   Result Value Ref Range    Diagnostic Alcohol <10.1 <10.1 mg/dL   ESTIMATED GFR    Collection Time: 01/22/25  6:16 PM   Result Value Ref Range    GFR (CKD-EPI) 72 >60 mL/min/1.73 m 2     *Note: Due to a large number of results and/or encounters for the requested time period, some results have not been displayed. A complete set of results can be found in Results Review.     All labs reviewed by me.      COURSE & MEDICAL  DECISION MAKING     COURSE AND PLAN  5:28 PM - Patient with a history of chronic pancreatitis seen and examined at bedside. Exam reveals tenderness diffusely in the abdomen. Discussed plan of care, including ordering a diagnostic workup of labs. Patient agrees to the plan of care. The patient will be medicated with Dilaudid 1 mg IV, Benadryl 25 mg IV, and NS 1 L infusion. Ordered for Lipase, CMP, CBC w/ diff, Ethyl alcohol blood, and Diagnostic alcohol to evaluate his symptoms.     7:20 PM - Patient was reevaluated at bedside. Pain unchanged. Lipase normal. Labs normal. He has had two CTs of abdomen in last year. No indication for further imaging. Will give one dose of IM dilaudid prior to discharge.     9:24 PM - Patient will be medicated with Benadryl 50 mg injection       HYDRATION: Based on the patient's presentation of Acute Diarrhea, Acute Vomiting, and Dehydration the patient was given IV fluids. IV Hydration was used because oral hydration was not adequate alone. Upon recheck following hydration, the patient was improved.    ED Summary: Patient has a history of chronic pancreatitis.  Has records reviewed has had multiple visits usually to [facility] for what is being diagnosis chronic pancreatitis.  His lab test from previous have been reviewed he has no sign of elevation of lipase on his previous visits.    He has had multiple imaging including an EGD, and 2 CT scans in the last year for his abdominal pain.  And these have been nonspecific.  He was instructed that he needs to see a GI specialist second to an ERCP versus MRCP and he saw GI today and they were concerned about his complaint of inability to tolerate fluids or food for long period of time there was concerns for dehydration and renal insufficiency.  His labs are nonspecific his BUN is minimally elevated at 24 which is normal for him.  He was given IV fluids here.  He was medicated for pain here and his lipase is normal.    Since October he has  gained 5 pounds so I believe the patient's perception of what he is eating and drinking is not very accurate.  This patient is allergic to multiple medications and seems to only be able to tolerate Dilaudid, and Benadryl.  He is medicated with these medications for his pain and he is stable for discharge home into can fit continue follow-up with GI.  This appears to be a chronic pain problem he states he has been discharged from multiple [facility] management doctors [facility] due [facility].      DISPOSITION AND DISCUSSIONS  I have discussed management of the patient with the following physicians and BECKA's: None    Discussion of management with other [facility] or appropriate source(s): None    Barriers to care at this time, including but not limited to: Patient does not have an established primary care physician.      The patient will return for new or worsening symptoms and is stable at the time of discharge.    The patient is referred to a primary physician for blood pressure management, diabetic screening, and for all other preventative health concerns.      DISPOSITION:  Patient will be discharged home in stable condition.    FOLLOW UP:    Please continue to follow-up with your GI consultant for further evaluation and treatment.          FINAL DIAGNOSIS  1. Other chronic pain    2. Acute abdominal pain        Choco BURCH (Anika), am scribing for, and in the presence of, Ankur Sher D.O..    Electronically signed by: Choco Bond (Anika), 1/22/2025    Ankur BURCH D.O. personally performed the services described in this documentation, as scribed by Choco Bond in my presence, and it is both accurate and complete.     The note accurately reflects work and decisions made by me.  Ankur Sher D.O.  1/22/2025  11:08 PM

## 2025-02-01 ENCOUNTER — APPOINTMENT (OUTPATIENT)
Dept: RADIOLOGY | Facility: MEDICAL CENTER | Age: 55
End: 2025-02-01
Attending: STUDENT IN AN ORGANIZED HEALTH CARE EDUCATION/TRAINING PROGRAM
Payer: COMMERCIAL

## 2025-02-01 ENCOUNTER — HOSPITAL ENCOUNTER (EMERGENCY)
Facility: MEDICAL CENTER | Age: 55
End: 2025-02-02
Attending: STUDENT IN AN ORGANIZED HEALTH CARE EDUCATION/TRAINING PROGRAM
Payer: COMMERCIAL

## 2025-02-01 DIAGNOSIS — R10.84 GENERALIZED ABDOMINAL PAIN: ICD-10-CM

## 2025-02-01 LAB
ABO GROUP BLD: NORMAL
ALBUMIN SERPL BCP-MCNC: 4.3 G/DL (ref 3.2–4.9)
ALBUMIN/GLOB SERPL: 1.7 G/DL
ALP SERPL-CCNC: 57 U/L (ref 30–99)
ALT SERPL-CCNC: 22 U/L (ref 2–50)
ANION GAP SERPL CALC-SCNC: 11 MMOL/L (ref 7–16)
AST SERPL-CCNC: 15 U/L (ref 12–45)
BASOPHILS # BLD AUTO: 0.2 % (ref 0–1.8)
BASOPHILS # BLD: 0.01 K/UL (ref 0–0.12)
BILIRUB SERPL-MCNC: 0.3 MG/DL (ref 0.1–1.5)
BLD GP AB SCN SERPL QL: NORMAL
BUN SERPL-MCNC: 24 MG/DL (ref 8–22)
CALCIUM ALBUM COR SERPL-MCNC: 9 MG/DL (ref 8.5–10.5)
CALCIUM SERPL-MCNC: 9.2 MG/DL (ref 8.4–10.2)
CHLORIDE SERPL-SCNC: 110 MMOL/L (ref 96–112)
CO2 SERPL-SCNC: 21 MMOL/L (ref 20–33)
CREAT SERPL-MCNC: 1.08 MG/DL (ref 0.5–1.4)
EOSINOPHIL # BLD AUTO: 0.05 K/UL (ref 0–0.51)
EOSINOPHIL NFR BLD: 0.8 % (ref 0–6.9)
ERYTHROCYTE [DISTWIDTH] IN BLOOD BY AUTOMATED COUNT: 45.1 FL (ref 35.9–50)
GFR SERPLBLD CREATININE-BSD FMLA CKD-EPI: 81 ML/MIN/1.73 M 2
GLOBULIN SER CALC-MCNC: 2.5 G/DL (ref 1.9–3.5)
GLUCOSE SERPL-MCNC: 84 MG/DL (ref 65–99)
HCT VFR BLD AUTO: 45.3 % (ref 42–52)
HGB BLD-MCNC: 15.5 G/DL (ref 14–18)
IMM GRANULOCYTES # BLD AUTO: 0.02 K/UL (ref 0–0.11)
IMM GRANULOCYTES NFR BLD AUTO: 0.3 % (ref 0–0.9)
INR PPP: 0.97 (ref 0.87–1.13)
LACTATE SERPL-SCNC: 1.4 MMOL/L (ref 0.5–2)
LIPASE SERPL-CCNC: 36 U/L (ref 11–82)
LYMPHOCYTES # BLD AUTO: 1.58 K/UL (ref 1–4.8)
LYMPHOCYTES NFR BLD: 25.1 % (ref 22–41)
MCH RBC QN AUTO: 32.8 PG (ref 27–33)
MCHC RBC AUTO-ENTMCNC: 34.2 G/DL (ref 32.3–36.5)
MCV RBC AUTO: 96 FL (ref 81.4–97.8)
MONOCYTES # BLD AUTO: 0.61 K/UL (ref 0–0.85)
MONOCYTES NFR BLD AUTO: 9.7 % (ref 0–13.4)
NEUTROPHILS # BLD AUTO: 4.03 K/UL (ref 1.82–7.42)
NEUTROPHILS NFR BLD: 63.9 % (ref 44–72)
NRBC # BLD AUTO: 0 K/UL
NRBC BLD-RTO: 0 /100 WBC (ref 0–0.2)
PLATELET # BLD AUTO: 186 K/UL (ref 164–446)
PMV BLD AUTO: 9.8 FL (ref 9–12.9)
POTASSIUM SERPL-SCNC: 4 MMOL/L (ref 3.6–5.5)
PROT SERPL-MCNC: 6.8 G/DL (ref 6–8.2)
PROTHROMBIN TIME: 13.2 SEC (ref 12–14.6)
RBC # BLD AUTO: 4.72 M/UL (ref 4.7–6.1)
RH BLD: NORMAL
SODIUM SERPL-SCNC: 142 MMOL/L (ref 135–145)
WBC # BLD AUTO: 6.3 K/UL (ref 4.8–10.8)

## 2025-02-01 PROCEDURE — 96376 TX/PRO/DX INJ SAME DRUG ADON: CPT

## 2025-02-01 PROCEDURE — 85610 PROTHROMBIN TIME: CPT

## 2025-02-01 PROCEDURE — 86900 BLOOD TYPING SEROLOGIC ABO: CPT

## 2025-02-01 PROCEDURE — 85025 COMPLETE CBC W/AUTO DIFF WBC: CPT

## 2025-02-01 PROCEDURE — 83605 ASSAY OF LACTIC ACID: CPT

## 2025-02-01 PROCEDURE — 700105 HCHG RX REV CODE 258: Performed by: STUDENT IN AN ORGANIZED HEALTH CARE EDUCATION/TRAINING PROGRAM

## 2025-02-01 PROCEDURE — 74177 CT ABD & PELVIS W/CONTRAST: CPT

## 2025-02-01 PROCEDURE — 99285 EMERGENCY DEPT VISIT HI MDM: CPT

## 2025-02-01 PROCEDURE — 700117 HCHG RX CONTRAST REV CODE 255: Performed by: STUDENT IN AN ORGANIZED HEALTH CARE EDUCATION/TRAINING PROGRAM

## 2025-02-01 PROCEDURE — 700111 HCHG RX REV CODE 636 W/ 250 OVERRIDE (IP): Mod: JZ | Performed by: STUDENT IN AN ORGANIZED HEALTH CARE EDUCATION/TRAINING PROGRAM

## 2025-02-01 PROCEDURE — 86901 BLOOD TYPING SEROLOGIC RH(D): CPT

## 2025-02-01 PROCEDURE — 36415 COLL VENOUS BLD VENIPUNCTURE: CPT

## 2025-02-01 PROCEDURE — 96374 THER/PROPH/DIAG INJ IV PUSH: CPT | Mod: XU

## 2025-02-01 PROCEDURE — 83690 ASSAY OF LIPASE: CPT

## 2025-02-01 PROCEDURE — 96375 TX/PRO/DX INJ NEW DRUG ADDON: CPT

## 2025-02-01 PROCEDURE — 80053 COMPREHEN METABOLIC PANEL: CPT

## 2025-02-01 PROCEDURE — 86850 RBC ANTIBODY SCREEN: CPT

## 2025-02-01 RX ORDER — HYDROMORPHONE HYDROCHLORIDE 1 MG/ML
1 INJECTION, SOLUTION INTRAMUSCULAR; INTRAVENOUS; SUBCUTANEOUS ONCE
Status: COMPLETED | OUTPATIENT
Start: 2025-02-01 | End: 2025-02-01

## 2025-02-01 RX ORDER — SODIUM CHLORIDE, SODIUM LACTATE, POTASSIUM CHLORIDE, CALCIUM CHLORIDE 600; 310; 30; 20 MG/100ML; MG/100ML; MG/100ML; MG/100ML
2000 INJECTION, SOLUTION INTRAVENOUS ONCE
Status: COMPLETED | OUTPATIENT
Start: 2025-02-01 | End: 2025-02-02

## 2025-02-01 RX ORDER — DIPHENHYDRAMINE HYDROCHLORIDE 50 MG/ML
25 INJECTION INTRAMUSCULAR; INTRAVENOUS ONCE
Status: COMPLETED | OUTPATIENT
Start: 2025-02-01 | End: 2025-02-01

## 2025-02-01 RX ORDER — HYDROMORPHONE HYDROCHLORIDE 2 MG/1
2 TABLET ORAL EVERY 8 HOURS PRN
Qty: 6 TABLET | Refills: 0 | Status: SHIPPED | OUTPATIENT
Start: 2025-02-01 | End: 2025-02-03

## 2025-02-01 RX ADMIN — HYDROMORPHONE HYDROCHLORIDE 1 MG: 1 INJECTION, SOLUTION INTRAMUSCULAR; INTRAVENOUS; SUBCUTANEOUS at 23:07

## 2025-02-01 RX ADMIN — DIPHENHYDRAMINE HYDROCHLORIDE 25 MG: 50 INJECTION, SOLUTION INTRAMUSCULAR; INTRAVENOUS at 20:49

## 2025-02-01 RX ADMIN — SODIUM CHLORIDE, POTASSIUM CHLORIDE, SODIUM LACTATE AND CALCIUM CHLORIDE 2000 ML: 600; 310; 30; 20 INJECTION, SOLUTION INTRAVENOUS at 20:47

## 2025-02-01 RX ADMIN — IOHEXOL 100 ML: 350 INJECTION, SOLUTION INTRAVENOUS at 21:36

## 2025-02-01 RX ADMIN — HYDROMORPHONE HYDROCHLORIDE 1 MG: 1 INJECTION, SOLUTION INTRAMUSCULAR; INTRAVENOUS; SUBCUTANEOUS at 20:49

## 2025-02-01 ASSESSMENT — FIBROSIS 4 INDEX: FIB4 SCORE: 0.86

## 2025-02-02 VITALS
SYSTOLIC BLOOD PRESSURE: 134 MMHG | HEIGHT: 72 IN | OXYGEN SATURATION: 94 % | BODY MASS INDEX: 29.29 KG/M2 | WEIGHT: 216.27 LBS | RESPIRATION RATE: 16 BRPM | TEMPERATURE: 98.5 F | HEART RATE: 84 BPM | DIASTOLIC BLOOD PRESSURE: 83 MMHG

## 2025-02-02 NOTE — ED PROVIDER NOTES
ED Provider Note    CHIEF COMPLAINT  Chief Complaint   Patient presents with    Abdominal Pain     Fears he has recurrent pancreatitis.   Unable to sit during triage due to the pain.   Most of the pain was last night and continues to worsen.     Nausea/Vomiting/Diarrhea     Bloody vomitus.        EXTERNAL RECORDS REVIEWED  Inpatient Notes note from 12/15/2024 noted history of pancreatitis underwent EGD which did not show any acute findings    HPI/ROS  LIMITATION TO HISTORY     OUTSIDE HISTORIAN(S):      Landon Allred is a 54 y.o. male who presents with abdominal pain.  Patient reports history of chronic pancreatitis.  Patient says he has had worsening abdominal pain over the past week with limited p.o. intake.  Patient says he has had ongoing diarrhea.  Patient denies chest pain, shortness of breath, cough.    PAST MEDICAL HISTORY   has a past medical history of Allergy, Arthritis, Asthma, Claustrophobia, Clostridium difficile diarrhea, Clotting disorder (Formerly [facility]), Depression, DVT (deep venous thrombosis) (Formerly [facility]), GERD (gastroesophageal reflux disease), Hypertension, IBD (inflammatory bowel disease), Migraine, MRSA (methicillin resistant Staphylococcus aureus), Pancreatitis, PE (pulmonary embolism), Psychiatric disorder, and Seizure (Formerly [facility]).    SURGICAL HISTORY   has a past surgical history that includes nerve ulnar repair or explore; dental extraction(s); closed rx nasal septal fracture; rectal exploration; orif, fracture, femur; orif, elbow; inguinal hernia repair (Right, 09/06/2019); and hernia repair (Right).    FAMILY HISTORY  Family History   Problem Relation Age of Onset    Hypertension Mother     Cancer Father     Hypertension Father        SOCIAL HISTORY  Social History     Tobacco Use    Smoking status: Never    Smokeless tobacco: Never   Vaping Use    Vaping status: Never Used   Substance and Sexual Activity    Alcohol use: No    Drug use: No    Sexual activity: Not on file       CURRENT MEDICATIONS  Home  "Medications       Reviewed by Dylan Thomas R.N. (Registered Nurse) on 02/01/25 at 1944  Med List Status: Not Addressed     Medication Last Dose Status   diphenhydrAMINE (BENADRYL) 25 MG capsule  Active   EPINEPHrine (EPIPEN) 0.3 MG/0.3ML Solution Auto-injector solution for injection  Active                  Audit from Redirected Encounters    **Home medications have not yet been reviewed for this encounter**         ALLERGIES  Allergies   Allergen Reactions    Capsaicin-Turpentine Anaphylaxis and Rash     08/12/2009 21:56 [facility] - BERNADETTE NGUYEN<br/><br/>patient was admitted to ER, taken via ambulance, after<br/>applying capsaicin 0.075% - severe skin reaction<br/>(hyperventilation, rash, right nose bleed, extreme skin<br/>sensitivity)    Ciprofloxacin Anaphylaxis and Unspecified    Dilaudid [Hydromorphone] Itching     States \"I have an adverse reaction and need benadryl first\"    Doxycycline Anaphylaxis    Haloperidol Unspecified and Anaphylaxis    Shellfish Allergy Anaphylaxis    Tape Hives    Wasp Venom Anaphylaxis    Amoxicillin-Pot Clavulanate Rash     Other reaction(s): .Unknown  rash      Augmentin Rash and Unspecified     rash    Dalteparin Diarrhea     03/28/2013 21:07 [facility] [place] MANOJ WILKINSON<br/><br/>Vet states he had constant diarrhea after starting the<br/>dalteparin.  Was able to complete his bridge therapy, but<br/>stated that he would refuse to use it in the future due to<br/>this ADR.    Sulfamethoxazole W-Trimethoprim Hives     04/18/2014 16:26 [facility] ROSE COHEN<br/><br/>Pt admitted 4/18/2014 for urticaria but he had bactrim,<br/>nuclear medicine, dilaudid, phototherapy from dermatology,<br/>and other meds so it is unclear whether bactrim caused the<br/>rash.    Acetaminophen Unspecified     Other reaction(s): .Unknown  \"makes me deathly ill\"  \"makes me deathly ill\"      Amitriptyline Hcl     Apra     Aspirin Unspecified    Baclofen Unspecified    Biofreeze     Capsaicin Unspecified    Carisoprodol " "Unspecified     Other reaction(s): Unknown    Celery Oil      Celery causes hives    Codeine Unspecified    Compazine      Other reaction(s): Unknown    Cyclobenzaprine     Doxepin Unspecified    Duloxetine Unspecified and Hives    Duloxetine Hcl Unspecified    Enoxaparin Unspecified and Vomiting    Eszopiclone     Hydrocodone-Acetaminophen     Kdc:Yellow Dye+Aspirin+Oxycodone     Ketamine      Other reaction(s): .Unknown  Pt reports he \"gets violent\"    Ketorolac Tromethamine     Ketorolac Tromethamine [Toradol]     Latex Unspecified    Lunesta      Other reaction(s): Unknown    Menthol     Methocarbamol     Morphine Vomiting and Unspecified    Nitroglycerin      Other reaction(s): Unknown    Ondansetron [Zofran]     Other Misc      Can take dilaudid if given benadryl first    Oxycodone     Oxycodone-Acetaminophen     Oxycodone-Aspirin     Prochlorperazine Unspecified     Hallucinations, dizziness    Propofol     Propoxyphene      Other reaction(s): Unknown    Quetiapine      Other reaction(s): Unknown    Reglan [Metoclopramide]      Has no idea what his RX is    Risperidone     Tramadol     Tylenol      Other reaction(s): Unknown    Zofran [Ondansetron]     Rabeprazole Unspecified, Nausea and Vomiting     cannot remember   cannot remember   06/05/2007 23:16 [facility] A[place]IN BRADFORD<br/><br/>Updated using auto clean up process from RA*4*29.  Changed reactant from RABEPRAZOLE (free text) to RABEPRAZOLE(file - PSNDF(50.6,) <br/><br/><br/>04/16/2007 23:18 [facility] [place]<br/><br/>Changed from RABEPRAZOLE (File 120.82) to free text by patch RA*4*36 <br/><br/><br/>02/21/2007 00:57 [facility] JOSE WHITMAN<br/><br/>Updated using clean up process.  Changed reactant from RABEPRAZOLE (ingredient) to RABEPRAZOLE(file - PSNDF(50.6,) <br/><br/><br/>01/17/2002 18:01 [facility] JUAN MURRAY<br/><br/>reported by Dr. Austin Kinney on lansoprazole request form    06/05/2007 23:16 [facility]<br/><br/>Updated using auto clean " up process from RA*4*29.  Changed reactant from RABEPRAZOLE (free text) to RABEPRAZOLE(file - PSNDF(50.6,) <br/><br/><br/>04/16/2007 23:18 [facility] AUSTIN BRADFORD<br/><br/>Changed from RABEPRAZOLE (File 120.82) to free text by patch [facility]*4*36 <br/><br/><br/>02/21/2007 00:57 [facility] JOSE WHITMAN<br/><br/>Updated using clean up process.  Changed reactant from RABEPRAZOLE (ingredient) to RABEPRAZOLE(file - PSNDF(50.6,) <br/><br/><br/>01/17/2002 18:01 [facility] JUAN MURRAY<br/><br/>reported by Dr. Austin Kinney on lansoprazole request form    cannot remember cannot remember 06/05/2007 23:16 [facility] AUSTIN BRADFORD<br/><br/>Updated using auto clean up process from RA*4*29.  Changed reactant from RABEPRAZOLE (free text) to RABEPRAZOLE(file - PSNDF(50.6,) <br/><br/><br/>04/16/2007 23:18 [facility] AUSTIN BRADFORD<br/><br/>Changed from RABEPRAZOLE (File 120.82) to free text by patch [facility]*4*36 <br/><br/><br/>02/21/2007 00:57 [facility] JOSE WHITMAN<br/><br/>Updated using clean up process.  Changed reactant from RABEPRAZOLE (ingredient) to RABEPRAZOLE(file - PSNDF(50.6,) <br/><br/><br/>01/17/2002 18:01 [facility] JUAN GASPAR<br/><br/>reported by Dr. Austin Kinney on lansoprazole request form      06/05/2007 23:16 [facility] AUSTIN BRADFORD<br/><br/>Updated using auto clean up process from GMRA*4*29.  Changed reactant from RABEPRAZOLE (free text) to RABEPRAZOLE(file - PSNDF(50.6,) <br/><br/><br/>04/16/2007 23:18 [facility] [place] BRAD[facility]<br/><br/>Changed from RABEPRAZOLE (File 120.82) to free text by patch GMRA*4*36 <br/><br/><br/>02/21/2007 00:57 [facility] JOSE WHITMAN<br/><br/>Updated using clean up process.  Changed reactant from RABEPRAZOLE (ingredient) to RABEPRAZOLE(file - PSNDF(50.6,) <br/><br/><br/>01/17/2002 18:01 [facility] JUAN MURRAY<br/><br/>reported by Dr. Austin Kinney on lansoprazole request form       PHYSICAL EXAM  VITAL SIGNS: /79   Pulse 85   Temp 36.9 °C (98.5 °F) (Temporal)   Resp 16   Ht 1.829 m (6')   Wt 98.1 kg (216 lb 4.3  oz)   SpO2 95%   BMI 29.33 kg/m²    Constitutional: Alert in no apparent distress.  HENT: No signs of trauma, Bilateral external ears normal, Nose normal.  Mucous membranes  Eyes: Pupils are equal and reactive, Conjunctiva normal, Non-icteric.   Neck: Normal range of motion, No tenderness, Supple, No stridor.   Cardiovascular: Tachycardic  Thorax & Lungs: Normal breath sounds, No respiratory distress, No wheezing, No chest tenderness.   Abdomen: Bowel sounds normal, Soft, mild distention, generalized tenderness, no rebound tenderness or guarding, No masses, No pulsatile masses.   Skin: Warm, Dry, No erythema, No rash.   Back: No bony tenderness, No CVA tenderness.   Extremities: Intact distal pulses, No edema, No tenderness, No cyanosis  Musculoskeletal: Good range of motion in all major joints. No tenderness to palpation or major deformities noted.   Neurologic: Alert , Normal motor function, Normal sensory function, No focal deficits noted.       EKG/LABS  Results for orders placed or performed during the hospital encounter of 02/01/25   CBC WITH DIFFERENTIAL    Collection Time: 02/01/25  8:48 PM   Result Value Ref Range    WBC 6.3 4.8 - 10.8 K/uL    RBC 4.72 4.70 - 6.10 M/uL    Hemoglobin 15.5 14.0 - 18.0 g/dL    Hematocrit 45.3 42.0 - 52.0 %    MCV 96.0 81.4 - 97.8 fL    MCH 32.8 27.0 - 33.0 pg    MCHC 34.2 32.3 - 36.5 g/dL    RDW 45.1 35.9 - 50.0 fL    Platelet Count 186 164 - 446 K/uL    MPV 9.8 9.0 - 12.9 fL    Neutrophils-Polys 63.90 44.00 - 72.00 %    Lymphocytes 25.10 22.00 - 41.00 %    Monocytes 9.70 0.00 - 13.40 %    Eosinophils 0.80 0.00 - 6.90 %    Basophils 0.20 0.00 - 1.80 %    Immature Granulocytes 0.30 0.00 - 0.90 %    Nucleated RBC 0.00 0.00 - 0.20 /100 WBC    Neutrophils (Absolute) 4.03 1.82 - 7.42 K/uL    Lymphs (Absolute) 1.58 1.00 - 4.80 K/uL    Monos (Absolute) 0.61 0.00 - 0.85 K/uL    Eos (Absolute) 0.05 0.00 - 0.51 K/uL    Baso (Absolute) 0.01 0.00 - 0.12 K/uL    Immature Granulocytes  (abs) 0.02 0.00 - 0.11 K/uL    NRBC (Absolute) 0.00 K/uL   COMP METABOLIC PANEL    Collection Time: 02/01/25  8:48 PM   Result Value Ref Range    Sodium 142 135 - 145 mmol/L    Potassium 4.0 3.6 - 5.5 mmol/L    Chloride 110 96 - 112 mmol/L    Co2 21 20 - 33 mmol/L    Anion Gap 11.0 7.0 - 16.0    Glucose 84 65 - 99 mg/dL    Bun 24 (H) 8 - 22 mg/dL    Creatinine 1.08 0.50 - 1.40 mg/dL    Calcium 9.2 8.4 - 10.2 mg/dL    Correct Calcium 9.0 8.5 - 10.5 mg/dL    AST(SGOT) 15 12 - 45 U/L    ALT(SGPT) 22 2 - 50 U/L    Alkaline Phosphatase 57 30 - 99 U/L    Total Bilirubin 0.3 0.1 - 1.5 mg/dL    Albumin 4.3 3.2 - 4.9 g/dL    Total Protein 6.8 6.0 - 8.2 g/dL    Globulin 2.5 1.9 - 3.5 g/dL    A-G Ratio 1.7 g/dL   LIPASE    Collection Time: 02/01/25  8:48 PM   Result Value Ref Range    Lipase 36 11 - 82 U/L   COD (ADULT)    Collection Time: 02/01/25  8:48 PM   Result Value Ref Range    ABO Grouping Only A     Rh Grouping Only POS     Antibody Screen-Cod NEG    LACTIC ACID    Collection Time: 02/01/25  8:48 PM   Result Value Ref Range    Lactic Acid 1.4 0.5 - 2.0 mmol/L   Prothrombin Time    Collection Time: 02/01/25  8:48 PM   Result Value Ref Range    PT 13.2 12.0 - 14.6 sec    INR 0.97 0.87 - 1.13   ESTIMATED GFR    Collection Time: 02/01/25  8:48 PM   Result Value Ref Range    GFR (CKD-EPI) 81 >60 mL/min/1.73 m 2     *Note: Due to a large number of results and/or encounters for the requested time period, some results have not been displayed. A complete set of results can be found in Results Review.         I have independently interpreted this EKG    RADIOLOGY/PROCEDURES   I have independently interpreted the diagnostic imaging associated with this visit and am waiting the final reading from the radiologist.   My preliminary interpretation is as follows: No free air    Radiologist interpretation:  CT-ABDOMEN-PELVIS WITH   Final Result      No acute abnormalities are identified in the abdomen or pelvis.          COURSE &  MEDICAL DECISION MAKING    ASSESSMENT, COURSE AND PLAN  Care Narrative: Patient presenting with worsening generalized abdominal pain vomiting diarrhea.  Patient says she has had streaks of blood in his emesis and  stool.  Patient does appear dry on exam.  Differential includes acute on chronic pancreatitis, cholecystitis, bowel obstruction.  Will initiate IV fluids antiemetics analgesics and obtain CT imaging as well as blood work to assess for gross hematologic or metabolic derangements.    On reassessment tachycardia has resolved patient reports significant improvement in symptoms been able to tolerate p.o. without difficulty.  CT imaging without acute abnormality.  Blood work is unremarkable including normal lactic acid, lipase and LFTs.  Patient has plans to follow-up with his primary care provider and gastroenterologist.  Patient will be given new prescription for reflux medicine as well as short course of opioid medication.  Patient comfortable with return precautions and outpatient follow-up plan.    In prescribing controlled substances to this patient, I certify that I have obtained and reviewed the medical history of Landon Allred. I have also made a good bette effort to obtain applicable records from other providers who have treated the patient and records demonstrating the following: Previous prescription for hydromorphone .     I have conducted a physical exam and documented it. I have reviewed Mr. Allred’s prescription history as maintained by the [place] Prescription Monitoring Program.     I have assessed the patient’s risk for abuse, dependency, and addiction using the validated Opioid Risk Tool available at https://www.mdcalc.com/akeqbk-whmu-hcue-ort-narcotic-abuse.     Given the above, I believe the benefits of controlled substance therapy outweigh the risks. The reasons for prescribing controlled substances include non-narcotic, oral analgesic alternatives have been inadequate for pain control.  Accordingly, I have discussed the risk and benefits, treatment plan, and alternative therapies with the patient.           Hydration: Based on the patient's presentation of Dehydration the patient was given IV fluids. IV Hydration was used because oral hydration was not adequate alone. Upon recheck following hydration, the patient was improved.          ADDITIONAL PROBLEMS MANAGED      DISPOSITION AND DISCUSSIONS    Decision tools and prescription drugs considered including, but not limited to: Pain Medications for abdominal pain .    FINAL DIAGNOSIS  1. Generalized abdominal pain         Electronically signed by: Roberto Hayden D.O., 2/1/2025 8:13 PM

## 2025-02-02 NOTE — DISCHARGE INSTRUCTIONS
We have given you a new prescription for your reflux medication.  We have given you a short course of pain medication.  You should follow-up with your gastroenterologist and primary care provider.

## 2025-02-02 NOTE — ED NOTES
Dc instructions and medications discussed with patient at bedside. All questions answered at this time. VSS. No IV in place at this time. Pt to lobby without incident. Brother to drive patient home.

## 2025-02-02 NOTE — ED TRIAGE NOTES
Chief Complaint   Patient presents with    Abdominal Pain     Fears he has recurrent pancreatitis.   Unable to sit during triage due to the pain.   Most of the pain was last night and continues to worsen.     Nausea/Vomiting/Diarrhea     Bloody vomitus.      Blood Pressure: (!) 143/111, Pulse: (!) 115, Respiration: (!) 21, Temperature: 36.9 °C (98.5 °F), Height: 182.9 cm (6'), Weight: 98.1 kg (216 lb 4.3 oz), BMI (Calculated): 29.33, BSA (Calculated): 2.2, Pulse Oximetry: 95 %, O2 Delivery Device: None - Room Air

## 2025-02-25 ENCOUNTER — HOSPITAL ENCOUNTER (EMERGENCY)
Facility: MEDICAL CENTER | Age: 55
End: 2025-02-26
Attending: STUDENT IN AN ORGANIZED HEALTH CARE EDUCATION/TRAINING PROGRAM
Payer: COMMERCIAL

## 2025-02-25 VITALS
WEIGHT: 199.52 LBS | HEART RATE: 66 BPM | RESPIRATION RATE: 20 BRPM | TEMPERATURE: 98.7 F | BODY MASS INDEX: 27.02 KG/M2 | OXYGEN SATURATION: 96 % | DIASTOLIC BLOOD PRESSURE: 91 MMHG | SYSTOLIC BLOOD PRESSURE: 146 MMHG | HEIGHT: 72 IN

## 2025-02-25 DIAGNOSIS — G89.29 CHRONIC ABDOMINAL PAIN: ICD-10-CM

## 2025-02-25 DIAGNOSIS — R10.13 EPIGASTRIC PAIN: Primary | ICD-10-CM

## 2025-02-25 DIAGNOSIS — R10.9 CHRONIC ABDOMINAL PAIN: ICD-10-CM

## 2025-02-25 LAB
ALBUMIN SERPL BCP-MCNC: 4.5 G/DL (ref 3.2–4.9)
ALBUMIN/GLOB SERPL: 1.6 G/DL
ALP SERPL-CCNC: 52 U/L (ref 30–99)
ALT SERPL-CCNC: 22 U/L (ref 2–50)
ANION GAP SERPL CALC-SCNC: 12 MMOL/L (ref 7–16)
AST SERPL-CCNC: 23 U/L (ref 12–45)
BASOPHILS # BLD AUTO: 0.5 % (ref 0–1.8)
BASOPHILS # BLD: 0.02 K/UL (ref 0–0.12)
BILIRUB SERPL-MCNC: 0.6 MG/DL (ref 0.1–1.5)
BUN SERPL-MCNC: 15 MG/DL (ref 8–22)
CALCIUM ALBUM COR SERPL-MCNC: 9.3 MG/DL (ref 8.5–10.5)
CALCIUM SERPL-MCNC: 9.7 MG/DL (ref 8.4–10.2)
CHLORIDE SERPL-SCNC: 107 MMOL/L (ref 96–112)
CO2 SERPL-SCNC: 22 MMOL/L (ref 20–33)
CREAT SERPL-MCNC: 1.22 MG/DL (ref 0.5–1.4)
EOSINOPHIL # BLD AUTO: 0.15 K/UL (ref 0–0.51)
EOSINOPHIL NFR BLD: 3.9 % (ref 0–6.9)
ERYTHROCYTE [DISTWIDTH] IN BLOOD BY AUTOMATED COUNT: 41.2 FL (ref 35.9–50)
GFR SERPLBLD CREATININE-BSD FMLA CKD-EPI: 70 ML/MIN/1.73 M 2
GLOBULIN SER CALC-MCNC: 2.8 G/DL (ref 1.9–3.5)
GLUCOSE SERPL-MCNC: 89 MG/DL (ref 65–99)
HCT VFR BLD AUTO: 42.4 % (ref 42–52)
HGB BLD-MCNC: 14.6 G/DL (ref 14–18)
IMM GRANULOCYTES # BLD AUTO: 0.01 K/UL (ref 0–0.11)
IMM GRANULOCYTES NFR BLD AUTO: 0.3 % (ref 0–0.9)
LIPASE SERPL-CCNC: 35 U/L (ref 11–82)
LYMPHOCYTES # BLD AUTO: 1.57 K/UL (ref 1–4.8)
LYMPHOCYTES NFR BLD: 40.9 % (ref 22–41)
MCH RBC QN AUTO: 32.4 PG (ref 27–33)
MCHC RBC AUTO-ENTMCNC: 34.4 G/DL (ref 32.3–36.5)
MCV RBC AUTO: 94 FL (ref 81.4–97.8)
MONOCYTES # BLD AUTO: 0.4 K/UL (ref 0–0.85)
MONOCYTES NFR BLD AUTO: 10.4 % (ref 0–13.4)
NEUTROPHILS # BLD AUTO: 1.69 K/UL (ref 1.82–7.42)
NEUTROPHILS NFR BLD: 44 % (ref 44–72)
NRBC # BLD AUTO: 0 K/UL
NRBC BLD-RTO: 0 /100 WBC (ref 0–0.2)
PLATELET # BLD AUTO: 209 K/UL (ref 164–446)
PMV BLD AUTO: 10.6 FL (ref 9–12.9)
POTASSIUM SERPL-SCNC: 3.8 MMOL/L (ref 3.6–5.5)
PROT SERPL-MCNC: 7.3 G/DL (ref 6–8.2)
RBC # BLD AUTO: 4.51 M/UL (ref 4.7–6.1)
SODIUM SERPL-SCNC: 141 MMOL/L (ref 135–145)
WBC # BLD AUTO: 3.8 K/UL (ref 4.8–10.8)

## 2025-02-25 PROCEDURE — 99285 EMERGENCY DEPT VISIT HI MDM: CPT

## 2025-02-25 PROCEDURE — 96374 THER/PROPH/DIAG INJ IV PUSH: CPT

## 2025-02-25 PROCEDURE — 96375 TX/PRO/DX INJ NEW DRUG ADDON: CPT

## 2025-02-25 PROCEDURE — 85025 COMPLETE CBC W/AUTO DIFF WBC: CPT

## 2025-02-25 PROCEDURE — 83690 ASSAY OF LIPASE: CPT

## 2025-02-25 PROCEDURE — 700105 HCHG RX REV CODE 258: Performed by: STUDENT IN AN ORGANIZED HEALTH CARE EDUCATION/TRAINING PROGRAM

## 2025-02-25 PROCEDURE — 700111 HCHG RX REV CODE 636 W/ 250 OVERRIDE (IP): Mod: JZ | Performed by: STUDENT IN AN ORGANIZED HEALTH CARE EDUCATION/TRAINING PROGRAM

## 2025-02-25 PROCEDURE — 80053 COMPREHEN METABOLIC PANEL: CPT

## 2025-02-25 PROCEDURE — 36415 COLL VENOUS BLD VENIPUNCTURE: CPT

## 2025-02-25 RX ORDER — HYDROMORPHONE HYDROCHLORIDE 1 MG/ML
1 INJECTION, SOLUTION INTRAMUSCULAR; INTRAVENOUS; SUBCUTANEOUS ONCE
Status: COMPLETED | OUTPATIENT
Start: 2025-02-25 | End: 2025-02-25

## 2025-02-25 RX ORDER — SODIUM CHLORIDE, SODIUM LACTATE, POTASSIUM CHLORIDE, CALCIUM CHLORIDE 600; 310; 30; 20 MG/100ML; MG/100ML; MG/100ML; MG/100ML
1000 INJECTION, SOLUTION INTRAVENOUS ONCE
Status: COMPLETED | OUTPATIENT
Start: 2025-02-25 | End: 2025-02-25

## 2025-02-25 RX ORDER — HYDROMORPHONE HYDROCHLORIDE 1 MG/ML
1 INJECTION, SOLUTION INTRAMUSCULAR; INTRAVENOUS; SUBCUTANEOUS ONCE
Status: COMPLETED | OUTPATIENT
Start: 2025-02-26 | End: 2025-02-26

## 2025-02-25 RX ORDER — DIPHENHYDRAMINE HYDROCHLORIDE 50 MG/ML
25 INJECTION, SOLUTION INTRAMUSCULAR; INTRAVENOUS ONCE
Status: COMPLETED | OUTPATIENT
Start: 2025-02-26 | End: 2025-02-26

## 2025-02-25 RX ORDER — DIPHENHYDRAMINE HYDROCHLORIDE 50 MG/ML
25 INJECTION, SOLUTION INTRAMUSCULAR; INTRAVENOUS ONCE
Status: COMPLETED | OUTPATIENT
Start: 2025-02-25 | End: 2025-02-25

## 2025-02-25 RX ADMIN — SODIUM CHLORIDE, POTASSIUM CHLORIDE, SODIUM LACTATE AND CALCIUM CHLORIDE 1000 ML: 600; 310; 30; 20 INJECTION, SOLUTION INTRAVENOUS at 21:30

## 2025-02-25 RX ADMIN — HYDROMORPHONE HYDROCHLORIDE 1 MG: 1 INJECTION, SOLUTION INTRAMUSCULAR; INTRAVENOUS; SUBCUTANEOUS at 22:09

## 2025-02-25 RX ADMIN — DIPHENHYDRAMINE HYDROCHLORIDE 25 MG: 50 INJECTION, SOLUTION INTRAMUSCULAR; INTRAVENOUS at 22:08

## 2025-02-25 ASSESSMENT — FIBROSIS 4 INDEX: FIB4 SCORE: 0.95

## 2025-02-26 PROCEDURE — 96376 TX/PRO/DX INJ SAME DRUG ADON: CPT

## 2025-02-26 PROCEDURE — 700111 HCHG RX REV CODE 636 W/ 250 OVERRIDE (IP): Mod: JZ | Performed by: STUDENT IN AN ORGANIZED HEALTH CARE EDUCATION/TRAINING PROGRAM

## 2025-02-26 RX ADMIN — DIPHENHYDRAMINE HYDROCHLORIDE 25 MG: 50 INJECTION, SOLUTION INTRAMUSCULAR; INTRAVENOUS at 00:00

## 2025-02-26 RX ADMIN — HYDROMORPHONE HYDROCHLORIDE 1 MG: 1 INJECTION, SOLUTION INTRAMUSCULAR; INTRAVENOUS; SUBCUTANEOUS at 00:00

## 2025-02-26 NOTE — ED NOTES
Patient is stable for d/c at this time, anticipatory guidance provided, close follow-up encouraged, and ED return instructions have been detailed. Patient  agreeable to the disposition, and plan and discharged home in ambulatory state and good condition.

## 2025-02-26 NOTE — ED NOTES
Medicated per MAR. Provided with d/c paperwork. Pt asked if okay to hang out for a few minutes after med admin.

## 2025-02-26 NOTE — DISCHARGE INSTRUCTIONS
You were seen for abdominal pain. Thankfully your lipase is normal. Please continuing following up with your GI doctor. Return to the emergency room as needed especially for fevers, severe pain, any other concerns.

## 2025-02-26 NOTE — ED PROVIDER NOTES
"ED Provider Note    CHIEF COMPLAINT  Chief Complaint   Patient presents with    Abdominal Pain     PT presents d/t recurrent pancreatitis. PT notes ruq pain radiating to the luq. PT notes N/V/D       EXTERNAL RECORDS REVIEWED  External ED Note Patient seen at [facility] ER 02/22/2025 for epigastric pain but was discharged home. He was admitted 02/10-02/14 and \"presented with reported chronic abdominal pain, nausea and PO intolerance, which had already been extensively evaluated at outside facilities. On admission, the patient was managed with a multimodal pain regimen and IV fluids. Given the prior outpatient GI plan for EGD/EUS, GI was consulted. The patient underwent EGD/EUS/colonoscopy on 2/13 - this did not show any acute abnormalities, although there were some changes possibly consistent with mild chronic pancreatitis noted. The patient's current presentation was not consistent with acute pancreatitis.\" His doctor recommended pain management referral to discuss celiac pelxus block.  Patient had EGD/EUS, colonoscopy on February 13 of this year which did not show any acute abnormalities although there were some changes possibly consistent with mild chronic pancreatitis.  There was extensive discussion with multidisciplinary chronic pain, GI, clinical nutrition.  Patient reports p.o. intolerance and weight loss however he was able to ingest significant volumes of liquid and bowel prep during the hospitalization without nausea or vomiting.    HPI/ROS  LIMITATION TO HISTORY   Select: : None  OUTSIDE HISTORIAN(S):  None    Landon Allred is a 55 y.o. male with history of anxiety, fibromyalgia, irritable bowel, migraine headaches, traumatic brain injury complicated by chronic persistent vertigo, distant history of PE/DVT due to factor V leiden on anticoagulation, GERD  who presents for evaluation of epigastric pain. Patient tells me he has history of chronic pain, previously had functioning pain pump due to full body " pain after he fell out of a helicopter when he was in his 20s while in the .  The pain pump does not work anymore.  He states that the first episode of pancreatitis was during COVID.  He did not have any episodes of pancreatitis until October of last year and he is not sure what made the pancreatitis recur.  He has been in and out of several emergency rooms and hospitals since October 2024 for recurrent episodes of sharp epigastric/abdominal pain radiating to the back. He states that the [facility] stopped caring for him and said that he had to go to [facility] therefore he has been receiving care then. He notes that he was told that if his lipase is over 50 he has to be admitted to the hospital. He was in [place] recently to see his doctors. He was admitted for a period of time and last seen in the ER 02/22.  He states that he was given option for admission however wanted to come home therefore he flew home on the plane last night.  He notes that he did eat before getting on the plane and had some pain while on the plane but he drink a Sprite and the pain got better.  He states the pain got worse this morning.  He states that he has always been told that when he has this pain he needs to come in and get his lipase checked.  He is currently on Zenpep that he takes 20 minutes before eating.  He denies any nausea or vomiting today.  He states that he had 2 episode of diarrhea today which is pretty normal for him.  He also notes that he has not really been able to eat anything for the past several months however his weight today is 199 which is in comparison to 08/2024 his weight was 195. He denies any lower abdominal pain, fever, chills, painful urination. He states he gets treated with dilaudid and benadryl when he comes in for this.  He also worked with a clinical dietitian last week to figure out what foods he can eat with his pancreatitis.    PAST MEDICAL HISTORY   has a past medical history of Allergy, Arthritis,  "Asthma, Claustrophobia, Clostridium difficile diarrhea, Clotting disorder (HCC), Depression, DVT (deep venous thrombosis) ([facility]), GERD (gastroesophageal reflux disease), Hypertension, IBD (inflammatory bowel disease), Migraine, MRSA (methicillin resistant Staphylococcus aureus), Pancreatitis, PE (pulmonary embolism), Psychiatric disorder, and Seizure ([facility]).    SURGICAL HISTORY   has a past surgical history that includes nerve ulnar repair or explore; dental extraction(s); closed rx nasal septal fracture; rectal exploration; orif, fracture, femur; orif, elbow; inguinal hernia repair (Right, 09/06/2019); and hernia repair (Right).    FAMILY HISTORY  Family History   Problem Relation Age of Onset    Hypertension Mother     Cancer Father     Hypertension Father        SOCIAL HISTORY  Social History     Tobacco Use    Smoking status: Never    Smokeless tobacco: Never   Vaping Use    Vaping status: Never Used   Substance and Sexual Activity    Alcohol use: No    Drug use: No    Sexual activity: Not on file       CURRENT MEDICATIONS  Home Medications       Reviewed by Jose Alejandro Tillman R.N. (Registered Nurse) on 02/25/25 at 1918  Med List Status: Not Addressed     Medication Last Dose Status   diphenhydrAMINE (BENADRYL) 25 MG capsule  Active   EPINEPHrine (EPIPEN) 0.3 MG/0.3ML Solution Auto-injector solution for injection  Active   esomeprazole (NEXIUM) 20 MG capsule  Active                    ALLERGIES  Allergies   Allergen Reactions    Capsaicin-Turpentine Anaphylaxis and Rash     08/12/2009 21:56 [facility] BERNADETTE SARABIA<br/><br/>patient was admitted to ER, taken via ambulance, after<br/>applying capsaicin 0.075% - severe skin reaction<br/>(hyperventilation, rash, right nose bleed, extreme skin<br/>sensitivity)    Ciprofloxacin Anaphylaxis and Unspecified    Dilaudid [Hydromorphone] Itching     States \"I have an adverse reaction and need benadryl first\"    Doxycycline Anaphylaxis    Haloperidol Unspecified and Anaphylaxis    " "Shellfish Allergy Anaphylaxis    Tape Hives    Wasp Venom Anaphylaxis    Amoxicillin-Pot Clavulanate Rash     Other reaction(s): .Unknown  rash      Augmentin Rash and Unspecified     rash    Dalteparin Diarrhea     03/28/2013 21:07 [facility] - MANOJ WILKINSON<br/><br/>Santiago states he had constant diarrhea after starting the<br/>dalteparin.  Was able to complete his bridge therapy, but<br/>stated that he would refuse to use it in the future due to<br/>this ADR.    Sulfamethoxazole W-Trimethoprim Hives     04/18/2014 16:26 [facility] - ROSE COHEN<br/><br/>Pt admitted 4/18/2014 for urticaria but he had bactrim,<br/>nuclear medicine, dilaudid, phototherapy from dermatology,<br/>and other meds so it is unclear whether bactrim caused the<br/>rash.    Acetaminophen Unspecified     Other reaction(s): .Unknown  \"makes me deathly ill\"  \"makes me deathly ill\"      Amitriptyline Hcl     Apra     Aspirin Unspecified    Baclofen Unspecified    Biofreeze     Capsaicin Unspecified    Carisoprodol Unspecified     Other reaction(s): Unknown    Celery Oil      Celery causes hives    Codeine Unspecified    Compazine      Other reaction(s): Unknown    Cyclobenzaprine     Doxepin Unspecified    Duloxetine Unspecified and Hives    Duloxetine Hcl Unspecified    Enoxaparin Unspecified and Vomiting    Eszopiclone     Hydrocodone-Acetaminophen     Kdc:Yellow Dye+Aspirin+Oxycodone     Ketamine      Other reaction(s): .Unknown  Pt reports he \"gets violent\"    Ketorolac Tromethamine     Ketorolac Tromethamine [Toradol]     Latex Unspecified    Lunesta      Other reaction(s): Unknown    Menthol     Methocarbamol     Morphine Vomiting and Unspecified    Nitroglycerin      Other reaction(s): Unknown    Ondansetron [Zofran]     Other Misc      Can take dilaudid if given benadryl first    Oxycodone     Oxycodone-Acetaminophen     Oxycodone-Aspirin     Prochlorperazine Unspecified     Hallucinations, dizziness    Propofol     Propoxyphene      Other reaction(s): " Unknown    Quetiapine      Other reaction(s): Unknown    Reglan [Metoclopramide]      Has no idea what his RX is    Risperidone     Tramadol     Tylenol      Other reaction(s): Unknown    Zofran [Ondansetron]     Rabeprazole Unspecified, Nausea and Vomiting     cannot remember   cannot remember   06/05/2007 23:16 [facility] AUSTIN BRADFORD<br/><br/>Updated using auto clean up process from RA*4*29.  Changed reactant from RABEPRAZOLE (free text) to RABEPRAZOLE(file - PSNDF(50.6,) <br/><br/><br/>04/16/2007 23:18 [facility] - AUSTIN BRADFORD<br/><br/>Changed from RABEPRAZOLE (File 120.82) to free text by patch [facility]*4*36 <br/><br/><br/>02/21/2007 00:57 [facility] - JOSE WHITMAN<br/><br/>Updated using clean up process.  Changed reactant from RABEPRAZOLE (ingredient) to RABEPRAZOLE(file - PSNDF(50.6,) <br/><br/><br/>01/17/2002 18:01 [facility] - JUAN MURRAY<br/><br/>reported by Dr. Asutin Kinney on lansoprazole request form    06/05/2007 23:16 [facility] AUSTIN BRADFORD<br/><br/>Updated using auto clean up process from RA*4*29.  Changed reactant from RABEPRAZOLE (free text) to RABEPRAZOLE(file - PSNDF(50.6,) <br/><br/><br/>04/16/2007 23:18 [facility] AUSTIN BRADFORD<br/><br/>Changed from RABEPRAZOLE (File 120.82) to free text by patch [facility]*4*36 <br/><br/><br/>02/21/2007 00:57 [facility] - JOSE WHITMAN<br/><br/>Updated using clean up process.  Changed reactant from RABEPRAZOLE (ingredient) to RABEPRAZOLE(file - PSNDF(50.6,) <br/><br/><br/>01/17/2002 18:01 [facility] - JUAN MURRAY<br/><br/>reported by Dr. Austin Kinney on lansoprazole request form    cannot remember cannot remember 06/05/2007 23:16 [facility] - AUSTIN BRADFORD<br/><br/>Updated using auto clean up process from RA*4*29.  Changed reactant from RABEPRAZOLE (free text) to RABEPRAZOLE(file - PSNDF(50.6,) <br/><br/><br/>04/16/2007 23:18 [facility] - AUSTIN BRADFORD<br/><br/>Changed from RABEPRAZOLE (File 120.82) to free text by patch RA*4*36 <br/><br/><br/>02/21/2007 00:57 [facility] - JOSE WHITMAN<br/><br/>Updated  using clean up process.  Changed reactant from RABEPRAZOLE (ingredient) to RABEPRAZOLE(file - PSNDF(50.6,) <br/><br/><br/>01/17/2002 18:01 [facility] - JUAN MURRAY<br/><br/>reported by Dr. Austin Kinney on lansoprazole request form      06/05/2007 23:16 [facility] - AUSTIN BRADFORD<br/><br/>Updated using auto clean up process from RA*4*29.  Changed reactant from RABEPRAZOLE (free text) to RABEPRAZOLE(file - PSNDF(50.6,) <br/><br/><br/>04/16/2007 23:18 [facility] - AUSTIN BRADFORD<br/><br/>Changed from RABEPRAZOLE (File 120.82) to free text by patch RA*4*36 <br/><br/><br/>02/21/2007 00:57 [facility] - JOSE WHITMAN<br/><br/>Updated using clean up process.  Changed reactant from RABEPRAZOLE (ingredient) to RABEPRAZOLE(file - PSNDF(50.6,) <br/><br/><br/>01/17/2002 18:01 [facility] JUAN GASPAR<br/><br/>reported by Dr. Austin Kinney on lansoprazole request form       PHYSICAL EXAM  VITAL SIGNS: BP (!) 146/91   Pulse 100   Temp 37.1 °C (98.7 °F) (Temporal)   Resp 20   Ht 1.829 m (6')   Wt 90.5 kg (199 lb 8.3 oz)   SpO2 94%   BMI 27.06 kg/m²      Constitutional: Well developed, Well nourished, No acute distress, Non-toxic appearance.   HEENT: Normocephalic, Atraumatic,  external ears normal, pharynx pink,  Mucous  Membranes moist, No rhinorrhea or mucosal edema  Eyes: PERRL, EOMI, Conjunctiva normal, No discharge.   Neck: Normal range of motion, No tenderness, Supple, No stridor.   Cardiovascular: Regular Rate and Rhythm, No murmurs,  rubs, or gallops.   Thorax & Lungs: Lungs clear to auscultation bilaterally, No respiratory distress, No wheezes, rhales or rhonchi, No chest wall tenderness.   Abdomen: Soft, epigastric ttp, otherwise abdomen nontender, non distended,  No pulsatile masses., no rebound guarding or peritoneal signs.   Skin: Warm, Dry, No erythema, No rash,   Back:  No CVA tenderness,  No spinal tenderness, bony crepitance step offs or instability.   Extremities: Equal, intact distal pulses, No cyanosis, clubbing or edema,  No  tenderness.   Musculoskeletal: Good range of motion in all major joints. No tenderness to palpation or major deformities noted.   Neurologic: Alert & oriented No focal deficits noted.  Psychiatric: Affect normal, Judgment normal, Mood normal.      EKG/LABS  Results for orders placed or performed during the hospital encounter of 02/25/25   CBC WITH DIFFERENTIAL    Collection Time: 02/25/25 10:00 PM   Result Value Ref Range    WBC 3.8 (L) 4.8 - 10.8 K/uL    RBC 4.51 (L) 4.70 - 6.10 M/uL    Hemoglobin 14.6 14.0 - 18.0 g/dL    Hematocrit 42.4 42.0 - 52.0 %    MCV 94.0 81.4 - 97.8 fL    MCH 32.4 27.0 - 33.0 pg    MCHC 34.4 32.3 - 36.5 g/dL    RDW 41.2 35.9 - 50.0 fL    Platelet Count 209 164 - 446 K/uL    MPV 10.6 9.0 - 12.9 fL    Neutrophils-Polys 44.00 44.00 - 72.00 %    Lymphocytes 40.90 22.00 - 41.00 %    Monocytes 10.40 0.00 - 13.40 %    Eosinophils 3.90 0.00 - 6.90 %    Basophils 0.50 0.00 - 1.80 %    Immature Granulocytes 0.30 0.00 - 0.90 %    Nucleated RBC 0.00 0.00 - 0.20 /100 WBC    Neutrophils (Absolute) 1.69 (L) 1.82 - 7.42 K/uL    Lymphs (Absolute) 1.57 1.00 - 4.80 K/uL    Monos (Absolute) 0.40 0.00 - 0.85 K/uL    Eos (Absolute) 0.15 0.00 - 0.51 K/uL    Baso (Absolute) 0.02 0.00 - 0.12 K/uL    Immature Granulocytes (abs) 0.01 0.00 - 0.11 K/uL    NRBC (Absolute) 0.00 K/uL   COMP METABOLIC PANEL    Collection Time: 02/25/25 10:00 PM   Result Value Ref Range    Sodium 141 135 - 145 mmol/L    Potassium 3.8 3.6 - 5.5 mmol/L    Chloride 107 96 - 112 mmol/L    Co2 22 20 - 33 mmol/L    Anion Gap 12.0 7.0 - 16.0    Glucose 89 65 - 99 mg/dL    Bun 15 8 - 22 mg/dL    Creatinine 1.22 0.50 - 1.40 mg/dL    Calcium 9.7 8.4 - 10.2 mg/dL    Correct Calcium 9.3 8.5 - 10.5 mg/dL    AST(SGOT) 23 12 - 45 U/L    ALT(SGPT) 22 2 - 50 U/L    Alkaline Phosphatase 52 30 - 99 U/L    Total Bilirubin 0.6 0.1 - 1.5 mg/dL    Albumin 4.5 3.2 - 4.9 g/dL    Total Protein 7.3 6.0 - 8.2 g/dL    Globulin 2.8 1.9 - 3.5 g/dL    A-G Ratio 1.6 g/dL    LIPASE    Collection Time: 02/25/25 10:00 PM   Result Value Ref Range    Lipase 35 11 - 82 U/L   ESTIMATED GFR    Collection Time: 02/25/25 10:00 PM   Result Value Ref Range    GFR (CKD-EPI) 70 >60 mL/min/1.73 m 2     *Note: Due to a large number of results and/or encounters for the requested time period, some results have not been displayed. A complete set of results can be found in Results Review.       I have independently interpreted this EKG    COURSE & MEDICAL DECISION MAKING    ASSESSMENT, COURSE AND PLAN  Care Narrative:   This is a 55-year-old male with history of chronic abdominal pain and chronic reported p.o. intolerance who is presenting for evaluation of recurrent epigastric pain.  Patient has longstanding history of this pain and has received several workups including recent admission to [facility] where he had EGD/EUS, colonoscopy approximately 12 days ago which was unremarkable.  He went back to the emergency room in [place] 3 days ago and had normal laboratory testing.  He tells me that he was offered admission however ultimately wanted to try to come back to [place] where he lives.  He notes that his pain was under control until this morning.  He notes that he has not been able to eat.  He states that this is been a chronic issue however in August of last year he weighed 195 pounds and today he weighs 199.    He does have epigastric tenderness on exam.  There is no lower abdominal tenderness on exam.  He is not having any fevers.  He specifically requesting Dilaudid and Benadryl and states that he needs to receive Benadryl before Dilaudid.    Labs demonstrate no leukocytosis.  His lipase is normal at 35.  Patient states that he has been told by his [facility] doctors that he needs to admit to the hospital if his lipase is ever over 50.  There is no indication for imaging at this time as he has normal LFTs and he has no right upper quadrant pain therefore I do not think that his  gallbladder.      Results were discussed with patient.  I discussed with him that there is no indication for inpatient workup at this time.  I do advise continue follow-up with [facility] another pain management specialist for his symptoms.  I do think that his pain is related to a chronic process and I do not see any acute abnormalities requiring emergent intervention at this time.  Patient is advised to return for fevers, chest pain, worsening pain or any other concerns.  He is agreeable to discharge plan with no further questions.        Hydration: Based on the patient's presentation of Acute Diarrhea the patient was given IV fluids. IV Hydration was used because oral hydration was not adequate alone. Upon recheck following hydration, the patient was improved and tolerating PO.          ADDITIONAL PROBLEMS MANAGED  None    DISPOSITION AND DISCUSSIONS  I have discussed management of the patient with the following physicians and BECKA's:  None    Discussion of management with other QHP or appropriate source(s): None     Escalation of care considered, and ultimately not performed:diagnostic imaging however this is chronic pain, has no lower abdominal pain    Barriers to care at this time, including but not limited to:  None .     Decision tools and prescription drugs considered including, but not limited to:  None .     The patient will return for new or worsening symptoms and is stable at the time of discharge.    The patient is referred to a primary physician for blood pressure management, diabetic screening, and for all other preventative health concerns.    DISPOSITION:  Patient will be discharged home in stable condition.    FOLLOW UP:  Your GI doctors          [facility], Emergency Dept  [id number]  [place] [id number]  [phone]          OUTPATIENT MEDICATIONS:  Discharge Medication List as of 2/25/2025 11:56 PM            FINAL DIAGNOSIS  1. Epigastric pain Acute   2. Chronic  abdominal pain Acute        Electronically signed by: Pam Geronimo M.D., 2/25/2025 8:43 PM

## 2025-02-26 NOTE — ED TRIAGE NOTES
Chief Complaint   Patient presents with    Abdominal Pain     PT presents d/t recurrent pancreatitis. PT notes ruq pain radiating to the luq. PT notes N/V/D     BP (!) 146/91   Pulse 100   Temp 37.1 °C (98.7 °F) (Temporal)   Resp 20   Ht 1.829 m (6')   Wt 90.5 kg (199 lb 8.3 oz)   SpO2 94%   BMI 27.06 kg/m²

## 2025-03-13 ENCOUNTER — HOSPITAL ENCOUNTER (EMERGENCY)
Facility: MEDICAL CENTER | Age: 55
End: 2025-03-13
Attending: STUDENT IN AN ORGANIZED HEALTH CARE EDUCATION/TRAINING PROGRAM
Payer: COMMERCIAL

## 2025-03-13 VITALS
WEIGHT: 207.45 LBS | BODY MASS INDEX: 28.1 KG/M2 | TEMPERATURE: 97.5 F | HEART RATE: 78 BPM | OXYGEN SATURATION: 97 % | DIASTOLIC BLOOD PRESSURE: 77 MMHG | HEIGHT: 72 IN | RESPIRATION RATE: 18 BRPM | SYSTOLIC BLOOD PRESSURE: 122 MMHG

## 2025-03-13 DIAGNOSIS — R51.9 FACE PAIN: ICD-10-CM

## 2025-03-13 DIAGNOSIS — I80.9 THROMBOPHLEBITIS: ICD-10-CM

## 2025-03-13 DIAGNOSIS — R10.13 EPIGASTRIC PAIN: ICD-10-CM

## 2025-03-13 LAB
ALBUMIN SERPL BCP-MCNC: 4.3 G/DL (ref 3.2–4.9)
ALBUMIN/GLOB SERPL: 1.5 G/DL
ALP SERPL-CCNC: 52 U/L (ref 30–99)
ALT SERPL-CCNC: 21 U/L (ref 2–50)
ANION GAP SERPL CALC-SCNC: 12 MMOL/L (ref 7–16)
AST SERPL-CCNC: 25 U/L (ref 12–45)
BASOPHILS # BLD AUTO: 0.5 % (ref 0–1.8)
BASOPHILS # BLD: 0.02 K/UL (ref 0–0.12)
BILIRUB SERPL-MCNC: 0.4 MG/DL (ref 0.1–1.5)
BUN SERPL-MCNC: 18 MG/DL (ref 8–22)
CALCIUM ALBUM COR SERPL-MCNC: 9 MG/DL (ref 8.5–10.5)
CALCIUM SERPL-MCNC: 9.2 MG/DL (ref 8.4–10.2)
CHLORIDE SERPL-SCNC: 109 MMOL/L (ref 96–112)
CO2 SERPL-SCNC: 19 MMOL/L (ref 20–33)
CREAT SERPL-MCNC: 1.16 MG/DL (ref 0.5–1.4)
EOSINOPHIL # BLD AUTO: 0.18 K/UL (ref 0–0.51)
EOSINOPHIL NFR BLD: 4.1 % (ref 0–6.9)
ERYTHROCYTE [DISTWIDTH] IN BLOOD BY AUTOMATED COUNT: 44.7 FL (ref 35.9–50)
GFR SERPLBLD CREATININE-BSD FMLA CKD-EPI: 74 ML/MIN/1.73 M 2
GLOBULIN SER CALC-MCNC: 2.9 G/DL (ref 1.9–3.5)
GLUCOSE SERPL-MCNC: 94 MG/DL (ref 65–99)
HCT VFR BLD AUTO: 45.7 % (ref 42–52)
HGB BLD-MCNC: 15.3 G/DL (ref 14–18)
IMM GRANULOCYTES # BLD AUTO: 0.01 K/UL (ref 0–0.11)
IMM GRANULOCYTES NFR BLD AUTO: 0.2 % (ref 0–0.9)
LIPASE SERPL-CCNC: 36 U/L (ref 11–82)
LYMPHOCYTES # BLD AUTO: 1.47 K/UL (ref 1–4.8)
LYMPHOCYTES NFR BLD: 33.9 % (ref 22–41)
MCH RBC QN AUTO: 32.5 PG (ref 27–33)
MCHC RBC AUTO-ENTMCNC: 33.5 G/DL (ref 32.3–36.5)
MCV RBC AUTO: 97 FL (ref 81.4–97.8)
MONOCYTES # BLD AUTO: 0.29 K/UL (ref 0–0.85)
MONOCYTES NFR BLD AUTO: 6.7 % (ref 0–13.4)
NEUTROPHILS # BLD AUTO: 2.37 K/UL (ref 1.82–7.42)
NEUTROPHILS NFR BLD: 54.6 % (ref 44–72)
NRBC # BLD AUTO: 0 K/UL
NRBC BLD-RTO: 0 /100 WBC (ref 0–0.2)
PLATELET # BLD AUTO: 190 K/UL (ref 164–446)
PMV BLD AUTO: 10.6 FL (ref 9–12.9)
POTASSIUM SERPL-SCNC: 4.3 MMOL/L (ref 3.6–5.5)
PROT SERPL-MCNC: 7.2 G/DL (ref 6–8.2)
RBC # BLD AUTO: 4.71 M/UL (ref 4.7–6.1)
SODIUM SERPL-SCNC: 140 MMOL/L (ref 135–145)
WBC # BLD AUTO: 4.3 K/UL (ref 4.8–10.8)

## 2025-03-13 PROCEDURE — 83690 ASSAY OF LIPASE: CPT

## 2025-03-13 PROCEDURE — 36415 COLL VENOUS BLD VENIPUNCTURE: CPT

## 2025-03-13 PROCEDURE — 51798 US URINE CAPACITY MEASURE: CPT

## 2025-03-13 PROCEDURE — 96375 TX/PRO/DX INJ NEW DRUG ADDON: CPT

## 2025-03-13 PROCEDURE — 99285 EMERGENCY DEPT VISIT HI MDM: CPT

## 2025-03-13 PROCEDURE — 80053 COMPREHEN METABOLIC PANEL: CPT

## 2025-03-13 PROCEDURE — 700111 HCHG RX REV CODE 636 W/ 250 OVERRIDE (IP): Mod: JZ | Performed by: STUDENT IN AN ORGANIZED HEALTH CARE EDUCATION/TRAINING PROGRAM

## 2025-03-13 PROCEDURE — 85025 COMPLETE CBC W/AUTO DIFF WBC: CPT

## 2025-03-13 PROCEDURE — 96376 TX/PRO/DX INJ SAME DRUG ADON: CPT

## 2025-03-13 PROCEDURE — 700105 HCHG RX REV CODE 258: Performed by: STUDENT IN AN ORGANIZED HEALTH CARE EDUCATION/TRAINING PROGRAM

## 2025-03-13 PROCEDURE — 96374 THER/PROPH/DIAG INJ IV PUSH: CPT

## 2025-03-13 RX ORDER — DIPHENHYDRAMINE HYDROCHLORIDE 50 MG/ML
25 INJECTION, SOLUTION INTRAMUSCULAR; INTRAVENOUS ONCE
Status: COMPLETED | OUTPATIENT
Start: 2025-03-13 | End: 2025-03-13

## 2025-03-13 RX ORDER — HYDROMORPHONE HYDROCHLORIDE 1 MG/ML
1 INJECTION, SOLUTION INTRAMUSCULAR; INTRAVENOUS; SUBCUTANEOUS ONCE
Status: COMPLETED | OUTPATIENT
Start: 2025-03-13 | End: 2025-03-13

## 2025-03-13 RX ORDER — SODIUM CHLORIDE 9 MG/ML
1000 INJECTION, SOLUTION INTRAVENOUS ONCE
Status: COMPLETED | OUTPATIENT
Start: 2025-03-13 | End: 2025-03-13

## 2025-03-13 RX ADMIN — SODIUM CHLORIDE 1000 ML: 9 INJECTION, SOLUTION INTRAVENOUS at 17:26

## 2025-03-13 RX ADMIN — DIPHENHYDRAMINE HYDROCHLORIDE 25 MG: 50 INJECTION, SOLUTION INTRAMUSCULAR; INTRAVENOUS at 17:26

## 2025-03-13 RX ADMIN — HYDROMORPHONE HYDROCHLORIDE 1 MG: 1 INJECTION, SOLUTION INTRAMUSCULAR; INTRAVENOUS; SUBCUTANEOUS at 17:26

## 2025-03-13 RX ADMIN — DIPHENHYDRAMINE HYDROCHLORIDE 25 MG: 50 INJECTION, SOLUTION INTRAMUSCULAR; INTRAVENOUS at 19:14

## 2025-03-13 RX ADMIN — HYDROMORPHONE HYDROCHLORIDE 1 MG: 1 INJECTION, SOLUTION INTRAMUSCULAR; INTRAVENOUS; SUBCUTANEOUS at 19:14

## 2025-03-13 ASSESSMENT — PAIN DESCRIPTION - PAIN TYPE
TYPE: ACUTE PAIN
TYPE: CHRONIC PAIN;ACUTE PAIN
TYPE: ACUTE PAIN

## 2025-03-13 ASSESSMENT — FIBROSIS 4 INDEX: FIB4 SCORE: 1.29

## 2025-03-13 NOTE — ED NOTES
IV established. Blood sent to lab. Pt educated on notifying staff for IV removal prior to leaving ED.

## 2025-03-14 NOTE — ED NOTES
Patient given discharge instructions, verbalized understanding. PIV discontinued. Patient instructed to follow up with PCP, behavioral health. Education provided to come to ER if symptoms worsen. Discharged in stable condition, taken out by wheelchair by ED tech.

## 2025-03-14 NOTE — ED NOTES
Bladder scan of 464 ml.  Patient urine void of 475 ml, ERP updated of bladder scan results and urine output.

## 2025-03-14 NOTE — ED PROVIDER NOTES
CHIEF COMPLAINT  Chief Complaint   Patient presents with    Abdominal Pain     Thinking he is having a flare of pancreatitis     Hand Swelling     Having blood clots to L hand   having pain to top of hand  the last couple of days now      Facial Pain     Has Hx of sinus infections  thinking he is having another attack for this  R side of face having pain to cheek and forehead     N/V    Diarrhea     Started since recent flare of pancreatitis        LIMITATION TO HISTORY   Select: None  HPI    Landon Allred is a 55 y.o. male who presents to the Emergency Department presents for evaluation of chronic abdominal pain as well as concerns he may have a blood clot in his hand as well as right-sided facial pain.  He states for the past 2 to 3 days he has had several episodes of nonbloody nonbilious vomiting with associated epigastric abdominal discomfort.  Reports he has a prior history of idiopathic pancreatitis, and feels like a similar flare.  Denies any recent drugs or alcohol is no fevers no bloody stools no black tarry stools no hematemesis.  He was also complaining of right hand pain he is concerned that he may have a blood clot in his hand as there is a very small nodule on his left hand.  He does have a prior history of DVT though he is anticoagulated on Xarelto.  Had a third complaint of right-sided facial pain, states that he has frequent sinus infections and thinks he may have another sinus infection.    OUTSIDE HISTORIAN(S):  Select:  none    EXTERNAL RECORDS REVIEWED  Select: Other reviewed multiple outpatient visits, patient was diagnosed with pancreatitis at [facility], he had a EGD and EUS that did not show any acute abnormalities, did find possible chronic pancreatitis, was referred to pain management clinic      PAST MEDICAL HISTORY  Past Medical History:   Diagnosis Date    Allergy     Arthritis     Asthma     Claustrophobia     Clostridium difficile diarrhea     Clotting disorder (HCC)     Depression      DVT (deep venous thrombosis) (HCC)     GERD (gastroesophageal reflux disease)     Hypertension     IBD (inflammatory bowel disease)     Migraine     MRSA (methicillin resistant Staphylococcus aureus)     Pancreatitis     PE (pulmonary embolism)     Psychiatric disorder     PTSD, depression    Seizure (HCC)      .    SURGICAL HISTORY  Past Surgical History:   Procedure Laterality Date    INGUINAL HERNIA REPAIR Right 09/06/2019    DENTAL EXTRACTION(S)      HERNIA REPAIR Right     NERVE ULNAR REPAIR OR EXPLORE      ORIF, ELBOW      ORIF, FRACTURE, FEMUR      MT CLOSED RX NASAL SEPTAL FRACTURE      RECTAL EXPLORATION           FAMILY HISTORY  Family History   Problem Relation Age of Onset    Hypertension Mother     Cancer Father     Hypertension Father           SOCIAL HISTORY  Social History     Socioeconomic History    Marital status: Single     Spouse name: Not on file    Number of children: Not on file    Years of education: Not on file    Highest education level: Not on file   Occupational History    Not on file   Tobacco Use    Smoking status: Never    Smokeless tobacco: Never   Vaping Use    Vaping status: Never Used   Substance and Sexual Activity    Alcohol use: No    Drug use: No    Sexual activity: Not on file   Other Topics Concern    Not on file   Social History Narrative    55 y/o male, disabled     Social Drivers of Health     Financial Resource Strain: High Risk (2/14/2025)    Received from [facility]    Financial Resource Strain     How hard is it for you to pay for the very basics like food, housing, medical care, and heating?: Very hard   Food Insecurity: No Food Insecurity (2/11/2025)    Received from [facility]    Food Needs     Within the past 12 months, you worried whether your food would run out before you got money to buy more?: Never true     Within the past 12 months, the food you bought just didn't last and you didn't have money to get more.: Never true   Transportation  Needs: No Transportation Needs (2/14/2025)    Received from [facility]    Transportation     Within the past 12 months, has a lack of transportation kept you from medical appointments, meetings, work, or from getting things needed for daily living? : No   Physical Activity: Inactive (2/1/2023)    Exercise Vital Sign     Days of Exercise per Week: 0 days     Minutes of Exercise per Session: 0 min   Stress: Stress Concern Present (2/1/2023)    [employer] - Occupational Stress Questionnaire     Feeling of Stress : Very much   Social Connections: Unknown (12/13/2023)    [facility]    Social Isolation     How often do you see or talk to people that you care about and feel close to?: Not on file   Intimate Partner Violence: Low Risk  (11/1/2023)    [facility]    History of Abuse     History of Abuse: Denies   Housing Stability: High Risk (2/14/2025)    Received from [facility]    Housing Stability     In the last 12 months, was there a time when you were not able to pay the mortgage or rent on time?: Unable to assess     In the last 12 months, how many places have you lived?: Not on file     In the last 12 months, was there a time when you did not have a steady place to sleep or slept in a shelter (including now)?: Yes         CURRENT MEDICATIONS  No current facility-administered medications on file prior to encounter.     Current Outpatient Medications on File Prior to Encounter   Medication Sig Dispense Refill    esomeprazole (NEXIUM) 20 MG capsule Take 1 Capsule by mouth every morning before breakfast. 30 Capsule 0    diphenhydrAMINE (BENADRYL) 25 MG capsule Take 50 mg by mouth 2 times a day.      EPINEPHrine (EPIPEN) 0.3 MG/0.3ML Solution Auto-injector solution for injection Inject  into the shoulder, thigh, or buttocks.    "          ALLERGIES  Allergies   Allergen Reactions    Capsaicin-Turpentine Anaphylaxis and Rash     08/12/2009 21:56 [facility] - BERNADETTE NGUYEN<br/><br/>patient was admitted to ER, taken via ambulance, after<br/>applying capsaicin 0.075% - severe skin reaction<br/>(hyperventilation, rash, right nose bleed, extreme skin<br/>sensitivity)    Celery Oil Hives     Celery causes hives    Ciprofloxacin Anaphylaxis and Unspecified    Doxycycline Anaphylaxis    Duloxetine Hives    Haloperidol Unspecified and Anaphylaxis    Shellfish Allergy Anaphylaxis    Tape Hives    Wasp Venom Anaphylaxis    Amoxicillin-Pot Clavulanate Rash     Other reaction(s): .Unknown  rash      Dalteparin Diarrhea     03/28/2013 21:07 [facility] - MANOJ WILKINSON<br/><br/>Venaseem states he had constant diarrhea after starting the<br/>dalteparin.  Was able to complete his bridge therapy, but<br/>stated that he would refuse to use it in the future due to<br/>this ADR.    Sulfamethoxazole W-Trimethoprim Hives     04/18/2014 16:26 [facility] - ROSE COHEN<br/><br/>Pt admitted 4/18/2014 for urticaria but he had bactrim,<br/>nuclear medicine, dilaudid, phototherapy from dermatology,<br/>and other meds so it is unclear whether bactrim caused the<br/>rash.    Amitriptyline Hcl     Aspirin Unspecified    Baclofen Unspecified    Biofreeze     Carisoprodol Unspecified     Other reaction(s): Unknown    Codeine Unspecified    Compazine      Other reaction(s): Unknown    Cyclobenzaprine     Doxepin Unspecified    Duloxetine Hcl Unspecified    Enoxaparin Unspecified and Vomiting    Eszopiclone     Hydrocodone-Acetaminophen     Kdc:Yellow Dye+Aspirin+Oxycodone     Ketamine      Other reaction(s): .Unknown  Pt reports he \"gets violent\"    Ketorolac Tromethamine     Ketorolac Tromethamine [Toradol]     Latex Unspecified    Lunesta      Other reaction(s): Unknown    Menthol     Methocarbamol     Nitroglycerin      Other reaction(s): Unknown    Ondansetron [Zofran]     Other Misc      Can take " "dilaudid if given benadryl first    Oxycodone     Oxycodone-Acetaminophen     Oxycodone-Aspirin     Prochlorperazine Unspecified     Hallucinations, dizziness    Propofol     Propoxyphene      Other reaction(s): Unknown    Quetiapine      Other reaction(s): Unknown    Reglan [Metoclopramide]      Has no idea what his RX is    Risperidone     Tramadol     Tylenol      Other reaction(s): Unknown    Zofran [Ondansetron]     Acetaminophen Unspecified     \"Makes me deathly ill\"        Hydromorphone Itching     States \"I have an adverse reaction and need benadryl first\"    Morphine Vomiting    Rabeprazole Unspecified, Nausea and Vomiting     cannot remember   cannot remember   06/05/2007 23:16 [facility] - AUSTIN BRADFORD<br/><br/>Updated using auto clean up process from RA*4*29.  Changed reactant from RABEPRAZOLE (free text) to RABEPRAZOLE(file - PSNDF(50.6,) <br/><br/><br/>04/16/2007 23:18 [facility] AUSTIN BRADFORD<br/><br/>Changed from RABEPRAZOLE (File 120.82) to free text by patch RA*4*36 <br/><br/><br/>02/21/2007 00:57 [facility] - JOSE WHITMAN<br/><br/>Updated using clean up process.  Changed reactant from RABEPRAZOLE (ingredient) to RABEPRAZOLE(file - PSNDF(50.6,) <br/><br/><br/>01/17/2002 18:01 [facility] - JUAN MURRAY<br/><br/>reported by Dr. Austin Kinney on lansoprazole request form    06/05/2007 23:16 [facility] AUSTIN KING<br/><br/>Updated using auto clean up process from RA*4*29.  Changed reactant from RABEPRAZOLE (free text) to RABEPRAZOLE(file - PSNDF(50.6,) <br/><br/><br/>04/16/2007 23:18 [facility] AUSTIN KING<br/><br/>Changed from RABEPRAZOLE (File 120.82) to free text by patch GMRA*4*36 <br/><br/><br/>02/21/2007 00:57 [facility] - JOSE WHITMAN<br/><br/>Updated using clean up process.  Changed reactant from RABEPRAZOLE (ingredient) to RABEPRAZOLE(file - PSNDF(50.6,) <br/><br/><br/>01/17/2002 18:01 [facility] - JUAN MURRAY<br/><br/>reported by Dr. Asutin Kinney on lansoprazole request form    cannot remember cannot remember " 06/05/2007 23:16 [facility] AUSTIN BRADFORD<br/><br/>Updated using auto clean up process from RA*4*29.  Changed reactant from RABEPRAZOLE (free text) to RABEPRAZOLE(file - PSNDF(50.6,) <br/><br/><br/>04/16/2007 23:18 [facility] AUSTIN BRADFORD<br/><br/>Changed from RABEPRAZOLE (File 120.82) to free text by patch [facility]*4*36 <br/><br/><br/>02/21/2007 00:57 [facility] - JOSE WHITMAN<br/><br/>Updated using clean up process.  Changed reactant from RABEPRAZOLE (ingredient) to RABEPRAZOLE(file - PSNDF(50.6,) <br/><br/><br/>01/17/2002 18:01 [facility] JUAN GASPAR<br/><br/>reported by Dr. Austin Kinney on lansoprazole request form      06/05/2007 23:16 [facility] AUSTIN BRADFORD<br/><br/>Updated using auto clean up process from RA*4*29.  Changed reactant from RABEPRAZOLE (free text) to RABEPRAZOLE(file - PSNDF(50.6,) <br/><br/><br/>04/16/2007 23:18 [facility] AUSTIN BRADFORD<br/><br/>Changed from RABEPRAZOLE (File 120.82) to free text by patch [facility]*4*36 <br/><br/><br/>02/21/2007 00:57 [facility] JOSE BLAS<br/><br/>Updated using clean up process.  Changed reactant from RABEPRAZOLE (ingredient) to RABEPRAZOLE(file - PSNDF(50.6,) <br/><br/><br/>01/17/2002 18:01 [facility] JUAN GASPRA<br/><br/>reported by Dr. Austin Kinney on lansoprazole request form       PHYSICAL EXAM  VITAL SIGNS:BP (!) 132/90   Pulse 88   Temp 36.4 °C (97.5 °F) (Temporal)   Resp 18   Ht 1.829 m (6')   Wt 94.1 kg (207 lb 7.3 oz)   SpO2 96%   BMI 28.14 kg/m²       VITALS - vital signs documented prior to this note have been reviewed and noted,  GENERAL - awake, alert, oriented, GCS 15, no apparent distress, non-toxic  appearing  HEENT - normocephalic, atraumatic, pupils equal, sclera anicteric, mucus  membranes moist  NECK - supple, no meningismus, full active range of motion, trachea midline  CARDIOVASCULAR - regular rate/rhythm, no murmurs/gallops/rubs  PULMONARY - no respiratory distress, speaking in full sentences, clear to  auscultation bilaterally, no wheezing/ronchi/rales, no  accessory muscle use  GASTROINTESTINAL -mild epigastric tenderness abdomen is otherwise soft, non-tender, non-distended, no rebound, guarding,  or peritonitis  GENITOURINARY - Deferred  NEUROLOGIC - Awake alert, normal mental status, speech fluid, cognition  normal, moves all extremities  MUSCULOSKELETAL - no obvious asymmetry or deformities present  EXTREMITIES - warm, well-perfused, no cyanosis or significant edema  DERMATOLOGIC - warm, dry, no rashes, no jaundice  PSYCHIATRIC - normal affect, normal insight, normal concentration    DIAGNOSTIC STUDIES / PROCEDURES      LABS  Labs Reviewed   CBC WITH DIFFERENTIAL - Abnormal; Notable for the following components:       Result Value    WBC 4.3 (*)     All other components within normal limits   COMP METABOLIC PANEL - Abnormal; Notable for the following components:    Co2 19 (*)     All other components within normal limits   LIPASE   ESTIMATED GFR   URINALYSIS     No significant metabolic derangements, lipase normal doubt acute pancreatitis          Radiologist interpretation:   No orders to display        COURSE & MEDICAL DECISION MAKING    ED COURSE:        INTERVENTIONS BY ME:  Medications   HYDROmorphone (Dilaudid) injection 1 mg (has no administration in time range)   diphenhydrAMINE (Benadryl) injection 25 mg (has no administration in time range)   HYDROmorphone (Dilaudid) injection 1 mg (1 mg Intravenous Given 3/13/25 1726)   diphenhydrAMINE (Benadryl) injection 25 mg (25 mg Intravenous Given 3/13/25 1726)   NS (Bolus) 0.9 % infusion 1,000 mL (1,000 mL Intravenous New Bag 3/13/25 1726)       Response on recheck: After second dose of Dilaudid pain is improved patient tolerating p.o., will discharge      Hydration: Based on the patient's presentation of Acute Vomiting the patient was given IV fluids. IV Hydration was used because oral hydration was not adequate alone. Upon recheck following hydration, the patient was improvedy..     INITIAL ASSESSMENT, COURSE  AND PLAN  Care Narrative:  patient presented for evaluation of acute on chronic abdominal pain.  He reports a prior history of pancreatitis had a extensive workup for this at [facility] including EGD as well as EUS colonoscopy which was normal at the time.  Does have frequent visits for abdominal pain, he had several other complaints today including a right-sided sinus pain, which I believe likely represents an acute sinusitis we will recommend a Flonase, and Claritin.  He was concerned about a possible blood clot on his hand which appears to be a very superficial thrombophlebitis of the dorsal aspect of his left hand.  In regards to his abdominal pain did obtain labs which did not show any significant metabolic derangements, he had a normal lipase.  His pain resolved after 2 doses of Dilaudid, do believe he is appropriate for continued outpatient management will encourage him to continue to follow-up with his outpatient PCP, outpatient GI and pain management.  Patient be discharged in stable condition.             ADDITIONAL PROBLEM LIST    DISPOSITION AND DISCUSSIONS      Escalation of care considered, and ultimately not performed:diagnostic imaging patient's labs are reassuring able to tolerate p.o. after 2 doses of pain medication, repeat abdominal exam reassuring lowering concern for acute intra-abdominal pathology thus CT on pelvis deferred      Decision tools and prescription drugs considered including, but not limited to: Medication modification   .    FINAL DIAGNOSIS  1. Epigastric pain    2. Face pain    3. Thrombophlebitis             Electronically signed by: Ean Smith DO ,6:52 PM 03/13/25

## 2025-03-17 SDOH — HEALTH STABILITY: PHYSICAL HEALTH: ON AVERAGE, HOW MANY DAYS PER WEEK DO YOU ENGAGE IN MODERATE TO STRENUOUS EXERCISE (LIKE A BRISK WALK)?: 0 DAYS

## 2025-03-17 SDOH — HEALTH STABILITY: PHYSICAL HEALTH: ON AVERAGE, HOW MANY MINUTES DO YOU ENGAGE IN EXERCISE AT THIS LEVEL?: 0 MIN

## 2025-03-17 SDOH — ECONOMIC STABILITY: FOOD INSECURITY: WITHIN THE PAST 12 MONTHS, YOU WORRIED THAT YOUR FOOD WOULD RUN OUT BEFORE YOU GOT MONEY TO BUY MORE.: OFTEN TRUE

## 2025-03-17 SDOH — HEALTH STABILITY: MENTAL HEALTH
STRESS IS WHEN SOMEONE FEELS TENSE, NERVOUS, ANXIOUS, OR CAN'T SLEEP AT NIGHT BECAUSE THEIR MIND IS TROUBLED. HOW STRESSED ARE YOU?: VERY MUCH

## 2025-03-17 SDOH — ECONOMIC STABILITY: INCOME INSECURITY: IN THE LAST 12 MONTHS, WAS THERE A TIME WHEN YOU WERE NOT ABLE TO PAY THE MORTGAGE OR RENT ON TIME?: YES

## 2025-03-17 SDOH — ECONOMIC STABILITY: HOUSING INSECURITY
IN THE LAST 12 MONTHS, WAS THERE A TIME WHEN YOU DID NOT HAVE A STEADY PLACE TO SLEEP OR SLEPT IN A SHELTER (INCLUDING NOW)?: YES

## 2025-03-17 SDOH — ECONOMIC STABILITY: INCOME INSECURITY: HOW HARD IS IT FOR YOU TO PAY FOR THE VERY BASICS LIKE FOOD, HOUSING, MEDICAL CARE, AND HEATING?: VERY HARD

## 2025-03-17 SDOH — ECONOMIC STABILITY: TRANSPORTATION INSECURITY
IN THE PAST 12 MONTHS, HAS LACK OF RELIABLE TRANSPORTATION KEPT YOU FROM MEDICAL APPOINTMENTS, MEETINGS, WORK OR FROM GETTING THINGS NEEDED FOR DAILY LIVING?: YES

## 2025-03-17 SDOH — ECONOMIC STABILITY: FOOD INSECURITY: WITHIN THE PAST 12 MONTHS, THE FOOD YOU BOUGHT JUST DIDN'T LAST AND YOU DIDN'T HAVE MONEY TO GET MORE.: OFTEN TRUE

## 2025-03-17 SDOH — ECONOMIC STABILITY: TRANSPORTATION INSECURITY
IN THE PAST 12 MONTHS, HAS LACK OF TRANSPORTATION KEPT YOU FROM MEETINGS, WORK, OR FROM GETTING THINGS NEEDED FOR DAILY LIVING?: YES

## 2025-03-17 SDOH — ECONOMIC STABILITY: TRANSPORTATION INSECURITY
IN THE PAST 12 MONTHS, HAS THE LACK OF TRANSPORTATION KEPT YOU FROM MEDICAL APPOINTMENTS OR FROM GETTING MEDICATIONS?: YES

## 2025-03-17 ASSESSMENT — SOCIAL DETERMINANTS OF HEALTH (SDOH)
IN THE PAST 12 MONTHS, HAS THE ELECTRIC, GAS, OIL, OR WATER COMPANY THREATENED TO SHUT OFF SERVICE IN YOUR HOME?: YES
HOW OFTEN DO YOU ATTENT MEETINGS OF THE CLUB OR ORGANIZATION YOU BELONG TO?: NEVER
ARE YOU MARRIED, WIDOWED, DIVORCED, SEPARATED, NEVER MARRIED, OR LIVING WITH A PARTNER?: NEVER MARRIED
WITHIN THE PAST 12 MONTHS, YOU WORRIED THAT YOUR FOOD WOULD RUN OUT BEFORE YOU GOT THE MONEY TO BUY MORE: OFTEN TRUE
HOW OFTEN DO YOU ATTENT MEETINGS OF THE CLUB OR ORGANIZATION YOU BELONG TO?: NEVER
HOW OFTEN DO YOU GET TOGETHER WITH FRIENDS OR RELATIVES?: NEVER
ARE YOU MARRIED, WIDOWED, DIVORCED, SEPARATED, NEVER MARRIED, OR LIVING WITH A PARTNER?: NEVER MARRIED
HOW MANY DRINKS CONTAINING ALCOHOL DO YOU HAVE ON A TYPICAL DAY WHEN YOU ARE DRINKING: PATIENT DOES NOT DRINK
DO YOU BELONG TO ANY CLUBS OR ORGANIZATIONS SUCH AS CHURCH GROUPS UNIONS, FRATERNAL OR ATHLETIC GROUPS, OR SCHOOL GROUPS?: NO
HOW HARD IS IT FOR YOU TO PAY FOR THE VERY BASICS LIKE FOOD, HOUSING, MEDICAL CARE, AND HEATING?: VERY HARD
HOW OFTEN DO YOU ATTEND CHURCH OR RELIGIOUS SERVICES?: NEVER
HOW OFTEN DO YOU GET TOGETHER WITH FRIENDS OR RELATIVES?: NEVER
HOW OFTEN DO YOU HAVE A DRINK CONTAINING ALCOHOL: NEVER
HOW OFTEN DO YOU HAVE SIX OR MORE DRINKS ON ONE OCCASION: NEVER
IN A TYPICAL WEEK, HOW MANY TIMES DO YOU TALK ON THE PHONE WITH FAMILY, FRIENDS, OR NEIGHBORS?: NEVER
IN A TYPICAL WEEK, HOW MANY TIMES DO YOU TALK ON THE PHONE WITH FAMILY, FRIENDS, OR NEIGHBORS?: NEVER
HOW OFTEN DO YOU ATTEND CHURCH OR RELIGIOUS SERVICES?: NEVER
DO YOU BELONG TO ANY CLUBS OR ORGANIZATIONS SUCH AS CHURCH GROUPS UNIONS, FRATERNAL OR ATHLETIC GROUPS, OR SCHOOL GROUPS?: NO

## 2025-03-17 ASSESSMENT — LIFESTYLE VARIABLES
AUDIT-C TOTAL SCORE: 0
HOW OFTEN DO YOU HAVE A DRINK CONTAINING ALCOHOL: NEVER
HOW MANY STANDARD DRINKS CONTAINING ALCOHOL DO YOU HAVE ON A TYPICAL DAY: PATIENT DOES NOT DRINK
SKIP TO QUESTIONS 9-10: 1
HOW OFTEN DO YOU HAVE SIX OR MORE DRINKS ON ONE OCCASION: NEVER

## 2025-03-18 ENCOUNTER — OFFICE VISIT (OUTPATIENT)
Dept: MEDICAL GROUP | Facility: PHYSICIAN GROUP | Age: 55
End: 2025-03-18
Payer: MEDICARE

## 2025-03-18 VITALS
BODY MASS INDEX: 28.04 KG/M2 | HEIGHT: 72 IN | HEART RATE: 94 BPM | OXYGEN SATURATION: 98 % | DIASTOLIC BLOOD PRESSURE: 80 MMHG | WEIGHT: 207 LBS | RESPIRATION RATE: 12 BRPM | TEMPERATURE: 97.4 F | SYSTOLIC BLOOD PRESSURE: 100 MMHG

## 2025-03-18 DIAGNOSIS — F41.9 ANXIETY: ICD-10-CM

## 2025-03-18 DIAGNOSIS — F11.90 CHRONIC NARCOTIC USE: ICD-10-CM

## 2025-03-18 DIAGNOSIS — D68.2 FACTOR V DEFICIENCY (HCC): ICD-10-CM

## 2025-03-18 DIAGNOSIS — R55 SYNCOPE, UNSPECIFIED SYNCOPE TYPE: ICD-10-CM

## 2025-03-18 DIAGNOSIS — G43.E09 CHRONIC MIGRAINE WITH AURA WITHOUT STATUS MIGRAINOSUS, NOT INTRACTABLE: ICD-10-CM

## 2025-03-18 DIAGNOSIS — J45.20 MILD INTERMITTENT ASTHMA WITHOUT COMPLICATION: ICD-10-CM

## 2025-03-18 DIAGNOSIS — F32.2 SEVERE DEPRESSION (HCC): ICD-10-CM

## 2025-03-18 DIAGNOSIS — T85.840A: ICD-10-CM

## 2025-03-18 DIAGNOSIS — F43.10 PTSD (POST-TRAUMATIC STRESS DISORDER): ICD-10-CM

## 2025-03-18 DIAGNOSIS — G89.4 CHRONIC PAIN SYNDROME: ICD-10-CM

## 2025-03-18 DIAGNOSIS — J32.9 CHRONIC SINUSITIS, UNSPECIFIED LOCATION: ICD-10-CM

## 2025-03-18 PROBLEM — M54.41 CHRONIC MIDLINE LOW BACK PAIN WITH BILATERAL SCIATICA: Status: ACTIVE | Noted: 2025-03-18

## 2025-03-18 PROBLEM — M54.42 CHRONIC MIDLINE LOW BACK PAIN WITH BILATERAL SCIATICA: Status: ACTIVE | Noted: 2025-03-18

## 2025-03-18 PROBLEM — G89.29 CHRONIC MIDLINE LOW BACK PAIN WITH BILATERAL SCIATICA: Status: ACTIVE | Noted: 2025-03-18

## 2025-03-18 PROCEDURE — 3079F DIAST BP 80-89 MM HG: CPT | Performed by: STUDENT IN AN ORGANIZED HEALTH CARE EDUCATION/TRAINING PROGRAM

## 2025-03-18 PROCEDURE — 3074F SYST BP LT 130 MM HG: CPT | Performed by: STUDENT IN AN ORGANIZED HEALTH CARE EDUCATION/TRAINING PROGRAM

## 2025-03-18 PROCEDURE — 99204 OFFICE O/P NEW MOD 45 MIN: CPT | Performed by: STUDENT IN AN ORGANIZED HEALTH CARE EDUCATION/TRAINING PROGRAM

## 2025-03-18 RX ORDER — DEXLANSOPRAZOLE 60 MG/1
60 CAPSULE, DELAYED RELEASE ORAL DAILY
COMMUNITY
Start: 2025-02-20 | End: 2025-03-27

## 2025-03-18 RX ORDER — CLONAZEPAM 1 MG/1
1 TABLET ORAL
COMMUNITY
End: 2025-03-27

## 2025-03-18 RX ORDER — PROMETHAZINE HYDROCHLORIDE 25 MG/1
25 TABLET ORAL
COMMUNITY
Start: 2025-01-08 | End: 2025-03-27

## 2025-03-18 RX ORDER — OMEPRAZOLE 40 MG/1
CAPSULE, DELAYED RELEASE ORAL
COMMUNITY
End: 2025-03-27

## 2025-03-18 RX ORDER — FLUTICASONE FUROATE AND VILANTEROL 100; 25 UG/1; UG/1
1 POWDER RESPIRATORY (INHALATION) DAILY
COMMUNITY
Start: 2025-02-21 | End: 2025-03-27

## 2025-03-18 RX ORDER — FLUTICASONE FUROATE AND VILANTEROL 200; 25 UG/1; UG/1
POWDER RESPIRATORY (INHALATION)
COMMUNITY
End: 2025-03-18 | Stop reason: SDUPTHER

## 2025-03-18 RX ORDER — PSEUDOEPHEDRINE HCL 120 MG/1
260 TABLET, FILM COATED, EXTENDED RELEASE ORAL DAILY
COMMUNITY
End: 2025-03-27

## 2025-03-18 RX ORDER — GLYCERIN/PROPYLENE GLYCOL 0.6 %-0.6%
DROPPERETTE, SINGLE-USE DROP DISPENSER OPHTHALMIC (EYE)
COMMUNITY
End: 2025-03-27

## 2025-03-18 RX ORDER — FLUTICASONE FUROATE AND VILANTEROL 200; 25 UG/1; UG/1
1 POWDER RESPIRATORY (INHALATION) DAILY
Qty: 60 EACH | Refills: 0 | Status: SHIPPED
Start: 2025-03-18 | End: 2025-03-27

## 2025-03-18 RX ORDER — HYDROMORPHONE HYDROCHLORIDE 2 MG/1
TABLET ORAL
COMMUNITY
Start: 2025-02-14 | End: 2025-03-18

## 2025-03-18 RX ORDER — ALUMINA, MAGNESIA, AND SIMETHICONE 2400; 2400; 240 MG/30ML; MG/30ML; MG/30ML
10 SUSPENSION ORAL
COMMUNITY
Start: 2025-02-22 | End: 2025-03-24

## 2025-03-18 ASSESSMENT — FIBROSIS 4 INDEX: FIB4 SCORE: 1.58

## 2025-03-18 ASSESSMENT — PATIENT HEALTH QUESTIONNAIRE - PHQ9: CLINICAL INTERPRETATION OF PHQ2 SCORE: 0

## 2025-03-19 NOTE — Clinical Note
REFERRAL APPROVAL NOTICE         Sent on March 19, 2025                   Landon Allred  [address]  [place] [address]                   Dear Mr. Allred,    After a careful review of the medical information and benefit coverage, [facility] has processed your referral. See below for additional details.    If applicable, you must be actively enrolled with your insurance for coverage of the authorized service. If you have any questions regarding your coverage, please contact your insurance directly.    REFERRAL INFORMATION   Referral #:  [id number]  Referred-To Provider    Referred-By Provider:  Otolaryngology    Aurosis MISHA Chery, JAYJAY      [address]  [address]  [place] [address]  [phone] [address]  [facility] [place] [facility] [address]  [phone]    Referral Start Date:  03/18/2025  Referral End Date:   03/18/2026             SCHEDULING  If you do not already have an appointment, please call [phone] to make an appointment.     MORE INFORMATION  If you do not already have a [employer] account, sign up at: [email or web address]  You can access your medical information, make appointments, see lab results, billing information, and more.  If you have questions regarding this referral, please contact  the [facility] Referrals department at:             [phone]. Monday - Friday 8:00AM - 5:00PM.     Sincerely,    [facility]

## 2025-03-19 NOTE — Clinical Note
REFERRAL APPROVAL NOTICE         Sent on March 19, 2025                   Landon Allred  [address]  [facility] [place] [address]                   Dear Mr. Allred,    After a careful review of the medical information and benefit coverage, [facility] has processed your referral. See below for additional details.    If applicable, you must be actively enrolled with your insurance for coverage of the authorized service. If you have any questions regarding your coverage, please contact your insurance directly.    REFERRAL INFORMATION   Referral #:  [id number]  Referred-To Provider    Referred-By Provider:  Pain Medicine    Aurosis MISHA Chery   [facility]      [address]  [place] [address]  [phone] [address] [facility] [address]  [phone]    Referral Start Date:  03/18/2025  Referral End Date:   03/18/2026             SCHEDULING  If you do not already have an appointment, please call [phone] to make an appointment.     MORE INFORMATION  If you do not already have a [employer] account, sign up at: [email or web address]  You can access your medical information, make appointments, see lab results, billing information, and more.  If you have questions regarding this referral, please contact  the [facility] Referrals department at:             [phone]. Monday - Friday 8:00AM - 5:00PM.     Sincerely,    [facility]

## 2025-03-19 NOTE — Clinical Note
REFERRAL APPROVAL NOTICE         Sent on March 19, 2025                   Landon Phillipss  [address]  [place] [address]                   Dear Mr. Allred,    After a careful review of the medical information and benefit coverage, [facility] has processed your referral. See below for additional details.    If applicable, you must be actively enrolled with your insurance for coverage of the authorized service. If you have any questions regarding your coverage, please contact your insurance directly.    REFERRAL INFORMATION   Referral #:  [id number]  Referred-To Department    Referred-By Provider:  Behavioral Health    Aurosis MISHA Chery   Behavioral Health Outpatient      [address]  [facility] [id number] [phone] [address]  [place] [address] [phone]    Referral Start Date:  03/18/2025  Referral End Date:   03/18/2026             SCHEDULING  If you do not already have an appointment, please call [phone] to make an appointment.     MORE INFORMATION  If you do not already have a [employer] account, sign up at: [email or web address]  You can access your medical information, make appointments, see lab results, billing information, and more.  If you have questions regarding this referral, please contact  the [facility] Referrals department at:             [phone]. Monday - Friday 8:00AM - 5:00PM.     Sincerely,    [facility]

## 2025-03-19 NOTE — PROGRESS NOTES
Verbal Consent given for [employer] recording software    HISTORY OF PRESENT ILLNESS: Landon is a pleasant 55 y.o. male, new  patient who presents today to discuss medical problems as listed below:    History of Present Illness  The patient is a 55-year-old male presenting to [facility] care.    He has chronic pain managed with Dilaudid. A pain pump was implanted in his lower right side 6-7 years ago, but care was terminated 11 months later. Unable to find a pain management specialist, he receives narcotics from the ER almost weekly. Allergic to all medications except Dilaudid, he requires Benadryl before any other medication. Takes 100 mg of Benadryl daily. Much of his pain stems from time in the       Interested in ENT for chronic sinusitis and mental health professionals for severe depression, stress, PTSD. Previously treated with Valium and clonazepam, but unable to find a provider since 08/2024.        Experiencing abdominal pain, bloating, and distension. Advised to seek emergency care when symptoms become intolerable. He suspects this is due to pancreatitis     Has factor V deficiency, experienced one PE and multiple DVTs. Supposed to be on Xarelto 20 mg but cannot afford it. Allergic to warfarin and cannot take stomach injections.    Has asthma, never smoked, drank, or used drugs. Supposed to use Breo 200/25 inhaler and albuterol but does not have them and cannot afford.     Experiencing blackouts attributed to seizures, taking zonisamide but ineffective. Avoiding caffeine and chocolate. Diagnosed with traumatic brain injury. Seeing Dr. Landon Gustafson, neurologist in [place], but unable to provide further treatment.    SOCIAL HISTORY  - Never smoked  - Never drank  - Never used drugs    ALLERGIES  Allergic to warfarin and Percocet.    MEDICATIONS  Current: Xarelto, zonisamide, Breo inhaler, albuterol, Ensure Plus, Dilaudid, Benadryl       Current Outpatient Medications Ordered in Epic   Medication Sig  Dispense Refill    promethazine (PHENERGAN) 25 MG Tab Take 25 mg by mouth.      fluticasone furoate-vilanterol (BREO) 100-25 MCG/ACT AEROSOL POWDER, BREATH ACTIVATED Inhale 1 Puff every day.      Dexlansoprazole 60 MG CAPSULE DELAYED RELEASE delayed-release capsule Take 60 mg by mouth every day.      mag hydrox-al hydrox-simeth (MAALOX PLUS ES OR MYLANTA DS) 400-400-40 MG/5ML Suspension Take 10 mL by mouth.      fluticasone furoate-vilanterol (BREO ELLIPTA) 200-25 MCG/ACT AEROSOL POWDER, BREATH ACTIVATED Inhale 1 Puff every day. 60 Each 0    pseudoephedrine SR (SUDAFED SR) 120 MG TABLET SR 12 HR Take 260 mg by mouth every day.      omeprazole (PRILOSEC) 40 MG delayed-release capsule 1 cap(s) orally once a day for 30 day(s)      Inulin (FIBER CHOICE) 1.5 g Chew Tab 2 tab(s) chewed 3 times a day for 30 day(s)      clonazePAM (KLONOPIN) 1 MG Tab Take 1 mg by mouth.      Artificial Tear Solution (SOOTHE HYDRATION) 1.25 % Solution 1 gtt in each eye 2 times a day for 30 day(s)      esomeprazole (NEXIUM) 20 MG capsule Take 1 Capsule by mouth every morning before breakfast. 30 Capsule 0    diphenhydrAMINE (BENADRYL) 25 MG capsule Take 50 mg by mouth 2 times a day.       No current [facility]-ordered facility-administered medications on file.       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    Post-traumatic stress syndrome 02/01/2023    Bilateral occipital neuralgia 01/29/2020     Traumatic brain injury ([facility]) 01/29/2020    Accommodative insufficiency 01/29/2020    Hyperopia of both eyes with astigmatism 01/29/2020    Intractable pain 01/29/2020    Factor V deficiency (HCC) 01/29/2020    Depression with anxiety 01/29/2020    Syncopal episodes 11/25/2019    Inguinal hernia, bilateral 08/28/2019    Chronic narcotic use 07/27/2013     Past Surgical History:   Procedure Laterality Date    INGUINAL HERNIA REPAIR Right 09/06/2019    DENTAL EXTRACTION(S)      HERNIA REPAIR Right     NERVE ULNAR REPAIR OR EXPLORE      ORIF, ELBOW      ORIF, FRACTURE, FEMUR      NE CLOSED RX NASAL SEPTAL FRACTURE      RECTAL EXPLORATION       Social History     Tobacco Use    Smoking status: Never    Smokeless tobacco: Never   Vaping Use    Vaping status: Never Used   Substance Use Topics    Alcohol use: No    Drug use: No      Family History   Problem Relation Age of Onset    Hypertension Mother     Cancer Father     Hypertension Father      Current Outpatient Medications   Medication Sig Dispense Refill    promethazine (PHENERGAN) 25 MG Tab Take 25 mg by mouth.      fluticasone furoate-vilanterol (BREO) 100-25 MCG/ACT AEROSOL POWDER, BREATH ACTIVATED Inhale 1 Puff every day.      Dexlansoprazole 60 MG CAPSULE DELAYED RELEASE delayed-release capsule Take 60 mg by mouth every day.      mag hydrox-al hydrox-simeth (MAALOX PLUS ES OR MYLANTA DS) 400-400-40 MG/5ML Suspension Take 10 mL by mouth.      fluticasone furoate-vilanterol (BREO ELLIPTA) 200-25 MCG/ACT AEROSOL POWDER, BREATH ACTIVATED Inhale 1 Puff every day. 60 Each 0    pseudoephedrine SR (SUDAFED SR) 120 MG TABLET SR 12 HR Take 260 mg by mouth every day.      omeprazole (PRILOSEC) 40 MG delayed-release capsule 1 cap(s) orally once a day for 30 day(s)      Inulin (FIBER CHOICE) 1.5 g Chew Tab 2 tab(s) chewed 3 times a day for 30 day(s)      clonazePAM (KLONOPIN) 1 MG Tab Take 1 mg by mouth.      Artificial Tear Solution (SOOTHE HYDRATION) 1.25 % Solution 1 gtt  in each eye 2 times a day for 30 day(s)      esomeprazole (NEXIUM) 20 MG capsule Take 1 Capsule by mouth every morning before breakfast. 30 Capsule 0    diphenhydrAMINE (BENADRYL) 25 MG capsule Take 50 mg by mouth 2 times a day.       No current facility-administered medications for this visit.       Allergies:  Allergies   Allergen Reactions    Capsaicin-Turpentine Anaphylaxis and Rash     08/12/2009 21:56 [facility] - BERNADETTE NGUYEN<br/><br/>patient was admitted to ER, taken via ambulance, after<br/>applying capsaicin 0.075% - severe skin reaction<br/>(hyperventilation, rash, right nose bleed, extreme skin<br/>sensitivity)    Celery Oil Hives     Celery causes hives    Ciprofloxacin Anaphylaxis and Unspecified    Doxycycline Anaphylaxis    Duloxetine Hives    Haloperidol Unspecified and Anaphylaxis    Shellfish Allergy Anaphylaxis    Tape Hives    Wasp Venom Anaphylaxis    Amoxicillin-Pot Clavulanate Rash     Other reaction(s): .Unknown  rash      Dalteparin Diarrhea     03/28/2013 21:07 [facility] - MANOJ WILKINSON<br/><br/>Vet states he had constant diarrhea after starting the<br/>dalteparin.  Was able to complete his bridge therapy, but<br/>stated that he would refuse to use it in the future due to<br/>this ADR.    Sulfamethoxazole W-Trimethoprim Hives     04/18/2014 16:26 [facility] - ROSE COHEN<br/><br/>Pt admitted 4/18/2014 for urticaria but he had bactrim,<br/>nuclear medicine, dilaudid, phototherapy from dermatology,<br/>and other meds so it is unclear whether bactrim caused the<br/>rash.    Amitriptyline Hcl     Aspirin Unspecified    Baclofen Unspecified    Biofreeze     Carisoprodol Unspecified     Other reaction(s): Unknown    Codeine Unspecified    Compazine      Other reaction(s): Unknown    Cyclobenzaprine     Doxepin Unspecified    Duloxetine Hcl Unspecified    Enoxaparin Unspecified and Vomiting    Eszopiclone     Hydrocodone-Acetaminophen     Kdc:Yellow Dye+Aspirin+Oxycodone     Ketamine      Other reaction(s):  ".Unknown  Pt reports he \"gets violent\"    Ketorolac Tromethamine     Ketorolac Tromethamine [Toradol]     Latex Unspecified    Lunesta      Other reaction(s): Unknown    Menthol     Methocarbamol     Nitroglycerin      Other reaction(s): Unknown    Ondansetron [Zofran]     Other Misc      Can take dilaudid if given benadryl first    Oxycodone     Oxycodone-Acetaminophen     Oxycodone-Aspirin     Prochlorperazine Unspecified     Hallucinations, dizziness    Propofol     Propoxyphene      Other reaction(s): Unknown    Quetiapine      Other reaction(s): Unknown    Reglan [Metoclopramide]      Has no idea what his RX is    Risperidone     Tramadol     Tylenol      Other reaction(s): Unknown    Zofran [Ondansetron]     Acetaminophen Unspecified     \"Makes me deathly ill\"        Hydromorphone Itching     States \"I have an adverse reaction and need benadryl first\"    Morphine Vomiting    Rabeprazole Unspecified, Nausea and Vomiting     cannot remember   cannot remember   06/05/2007 23:16 [facility] - AUSTIN BRADFORD<br/><br/>Updated using auto clean up process from RA*4*29.  Changed reactant from RABEPRAZOLE (free text) to RABEPRAZOLE(file - PSNDF(50.6,) <br/><br/><br/>04/16/2007 23:18 [facility] AUSTIN BRADFORD<br/><br/>Changed from RABEPRAZOLE (File 120.82) to free text by patch RA*4*36 <br/><br/><br/>02/21/2007 00:57 [facility] - JOSE WHITMAN<br/><br/>Updated using clean up process.  Changed reactant from RABEPRAZOLE (ingredient) to RABEPRAZOLE(file - PSNDF(50.6,) <br/><br/><br/>01/17/2002 18:01 [facility] - JUAN MURRAY<br/><br/>reported by Dr. Austin Kinney on lansoprazole request form    06/05/2007 23:16 [facility] A[place]IN BRADFORD<br/><br/>Updated using auto clean up process from RA*4*29.  Changed reactant from RABEPRAZOLE (free text) to RABEPRAZOLE(file - PSNDF(50.6,) <br/><br/><br/>04/16/2007 23:18 [facility] - A[place]IN BRADFORD<br/><br/>Changed from RABEPRAZOLE (File 120.82) to free text by patch RA*4*36 <br/><br/><br/>02/21/2007 00:57 [facility] - " JOSE WHITMAN<br/><br/>Updated using clean up process.  Changed reactant from RABEPRAZOLE (ingredient) to RABEPRAZOLE(file - PSNDF(50.6,) <br/><br/><br/>01/17/2002 18:01 [facility] - JUAN MURRAY<br/><br/>reported by Dr. Austin Kinney on lansoprazole request form    cannot remember cannot remember 06/05/2007 23:16 [facility] AUSTIN BRADFORD<br/><br/>Updated using auto clean up process from RA*4*29.  Changed reactant from RABEPRAZOLE (free text) to RABEPRAZOLE(file - PSNDF(50.6,) <br/><br/><br/>04/16/2007 23:18 [facility] AUSTIN BRADFORD<br/><br/>Changed from RABEPRAZOLE (File 120.82) to free text by patch RA*4*36 <br/><br/><br/>02/21/2007 00:57 [facility] - JOSE WHITMAN<br/><br/>Updated using clean up process.  Changed reactant from RABEPRAZOLE (ingredient) to RABEPRAZOLE(file - PSNDF(50.6,) <br/><br/><br/>01/17/2002 18:01 [facility] - JUAN MURRAY<br/><br/>reported by Dr. Austin Kinney on lansoprazole request form      06/05/2007 23:16 [facility] AUSTIN BRADFORD<br/><br/>Updated using auto clean up process from RA*4*29.  Changed reactant from RABEPRAZOLE (free text) to RABEPRAZOLE(file - PSNDF(50.6,) <br/><br/><br/>04/16/2007 23:18 [facility] AUSTIN BRADFORD<br/><br/>Changed from RABEPRAZOLE (File 120.82) to free text by patch RA*4*36 <br/><br/><br/>02/21/2007 00:57 [facility] - JOSE WHITMAN<br/><br/>Updated using clean up process.  Changed reactant from RABEPRAZOLE (ingredient) to RABEPRAZOLE(file - PSNDF(50.6,) <br/><br/><br/>01/17/2002 18:01 [facility] - JUAN MURRAY<br/><br/>reported by Dr. Austin Kinney on lansoprazole request form       Allergies, past medical history, past surgical history, family history, social history reviewed and updated.    Objective:    /80   Pulse 94   Temp 36.3 °C (97.4 °F) (Temporal)   Resp 12   Ht 1.829 m (6')   Wt 93.9 kg (207 lb)   SpO2 98%   BMI 28.07 kg/m²    Body mass index is 28.07 kg/m².    Physical exam:  General: Normal appearance, no acute distress, not ill-appearing  HEENT: EOM intact, conjunctiva  normal limits, negative right/left eye discharge.  Sclera anicteric  Cardiovascular: Normal rate and rhythm, no murmurs  Pulmonary: No respiratory distress, no wheezing, no rales, breath sounds normal.  Musculoskeletal: No edema bilaterally  Skin: Warm, dry, no lesions  Neurological: No focal deficits, normal gait  Psychiatric: Mood within normal limits      EGD/EUS 2/13/25  FINDINGS:  On a complete endoscopic evaluation performed using the upper endoscope, the esophagus appeared normal with no evidence of esophagitis or Dykes's esophagus. The z-line was at the level of the GE junction, at 40 cm from the incisors. No hiatal hernia   was noted. Normal gastric and duodenal mucosa. Biopsies (cold forceps) obtained in the duodenum to rule out celiac disease and biopsies (cold forceps) obtained in the stomach to rule out H. pylori. The ampulla was carefully examined with use of a cap and  did not have any concerning findings.    EUS: The pancreatic parenchyma was well seen throughout without evidence of focal calcifications, focal masses or any cystic lesions. No atrophy was seen. Mild lobularity was seen in the pancreas along with stippling and stranding, which are nonspecific   findings but can in some cases represent early changes of chronic pancreatic inflammation. The main PD was not dilated (< 3 mm) throughout. No evidence of pancreas divisum was noted. The CBD was nondilated with no evidence of stones or sludge. The   gallbladder was without stones, sludge, or wall thickening. The examined left liver, spleen, left kidney, left adrenal, and celiac axis were unremarkable.    The wall of the esophagus appeared unremarkable on EUS imaging with no evidence of any subepithelial lesions. The gastric wall appeared normal on EUS imaging with no evidence of any subepithelial lesions. The duodenal wall appeared normal on EUS imaging   with waterfill technique with no evidence of any subepithelial lesions. The area of the  major papilla had no abnormalities seen on EUS imaging. No periesophageal, perigastric, or periduodenal lymph nodes were seen. No mediastinal lesions or ascites were   noted.    FINAL DIAGNOSIS   A. DUODENUM (BIOPSY):  - Duodenal mucosa with rare gastric foveolar metaplasia and a single dilated crypt  - No Marsh lesion   B. STOMACH (BIOPSY):  - Oxyntic and antral-oxyntic (transitional) mucosa with no diagnostic abnormality  - No H. pylori organisms  - No intestinal metaplasia   C. COLON, SIGMOID, POLYP X 2 (POLYPECTOMY):  - Hyperplastic polyp x 2  - Deeper levels examined  D. COLON, SIGMOID (BIOPSY):  - Colonic mucosa with no diagnostic abnormality  - No active colitis, microscopic colitis, or features of chronic mucosal injury   E. RECTUM (BIOPSY):  - Rectal mucosa with no diagnostic abnormality  - No active proctitis, microscopic colitis, or features of chronic mucosal injury       Assessment/Plan:    Assessment & Plan  1. Chronic pain syndrome: Chronic not well controlled. Pain pump placed but no pain management doctor is willing to prescribe him per patient. Hesitant to remove pain pump without pain medication. Frequently visits ER for Dilaudid.  - Referral to [facility] placed as he has not tried this office.   - reports he is allergic to all pain meds except dilaudid    2. Chronic abdominal pain: Chronic.   - patient reports this is due to pancreatitis. Frequently goes to ER to get dilaudid for this issue though lipase is wnl. Had assessment by gastroenterology in [place]. EGD in February 2025 unremarkable. No evidence of ongoing pancreatitis, possible early chronic pancreatitis from nonspecific changes. Dr. Jaramillo GI report this is not causing his pain and likely needs to be seen by pain management for a pain syndrome.     3. Factor V deficiency: History of multiple blood clots including PE and DVTs. - cannot afford the xarelto . Reports [facility] insurance does not pay for this medication  -  Referral to anticoagulation pharmacist placed. Reports intolerant to warfarin     4. Asthma: History of asthma. Used to be on Breo 200/25 and albuterol. Cannot afford medications.  - Prescription for Breo 200/25 and albuterol sent to [facility] in [place]  - currently stable  -  referral placed for inhaler management with pharmacy services    5. Syncope  6. migraines:   History of syncopal episodes  Was seeing neuro in the past and was prescribed zonisamide. Still experiences blackouts, unsure if zonisamide is effective.  - Referral to neurologist placed.    6. Chronic sinusitis: History of chronic sinusitis, multiple septoplasties. Needs prednisone constantly.  - Referral to ENT specialist placed.    7. Major depression: History of major depression and PTSD. Previously on clonazepam 2 mg daily, unable to get prescription.  - Referral to psychiatry placed.    Follow-up  - Referral to neurologist placed.  - Referral to ENT specialist placed.  - Referral to psychiatry placed.  - Referral to anticoagulation pharmacist placed.         Problem List Items Addressed This Visit       Chronic narcotic use    Relevant Orders    Referral to Pain Management - Chronic Opioid Therapy    Syncopal episodes    Factor V deficiency (HCC)    Relevant Orders    Referral to Anticoagulation Monitoring    Pain of intrathecal infusion pump pocket after insertion (HCC)    Relevant Orders    Referral to Pain Management - Chronic Opioid Therapy     Other Visit Diagnoses         Mild intermittent asthma without complication        Relevant Medications    fluticasone furoate-vilanterol (BREO) 100-25 MCG/ACT AEROSOL POWDER, BREATH ACTIVATED    fluticasone furoate-vilanterol (BREO ELLIPTA) 200-25 MCG/ACT AEROSOL POWDER, BREATH ACTIVATED    Other Relevant Orders    Referral to Pharmacotherapy Service      Chronic migraine with aura without status migrainosus, not intractable        Relevant Medications    clonazePAM (KLONOPIN) 1 MG Tab     Other Relevant Orders    Referral to Neurology      Chronic sinusitis, unspecified location        Relevant Orders    Referral to ENT      Severe depression (HCC)        Relevant Orders    Referral to Behavioral Health      Anxiety        Relevant Orders    Referral to Behavioral Health      PTSD (post-traumatic stress disorder)        Relevant Orders    Referral to Behavioral Health      Chronic pain syndrome        Relevant Medications    clonazePAM (KLONOPIN) 1 MG Tab    Other Relevant Orders    Referral to Pain Management - Chronic Opioid Therapy            Return in about 6 months (around 9/18/2025).

## 2025-03-19 NOTE — Clinical Note
REFERRAL APPROVAL NOTICE         Sent on March 19, 2025                   Landon Phillipss  [address]  [facility] [address]                   Dear Mr. Allred,    After a careful review of the medical information and benefit coverage, [facility] has processed your referral. See below for additional details.    If applicable, you must be actively enrolled with your insurance for coverage of the authorized service. If you have any questions regarding your coverage, please contact your insurance directly.    REFERRAL INFORMATION   Referral #:  [id number]  Referred-To Department    Referred-By Provider:  Neurology    Aurosis MISHA Chery   Neurology [facility]      [address]  [facility] [address]  [phone] [address], [address]  [facility] [address] [phone]    Referral Start Date:  03/18/2025  Referral End Date:   03/18/2026           SCHEDULING  If you do not already have an appointment, please call [phone] to make an appointment.   MORE INFORMATION  As a reminder, [facility] ownership has changed, meaning this location is now owned and operated by [facility]. As such, we want to clarify that our patients should expect to receive two separate bills for the services received at [facility] - one representing the [facility] facility fees as the owner of the establishment, and the other to represent the physician's services and subsequent fees. You can speak with your insurance carrier for a pricing estimate by calling the customer service number on the back of your card and ask about charges for a hospital outpatient visit.  If you do not already have a [employer] account, sign up at: [email or web address]  You can access your medical information, make appointments, see lab results, billing information, and more.  If you have questions regarding this referral, please contact  the [facility] Referrals department at:             [phone]. Monday - Friday  7:30AM - 5:00PM.      Sincerely,  [facility]

## 2025-03-20 ENCOUNTER — TELEPHONE (OUTPATIENT)
Dept: VASCULAR LAB | Facility: MEDICAL CENTER | Age: 55
End: 2025-03-20
Payer: MEDICARE

## 2025-03-20 NOTE — TELEPHONE ENCOUNTER
[facility]     Received anticoagulation referral from Dr. Chery on 3/18/25.     Called and spoke to patient. Patient is aware that he will have to request authorization from the VA to be seen at our clinic and requested a call back in 1 week to be able to discuss with the VA. Provided patient with clinic phone number to be able to call in the meantime if there are any updates to provide to our clinic.     1st attempt     Insurance: VA   PCP: [facility]  Locations to be seen: [address] prescription eligibility - however, patient will need authorization from the VA to be seen by our clinic.    If no response by 4/18/25 OR 2 no shows/cancellations, will remove from referral list    Kevin Don, PharmD  [facility] Anticoagulation/Pharmacotherapy Clinic  Phone [phone]

## 2025-03-21 ENCOUNTER — TELEPHONE (OUTPATIENT)
Dept: HEALTH INFORMATION MANAGEMENT | Facility: OTHER | Age: 55
End: 2025-03-21
Payer: MEDICARE

## 2025-03-27 ENCOUNTER — APPOINTMENT (OUTPATIENT)
Dept: RADIOLOGY | Facility: MEDICAL CENTER | Age: 55
End: 2025-03-27
Attending: INTERNAL MEDICINE
Payer: COMMERCIAL

## 2025-03-27 ENCOUNTER — APPOINTMENT (OUTPATIENT)
Dept: RADIOLOGY | Facility: MEDICAL CENTER | Age: 55
End: 2025-03-27
Attending: EMERGENCY MEDICINE
Payer: COMMERCIAL

## 2025-03-27 ENCOUNTER — HOSPITAL ENCOUNTER (INPATIENT)
Facility: MEDICAL CENTER | Age: 55
LOS: 2 days | End: 2025-03-29
Attending: EMERGENCY MEDICINE | Admitting: INTERNAL MEDICINE
Payer: COMMERCIAL

## 2025-03-27 DIAGNOSIS — K85.90 ACUTE PANCREATITIS WITHOUT INFECTION OR NECROSIS, UNSPECIFIED PANCREATITIS TYPE: ICD-10-CM

## 2025-03-27 PROBLEM — K86.1 ACUTE ON CHRONIC PANCREATITIS (HCC): Status: ACTIVE | Noted: 2025-03-27

## 2025-03-27 PROBLEM — Z88.9 MULTIPLE DRUG ALLERGIES: Status: ACTIVE | Noted: 2025-03-27

## 2025-03-27 PROBLEM — D68.59 HYPERCOAGULABLE STATE (HCC): Status: ACTIVE | Noted: 2025-03-27

## 2025-03-27 LAB
ALBUMIN SERPL BCP-MCNC: 4.5 G/DL (ref 3.2–4.9)
ALBUMIN/GLOB SERPL: 1.5 G/DL
ALP SERPL-CCNC: 54 U/L (ref 30–99)
ALT SERPL-CCNC: 17 U/L (ref 2–50)
ANION GAP SERPL CALC-SCNC: 12 MMOL/L (ref 7–16)
AST SERPL-CCNC: 20 U/L (ref 12–45)
BASOPHILS # BLD AUTO: 0.3 % (ref 0–1.8)
BASOPHILS # BLD: 0.01 K/UL (ref 0–0.12)
BILIRUB SERPL-MCNC: 0.7 MG/DL (ref 0.1–1.5)
BUN SERPL-MCNC: 19 MG/DL (ref 8–22)
CALCIUM ALBUM COR SERPL-MCNC: 9.3 MG/DL (ref 8.5–10.5)
CALCIUM SERPL-MCNC: 9.7 MG/DL (ref 8.5–10.5)
CHLORIDE SERPL-SCNC: 108 MMOL/L (ref 96–112)
CO2 SERPL-SCNC: 23 MMOL/L (ref 20–33)
CREAT SERPL-MCNC: 1.17 MG/DL (ref 0.5–1.4)
EOSINOPHIL # BLD AUTO: 0.09 K/UL (ref 0–0.51)
EOSINOPHIL NFR BLD: 2.3 % (ref 0–6.9)
ERYTHROCYTE [DISTWIDTH] IN BLOOD BY AUTOMATED COUNT: 44.6 FL (ref 35.9–50)
GFR SERPLBLD CREATININE-BSD FMLA CKD-EPI: 74 ML/MIN/1.73 M 2
GLOBULIN SER CALC-MCNC: 3.1 G/DL (ref 1.9–3.5)
GLUCOSE SERPL-MCNC: 95 MG/DL (ref 65–99)
HCT VFR BLD AUTO: 45.9 % (ref 42–52)
HGB BLD-MCNC: 15.7 G/DL (ref 14–18)
IMM GRANULOCYTES # BLD AUTO: 0.01 K/UL (ref 0–0.11)
IMM GRANULOCYTES NFR BLD AUTO: 0.3 % (ref 0–0.9)
LIPASE SERPL-CCNC: 282 U/L (ref 11–82)
LYMPHOCYTES # BLD AUTO: 1.11 K/UL (ref 1–4.8)
LYMPHOCYTES NFR BLD: 28.7 % (ref 22–41)
MCH RBC QN AUTO: 32.6 PG (ref 27–33)
MCHC RBC AUTO-ENTMCNC: 34.2 G/DL (ref 32.3–36.5)
MCV RBC AUTO: 95.2 FL (ref 81.4–97.8)
MONOCYTES # BLD AUTO: 0.32 K/UL (ref 0–0.85)
MONOCYTES NFR BLD AUTO: 8.3 % (ref 0–13.4)
NEUTROPHILS # BLD AUTO: 2.33 K/UL (ref 1.82–7.42)
NEUTROPHILS NFR BLD: 60.1 % (ref 44–72)
NRBC # BLD AUTO: 0 K/UL
NRBC BLD-RTO: 0 /100 WBC (ref 0–0.2)
PLATELET # BLD AUTO: 202 K/UL (ref 164–446)
PMV BLD AUTO: 10.2 FL (ref 9–12.9)
POTASSIUM SERPL-SCNC: 4 MMOL/L (ref 3.6–5.5)
PROT SERPL-MCNC: 7.6 G/DL (ref 6–8.2)
RBC # BLD AUTO: 4.82 M/UL (ref 4.7–6.1)
SODIUM SERPL-SCNC: 143 MMOL/L (ref 135–145)
WBC # BLD AUTO: 3.9 K/UL (ref 4.8–10.8)

## 2025-03-27 PROCEDURE — 36415 COLL VENOUS BLD VENIPUNCTURE: CPT

## 2025-03-27 PROCEDURE — 96374 THER/PROPH/DIAG INJ IV PUSH: CPT

## 2025-03-27 PROCEDURE — 74150 CT ABDOMEN W/O CONTRAST: CPT

## 2025-03-27 PROCEDURE — 80053 COMPREHEN METABOLIC PANEL: CPT

## 2025-03-27 PROCEDURE — 94760 N-INVAS EAR/PLS OXIMETRY 1: CPT

## 2025-03-27 PROCEDURE — 700111 HCHG RX REV CODE 636 W/ 250 OVERRIDE (IP): Mod: JZ | Performed by: EMERGENCY MEDICINE

## 2025-03-27 PROCEDURE — 96375 TX/PRO/DX INJ NEW DRUG ADDON: CPT

## 2025-03-27 PROCEDURE — 85025 COMPLETE CBC W/AUTO DIFF WBC: CPT

## 2025-03-27 PROCEDURE — 99285 EMERGENCY DEPT VISIT HI MDM: CPT

## 2025-03-27 PROCEDURE — 83690 ASSAY OF LIPASE: CPT

## 2025-03-27 PROCEDURE — 700105 HCHG RX REV CODE 258: Performed by: INTERNAL MEDICINE

## 2025-03-27 PROCEDURE — 770001 HCHG ROOM/CARE - MED/SURG/GYN PRIV*

## 2025-03-27 PROCEDURE — 700111 HCHG RX REV CODE 636 W/ 250 OVERRIDE (IP)

## 2025-03-27 PROCEDURE — 700111 HCHG RX REV CODE 636 W/ 250 OVERRIDE (IP): Mod: JZ | Performed by: INTERNAL MEDICINE

## 2025-03-27 PROCEDURE — 99223 1ST HOSP IP/OBS HIGH 75: CPT | Mod: AI | Performed by: INTERNAL MEDICINE

## 2025-03-27 PROCEDURE — 700105 HCHG RX REV CODE 258: Performed by: EMERGENCY MEDICINE

## 2025-03-27 PROCEDURE — 76705 ECHO EXAM OF ABDOMEN: CPT

## 2025-03-27 RX ORDER — HYDROMORPHONE HYDROCHLORIDE 1 MG/ML
0.5 INJECTION, SOLUTION INTRAMUSCULAR; INTRAVENOUS; SUBCUTANEOUS ONCE
Status: COMPLETED | OUTPATIENT
Start: 2025-03-27 | End: 2025-03-27

## 2025-03-27 RX ORDER — DIPHENHYDRAMINE HYDROCHLORIDE 50 MG/ML
25 INJECTION, SOLUTION INTRAMUSCULAR; INTRAVENOUS EVERY 4 HOURS
Status: DISCONTINUED | OUTPATIENT
Start: 2025-03-27 | End: 2025-03-28

## 2025-03-27 RX ORDER — LORAZEPAM 2 MG/ML
0.5 INJECTION INTRAMUSCULAR ONCE
Status: COMPLETED | OUTPATIENT
Start: 2025-03-27 | End: 2025-03-27

## 2025-03-27 RX ORDER — DIPHENHYDRAMINE HYDROCHLORIDE 50 MG/ML
25 INJECTION, SOLUTION INTRAMUSCULAR; INTRAVENOUS EVERY 6 HOURS PRN
Status: CANCELLED | OUTPATIENT
Start: 2025-03-27

## 2025-03-27 RX ORDER — DIPHENHYDRAMINE HYDROCHLORIDE 50 MG/ML
25 INJECTION, SOLUTION INTRAMUSCULAR; INTRAVENOUS EVERY 6 HOURS PRN
Status: DISCONTINUED | OUTPATIENT
Start: 2025-03-27 | End: 2025-03-27

## 2025-03-27 RX ORDER — SODIUM CHLORIDE, SODIUM LACTATE, POTASSIUM CHLORIDE, AND CALCIUM CHLORIDE .6; .31; .03; .02 G/100ML; G/100ML; G/100ML; G/100ML
1000 INJECTION, SOLUTION INTRAVENOUS ONCE
Status: COMPLETED | OUTPATIENT
Start: 2025-03-27 | End: 2025-03-27

## 2025-03-27 RX ORDER — POLYETHYLENE GLYCOL 3350 17 G/17G
1 POWDER, FOR SOLUTION ORAL
Status: DISCONTINUED | OUTPATIENT
Start: 2025-03-27 | End: 2025-03-28

## 2025-03-27 RX ORDER — HYDROMORPHONE HYDROCHLORIDE 1 MG/ML
0.5 INJECTION, SOLUTION INTRAMUSCULAR; INTRAVENOUS; SUBCUTANEOUS
Status: DISCONTINUED | OUTPATIENT
Start: 2025-03-27 | End: 2025-03-28

## 2025-03-27 RX ORDER — PROMETHAZINE HYDROCHLORIDE 25 MG/1
25 TABLET ORAL EVERY 6 HOURS PRN
Status: DISCONTINUED | OUTPATIENT
Start: 2025-03-27 | End: 2025-03-29 | Stop reason: HOSPADM

## 2025-03-27 RX ORDER — AMOXICILLIN 250 MG
2 CAPSULE ORAL EVERY EVENING
Status: DISCONTINUED | OUTPATIENT
Start: 2025-03-28 | End: 2025-03-28

## 2025-03-27 RX ORDER — SODIUM CHLORIDE, SODIUM LACTATE, POTASSIUM CHLORIDE, CALCIUM CHLORIDE 600; 310; 30; 20 MG/100ML; MG/100ML; MG/100ML; MG/100ML
INJECTION, SOLUTION INTRAVENOUS CONTINUOUS
Status: DISCONTINUED | OUTPATIENT
Start: 2025-03-27 | End: 2025-03-29 | Stop reason: HOSPADM

## 2025-03-27 RX ORDER — HYDROXYZINE HYDROCHLORIDE 10 MG/1
10 TABLET, FILM COATED ORAL 3 TIMES DAILY PRN
Status: DISCONTINUED | OUTPATIENT
Start: 2025-03-27 | End: 2025-03-28

## 2025-03-27 RX ADMIN — SODIUM CHLORIDE, POTASSIUM CHLORIDE, SODIUM LACTATE AND CALCIUM CHLORIDE 1000 ML: 600; 310; 30; 20 INJECTION, SOLUTION INTRAVENOUS at 21:18

## 2025-03-27 RX ADMIN — LORAZEPAM 0.5 MG: 2 INJECTION INTRAMUSCULAR; INTRAVENOUS at 20:01

## 2025-03-27 RX ADMIN — DIPHENHYDRAMINE HYDROCHLORIDE 25 MG: 50 INJECTION, SOLUTION INTRAMUSCULAR; INTRAVENOUS at 21:32

## 2025-03-27 RX ADMIN — Medication: at 22:51

## 2025-03-27 RX ADMIN — HYDROMORPHONE HYDROCHLORIDE 0.5 MG: 1 INJECTION, SOLUTION INTRAMUSCULAR; INTRAVENOUS; SUBCUTANEOUS at 21:32

## 2025-03-27 RX ADMIN — FENTANYL CITRATE 100 MCG: 50 INJECTION, SOLUTION INTRAMUSCULAR; INTRAVENOUS at 15:56

## 2025-03-27 RX ADMIN — SODIUM CHLORIDE, POTASSIUM CHLORIDE, SODIUM LACTATE AND CALCIUM CHLORIDE: 600; 310; 30; 20 INJECTION, SOLUTION INTRAVENOUS at 22:28

## 2025-03-27 ASSESSMENT — ENCOUNTER SYMPTOMS
NAUSEA: 1
DEPRESSION: 1
NERVOUS/ANXIOUS: 1
LOSS OF CONSCIOUSNESS: 1
MYALGIAS: 1
HEADACHES: 1
BACK PAIN: 1
ABDOMINAL PAIN: 1
VOMITING: 1
DIARRHEA: 0
CONSTIPATION: 0

## 2025-03-27 ASSESSMENT — PAIN DESCRIPTION - PAIN TYPE
TYPE: ACUTE PAIN
TYPE: ACUTE PAIN;CHRONIC PAIN

## 2025-03-27 ASSESSMENT — FIBROSIS 4 INDEX: FIB4 SCORE: 1.32

## 2025-03-27 NOTE — ED TRIAGE NOTES
"Chief Complaint   Patient presents with    Abdominal Pain     LLQ and LUQ abd pain chronic since last October and worse since last night.  +N/V/D, hematemesis.  Hx pancreatitis.     Syncope     Syncope last night \"I woke up on the floor  last thing I remembered was pouring a glass of milk.\" LOC for a few hours. Unsure if he hit his head. +xarelto     BP (!) 132/99   Pulse 90   Temp 36.8 °C (98.2 °F) (Temporal)   Resp 20   Ht 1.829 m (6')   SpO2 95%   BMI 28.07 kg/m²     "

## 2025-03-27 NOTE — ED PROVIDER NOTES
"ER Provider Note    Scribed for Jose Becerra M.d. by Dionne Harrington. 3/27/2025  3:06 PM    Primary Care Provider: John Chery D.O.    CHIEF COMPLAINT  Chief Complaint   Patient presents with    Abdominal Pain     LLQ and LUQ abd pain chronic since last October and worse since last night.  +N/V/D, hematemesis.  Hx pancreatitis.     Syncope     Syncope last night \"I woke up on the floor  last thing I remembered was pouring a glass of milk.\" LOC for a few hours. Unsure if he hit his head. +xarelto     EXTERNAL RECORDS REVIEWED  Outpatient Notes I reviewed previous visits for chronic abdominal pain and history of pancreatitis.  Veritable documented history of chronic abdominal pain and chronic reported p.o. intolerance who is presenting for evaluation of recurrent epigastric pain.  He has received several workups including recent admission to [facility] where he had EGD/EUS, colonoscopy approximately in February which was unremarkable. He went back to the emergency room in [place] 3 days after his hospitalization with EGD/EUS and had normal laboratory testing.  He then had unremarkable testing with one of our emergency departments a few days later.    HPI/ROS  LIMITATION TO HISTORY   Select: Aggressive and non cooperative with staff.     OUTSIDE HISTORIAN(S):  None    Landon Allred is a 55 y.o. male with a history of abdominal pain who presents to the ED complaining of worsening pain onset last night. Patient states he has been experiencing left sided abdominal pain since last October that has worsened last night. He states yesterday around 3 PM, he began experiencing abdominal pain, he describes as \"manageable\" and then woke up this morning with the worsening pain along with vomiting and diarrhea. Patient also states he has been having difficulty eating and drinking secondary to pain. He reports his most recent pancreatitis episode was multiple years ago. He denies any history of " pancreatitic cysts.     Pt's allergy list includes greater than 50 items, including all NSAIDS, tylenol, and morphine. Fentanyl was not listed, and was ordered. Pt. then reported an allergy to fentanyl, stated he can only have dilaudid, and has to have benadryl with it. This is transparent drug seeking behavior.    PAST MEDICAL HISTORY  Past Medical History:   Diagnosis Date    Allergy     Arthritis     Asthma     Chronic kidney disease     Claustrophobia     Clostridium difficile diarrhea     Clotting disorder (HCC)     Concussion     Connective tissue disorder (HCC)     Depression     DVT (deep venous thrombosis) (HCC)     Fibromyalgia, primary     GERD (gastroesophageal reflux disease)     Hypertension     IBD (inflammatory bowel disease)     Learning disability     Migraine     MRSA (methicillin resistant Staphylococcus aureus)     Pancreatitis     PE (pulmonary embolism)     Psychiatric disorder     PTSD, depression    Pulmonary fibrosis (HCC)     Seizure (HCC)     Spinal disorder     Stomach ulcer     Supplemental oxygen dependent        SURGICAL HISTORY  Past Surgical History:   Procedure Laterality Date    INGUINAL HERNIA REPAIR Right 09/06/2019    DENTAL EXTRACTION(S)      HERNIA REPAIR Right     NERVE ULNAR REPAIR OR EXPLORE      ORIF, ELBOW      ORIF, FRACTURE, FEMUR      IA CLOSED RX NASAL SEPTAL FRACTURE      RECTAL EXPLORATION         FAMILY HISTORY  Family History   Problem Relation Age of Onset    Hypertension Mother     Cancer Father     Hypertension Father        SOCIAL HISTORY   reports that he has never smoked. He has never used smokeless tobacco. He reports that he does not drink alcohol and does not use drugs.    CURRENT MEDICATIONS  Previous Medications    No medications on file       ALLERGIES  Capsaicin-turpentine, Celery oil, Ciprofloxacin, Doxycycline, Duloxetine, Haloperidol, Shellfish allergy, Tape, Wasp venom, Amoxicillin-pot clavulanate, Dalteparin, Sulfamethoxazole w-trimethoprim,  Amitriptyline hcl, Aspirin, Baclofen, Biofreeze, Carisoprodol, Codeine, Compazine, Cyclobenzaprine, Doxepin, Duloxetine hcl, Enoxaparin, Eszopiclone, Hydrocodone-acetaminophen, Kdc:yellow dye+aspirin+oxycodone, Ketamine, Ketorolac tromethamine, Ketorolac tromethamine [toradol], Latex, Lunesta, Menthol, Methocarbamol, Nitroglycerin, Ondansetron [zofran], Other misc, Oxycodone, Oxycodone-acetaminophen, Oxycodone-aspirin, Prochlorperazine, Propofol, Propoxyphene, Quetiapine, Reglan [metoclopramide], Risperidone, Tramadol, Tylenol, Zofran [ondansetron], Acetaminophen, Hydromorphone, Morphine, and Rabeprazole    PHYSICAL EXAM  VITAL SIGNS: BP (!) 132/99   Pulse 90   Temp 36.8 °C (98.2 °F) (Temporal)   Resp 20   Ht 1.829 m (6')   SpO2 95%   BMI 28.07 kg/m²   Pulse ox interpretation: I interpret this pulse ox as normal.  Constitutional: Alert in no apparent distress.  HENT: No signs of trauma, Bilateral external ears normal, Nose normal.   Eyes: Conjunctiva normal, Non-icteric.   Neck: Normal range of motion, Supple, No stridor.   Lymphatic: No lymphadenopathy noted.   Cardiovascular: Regular rate and rhythm, no murmurs.   Thorax & Lungs: Normal breath sounds, No respiratory distress, No wheezing  Abdomen: Diffuse abdominal tenderness, Bowel sounds normal, Soft, No masses, No pulsatile masses. No peritoneal signs.  Skin: Warm, Dry, No erythema, No rash.   Back: No midline bony tenderness.  Extremities: Intact distal pulses, No edema, No cyanosis.  Musculoskeletal: Good range of motion in all major joints. No or major deformities noted.   Neurologic: Alert , Normal motor function, Normal sensory function, No focal deficits noted.   Psychiatric: Affect normal, Judgment normal, Mood normal.     DIAGNOSTIC STUDIES    Labs:   Labs Reviewed   CBC WITH DIFFERENTIAL - Abnormal; Notable for the following components:       Result Value    WBC 3.9 (*)     All other components within normal limits   LIPASE - Abnormal; Notable  for the following components:    Lipase 282 (*)     All other components within normal limits   COMP METABOLIC PANEL   ESTIMATED GFR     Radiology:   The attending emergency physician has independently interpreted the diagnostic imaging associated with this visit and am waiting the final reading from the radiologist.     Preliminary interpretation is a follows: No pancreatoic cyst or pseudocyst    Radiologist interpretation:   US-RUQ   Final Result         1. Fatty infiltration of the liver.   2. No evidence for cholelithiasis or acute cholecystitis.   3. Midline structures are obscured by bowel gas.          COURSE & MEDICAL DECISION MAKING     INITIAL ASSESSMENT, COURSE AND PLAN  Care Narrative:       3:06 PM Patient presents to the ED with worsening abdominal pain onset last night. Patient evaluated at bedside and discussed plan of care, including diagnostic imaging and lab work. Patient will be treated with IV fluids and fentanyl. Ordered for US-RUQ, CBC with diff, Lipase to evaluate his symptoms. Differential diagnoses include but not limited to: Acute vs chronic pancreatitis, chronic abdominal pain, bacterial intra-abdominal infection seems unlikely considering normal vital signs.     3:54 PM Pt's allergy list includes greater than 50 items, including NSAIDS, tylenol, and morphine. Fentanyl was not listed, and was ordered. Pt. Now reports an allergy to fentanyl, states he can only have dilaudid, and has to have benadryl with it. This is transparent drug seeking behavior.    4:31 PM -patient's records suggest admission for lipase greater than 50, in the setting of chronic pancreatitis.  With a level of 300 and significant pain, despite obvious drug-seeking behavior, the patient does warrant additional treatment.  I spoke with Dr. Farrell (hospitalist) regarding patient care and admission. At this time, admission was accepted and patient care was transferred.     ADDITIONAL PROBLEM MANAGED  Drug-seeking  behavior.    DISPOSITION AND DISCUSSIONS  I have discussed management of the patient with the following physicians and BECKA's:  Dr. Farrell (Hospitalist)    Discussion of management with other [facility] or appropriate source(s): None     DISPOSITION:  Patient will be hospitalized by Dr. Farrell (hospitalist) in stable condition.    FINAL DIAGNOSIS  1. Acute pancreatitis without infection or necrosis, unspecified pancreatitis type         I, Dionne Harrington (Anika), am scribing for, and in the presence of, Jose Becerra M.D..    Electronically signed by: Dionne Harrington (Scribe), 3/27/2025    IJose M.D. personally performed the services described in this documentation, as scribed by Dionne Harrington in my presence, and it is both accurate and complete.

## 2025-03-27 NOTE — ED NOTES
"Pt refused to have his weight taken \"I have titanium in my body so its not an accurate weight.\" I told pt it is still policy to get a weight in triage, pt states \"you're not getting my weight.\"  Triage orders placed for blood work pt states he has to have an U/S IV.   Pt told PAR that he is not able to wear a pt wrist band so it was placed on his chart.       "

## 2025-03-27 NOTE — ED NOTES
Iv placed , labs drawn and taken to lab. poc update given to pt, results pending at this time  U/s at bedside

## 2025-03-27 NOTE — ED NOTES
Pharmacy Medication Reconciliation    ~Medication Reconciliation updated and complete per patient at bedside   ~Allergies reviewed and updated   ~Is dispense history available in EPIC: no           ~Patient reports NO Prescription or OTC medications

## 2025-03-28 LAB
ANION GAP SERPL CALC-SCNC: 11 MMOL/L (ref 7–16)
BASOPHILS # BLD AUTO: 0.2 % (ref 0–1.8)
BASOPHILS # BLD: 0.01 K/UL (ref 0–0.12)
BUN SERPL-MCNC: 18 MG/DL (ref 8–22)
CALCIUM SERPL-MCNC: 9 MG/DL (ref 8.5–10.5)
CHLORIDE SERPL-SCNC: 109 MMOL/L (ref 96–112)
CO2 SERPL-SCNC: 22 MMOL/L (ref 20–33)
CREAT SERPL-MCNC: 1.16 MG/DL (ref 0.5–1.4)
EOSINOPHIL # BLD AUTO: 0.14 K/UL (ref 0–0.51)
EOSINOPHIL NFR BLD: 3 % (ref 0–6.9)
ERYTHROCYTE [DISTWIDTH] IN BLOOD BY AUTOMATED COUNT: 45.5 FL (ref 35.9–50)
GFR SERPLBLD CREATININE-BSD FMLA CKD-EPI: 74 ML/MIN/1.73 M 2
GLUCOSE SERPL-MCNC: 88 MG/DL (ref 65–99)
HCT VFR BLD AUTO: 43.9 % (ref 42–52)
HGB BLD-MCNC: 14.8 G/DL (ref 14–18)
IMM GRANULOCYTES # BLD AUTO: 0.01 K/UL (ref 0–0.11)
IMM GRANULOCYTES NFR BLD AUTO: 0.2 % (ref 0–0.9)
LIPASE SERPL-CCNC: 82 U/L (ref 11–82)
LYMPHOCYTES # BLD AUTO: 1.99 K/UL (ref 1–4.8)
LYMPHOCYTES NFR BLD: 42.6 % (ref 22–41)
MCH RBC QN AUTO: 32.6 PG (ref 27–33)
MCHC RBC AUTO-ENTMCNC: 33.7 G/DL (ref 32.3–36.5)
MCV RBC AUTO: 96.7 FL (ref 81.4–97.8)
MONOCYTES # BLD AUTO: 0.37 K/UL (ref 0–0.85)
MONOCYTES NFR BLD AUTO: 7.9 % (ref 0–13.4)
NEUTROPHILS # BLD AUTO: 2.15 K/UL (ref 1.82–7.42)
NEUTROPHILS NFR BLD: 46.1 % (ref 44–72)
NRBC # BLD AUTO: 0 K/UL
NRBC BLD-RTO: 0 /100 WBC (ref 0–0.2)
PLATELET # BLD AUTO: 190 K/UL (ref 164–446)
PMV BLD AUTO: 10.1 FL (ref 9–12.9)
POTASSIUM SERPL-SCNC: 3.9 MMOL/L (ref 3.6–5.5)
RBC # BLD AUTO: 4.54 M/UL (ref 4.7–6.1)
SODIUM SERPL-SCNC: 142 MMOL/L (ref 135–145)
WBC # BLD AUTO: 4.7 K/UL (ref 4.8–10.8)

## 2025-03-28 PROCEDURE — 700111 HCHG RX REV CODE 636 W/ 250 OVERRIDE (IP): Mod: JZ | Performed by: HOSPITALIST

## 2025-03-28 PROCEDURE — 83690 ASSAY OF LIPASE: CPT

## 2025-03-28 PROCEDURE — 94760 N-INVAS EAR/PLS OXIMETRY 1: CPT

## 2025-03-28 PROCEDURE — 700111 HCHG RX REV CODE 636 W/ 250 OVERRIDE (IP): Mod: JZ | Performed by: INTERNAL MEDICINE

## 2025-03-28 PROCEDURE — 99233 SBSQ HOSP IP/OBS HIGH 50: CPT | Performed by: INTERNAL MEDICINE

## 2025-03-28 PROCEDURE — 80048 BASIC METABOLIC PNL TOTAL CA: CPT

## 2025-03-28 PROCEDURE — A9270 NON-COVERED ITEM OR SERVICE: HCPCS | Performed by: HOSPITALIST

## 2025-03-28 PROCEDURE — 85025 COMPLETE CBC W/AUTO DIFF WBC: CPT

## 2025-03-28 PROCEDURE — 700105 HCHG RX REV CODE 258: Performed by: INTERNAL MEDICINE

## 2025-03-28 PROCEDURE — 770001 HCHG ROOM/CARE - MED/SURG/GYN PRIV*

## 2025-03-28 PROCEDURE — 700102 HCHG RX REV CODE 250 W/ 637 OVERRIDE(OP): Performed by: HOSPITALIST

## 2025-03-28 PROCEDURE — 36415 COLL VENOUS BLD VENIPUNCTURE: CPT

## 2025-03-28 PROCEDURE — 51798 US URINE CAPACITY MEASURE: CPT

## 2025-03-28 RX ORDER — HYDROMORPHONE HYDROCHLORIDE 1 MG/ML
1 INJECTION, SOLUTION INTRAMUSCULAR; INTRAVENOUS; SUBCUTANEOUS
Status: DISCONTINUED | OUTPATIENT
Start: 2025-03-28 | End: 2025-03-29

## 2025-03-28 RX ORDER — DIPHENHYDRAMINE HYDROCHLORIDE 50 MG/ML
25 INJECTION, SOLUTION INTRAMUSCULAR; INTRAVENOUS EVERY 6 HOURS PRN
Status: DISCONTINUED | OUTPATIENT
Start: 2025-03-28 | End: 2025-03-29 | Stop reason: HOSPADM

## 2025-03-28 RX ADMIN — SODIUM CHLORIDE, POTASSIUM CHLORIDE, SODIUM LACTATE AND CALCIUM CHLORIDE: 600; 310; 30; 20 INJECTION, SOLUTION INTRAVENOUS at 15:13

## 2025-03-28 RX ADMIN — HYDROMORPHONE HYDROCHLORIDE 1 MG: 1 INJECTION, SOLUTION INTRAMUSCULAR; INTRAVENOUS; SUBCUTANEOUS at 17:11

## 2025-03-28 RX ADMIN — DIPHENHYDRAMINE HYDROCHLORIDE 25 MG: 50 INJECTION, SOLUTION INTRAMUSCULAR; INTRAVENOUS at 10:00

## 2025-03-28 RX ADMIN — SODIUM CHLORIDE, POTASSIUM CHLORIDE, SODIUM LACTATE AND CALCIUM CHLORIDE: 600; 310; 30; 20 INJECTION, SOLUTION INTRAVENOUS at 06:56

## 2025-03-28 RX ADMIN — DIPHENHYDRAMINE HYDROCHLORIDE 25 MG: 50 INJECTION, SOLUTION INTRAMUSCULAR; INTRAVENOUS at 01:35

## 2025-03-28 RX ADMIN — PROMETHAZINE HYDROCHLORIDE 25 MG: 25 TABLET ORAL at 17:11

## 2025-03-28 RX ADMIN — HYDROMORPHONE HYDROCHLORIDE 1 MG: 1 INJECTION, SOLUTION INTRAMUSCULAR; INTRAVENOUS; SUBCUTANEOUS at 20:12

## 2025-03-28 RX ADMIN — PROMETHAZINE HYDROCHLORIDE 25 MG: 25 TABLET ORAL at 01:46

## 2025-03-28 RX ADMIN — RIVAROXABAN 20 MG: 20 TABLET, FILM COATED ORAL at 17:11

## 2025-03-28 RX ADMIN — DIPHENHYDRAMINE HYDROCHLORIDE 25 MG: 50 INJECTION, SOLUTION INTRAMUSCULAR; INTRAVENOUS at 05:17

## 2025-03-28 RX ADMIN — DIPHENHYDRAMINE HYDROCHLORIDE 25 MG: 50 INJECTION, SOLUTION INTRAMUSCULAR; INTRAVENOUS at 20:12

## 2025-03-28 RX ADMIN — DIPHENHYDRAMINE HYDROCHLORIDE 25 MG: 50 INJECTION, SOLUTION INTRAMUSCULAR; INTRAVENOUS at 13:57

## 2025-03-28 ASSESSMENT — COGNITIVE AND FUNCTIONAL STATUS - GENERAL
MOVING TO AND FROM BED TO CHAIR: A LITTLE
CLIMB 3 TO 5 STEPS WITH RAILING: A LITTLE
DAILY ACTIVITIY SCORE: 24
MOBILITY SCORE: 21
SUGGESTED CMS G CODE MODIFIER MOBILITY: CJ
STANDING UP FROM CHAIR USING ARMS: A LITTLE
SUGGESTED CMS G CODE MODIFIER DAILY ACTIVITY: CH

## 2025-03-28 ASSESSMENT — PAIN DESCRIPTION - PAIN TYPE
TYPE: ACUTE PAIN
TYPE: CHRONIC PAIN
TYPE: ACUTE PAIN

## 2025-03-28 ASSESSMENT — ENCOUNTER SYMPTOMS
NEUROLOGICAL NEGATIVE: 1
CARDIOVASCULAR NEGATIVE: 1
RESPIRATORY NEGATIVE: 1
CONSTITUTIONAL NEGATIVE: 1
PSYCHIATRIC NEGATIVE: 1
ABDOMINAL PAIN: 1
DIARRHEA: 1
MUSCULOSKELETAL NEGATIVE: 1
EYES NEGATIVE: 1

## 2025-03-28 ASSESSMENT — PATIENT HEALTH QUESTIONNAIRE - PHQ9
6. FEELING BAD ABOUT YOURSELF - OR THAT YOU ARE A FAILURE OR HAVE LET YOURSELF OR YOUR FAMILY DOWN: NEARLY EVERY DAY
4. FEELING TIRED OR HAVING LITTLE ENERGY: NEARLY EVERY DAY
1. LITTLE INTEREST OR PLEASURE IN DOING THINGS: NEARLY EVERY DAY
1. LITTLE INTEREST OR PLEASURE IN DOING THINGS: NOT AT ALL
SUM OF ALL RESPONSES TO PHQ9 QUESTIONS 1 AND 2: 1
2. FEELING DOWN, DEPRESSED, IRRITABLE, OR HOPELESS: SEVERAL DAYS
3. TROUBLE FALLING OR STAYING ASLEEP OR SLEEPING TOO MUCH: NEARLY EVERY DAY
SUM OF ALL RESPONSES TO PHQ9 QUESTIONS 1 AND 2: 3
5. POOR APPETITE OR OVEREATING: NEARLY EVERY DAY
8. MOVING OR SPEAKING SO SLOWLY THAT OTHER PEOPLE COULD HAVE NOTICED. OR THE OPPOSITE, BEING SO FIGETY OR RESTLESS THAT YOU HAVE BEEN MOVING AROUND A LOT MORE THAN USUAL: NEARLY EVERY DAY
7. TROUBLE CONCENTRATING ON THINGS, SUCH AS READING THE NEWSPAPER OR WATCHING TELEVISION: NEARLY EVERY DAY

## 2025-03-28 ASSESSMENT — FIBROSIS 4 INDEX: FIB4 SCORE: 1.4

## 2025-03-28 ASSESSMENT — SOCIAL DETERMINANTS OF HEALTH (SDOH)
WITHIN THE LAST YEAR, HAVE YOU BEEN AFRAID OF YOUR PARTNER OR EX-PARTNER?: NO
WITHIN THE PAST 12 MONTHS, YOU WORRIED THAT YOUR FOOD WOULD RUN OUT BEFORE YOU GOT THE MONEY TO BUY MORE: OFTEN TRUE
WITHIN THE PAST 12 MONTHS, THE FOOD YOU BOUGHT JUST DIDN'T LAST AND YOU DIDN'T HAVE MONEY TO GET MORE: OFTEN TRUE
WITHIN THE LAST YEAR, HAVE TO BEEN RAPED OR FORCED TO HAVE ANY KIND OF SEXUAL ACTIVITY BY YOUR PARTNER OR EX-PARTNER?: NO
WITHIN THE LAST YEAR, HAVE YOU BEEN HUMILIATED OR EMOTIONALLY ABUSED IN OTHER WAYS BY YOUR PARTNER OR EX-PARTNER?: NO
WITHIN THE LAST YEAR, HAVE YOU BEEN KICKED, HIT, SLAPPED, OR OTHERWISE PHYSICALLY HURT BY YOUR PARTNER OR EX-PARTNER?: NO
IN THE PAST 12 MONTHS, HAS THE ELECTRIC, GAS, OIL, OR WATER COMPANY THREATENED TO SHUT OFF SERVICE IN YOUR HOME?: YES

## 2025-03-28 ASSESSMENT — LIFESTYLE VARIABLES
HOW MANY TIMES IN THE PAST YEAR HAVE YOU HAD 5 OR MORE DRINKS IN A DAY: 0
EVER HAD A DRINK FIRST THING IN THE MORNING TO STEADY YOUR NERVES TO GET RID OF A HANGOVER: NO
AVERAGE NUMBER OF DAYS PER WEEK YOU HAVE A DRINK CONTAINING ALCOHOL: 0
HAVE YOU EVER FELT YOU SHOULD CUT DOWN ON YOUR DRINKING: NO
EVER FELT BAD OR GUILTY ABOUT YOUR DRINKING: NO
TOTAL SCORE: 0
ALCOHOL_USE: NO
DOES PATIENT WANT TO STOP DRINKING: NO
CONSUMPTION TOTAL: NEGATIVE
TOTAL SCORE: 0
ON A TYPICAL DAY WHEN YOU DRINK ALCOHOL HOW MANY DRINKS DO YOU HAVE: 0
HAVE PEOPLE ANNOYED YOU BY CRITICIZING YOUR DRINKING: NO
TOTAL SCORE: 0

## 2025-03-28 NOTE — PROGRESS NOTES
2151 Pt arrived via hospital bed, admitted to room 209 from ED/ Tele. Pt is A&Ox4, on RA, complains pain level of 6/10.Pain meds given prior to arrival from Tele. Call light within reach. POC reviewed with pt including PCA. Fall precautions in place, bed at lowest position. Hourly rounding in place.
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
## 2025-03-28 NOTE — ASSESSMENT & PLAN NOTE
"CT abdomen pelvis showed no evidence of bowel obstruction or focal inflammatory changes within the abdomen.  There is no mention of acute on chronic pancreatitis.  Right upper quadrant ultrasound showed no evidence of cholelithiasis or acute cholecystitis.  Patient has had extensive workup and multiple hospitalizations for the same complaint.      Per previous GI evaluation   \"Chronic abdominal pain: acute on chronic. While patient may have had episodes of acute pancreatitis, there is no evidence of ongoing pancreatitis and only very subtle changes of possible early chronic pancreatitis, which should not cause this degree of pain, worse with even sips of water. His clinical picture is most consistent with a pain syndrome. While we can start pancreatic enzymes, I am not expecting this to be of much use.     Patient encouraged to find another pain specialist to take on his case, as there is no further GI workup or intervention I can offer. His reported allergies to medications also precludes many interventions and I have to wonder how may of these reported allergies actually exist. As such, will refer to allergy to see if they feel any further workup or desensitization is warranted\".     Will keep NPO.  Avoid increasing narcotic pain medications.  Continue IV fluids.    "

## 2025-03-28 NOTE — PROGRESS NOTES
4 Eyes Skin Assessment Completed by LEYDI Rosen and LEYDI Bledsoe.    Head: redness, discoloration  Ears Redness and Blanching  Nose WDL  Mouth WDL  Neck WDL  Breast/Chest WDL  Shoulder Blades WDL  Spine WDL  (R) Arm/Elbow/Hand WDL- old surgical scar  (L) Arm/Elbow/Hand WDL-old surgical scar  Abdomen WDL  Groin Redness and Blanching  Scrotum/Coccyx/Buttocks WDL  (R) Leg WDL  (L) Leg WDL  (R) Heel/Foot/Toe WDL  (L) Heel/Foot/Toe WDL          Devices In Places Blood Pressure Cuff and Pulse Ox      Interventions In Place Pillows and Barrier Cream    Possible Skin Injury No    Pictures Uploaded Into Epic N/A  Wound Consult Placed N/A  RN Wound Prevention Protocol Ordered No

## 2025-03-28 NOTE — PROGRESS NOTES
Received bedside report from NOC RN, assumed care of patient. Patient is AOX4, 95% on 3L via nasal canula, states pain is 8/10 at this time. Call light is within reach, bed locked in lowest position, hourly rounding in progress.

## 2025-03-28 NOTE — CARE PLAN
The patient is Stable - Low risk of patient condition declining or worsening    Shift Goals  Clinical Goals: Monitor PCA function and V/S. Monitor lab. Free from falls.  Patient Goals: Pain management  Progress made toward(s) clinical / shift goals:  PCA functions well, pt 's pain level is controlled during the shift along with benadryl injection. No critical lab reported. Pt remains free from falls during the shift.     Patient is not progressing towards the following goals:

## 2025-03-28 NOTE — PROGRESS NOTES
Hospital Medicine Daily Progress Note    Date of Service  3/28/2025    Chief Complaint  Landon Allred is a 55 y.o. male admitted 3/27/2025 with recurrent abdominal pain due to pancreatitis.  History of factor V Leiden deficiency.     Hospital Course  On admission, lipase was 282.  RUQ-US showed no evidence of cholelithiasis or acute cholecystitis.  CT abdomen and pelvis showed no evidence of bowel obstruction or focal inflammatory changes within the abdomen.     Interval Problem Update  Complains of abdominal pain.  He was placed on around-the-clock Benadryl, will switch to Q 6 hours PRN.  Afebrile and hemodynamically stable.    I have discussed this patient's plan of care and discharge plan at IDT rounds today with Case Management, Nursing, Nursing leadership, and other members of the IDT team.    Consultants/Specialty  None    Code Status  Full Code    Disposition  The patient is not medically cleared for discharge to home or a post-acute facility.  Anticipate discharge to: home with close outpatient follow-up    I have placed the appropriate orders for post-discharge needs.    Review of Systems  Review of Systems   Constitutional: Negative.    HENT: Negative.     Eyes: Negative.    Respiratory: Negative.     Cardiovascular: Negative.    Gastrointestinal:  Positive for abdominal pain and diarrhea.   Genitourinary: Negative.    Musculoskeletal: Negative.    Skin: Negative.    Neurological: Negative.    Endo/Heme/Allergies: Negative.    Psychiatric/Behavioral: Negative.          Physical Exam  Temp:  [35.9 °C (96.7 °F)-36.6 °C (97.8 °F)] 36.1 °C (97 °F)  Pulse:  [55-71] 60  Resp:  [18-20] 18  BP: (114-124)/(71-83) 114/71  SpO2:  [92 %-98 %] 96 %    Physical Exam  Constitutional:       General: He is not in acute distress.  HENT:      Head: Normocephalic.      Nose: Nose normal.   Eyes:      Pupils: Pupils are equal, round, and reactive to light.   Cardiovascular:      Rate and Rhythm: Normal rate.   Abdominal:  "     Tenderness: There is abdominal tenderness.   Musculoskeletal:      Right lower leg: No edema.      Left lower leg: No edema.   Skin:     General: Skin is warm.   Neurological:      Mental Status: He is alert and oriented to person, place, and time.         Fluids    Intake/Output Summary (Last 24 hours) at 3/28/2025 1444  Last data filed at 3/28/2025 1100  Gross per 24 hour   Intake 10 ml   Output 400 ml   Net -390 ml        Laboratory  Recent Labs     03/27/25  1534 03/28/25  0124   WBC 3.9* 4.7*   RBC 4.82 4.54*   HEMOGLOBIN 15.7 14.8   HEMATOCRIT 45.9 43.9   MCV 95.2 96.7   MCH 32.6 32.6   MCHC 34.2 33.7   RDW 44.6 45.5   PLATELETCT 202 190   MPV 10.2 10.1     Recent Labs     03/27/25  1534 03/28/25  0124   SODIUM 143 142   POTASSIUM 4.0 3.9   CHLORIDE 108 109   CO2 23 22   GLUCOSE 95 88   BUN 19 18   CREATININE 1.17 1.16   CALCIUM 9.7 9.0                   Imaging  CT-ABDOMEN W/O   Final Result      1.  No evidence of bowel obstruction or focal inflammatory change within the abdomen.      2.  1 mm right upper pole renal calyceal stone.      3.  Implanted intraspinal pain treatment device.      US-RUQ   Final Result         1. Fatty infiltration of the liver.   2. No evidence for cholelithiasis or acute cholecystitis.   3. Midline structures are obscured by bowel gas.           Assessment/Plan  * Acute on chronic pancreatitis (HCC)- (present on admission)  Assessment & Plan  CT abdomen pelvis showed no evidence of bowel obstruction or focal inflammatory changes within the abdomen.  There is no mention of acute on chronic pancreatitis.  Right upper quadrant ultrasound showed no evidence of cholelithiasis or acute cholecystitis.  Patient has had extensive workup and multiple hospitalizations for the same complaint.      Per previous GI evaluation   \"Chronic abdominal pain: acute on chronic. While patient may have had episodes of acute pancreatitis, there is no evidence of ongoing pancreatitis and only very " "subtle changes of possible early chronic pancreatitis, which should not cause this degree of pain, worse with even sips of water. His clinical picture is most consistent with a pain syndrome. While we can start pancreatic enzymes, I am not expecting this to be of much use.     Patient encouraged to find another pain specialist to take on his case, as there is no further GI workup or intervention I can offer. His reported allergies to medications also precludes many interventions and I have to wonder how may of these reported allergies actually exist. As such, will refer to allergy to see if they feel any further workup or desensitization is warranted\".     Will keep NPO.  Avoid increasing narcotic pain medications.  Continue IV fluids.      Hypercoagulable state (HCC)- (present on admission)  Assessment & Plan  Has history of factor V Leiden deficiency.  Will continue anticoagulation.     Multiple drug allergies- (present on admission)  Assessment & Plan  Patient has 42 allergies listed.  Unclear which are true allergies versus intolerances.         VTE prophylaxis: Xarelto    "

## 2025-03-29 VITALS
BODY MASS INDEX: 27.62 KG/M2 | WEIGHT: 203.93 LBS | TEMPERATURE: 98.3 F | HEART RATE: 81 BPM | DIASTOLIC BLOOD PRESSURE: 83 MMHG | SYSTOLIC BLOOD PRESSURE: 141 MMHG | OXYGEN SATURATION: 95 % | HEIGHT: 72 IN | RESPIRATION RATE: 18 BRPM

## 2025-03-29 PROBLEM — K85.90 ACUTE ON CHRONIC PANCREATITIS (HCC): Status: RESOLVED | Noted: 2025-03-27 | Resolved: 2025-03-29

## 2025-03-29 PROBLEM — K86.1 ACUTE ON CHRONIC PANCREATITIS (HCC): Status: RESOLVED | Noted: 2025-03-27 | Resolved: 2025-03-29

## 2025-03-29 LAB
ALBUMIN SERPL BCP-MCNC: 3.7 G/DL (ref 3.2–4.9)
ALBUMIN/GLOB SERPL: 1.6 G/DL
ALP SERPL-CCNC: 47 U/L (ref 30–99)
ALT SERPL-CCNC: 15 U/L (ref 2–50)
ANION GAP SERPL CALC-SCNC: 13 MMOL/L (ref 7–16)
AST SERPL-CCNC: 22 U/L (ref 12–45)
BILIRUB SERPL-MCNC: 1.1 MG/DL (ref 0.1–1.5)
BUN SERPL-MCNC: 14 MG/DL (ref 8–22)
CALCIUM ALBUM COR SERPL-MCNC: 8.9 MG/DL (ref 8.5–10.5)
CALCIUM SERPL-MCNC: 8.7 MG/DL (ref 8.5–10.5)
CHLORIDE SERPL-SCNC: 105 MMOL/L (ref 96–112)
CO2 SERPL-SCNC: 20 MMOL/L (ref 20–33)
CREAT SERPL-MCNC: 1.09 MG/DL (ref 0.5–1.4)
ERYTHROCYTE [DISTWIDTH] IN BLOOD BY AUTOMATED COUNT: 46.3 FL (ref 35.9–50)
GFR SERPLBLD CREATININE-BSD FMLA CKD-EPI: 80 ML/MIN/1.73 M 2
GLOBULIN SER CALC-MCNC: 2.3 G/DL (ref 1.9–3.5)
GLUCOSE SERPL-MCNC: 58 MG/DL (ref 65–99)
HCT VFR BLD AUTO: 44.2 % (ref 42–52)
HGB BLD-MCNC: 14.5 G/DL (ref 14–18)
LIPASE SERPL-CCNC: 20 U/L (ref 11–82)
MCH RBC QN AUTO: 32.7 PG (ref 27–33)
MCHC RBC AUTO-ENTMCNC: 32.8 G/DL (ref 32.3–36.5)
MCV RBC AUTO: 99.5 FL (ref 81.4–97.8)
PLATELET # BLD AUTO: 154 K/UL (ref 164–446)
PMV BLD AUTO: 10.3 FL (ref 9–12.9)
POTASSIUM SERPL-SCNC: 4 MMOL/L (ref 3.6–5.5)
PROT SERPL-MCNC: 6 G/DL (ref 6–8.2)
RBC # BLD AUTO: 4.44 M/UL (ref 4.7–6.1)
SODIUM SERPL-SCNC: 138 MMOL/L (ref 135–145)
WBC # BLD AUTO: 4.5 K/UL (ref 4.8–10.8)

## 2025-03-29 PROCEDURE — 80053 COMPREHEN METABOLIC PANEL: CPT

## 2025-03-29 PROCEDURE — 36415 COLL VENOUS BLD VENIPUNCTURE: CPT

## 2025-03-29 PROCEDURE — 83690 ASSAY OF LIPASE: CPT

## 2025-03-29 PROCEDURE — 85027 COMPLETE CBC AUTOMATED: CPT

## 2025-03-29 PROCEDURE — 700105 HCHG RX REV CODE 258: Performed by: INTERNAL MEDICINE

## 2025-03-29 PROCEDURE — 700111 HCHG RX REV CODE 636 W/ 250 OVERRIDE (IP): Mod: JZ | Performed by: INTERNAL MEDICINE

## 2025-03-29 PROCEDURE — 99239 HOSP IP/OBS DSCHRG MGMT >30: CPT | Performed by: INTERNAL MEDICINE

## 2025-03-29 RX ADMIN — SODIUM CHLORIDE, POTASSIUM CHLORIDE, SODIUM LACTATE AND CALCIUM CHLORIDE: 600; 310; 30; 20 INJECTION, SOLUTION INTRAVENOUS at 04:22

## 2025-03-29 RX ADMIN — DIPHENHYDRAMINE HYDROCHLORIDE 25 MG: 50 INJECTION, SOLUTION INTRAMUSCULAR; INTRAVENOUS at 05:20

## 2025-03-29 RX ADMIN — HYDROMORPHONE HYDROCHLORIDE 1 MG: 1 INJECTION, SOLUTION INTRAMUSCULAR; INTRAVENOUS; SUBCUTANEOUS at 04:24

## 2025-03-29 ASSESSMENT — PAIN DESCRIPTION - PAIN TYPE
TYPE: ACUTE PAIN

## 2025-03-29 NOTE — CARE PLAN
The patient is Stable - Low risk of patient condition declining or worsening    Shift Goals  Clinical Goals: Monitor pca and pain, monitor lipase, remain free from falls  Patient Goals: pain control, rest    Progress made toward(s) clinical / shift goals:  PCA d/c'd this afternoon. Pain is controlled with PRN dilaudid. Lipase trended down this morning. Continues to remain free from falls     Patient is not progressing towards the following goals:

## 2025-03-29 NOTE — CARE PLAN
The patient is Stable - Low risk of patient condition declining or worsening    Shift Goals  Clinical Goals: pain control, remain free from falls  Patient Goals: rest and sleep    Progress made toward(s) clinical / shift goals:  pain is controlled with Benadryl IV and Dilaudid IV. Patient slept most of the night, woke up around 0430H because of pain.     Patient is not progressing towards the following goals: pain

## 2025-03-29 NOTE — PROGRESS NOTES
Received bedside report from NOC RN, assumed care of patient. Patient is AOX4, 96% on 2L via nasal canula. States pain is 8/10 at this time. Call light is within reach, bed locked in lowest position, hourly rounding in progress.

## 2025-03-29 NOTE — DISCHARGE SUMMARY
"Discharge Summary    CHIEF COMPLAINT ON ADMISSION  Chief Complaint   Patient presents with    Abdominal Pain     LLQ and LUQ abd pain chronic since last October and worse since last night.  +N/V/D, hematemesis.  Hx pancreatitis.     Syncope     Syncope last night \"I woke up on the floor  last thing I remembered was pouring a glass of milk.\" LOC for a few hours. Unsure if he hit his head. +xarelto       Reason for Admission  N/V; Abdominal Pain; Syncopy     Admission Date  3/27/2025    CODE STATUS  Full Code    HPI & HOSPITAL COURSE  Landon Allred is a 55 y.o. male admitted 3/27/2025 with recurrent abdominal pain due to pancreatitis.  History of factor V Leiden deficiency.     Per previous GI evaluation   \"Chronic abdominal pain: acute on chronic. While patient may have had episodes of acute pancreatitis, there is no evidence of ongoing pancreatitis and only very subtle changes of possible early chronic pancreatitis, which should not cause this degree of pain, worse with even sips of water. His clinical picture is most consistent with a pain syndrome. While we can start pancreatic enzymes, I am not expecting this to be of much use.     Patient encouraged to find another pain specialist to take on his case, as there is no further GI workup or intervention I can offer. His reported allergies to medications also precludes many interventions and I have to wonder how may of these reported allergies actually exist. As such, will refer to allergy to see if they feel any further workup or desensitization is warranted\".         On admission, lipase was 282.  RUQ-US showed no evidence of cholelithiasis or acute cholecystitis.  CT abdomen and pelvis showed no evidence of bowel obstruction or focal inflammatory changes within the abdomen.  Lipase normalized the following day.    Has been advised to follow-up with his outpatient primary care physician and GI as needed.       Therefore, he is discharged in fair and stable " "condition to home with close outpatient follow-up.      Discharge Date  3/29/2025    FOLLOW UP ITEMS POST DISCHARGE  PCP and GI    DISCHARGE DIAGNOSES  Principal Problem (Resolved):    Acute on chronic pancreatitis (HCC) (POA: Yes)  Active Problems:    Multiple drug allergies (POA: Yes)    Hypercoagulable state (HCC) (POA: Yes)      FOLLOW UP  Future Appointments   Date Time Provider Department Center   4/1/2025 11:20 AM Jose Simmons M.D. [facility] None   4/1/2025  1:30 PM Sarmad Velazco M.D. [facility] None   4/3/2025 11:00 AM Gennaro Castillo M.D. [facility] [address]   4/16/2025  9:30 AM PAUL NICHOLS PHARMACIST [facility] [place]   5/8/2025 10:00 AM Jose Simmons M.D. [facility] None   6/12/2025  9:20 AM Jose Simmons M.D. [facility] None   6/24/2025  2:20 PM Henrik Parks M.D. [facility] None     No follow-up provider specified.    MEDICATIONS ON DISCHARGE     Medication List      You have not been prescribed any medications.         Allergies  Allergies   Allergen Reactions    Capsaicin-Turpentine Anaphylaxis and Rash          Celery Oil Hives     Celery causes hives    Ciprofloxacin Anaphylaxis and Unspecified    Doxycycline Anaphylaxis    Duloxetine Hives    Haloperidol Unspecified and Anaphylaxis    Shellfish Allergy Anaphylaxis    Tape Hives    Wasp Venom Anaphylaxis    Amoxicillin-Pot Clavulanate Rash     Other reaction(s): .Unknown  rash      Dalteparin Diarrhea          Sulfamethoxazole W-Trimethoprim Hives          Acetaminophen Unspecified     \"Makes me deathly ill\"        Amitriptyline Hcl Unspecified     Unknown reaction     Aspirin Unspecified     Unknown reaction     Baclofen Unspecified     Unknown reaction     Biofreeze Unspecified     Unknown reaction     Carisoprodol Unspecified     Unknown reaction     Codeine Unspecified     Unknown reaction     Cyclobenzaprine Unspecified     Unknown reaction     Doxepin Unspecified     Unknown reaction     Duloxetine Hcl Unspecified     Unknown reaction     Enoxaparin " "Vomiting    Eszopiclone Unspecified     Unknown reaction     Hydrocodone-Acetaminophen Unspecified     Unknown reaction     Hydromorphone Itching     States \"I have an adverse reaction and need benadryl first\"    Ketamine Unspecified     Other reaction(s): .Unknown  Pt reports he \"gets violent\"    Latex Unspecified     Unknown reaction     Menthol Unspecified     Unknown reaction     Methocarbamol Unspecified     Unknown reaction     Morphine Vomiting    Nitroglycerin Unspecified     Other reaction(s): Unknown    Ondansetron [Zofran] Unspecified     Unknown reaction     Oxycodone Unspecified     Unknown reaction     Oxycodone-Aspirin Unspecified     Unknown reaction     Prochlorperazine Unspecified     Hallucinations, dizziness    Propofol Unspecified     Unknown reaction     Propoxyphene Unspecified     Other reaction(s): Unknown    Quetiapine Unspecified     Other reaction(s): Unknown    Rabeprazole Vomiting and Nausea          Risperidone Unspecified     Unknown reaction     Tramadol Unspecified     Unknown reaction        DIET  Orders Placed This Encounter   Procedures    Diet Order Diet: Clear Liquid     Standing Status:   Standing     Number of Occurrences:   1     Diet::   Clear Liquid [10]       ACTIVITY  As tolerated.    CONSULTATIONS  None    PROCEDURES  None    LABORATORY  Lab Results   Component Value Date    SODIUM 138 03/29/2025    POTASSIUM 4.0 03/29/2025    CHLORIDE 105 03/29/2025    CO2 20 03/29/2025    GLUCOSE 58 (L) 03/29/2025    BUN 14 03/29/2025    CREATININE 1.09 03/29/2025    GLOMRATE 68 12/14/2022        Lab Results   Component Value Date    WBC 4.5 (L) 03/29/2025    HEMOGLOBIN 14.5 03/29/2025    HEMATOCRIT 44.2 03/29/2025    PLATELETCT 154 (L) 03/29/2025        Total time of the discharge process exceeds 34 minutes.  "

## 2025-03-29 NOTE — PROGRESS NOTES
Educated patient on discharge instructions, rx medications and follow up with PCP.  Patient voiced understanding . VSS BP (!) 141/83 Comment: Rn aware   Pulse 81   Temp 36.8 °C (98.3 °F) (Oral)   Resp 18   Ht 1.829 m (6')   Wt 92.5 kg (203 lb 14.8 oz)   SpO2 95%   BMI 27.66 kg/m²    Patient alert and appropriate. All questions and concerns addressed. No further questions or concerns at this time. Copy of discharge paperwork provided.  Patient out of department with self in stable condition.

## 2025-04-01 ENCOUNTER — APPOINTMENT (OUTPATIENT)
Dept: OPHTHALMOLOGY | Facility: MEDICAL CENTER | Age: 55
End: 2025-04-01
Payer: MEDICARE

## 2025-04-02 ENCOUNTER — OFFICE VISIT (OUTPATIENT)
Dept: OPHTHALMOLOGY | Facility: MEDICAL CENTER | Age: 55
End: 2025-04-02
Payer: COMMERCIAL

## 2025-04-02 DIAGNOSIS — F11.90 CHRONIC NARCOTIC USE: ICD-10-CM

## 2025-04-02 DIAGNOSIS — B02.8 HERPES ZOSTER WITH COMPLICATION: ICD-10-CM

## 2025-04-02 DIAGNOSIS — H52.4 ACCOMMODATIVE INSUFFICIENCY: ICD-10-CM

## 2025-04-02 DIAGNOSIS — H52.03 HYPEROPIA OF BOTH EYES WITH ASTIGMATISM: ICD-10-CM

## 2025-04-02 DIAGNOSIS — G89.4 CHRONIC PAIN SYNDROME: ICD-10-CM

## 2025-04-02 DIAGNOSIS — T85.840A: ICD-10-CM

## 2025-04-02 DIAGNOSIS — H52.203 HYPEROPIA OF BOTH EYES WITH ASTIGMATISM: ICD-10-CM

## 2025-04-02 DIAGNOSIS — K86.1 CHRONIC PANCREATITIS, UNSPECIFIED PANCREATITIS TYPE (HCC): ICD-10-CM

## 2025-04-02 PROCEDURE — 92015 DETERMINE REFRACTIVE STATE: CPT | Performed by: OPHTHALMOLOGY

## 2025-04-02 PROCEDURE — 99213 OFFICE O/P EST LOW 20 MIN: CPT | Performed by: OPHTHALMOLOGY

## 2025-04-02 ASSESSMENT — REFRACTION_MANIFEST
OS_SPHERE: PLANO
OS_CYLINDER: +0.75
OD_SPHERE: +0.50
METHOD_AUTOREFRACTION: 1
OD_CYLINDER: +1.25
OD_AXIS: 101
OS_AXIS: 076

## 2025-04-02 ASSESSMENT — REFRACTION_WEARINGRX
OS_CYLINDER: +1.25
OS_ADD: +2.75
OD_AXIS: 100
OD_CYLINDER: +1.50
OD_CYLINDER: +1.00
OS_SPHERE: -0.50
OS_AXIS: 079
OS_CYLINDER: +0.50
OD_SPHERE: -0.75
OS_SPHERE: +1.00
OD_SPHERE: +0.50
OD_ADD: +2.75
OS_AXIS: 080
OD_AXIS: 099

## 2025-04-02 ASSESSMENT — SLIT LAMP EXAM - LIDS
COMMENTS: NORMAL
COMMENTS: NORMAL

## 2025-04-02 ASSESSMENT — EXTERNAL EXAM - RIGHT EYE: OD_EXAM: NORMAL

## 2025-04-02 ASSESSMENT — ENCOUNTER SYMPTOMS: BLURRED VISION: 1

## 2025-04-02 ASSESSMENT — VISUAL ACUITY
OD_CC: 20/20
OD_CC: J1+
METHOD: SNELLEN - LINEAR
OS_CC: J1+
OS_CC: 20/20

## 2025-04-02 ASSESSMENT — TONOMETRY
OS_IOP_MMHG: SOFT
OD_IOP_MMHG: SOFT

## 2025-04-02 ASSESSMENT — CUP TO DISC RATIO
OD_RATIO: 0.1
OS_RATIO: 0.1

## 2025-04-02 ASSESSMENT — EXTERNAL EXAM - LEFT EYE: OS_EXAM: NORMAL

## 2025-04-02 NOTE — ASSESSMENT & PLAN NOTE
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 Most likely a combination of early NS cataracts, presbyopia and the traumatic brain injury. Also current rx over corrected and the reading rx significantly undercorrected. Was able to correct to 20/20 vision at distance and near with new glasses rx. Discussed importance of wearing glasses at all times to relax accommodation and prevent spasm at both distance and near. Since needs both distance and reading correction recommended progressive lenses.   5/10/2022 - States having difficulty with the current progressive lenses, especially when on the computer. Is till showing signs of accommodative insufficiency and as well the distance rx has changed. Will give a new rx progressive, but los full frame computer reading.   4/12/2023-never got full frame computer glasses.  Had episode when going from inside with difficulty and near vision to going outside in bright sunlight then a being able to read.  Discussed that one can accommodate better in bright sunlight with pupils becoming smaller and easier lens shifting.  In trial frame did well with the full frame computer lenses and a trial frame in the office.  Therefore gave new Rx  7/12/2023 - [facility], needed more specifics regarding progressive, polarized UV etc. Gave new rx with that information included  3/25/2024 -never got new glasses Rx with progressive.  Марина  6/4/2024-having difficulty getting glasses Rx filled.  Went to multiple optometric centers.  They tried in trial frames.  On examination today there is a minimal adjustment in his glasses.  New Rx given with continued progressive at +2.00.  9/16/2024-overall showing some improvement.  However states that has to hold his chin up significantly when on the computer.  Thus demonstrated progressive lens that he is currently wearing.  At distance  he is 20/10.  Gave Rx for frame computer  4/2/2025-came in stating he was going blind.  However his progressive lenses the reading add was far lateral and inferior in the aviator frames.  And single vision trial frame glasses this improved this improves his acuity.  In addition what he is thought was his distance glasses were actually a computer distance glasses and I demonstrated that this improved his acuity.  I spent significant time and trial frame as well as determining the best corrective acuity.  In addition he certainly may be having some fluid shifts causing worsening of his accommodation or even shifting his glasses prescription because of lenticular changes from fluid shifts from his pancreatitis.

## 2025-04-02 NOTE — ASSESSMENT & PLAN NOTE
4/2/2025-states that he has been admitted for hypotensive episodes including cardiopulmonary arrest.  This secondary to pancreatitis.  However I do not have the full records available for my review.  Suffice it to say there is no evidence of papilledema.  His retinal vessels appear otherwise unremarkable.  There is no hemorrhages or exudates.  There is no progressive optic atrophy or cupping

## 2025-04-02 NOTE — ASSESSMENT & PLAN NOTE
1/29/2020 gave new glasses rx.   5/10/2022 - some progression to myopia. Probably related to early NS. Will adjust rx.   2/9/2023-never got new glasses Rx.  In trial frame near acuity excellent.  Will give Rx.  3/24/2024 -regave Rx never got  6/4/2024-adjusted Rx slightly  9/16/2024-visual acuity 201/0 with current Rx letter progressive.  At near with head chin elevation J1.  Gave full frame readers  For 2/20/2025-see accommodative insufficiency.  In trial frame with best corrected lens combination I can get him to see 2025 at distance and J1 at near

## 2025-04-02 NOTE — ASSESSMENT & PLAN NOTE
2/9/2023-no evidence of zoster ophthalmicus  4/12/2023-states that has had a few outbreaks of zoster since his last visit with me.  However no ocular involvement  9/16/2024-no ophthalmic involvement  4/2/2025-eye examination still unremarkable

## 2025-04-02 NOTE — PROGRESS NOTES
Peds/Neuro Ophthalmology:   Sarmad Velazco M.D.    Date & Time note created:    4/2/2025   4:29 PM     Referring MD / APRN:  John Chery D.O., No att. providers found    Patient ID:  Name:             Landon Allred   YOB: 1970  Age:                 55 y.o.  male   MRN:               [id number]    Chief Complaint/Reason for Visit:     Other (Accommodative Insufficiency)      History of Present Illness:    Landon Allred is a 55 y.o. male   I can't see    Other        Review of Systems:  Review of Systems   Eyes:  Positive for blurred vision.   All other systems reviewed and are negative.      Past Medical History:   Past Medical History:   Diagnosis Date    Allergy     Arthritis     Asthma     Chronic kidney disease     Claustrophobia     Clostridium difficile diarrhea     Clotting disorder (HCC)     Concussion     Connective tissue disorder (HCC)     Depression     DVT (deep venous thrombosis) (HCC)     Fibromyalgia, primary     GERD (gastroesophageal reflux disease)     Hypertension     IBD (inflammatory bowel disease)     Learning disability     Migraine     MRSA (methicillin resistant Staphylococcus aureus)     Pancreatitis     PE (pulmonary embolism)     Psychiatric disorder     PTSD, depression    Pulmonary fibrosis (HCC)     Seizure (HCC)     Spinal disorder     Stomach ulcer     Supplemental oxygen dependent        Past Surgical History:  Past Surgical History:   Procedure Laterality Date    INGUINAL HERNIA REPAIR Right 09/06/2019    DENTAL EXTRACTION(S)      HERNIA REPAIR Right     NERVE ULNAR REPAIR OR EXPLORE      ORIF, ELBOW      ORIF, FRACTURE, FEMUR      [place] CLOSED RX NASAL SEPTAL FRACTURE      RECTAL EXPLORATION         Current Outpatient Medications:  No current outpatient medications on file.     No current facility-administered medications for this visit.       Allergies:  Allergies   Allergen Reactions    Capsaicin Unspecified, Rash and Shortness of Breath      "Other Reaction(s): Eruption, Tachypnea    Capsaicin-Turpentine Anaphylaxis and Rash          Celery Oil Hives     Celery causes hives    Ciprofloxacin Anaphylaxis and Unspecified    Doxycycline Anaphylaxis    Duloxetine Hives    Haloperidol Unspecified and Anaphylaxis    Shellfish Allergy Anaphylaxis    Tape Hives    Wasp Venom Anaphylaxis    Amoxicillin-Pot Clavulanate Rash     Other reaction(s): .Unknown  rash      Dalteparin Diarrhea          Sulfamethoxazole W-Trimethoprim Hives          Amoxicillin Unspecified and Anaphylaxis    Metoclopramide Anaphylaxis     Has no idea what his RX is    Acetaminophen Unspecified and Vomiting     \"Makes me deathly ill\"    Other Reaction(s): Drowsy (finding), Nausea and vomiting (disorder), Nausea and vomiting (disorder), Drowsy (finding)      \"makes me deathly ill\" \"makes me deathly ill\"      Other reaction(s): Unknown      Other reaction(s): .Unknown \"makes me deathly ill\" \"makes me deathly ill\"      01/28/2012 21:05 [facility] - PILI HARDIN<br/><br/>patient states he is allergic to acetaminophen, makes him<br/>nauseated and drowsy      Other Reaction(s): Nausea and vomiting, Drowsy    Amitriptyline Hcl Unspecified     Unknown reaction     Aspirin Unspecified     Unknown reaction    Other Reaction(s): Bleeding from nose (finding), Bleeding from nose (finding), Bleeding from nose (finding), Bleeding from nose (finding)      10/16/2012 02:10 [facility] - CELESTINO RANGEL<br/><br/>Patient reported he gets nosebleeds when he takes aspirin      Other Reaction(s): Bleeding from nose    Baclofen Unspecified and Anaphylaxis     Unknown reaction    Biofreeze Unspecified     Unknown reaction     Carisoprodol Unspecified and Anaphylaxis     Unknown reaction    Other reaction(s): Unknown    Codeine Unspecified     Unknown reaction     Cyclobenzaprine Unspecified     Unknown reaction     Doxepin Unspecified     Unknown reaction    Duloxetine Hcl Unspecified     Unknown reaction     Enoxaparin Vomiting " "   Eszopiclone Unspecified     Unknown reaction     Hydrocodone-Acetaminophen Unspecified, Itching, Anaphylaxis and Vomiting     Unknown reaction    I DONT KNOW    Hydromorphone Itching     States \"I have an adverse reaction and need benadryl first\"    Ketamine Unspecified     Other reaction(s): .Unknown  Pt reports he \"gets violent\"    Latex Unspecified     Unknown reaction     Menthol Unspecified     Unknown reaction     Methocarbamol Unspecified     Unknown reaction     Morphine Vomiting    Nitroglycerin Unspecified and Anaphylaxis     Other reaction(s): Unknown    Ondansetron [Zofran] Unspecified     Unknown reaction     Oxycodone Unspecified     Unknown reaction     Oxycodone-Aspirin Unspecified     Unknown reaction     Prochlorperazine Unspecified     Hallucinations, dizziness    Propofol Unspecified     Unknown reaction     Propoxyphene Unspecified     Other reaction(s): Unknown    Quetiapine Unspecified     Other reaction(s): Unknown    Other Reaction(s): Headache, Swelling, Excitement, Swelling (finding), OTHER REACTION: SPEEDING THOUGHTS, Headache (finding), Excitement (finding), Swelling (finding), Headache (finding), Swelling (finding), OTHER REACTION: SPEEDING THOUGHTS, Headache (finding), Excitement (finding), Swelling (finding), Headache (finding)      Other reaction(s): Unknown 06/05/2007 23:18 [facility] - ANTWAN BRADFORD<br/><br/>Updated using auto clean up process from RA*4*29.  Changed reactant from QUETIAPINE (free text) to QUETIAPINE(file - PSNDF(50.6,) <br/><br/><br/>04/16/2007 23:21 [facility] - ANTWAN BRADFORD<br/><br/>Changed from QUETIAPINE (File 120.82) to free text by patch RA*4*36 <br/><br/><br/>04/08/2005 21:10 [facility] - DONNELL CHACON<br/><br/>PT HAD JUST RESTARTED THE MEDICATION WHEN ADR OCCURRED.  PSYCH HAS DC'D      Other reaction(s): Unknown      06/05/2007 23:18 [facility] - ANTWAN BRADFORD<br/><br/>Updated using auto clean up process from RA*4*29.  Changed reactant from QUETIAPINE (free text) to " QUETIAPINE(file - PSNDF(50.6,) <br/><br/><br/>04/16/2007 23:21 [facility] - ANTWAN BRADFORD<br/><br/>Changed from QUETIAPINE (File 120.82) to free text by patch GMRA*4*36 <br/><br/><br/>04/08/2005 21:10 [facility] - DONNELL CHACON<br/><br/>PT HAD JUST RESTARTED THE MEDICATION WHEN ADR OCCURRED.  PSYCH HAS DC'D      Other Reaction(s): Headache, SPEEDING THOUGHTS, SWELLING, Headache, SPEEDING THOUGHTS, SWELLING    Rabeprazole Nausea, Vomiting and Unspecified     cannot remember cannot remember 06/05/2007 23:16 [facility] - ANTWAN BRADFORD<br/><br/>Updated using auto clean up process from RA*4*29.  Changed reactant from RABEPRAZOLE (free text) to RABEPRAZOLE(file - PSNDF(50.6,) <br/><br/><br/>04/16/2007 23:18 [facility] - ANTWAN BRADFORD<br/><br/>Changed from RABEPRAZOLE (File 120.82) to free text by patch RA*4*36 <br/><br/><br/>02/21/2007 00:57 [facility] - JOSE WHITMAN<br/><br/>Updated using clean up process.  Changed reactant from RABEPRAZOLE (ingredient) to RABEPRAZOLE(file - PSNDF(50.6,) <br/><br/><br/>01/17/2002 18:01 [facility] - JUAN MURRAY<br/><br/>reported by Dr. Antwan Kinney on lansoprazole request form      06/05/2007 23:16 [facility] - ANTWAN BRADFORD<br/><br/>Updated using auto clean up process from RA*4*29.  Changed reactant from RABEPRAZOLE (free text) to RABEPRAZOLE(file - PSNDF(50.6,) <br/><br/><br/>04/16/2007 23:18 [facility] - ANTWAN BRADFORD<br/><br/>Changed from RABEPRAZOLE (File 120.82) to free text by patch RA*4*36 <br/><br/><br/>02/21/2007 00:57 [facility] - JOSE WHITMAN<br/><br/>Updated using clean up process.  Changed reactant from RABEPRAZOLE (ingredient) to RABEPRAZOLE(file - PSNDF(50.6,) <br/><br/><br/>01/17/2002 18:01 [facility] - JUAN MURRAY<br/><br/>reported by Dr. Antwan Kinney on lansoprazole request form      cannot remember cannot remember 06/05/2007 23:16 [facility] - ANTWAN BRADFORD<br/><br/>Updated using auto clean up process from RA*4*29.  Changed reactant from RABEPRAZOLE (free text) to RABEPRAZOLE(file - PSNDF(50.6,)  <br/><br/><br/>04/16/2007 23:18 [facility] - SUZETTE,... (TRUNCATED)    Risperidone Unspecified     Unknown reaction     Tramadol Unspecified     Unknown reaction        Family History:  Family History   Problem Relation Age of Onset    Hypertension Mother     Cancer Father     Hypertension Father        Social History:  Social History     Socioeconomic History    Marital status: Single     Spouse name: Not on file    Number of children: Not on file    Years of education: Not on file    Highest education level: Not on file   Occupational History    Not on file   Tobacco Use    Smoking status: Never    Smokeless tobacco: Never   Vaping Use    Vaping status: Never Used   Substance and Sexual Activity    Alcohol use: No    Drug use: No    Sexual activity: Not on file   Other Topics Concern    Not on file   Social History Narrative    54 y/o male, disabled     Social Drivers of Health     Financial Resource Strain: High Risk (3/17/2025)    Overall Financial Resource Strain (CARDIA)     Difficulty of Paying Living Expenses: Very hard   Food Insecurity: Food Insecurity Present (3/28/2025)    Hunger Vital Sign     Worried About Running Out of Food in the Last Year: Often true     Ran Out of Food in the Last Year: Often true   Transportation Needs: Unmet Transportation Needs (3/28/2025)    PRAPARE - Transportation     Lack of Transportation (Medical): Yes     Lack of Transportation (Non-Medical): Yes   Physical Activity: Inactive (3/17/2025)    Exercise Vital Sign     Days of Exercise per Week: 0 days     Minutes of Exercise per Session: 0 min   Stress: Stress Concern Present (3/17/2025)    [employer] [place] [employer] - Occupational Stress Questionnaire     Feeling of Stress : Very much   Social Connections: Socially Isolated (3/17/2025)    Social Connection and Isolation Panel [NHANES]     Frequency of Communication with Friends and Family: Never     Frequency of Social Gatherings with Friends and Family: Never      Attends Worship Services: Never     Active Member of Clubs or Organizations: No     Attends Club or Organization Meetings: Never     Marital Status: Never    Intimate Partner Violence: Not At Risk (3/28/2025)    Humiliation, Afraid, Rape, and Kick questionnaire     Fear of Current or Ex-Partner: No     Emotionally Abused: No     Physically Abused: No     Sexually Abused: No   Housing Stability: High Risk (3/28/2025)    Housing Stability Vital Sign     Unable to Pay for Housing in the Last Year: Yes     Number of Times Moved in the Last Year: 0     Homeless in the Last Year: Patient unable to answer          Physical Exam:  Physical Exam    Oriented x 3  Weight/BMI: There is no height or weight on file to calculate BMI.  There were no vitals taken for this visit.    Base Eye Exam       Visual Acuity (Snellen - Linear)         Right Left    Dist cc 20/20 20/20    Near cc J1+ J1+              Tonometry (4:29 PM)         Right Left    Pressure soft soft              Pupils         Pupils    Right PERRL    Left PERRL              Extraocular Movement         Right Left     Full, Ortho Full, Ortho              Neuro/Psych       Oriented x3: Yes    Mood/Affect: Normal                  Slit Lamp and Fundus Exam       External Exam         Right Left    External Normal Normal              Slit Lamp Exam         Right Left    Lids/Lashes Normal Normal    Conjunctiva/Sclera White and quiet White and quiet    Cornea Clear Clear    Anterior Chamber Deep and quiet Deep and quiet    Iris Round and reactive Round and reactive    Lens Clear Clear    Vitreous Normal Normal              Fundus Exam         Right Left    Disc Normal Normal    C/D Ratio 0.1 0.1    Macula Normal Normal    Vessels Normal Normal    Periphery Normal Normal                  Refraction       Wearing Rx         Sphere Cylinder Axis Add    Right +0.50 +1.00 099     Left +1.00 +0.50 079               Wearing Rx #2         Sphere Cylinder Axis Add     Right -0.75 +1.50 100 +2.75    Left -0.50 +1.25 080 +2.75      Type: Progressive with antireflective, UV coating              Manifest Refraction (Auto)         Sphere Cylinder Axis    Right +0.50 +1.25 101    Left Roslyn +0.75 076              Final Rx         Sphere Cylinder Axis Add    Right -0.75 +1.50 100 +2.75    Left -0.50 +1.25 080 +2.75      Type: Progressive with antireflective, UV coating                    Pertinent Lab/Test/Imaging Review:      Assessment and Plan:     Accommodative insufficiency  1/29/2020 - accommodative insufficieny and probable episodes of accommodative spasm leading to some of the blurry vision and worsening reading. Most likely a combination of early NS cataracts, presbyopia and the traumatic brain injury. Also current rx over corrected and the reading rx significantly undercorrected. Was able to correct to 20/20 vision at distance and near with new glasses rx. Discussed importance of wearing glasses at all times to relax accommodation and prevent spasm at both distance and near. Since needs both distance and reading correction recommended progressive lenses.   5/10/2022 - States having difficulty with the current progressive lenses, especially when on the computer. Is till showing signs of accommodative insufficiency and as well the distance rx has changed. Will give a new rx progressive, but los full frame computer reading.   4/12/2023-never got full frame computer glasses.  Had episode when going from inside with difficulty and near vision to going outside in bright sunlight then a being able to read.  Discussed that one can accommodate better in bright sunlight with pupils becoming smaller and easier lens shifting.  In trial frame did well with the full frame computer lenses and a trial frame in the office.  Therefore gave new Rx  7/12/2023 - [facility], needed more specifics regarding progressive, polarized UV etc. Gave new rx with that information  included  3/25/2024 -never got new glasses Rx with progressive.  Regave  6/4/2024-having difficulty getting glasses Rx filled.  Went to multiple optometric centers.  They tried in trial frames.  On examination today there is a minimal adjustment in his glasses.  New Rx given with continued progressive at +2.00.  9/16/2024-overall showing some improvement.  However states that has to hold his chin up significantly when on the computer.  Thus demonstrated progressive lens that he is currently wearing.  At distance he is 20/10.  Gave Rx for frame computer  4/2/2025-came in stating he was going blind.  However his progressive lenses the reading add was far lateral and inferior in the aviator frames.  And single vision trial frame glasses this improved this improves his acuity.  In addition what he is thought was his distance glasses were actually a computer distance glasses and I demonstrated that this improved his acuity.  I spent significant time and trial frame as well as determining the best corrective acuity.  In addition he certainly may be having some fluid shifts causing worsening of his accommodation or even shifting his glasses prescription because of lenticular changes from fluid shifts from his pancreatitis.    Chronic narcotic use  4/2/2025-I did discuss how narcotics could decrease accommodative ability    Herpes zoster with complication  2/9/2023-no evidence of zoster ophthalmicus  4/12/2023-states that has had a few outbreaks of zoster since his last visit with me.  However no ocular involvement  9/16/2024-no ophthalmic involvement  4/2/2025-eye examination still unremarkable    Hyperopia of both eyes with astigmatism  1/29/2020 gave new glasses rx.   5/10/2022 - some progression to myopia. Probably related to early NS. Will adjust rx.   2/9/2023-never got new glasses Rx.  In trial frame near acuity excellent.  Will give Rx.  3/24/2024 -regave Rx never got  6/4/2024-adjusted Rx slightly  9/16/2024-visual  acuity 201/0 with current Rx letter progressive.  At near with head chin elevation J1.  Gave full frame readers  For 2/20/2025-see accommodative insufficiency.  In trial frame with best corrected lens combination I can get him to see 2025 at distance and J1 at near    Pancreatitis  4/2/2025-states that he has been admitted for hypotensive episodes including cardiopulmonary arrest.  This secondary to pancreatitis.  However I do not have the full records available for my review.  Suffice it to say there is no evidence of papilledema.  His retinal vessels appear otherwise unremarkable.  There is no hemorrhages or exudates.  There is no progressive optic atrophy or cupping        Sarmad Velazco M.D.

## 2025-04-03 ENCOUNTER — DOCUMENTATION (OUTPATIENT)
Dept: BEHAVIORAL HEALTH | Facility: CLINIC | Age: 55
End: 2025-04-03
Payer: MEDICARE

## 2025-04-03 ENCOUNTER — APPOINTMENT (OUTPATIENT)
Dept: BEHAVIORAL HEALTH | Facility: CLINIC | Age: 55
End: 2025-04-03
Payer: MEDICARE

## 2025-04-03 DIAGNOSIS — F41.8 DEPRESSION WITH ANXIETY: ICD-10-CM

## 2025-04-03 DIAGNOSIS — F41.0 PANIC ATTACKS: ICD-10-CM

## 2025-04-03 DIAGNOSIS — F43.10 POST-TRAUMATIC STRESS SYNDROME: Chronic | ICD-10-CM

## 2025-04-03 PROCEDURE — 99205 OFFICE O/P NEW HI 60 MIN: CPT | Performed by: PSYCHIATRY & NEUROLOGY

## 2025-04-03 RX ORDER — CLONAZEPAM 1 MG/1
1 TABLET ORAL 2 TIMES DAILY PRN
Qty: 60 TABLET | Refills: 1 | Status: SHIPPED | OUTPATIENT
Start: 2025-04-03 | End: 2025-05-03

## 2025-04-03 RX ORDER — ARIPIPRAZOLE 5 MG/1
TABLET ORAL
Qty: 23 TABLET | Refills: 0 | Status: SHIPPED
Start: 2025-04-03 | End: 2025-04-08

## 2025-04-03 RX ORDER — PRAZOSIN HYDROCHLORIDE 1 MG/1
1 CAPSULE ORAL
Qty: 30 CAPSULE | Refills: 1 | Status: SHIPPED | OUTPATIENT
Start: 2025-04-03
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
## 2025-04-08 DIAGNOSIS — F43.10 POST-TRAUMATIC STRESS SYNDROME: ICD-10-CM

## 2025-04-08 RX ORDER — ARIPIPRAZOLE 2 MG/1
2 TABLET ORAL DAILY
Qty: 30 TABLET | Refills: 2 | Status: SHIPPED
Start: 2025-04-08 | End: 2025-04-10

## 2025-04-15 ENCOUNTER — PATIENT MESSAGE (OUTPATIENT)
Dept: BEHAVIORAL HEALTH | Facility: CLINIC | Age: 55
End: 2025-04-15
Payer: MEDICARE

## 2025-04-15 DIAGNOSIS — F43.10 POST-TRAUMATIC STRESS SYNDROME: ICD-10-CM

## 2025-04-15 NOTE — PATIENT COMMUNICATION
Pts Abilify 2mg RX was canceled by an outside healthcare team accidentally can you resend the prescription the [facility] so pt can  the prescription?      Received request via: Patient    Was the patient seen in the last year in this department? Yes    Does the patient have an active prescription (recently filled or refills available) for medication(s) requested? No    Pharmacy Name: [id number]    Does the patient have snf Plus and need 100-day supply? (This applies to ALL medications) Patient does not have SCP

## 2025-04-16 DIAGNOSIS — F43.10 POST-TRAUMATIC STRESS SYNDROME: ICD-10-CM

## 2025-04-16 RX ORDER — ARIPIPRAZOLE 2 MG/1
2 TABLET ORAL DAILY
Qty: 30 TABLET | Refills: 2 | Status: SHIPPED | OUTPATIENT
Start: 2025-04-16

## 2025-04-16 NOTE — TELEPHONE ENCOUNTER
pts Abilify 2mg RX got accidentally canceled can you resend it for pt       Received request via: Patient    Was the patient seen in the last year in this department? Yes    Does the patient have an active prescription (recently filled or refills available) for medication(s) requested? No    Pharmacy Name: [id number]    Does the patient have long term Plus and need 100-day supply? (This applies to ALL medications) Patient does not have SCP

## 2025-04-18 ENCOUNTER — TELEPHONE (OUTPATIENT)
Dept: BEHAVIORAL HEALTH | Facility: CLINIC | Age: 55
End: 2025-04-18
Payer: MEDICARE

## 2025-04-18 NOTE — TELEPHONE ENCOUNTER
Caller Name: Landon Allred  Call Back Number: [phone] (Temporary phone number)    How would the patient prefer to be contacted with a response: Phone call do NOT leave a detailed message    Patient called and stated he is having issues with his new medication the   prazosin (MINIPRESS) 1 MG Cap. Patient was advised to hold the medication for a week because of side effects. He stopped taking the medication for that week and has started again on Wed 4/17. Patient reports many symptoms again as he has started back up, such as migraine, insomnia, nightmares, dizziness. He reports his pancreas hurts. Patient also reports feeling out of it and not like himself. Also stating he has fallen 3 times. Patient went to pharmacy this morning to  the   ARIPiprazole (ABILIFY) 2 MG tablet when he was at the pharmacy he told the pharmacist about how he was feeling and reports that the pharmacist sarah not give him the abilify with the symptoms he is reporting. Patient also states he should not be on any anti-depressants or anti-psychotics. Patient states he is feeling scared and is unsure what to do. Please advise.

## 2025-05-01 ENCOUNTER — APPOINTMENT (OUTPATIENT)
Dept: BEHAVIORAL HEALTH | Facility: CLINIC | Age: 55
End: 2025-05-01
Payer: MEDICARE

## 2025-05-01 DIAGNOSIS — F41.8 DEPRESSION WITH ANXIETY: ICD-10-CM

## 2025-05-01 DIAGNOSIS — F43.10 POST-TRAUMATIC STRESS SYNDROME: ICD-10-CM

## 2025-05-01 DIAGNOSIS — F41.0 PANIC ATTACKS: ICD-10-CM

## 2025-05-01 PROCEDURE — 99214 OFFICE O/P EST MOD 30 MIN: CPT | Mod: 95 | Performed by: PSYCHIATRY & NEUROLOGY

## 2025-05-01 RX ORDER — CLONAZEPAM 1 MG/1
1 TABLET ORAL 2 TIMES DAILY PRN
Qty: 60 TABLET | Refills: 5 | Status: SHIPPED | OUTPATIENT
Start: 2025-05-04 | End: 2025-06-03

## 2025-05-01 ASSESSMENT — PATIENT HEALTH QUESTIONNAIRE - PHQ9
6. FEELING BAD ABOUT YOURSELF - OR THAT YOU ARE A FAILURE OR HAVE LET YOURSELF OR YOUR FAMILY DOWN: NEARLY EVERY DAY
2. FEELING DOWN, DEPRESSED, IRRITABLE, OR HOPELESS: 3
6. FEELING BAD ABOUT YOURSELF - OR THAT YOU ARE A FAILURE OR HAVE LET YOURSELF OR YOUR FAMILY DOWN: 3
7. TROUBLE CONCENTRATING ON THINGS, SUCH AS READING THE NEWSPAPER OR WATCHING TELEVISION: NEARLY EVERY DAY
3. TROUBLE FALLING OR STAYING ASLEEP OR SLEEPING TOO MUCH: NEARLY EVERY DAY
5. POOR APPETITE OR OVEREATING: NEARLY EVERY DAY
1. LITTLE INTEREST OR PLEASURE IN DOING THINGS: NEARLY EVERY DAY
SUM OF ALL RESPONSES TO PHQ QUESTIONS 1-9: 27
5. POOR APPETITE OR OVEREATING: 3
8. MOVING OR SPEAKING SO SLOWLY THAT OTHER PEOPLE COULD HAVE NOTICED. OR THE OPPOSITE, BEING SO FIGETY OR RESTLESS THAT YOU HAVE BEEN MOVING AROUND A LOT MORE THAN USUAL: 3
10. IF YOU CHECKED OFF ANY PROBLEMS, HOW DIFFICULT HAVE THESE PROBLEMS MADE IT FOR YOU TO DO YOUR WORK, TAKE CARE OF THINGS AT HOME, OR GET ALONG WITH OTHER PEOPLE: EXTREMELY DIFFICULT
9. THOUGHTS THAT YOU WOULD BE BETTER OFF DEAD, OR OF HURTING YOURSELF: NEARLY EVERY DAY
2. FEELING DOWN, DEPRESSED, IRRITABLE, OR HOPELESS: NEARLY EVERY DAY
4. FEELING TIRED OR HAVING LITTLE ENERGY: NEARLY EVERY DAY
9. THOUGHTS THAT YOU WOULD BE BETTER OFF DEAD, OR OF HURTING YOURSELF: 3
3. TROUBLE FALLING OR STAYING ASLEEP OR SLEEPING TOO MUCH: 3
1. LITTLE INTEREST OR PLEASURE IN DOING THINGS: 3
CLINICAL INTERPRETATION OF PHQ2 SCORE: 0
4. FEELING TIRED OR HAVING LITTLE ENERGY: 3
8. MOVING OR SPEAKING SO SLOWLY THAT OTHER PEOPLE COULD HAVE NOTICED. OR THE OPPOSITE, BEING SO FIGETY OR RESTLESS THAT YOU HAVE BEEN MOVING AROUND A LOT MORE THAN USUAL: NEARLY EVERY DAY
7. TROUBLE CONCENTRATING ON THINGS, SUCH AS READING THE NEWSPAPER OR WATCHING TELEVISION: 3
5. POOR APPETITE OR OVEREATING: 3 - NEARLY EVERY DAY

## 2025-05-01 ASSESSMENT — ANXIETY QUESTIONNAIRES
3. WORRYING TOO MUCH ABOUT DIFFERENT THINGS: NEARLY EVERY DAY
7. FEELING AFRAID AS IF SOMETHING AWFUL MIGHT HAPPEN: NEARLY EVERY DAY
IF YOU CHECKED OFF ANY PROBLEMS ON THIS QUESTIONNAIRE, HOW DIFFICULT HAVE THESE PROBLEMS MADE IT FOR YOU TO DO YOUR WORK, TAKE CARE OF THINGS AT HOME, OR GET ALONG WITH OTHER PEOPLE: EXTREMELY DIFFICULT
6. BECOMING EASILY ANNOYED OR IRRITABLE: NEARLY EVERY DAY
3. WORRYING TOO MUCH ABOUT DIFFERENT THINGS: NEARLY EVERY DAY
2. NOT BEING ABLE TO STOP OR CONTROL WORRYING: NEARLY EVERY DAY
2. NOT BEING ABLE TO STOP OR CONTROL WORRYING: NEARLY EVERY DAY
5. BEING SO RESTLESS THAT IT IS HARD TO SIT STILL: NEARLY EVERY DAY
7. FEELING AFRAID AS IF SOMETHING AWFUL MIGHT HAPPEN: NEARLY EVERY DAY
GAD7 TOTAL SCORE: 21
5. BEING SO RESTLESS THAT IT IS HARD TO SIT STILL: NEARLY EVERY DAY
4. TROUBLE RELAXING: NEARLY EVERY DAY
6. BECOMING EASILY ANNOYED OR IRRITABLE: NEARLY EVERY DAY
1. FEELING NERVOUS, ANXIOUS, OR ON EDGE: NEARLY EVERY DAY
IF YOU CHECKED OFF ANY PROBLEMS ON THIS QUESTIONNAIRE, HOW DIFFICULT HAVE THESE PROBLEMS MADE IT FOR YOU TO DO YOUR WORK, TAKE CARE OF THINGS AT HOME, OR GET ALONG WITH OTHER PEOPLE: EXTREMELY DIFFICULT
4. TROUBLE RELAXING: NEARLY EVERY DAY
1. FEELING NERVOUS, ANXIOUS, OR ON EDGE: NEARLY EVERY DAY

## 2025-05-01 NOTE — PROGRESS NOTES
This evaluation was conducted via Teams using secure and encrypted videoconferencing technology. The patient was in a private location outside of their home in the state of [place].    The patient's identity was confirmed and verbal consent was obtained for this virtual visit.      PSYCHIATRY FOLLOW-UP NOTE      Name: Landon Allred  MRN: [id number]  : 1970  Age: 55 y.o.  Date of assessment: 2025  PCP: John Chery D.O.  Persons in attendance: Patient      REASON FOR VISIT/CHIEF COMPLAINT (as stated by Patient):  Landon Allred is a 55 y.o., White male, attending follow-up appointment for mood and anxiety management.      HISTORY OF PRESENT ILLNESS:  Landon Allred is a 55 y.o. old male with PTSD, PANCHITO and MDD comes in today for follow up. Patient was last seen 1 month ago, and following treatment planning recommendations were done:  Add Abiify 2.5 mg daily X 2 weeks --> 5 mg daily.  Add Prazosin 1 mg HS  Restart Klonopin 1 mg BID PRN for panic attacks.  Controlled Medication Treatment Agreement signed on 4/3/25.      Patient called on 25:  Patient called and stated he is having issues with his new medication the   prazosin (MINIPRESS) 1 MG Cap. Patient was advised to hold the medication for a week because of side effects. He stopped taking the medication for that week and has started again on . Patient reports many symptoms again as he has started back up, such as migraine, insomnia, nightmares, dizziness. He reports his pancreas hurts. Patient also reports feeling out of it and not like himself. Also stating he has fallen 3 times. Patient went to pharmacy this morning to  the ARIPiprazole (ABILIFY) 2 MG tablet when he was at the pharmacy he told the pharmacist about how he was feeling and reports that the pharmacist sarah not give him the abilify with the symptoms he is reporting. Patient also states he should not be on any anti-depressants or anti-psychotics. Patient  states he is feeling scared and is unsure what to do.      Discussed:  Instruct him to stop the Prazosin and avoid abilify at this time.   He can only stay on Klonopin at this time.   All these symptoms should improve after stopping the prazosin.     History of Present Illness    The chief complaint involves adverse reactions to Prazosin, which caused gastrointestinal and cognitive disturbances.   These medications were personally discontinued in the presence of his psychiatrist during a hospital visit.   He has been consistently taking clonazepam 1 mg in the morning and 1 mg at night, which provides some relief.   Prazosin: after 7 days of use caused severe migraines, nausea, and a flare-up of pancreatitis occurred. Following advice, the medication was discontinued for 7 days and resumed on a Wednesday night. The subsequent morning, agitation and aggressive behavior were experienced, leading to an emergency hospital visit. During this visit, IV Dilaudid and Benadryl were administered, and he was checked for pancreatitis. A transfer to another hospital resulted in additional doses of Dilaudid and Benadryl, followed by approximately 24 hours of sleep. A psychiatrist evaluated him and attributed the symptoms to the new medications, which had already been discontinued. Discharge occurred either on Saturday or Sunday. A subsequent trip to [place] on Tuesday led to a week-long hospitalization in [place] due to pancreatitis, with a return home early Friday morning.   Abilify has not yet been tried.  We discussed TMS: but concern about worsening migraines with this.  Ketamine: caused worsening aggression.  Discussed the limited treatment options at this time based on trial of most medication either poor response or worsening pancreatitis.  Agreed with staying on Klonopin at this time and considering Abilify in the upcoming appointment as he was just discharged from the hospital due to flareup of  pancreatitis.  Chronic suicidal ideation reported the patient reports feeling safe and is consenting for safety but understood the importance of calling 911 or going to the nearest emergency room if worsening of suicidal ideations or safety concern is seen.  Patient has an appointment with neurology in June and we will discuss the safety of TMS with them as well.  Agreed to follow up with me 1 week after this neurology appointment and then we will discuss TMS and also will consider adding low-dose of Abilify.      CURRENT MEDICATIONS:  Current Outpatient Medications   Medication Sig Dispense Refill    ARIPiprazole (ABILIFY) 2 MG tablet Take 1 Tablet by mouth every day. 30 Tablet 2    ARIPiprazole (ABILIFY) 2 MG tablet Take 1 Tablet by mouth every day. 30 Tablet 2    ARIPiprazole (ABILIFY) 2 MG tablet Take 2 mg by mouth every day.      fluticasone furoate-vilanterol (BREO ELLIPTA) 200-25 MCG/ACT AEROSOL POWDER, BREATH ACTIVATED Inhale 1 Puff every day.      albuterol 108 (90 Base) MCG/ACT Aero Soln inhalation aerosol Inhale 2 Puffs as needed for Shortness of Breath.      naloxone (NARCAN) 0.4 MG/ML Solution 0.4 mg by Other route one time. Inhaled      EPINEPHrine (ADRENALINE) 1 mg/10mL Solution Prefilled Syringe injection Inject 1 mg into a catheter in the bone as needed (IM).      HYDROmorphone (DILAUDID) 4 MG Tab Take 4 mg by mouth every 6 hours as needed for Severe Pain.      Pancrelipase, Lip-Prot-Amyl, (ZENPEP) 71376-30235 units Cap DR Particles Take 2 Capsules by mouth in the morning, at noon, and at bedtime.      dexlansoprazole (DEXILANT) 30 MG CAPSULE DELAYED RELEASE Take 30 mg by mouth every day.      zonisamide (ZONEGRAN) 50 MG capsule Take 50 mg by mouth 2 times a day.      rivaroxaban (XARELTO) 20 MG Tab tablet Take 20 mg by mouth with dinner.      diphenhydrAMINE (BENADRYL) 25 MG Tab Take 50 mg by mouth at bedtime as needed for Sleep.      promethazine (PHENERGAN) 25 MG Tab Take 25 mg by mouth every 6  hours as needed for Nausea/Vomiting.      clonazePAM (KLONOPIN) 1 MG Tab Take 1 mg by mouth 2 times a day.      pseudoephedrine (SUDAFED) 60 MG Tab Take 240 mg by mouth every four hours as needed for Congestion.      clonazePAM (KLONOPIN) 1 MG Tab Take 1 Tablet by mouth 2 times a day as needed (panic attacks) for up to 30 days. 60 Tablet 1    prazosin (MINIPRESS) 1 MG Cap Take 1 Capsule by mouth at bedtime. 30 Capsule 1     No current facility-administered medications for this visit.       MEDICAL HISTORY  Past Medical History:   Diagnosis Date    Allergy     Arthritis     Asthma     Breath shortness     Per pt he is very debilitated and has no exercise tolerance. States he becomes severely short of breath and fatigued with basic ADLs such as getting dressed in the morning, cannot climb stairs and sometimes uses a wheelchair. Patient is on 3L of continuous oxygen as of 4/10/25.    Chronic kidney disease     Claustrophobia     Clostridium difficile diarrhea     Clotting disorder ([facility])     Concussion     Connective tissue disorder ([facility])     Coughing blood     Pt states he doesn't know what causes the bloody cough as of 4/10/25.    Delayed emergence from general anesthesia     Trouble coming out of sedation during a dental procedure    Dental disorder     Loose and chipped teeth    Depression     DVT (deep venous thrombosis) ([facility])     Fibromyalgia, primary     GERD (gastroesophageal reflux disease)     Heart burn     Hiatus hernia syndrome     States he has had one hernia repaired and has another currently untreated as of 4/10/25.    Hypertension     IBD (inflammatory bowel disease)     Indigestion     Learning disability     Migraine     MRSA (methicillin resistant Staphylococcus aureus)     Pancreatitis     PE (pulmonary embolism)     Pneumonia     Psychiatric disorder     PTSD, depression    Pulmonary fibrosis (HCC)     Seizure ([facility])     Pt states he is not sure if he truly has seizures as of 4/10/25.    Sleep apnea      Spinal disorder     Stomach ulcer     Supplemental oxygen dependent      Past Surgical History:   Procedure Laterality Date    INGUINAL HERNIA REPAIR Right 09/06/2019    DENTAL EXTRACTION(S)      HERNIA REPAIR Right     NERVE ULNAR REPAIR OR EXPLORE      ORIF, ELBOW      ORIF, FRACTURE, FEMUR      [place] CLOSED RX NASAL SEPTAL FRACTURE      RECTAL EXPLORATION           PAST PSYCHIATRIC MEDICATIONS  Prozac, Paxil, Zoloft, Celexa, Lexapro  Effexor, Cymbalta  Wellbutrin  Mirtazapine  Amitriptyline  Depakote (for headaches)  Klonopin   Prazosin  Abilify    Reports allergic responses to most medication trials      REVIEW OF SYSTEMS:        Constitutional negative   Eyes negative   Ears/Nose/Mouth/Throat  multiple sinus surgeries including septoplasty    Cardiovascular negative   Respiratory  Severe persistent asthma    Gastrointestinal  H/o pancreatitis, chronic abdominal pain    Genitourinary negative   Muscular  Chronic Pain (?fibromyalgia)   Integumentary negative   Neurological  Migraines   Endocrine negative   Hematologic/Lymphatic  factor V Leiden deficiency      PHYSICAL EXAMINAION:  Vital signs: There were no vitals taken for this visit.  Musculoskeletal: Normal gait.   Abnormal movements: none      MENTAL STATUS EXAMINATION      General:   - Grooming and hygiene: Casual,   - Apparent distress: none,   - Behavior: Calm  - Eye Contact:  Good,   - no psychomotor agitation or retardation    - Participation: Active verbal participation  Orientation: Alert and Fully Oriented to person, place and time  Mood: Depressed and Anxious  Affect: Constricted,  Thought Process: Logical and Goal-directed  Thought Content: Denies suicidal or homicidal ideations, intent or plan   Perception: Denies auditory or visual hallucinations. No delusions noted   Attention span and concentration: fair  Speech:Rate within normal limits and Volume within normal limits  Language: Appropriate   Insight: Good  Judgment: Good  Recent and remote  memory: No gross evidence of memory deficits        DEPRESSION SCREENING:      3/18/2025     5:00 PM 3/28/2025    12:00 AM 3/28/2025    12:14 AM   Depression Screen (PHQ-2/PHQ-9)   PHQ-2 Total Score  1 3   PHQ-2 Total Score 0         Interpretation of PHQ-9 Total Score   Score Severity   1-4 No Depression   5-9 Mild Depression   10-14 Moderate Depression   15-19 Moderately Severe Depression   20-27 Severe Depression    CURRENT RISK:       Suicidal: Low       Homicidal: Low       Self-Harm: Low       Relapse: Low       Crisis Safety Plan Reviewed Not Indicated       If evidence of imminent risk is present, intervention/plan:      MEDICAL RECORDS/LABS/DIAGNOSTIC TESTS REVIEWED:  No new lab since last visit     [place]  records -   Reviewed     (1) PTSD; (2) PANCHITO; (3) MDD  Persistent depression and anxiety  Continue Klonopin 1 mg BID PRN for panic attacks.  Consider Abilify 2 mg in the next appointment.  Consider TMS  Patient prefers to not consider antidepressants or new medications based on poor toleration of multiple medications trials.  Controlled Medication Treatment Agreement signed on 4/3/25.  Medication options, alternatives (including no medications) and medication risks/benefits/side effects were discussed in detail.  The patient was advised to call, message provider on [employer], or come in to the clinic if symptoms worsen or if any future questions/issues regarding their medications arise; the patient verbalized understanding and agreement.    The patient was educated to call 911, call the suicide hotline, or go to local ER if having thoughts of suicide or homicide; verbalized understanding.      Billing Coding based on:  [address] based on [facility]  : based on the medical complexity and need for changes in treatment discussed above    Return to clinic in 1-2 months or sooner if symptoms worsen.  Next Appointment: instruction provided on how to make the next appointment.     The proposed treatment plan was discussed  with the patient who was provided the opportunity to ask questions and make suggestions regarding alternative treatment. Patient verbalized understanding and expressed agreement with the plan.       Gennaro Castillo M.D.  05/01/25    This note was created using voice recognition software (Dragon). The accuracy of the dictation is limited by the abilities of the software. I have reviewed the note prior to signing, however some errors in grammar and context are still possible. If you have any questions related to this note please do not hesitate to contact our office.

## 2025-05-21 ENCOUNTER — HOSPITAL ENCOUNTER (EMERGENCY)
Facility: MEDICAL CENTER | Age: 55
End: 2025-05-22
Attending: STUDENT IN AN ORGANIZED HEALTH CARE EDUCATION/TRAINING PROGRAM
Payer: MEDICARE

## 2025-05-21 DIAGNOSIS — R10.13 EPIGASTRIC PAIN: Primary | ICD-10-CM

## 2025-05-21 PROCEDURE — 36415 COLL VENOUS BLD VENIPUNCTURE: CPT

## 2025-05-21 PROCEDURE — 99284 EMERGENCY DEPT VISIT MOD MDM: CPT

## 2025-05-21 RX ORDER — DIPHENHYDRAMINE HYDROCHLORIDE 50 MG/ML
25 INJECTION, SOLUTION INTRAMUSCULAR; INTRAVENOUS ONCE
Status: COMPLETED | OUTPATIENT
Start: 2025-05-22 | End: 2025-05-22

## 2025-05-21 RX ORDER — HYDROMORPHONE HYDROCHLORIDE 1 MG/ML
1 INJECTION, SOLUTION INTRAMUSCULAR; INTRAVENOUS; SUBCUTANEOUS ONCE
Status: COMPLETED | OUTPATIENT
Start: 2025-05-22 | End: 2025-05-22

## 2025-05-21 ASSESSMENT — FIBROSIS 4 INDEX: FIB4 SCORE: 1.8

## 2025-05-22 ENCOUNTER — HOSPITAL ENCOUNTER (OUTPATIENT)
Dept: RADIOLOGY | Facility: MEDICAL CENTER | Age: 55
End: 2025-05-22
Attending: STUDENT IN AN ORGANIZED HEALTH CARE EDUCATION/TRAINING PROGRAM
Payer: MEDICARE

## 2025-05-22 VITALS
WEIGHT: 216 LBS | DIASTOLIC BLOOD PRESSURE: 77 MMHG | SYSTOLIC BLOOD PRESSURE: 128 MMHG | TEMPERATURE: 98.5 F | RESPIRATION RATE: 16 BRPM | OXYGEN SATURATION: 97 % | HEART RATE: 69 BPM | BODY MASS INDEX: 29.29 KG/M2

## 2025-05-22 LAB
ALBUMIN SERPL BCP-MCNC: 4.3 G/DL (ref 3.2–4.9)
ALBUMIN/GLOB SERPL: 1.5 G/DL
ALP SERPL-CCNC: 60 U/L (ref 30–99)
ALT SERPL-CCNC: 18 U/L (ref 2–50)
ANION GAP SERPL CALC-SCNC: 13 MMOL/L (ref 7–16)
APPEARANCE UR: CLEAR
AST SERPL-CCNC: 21 U/L (ref 12–45)
BASOPHILS # BLD AUTO: 0.3 % (ref 0–1.8)
BASOPHILS # BLD: 0.02 K/UL (ref 0–0.12)
BILIRUB SERPL-MCNC: 0.9 MG/DL (ref 0.1–1.5)
BILIRUB UR QL STRIP.AUTO: NEGATIVE
BUN SERPL-MCNC: 23 MG/DL (ref 8–22)
CALCIUM ALBUM COR SERPL-MCNC: 9.4 MG/DL (ref 8.5–10.5)
CALCIUM SERPL-MCNC: 9.6 MG/DL (ref 8.5–10.5)
CHLORIDE SERPL-SCNC: 108 MMOL/L (ref 96–112)
CO2 SERPL-SCNC: 20 MMOL/L (ref 20–33)
COLOR UR: YELLOW
CREAT SERPL-MCNC: 1.25 MG/DL (ref 0.5–1.4)
EOSINOPHIL # BLD AUTO: 0.11 K/UL (ref 0–0.51)
EOSINOPHIL NFR BLD: 1.8 % (ref 0–6.9)
ERYTHROCYTE [DISTWIDTH] IN BLOOD BY AUTOMATED COUNT: 43.5 FL (ref 35.9–50)
GFR SERPLBLD CREATININE-BSD FMLA CKD-EPI: 68 ML/MIN/1.73 M 2
GLOBULIN SER CALC-MCNC: 2.9 G/DL (ref 1.9–3.5)
GLUCOSE SERPL-MCNC: 90 MG/DL (ref 65–99)
GLUCOSE UR STRIP.AUTO-MCNC: NEGATIVE MG/DL
HCT VFR BLD AUTO: 44.5 % (ref 42–52)
HGB BLD-MCNC: 14.9 G/DL (ref 14–18)
IMM GRANULOCYTES # BLD AUTO: 0.01 K/UL (ref 0–0.11)
IMM GRANULOCYTES NFR BLD AUTO: 0.2 % (ref 0–0.9)
KETONES UR STRIP.AUTO-MCNC: 15 MG/DL
LEUKOCYTE ESTERASE UR QL STRIP.AUTO: NEGATIVE
LIPASE SERPL-CCNC: 27 U/L (ref 11–82)
LYMPHOCYTES # BLD AUTO: 1.51 K/UL (ref 1–4.8)
LYMPHOCYTES NFR BLD: 25 % (ref 22–41)
MCH RBC QN AUTO: 32 PG (ref 27–33)
MCHC RBC AUTO-ENTMCNC: 33.5 G/DL (ref 32.3–36.5)
MCV RBC AUTO: 95.7 FL (ref 81.4–97.8)
MICRO URNS: ABNORMAL
MONOCYTES # BLD AUTO: 0.54 K/UL (ref 0–0.85)
MONOCYTES NFR BLD AUTO: 9 % (ref 0–13.4)
NEUTROPHILS # BLD AUTO: 3.84 K/UL (ref 1.82–7.42)
NEUTROPHILS NFR BLD: 63.7 % (ref 44–72)
NITRITE UR QL STRIP.AUTO: NEGATIVE
NRBC # BLD AUTO: 0 K/UL
NRBC BLD-RTO: 0 /100 WBC (ref 0–0.2)
PH UR STRIP.AUTO: 5 [PH] (ref 5–8)
PLATELET # BLD AUTO: 179 K/UL (ref 164–446)
PMV BLD AUTO: 10.3 FL (ref 9–12.9)
POTASSIUM SERPL-SCNC: 3.9 MMOL/L (ref 3.6–5.5)
PROT SERPL-MCNC: 7.2 G/DL (ref 6–8.2)
PROT UR QL STRIP: NEGATIVE MG/DL
RBC # BLD AUTO: 4.65 M/UL (ref 4.7–6.1)
RBC UR QL AUTO: NEGATIVE
SODIUM SERPL-SCNC: 141 MMOL/L (ref 135–145)
SP GR UR STRIP.AUTO: 1.03
TROPONIN T SERPL-MCNC: <6 NG/L (ref 6–19)
UROBILINOGEN UR STRIP.AUTO-MCNC: 0.2 EU/DL
WBC # BLD AUTO: 6 K/UL (ref 4.8–10.8)

## 2025-05-22 PROCEDURE — 700111 HCHG RX REV CODE 636 W/ 250 OVERRIDE (IP): Mod: JZ | Performed by: STUDENT IN AN ORGANIZED HEALTH CARE EDUCATION/TRAINING PROGRAM

## 2025-05-22 PROCEDURE — 36415 COLL VENOUS BLD VENIPUNCTURE: CPT

## 2025-05-22 PROCEDURE — 85025 COMPLETE CBC W/AUTO DIFF WBC: CPT

## 2025-05-22 PROCEDURE — 96375 TX/PRO/DX INJ NEW DRUG ADDON: CPT

## 2025-05-22 PROCEDURE — 84484 ASSAY OF TROPONIN QUANT: CPT

## 2025-05-22 PROCEDURE — 83690 ASSAY OF LIPASE: CPT

## 2025-05-22 PROCEDURE — 71045 X-RAY EXAM CHEST 1 VIEW: CPT

## 2025-05-22 PROCEDURE — 96374 THER/PROPH/DIAG INJ IV PUSH: CPT

## 2025-05-22 PROCEDURE — 80053 COMPREHEN METABOLIC PANEL: CPT

## 2025-05-22 PROCEDURE — 81003 URINALYSIS AUTO W/O SCOPE: CPT

## 2025-05-22 RX ADMIN — HYDROMORPHONE HYDROCHLORIDE 1 MG: 1 INJECTION, SOLUTION INTRAMUSCULAR; INTRAVENOUS; SUBCUTANEOUS at 00:26

## 2025-05-22 RX ADMIN — DIPHENHYDRAMINE HYDROCHLORIDE 25 MG: 50 INJECTION, SOLUTION INTRAMUSCULAR; INTRAVENOUS at 00:26

## 2025-05-22 NOTE — ED TRIAGE NOTES
"Chief Complaint   Patient presents with    Abdominal Pain     Pt walked to triage with c/o abdominal pain. Pt states \"my pancreas\". Hx of pancreatitis. N/V/D      Loss of Consciousness     Pt states last night around 2000 pt walked into his kitchen and vomited in the sink, states \"I woke up on the kitchen floor at 0200\" Pt unknown LOC or head strike. Reports HA.    Unable to Urinate     For week and a half he has had decreased urine output, pt states he has photos of all his voids and has been counting them. Denies urinating today. Denies hx of kidney disease.      BP (!) 155/87   Pulse 81   Temp 36.9 °C (98.5 °F) (Temporal)   Resp 16   Wt 98 kg (216 lb)   SpO2 96%   BMI 29.29 kg/m²     Pt on home O2 at 3L.   "

## 2025-05-22 NOTE — ED PROVIDER NOTES
"ED Provider Note    CHIEF COMPLAINT  Chief Complaint   Patient presents with    Abdominal Pain     Pt walked to triage with c/o abdominal pain. Pt states \"my pancreas\". Hx of pancreatitis. N/V/D      Loss of Consciousness     Pt states last night around 2000 pt walked into his kitchen and vomited in the sink, states \"I woke up on the kitchen floor at 0200\" Pt unknown LOC or head strike. Reports HA.    Unable to Urinate     For week and a half he has had decreased urine output, pt states he has photos of all his voids and has been counting them. Denies urinating today. Denies hx of kidney disease.        EXTERNAL RECORDS REVIEWED  Outpatient Notes patient was seen by urology on May 13, 2025 and was noted to be an unreliable historian and reports that he does not urinate on a regular basis and he never picked up his prescription for tamsulosin.  He told urology that he urinated which is normal for him.  He was encouraged to do a frequency and volume chart.     HPI/ROS  LIMITATION TO HISTORY   Select: : None  OUTSIDE HISTORIAN(S):  None    Landon Allred is a 55 y.o. male with history of chronic pain/fibromyalgia, hypertension, migraine, pancreatitis, PE, prediabetes, PTSD who presents for evaluation of epigastric pain that patient deals with chronically.  He attributes this to pancreatitis.  He takes Dilaudid for this.  He has also been having some nausea and vomiting.  Last bowel movement was yesterday and was normal.  He does not drink alcohol.  He was recently admitted to [facility] and had a CAT scan which showed no acute abnormalities, is negative for acute pancreatitis.  HIDA scan was done and was negative and unremarkable.  He also states that about 24 hours ago he did pass out he believes.  He is not having any headache.  He also notes that he has had urinating over the past 2 days prior to Cox catheter when he was last admitted.  He has been following up with urology.  He has no fever, chest pain, " difficulty breathing.  He denies any history of cardiac arrhythmia.    PAST MEDICAL HISTORY   has a past medical history of Allergy, Arthritis, Asthma, Breath shortness, Chronic kidney disease, Claustrophobia, Clostridium difficile diarrhea, Clotting disorder (Formerly [facility]), Concussion, Connective tissue disorder (Formerly [facility]), Coughing blood, Delayed emergence from general anesthesia, Dental disorder, Depression, DVT (deep venous thrombosis) (Formerly [facility]), Fibromyalgia, primary, GERD (gastroesophageal reflux disease), Heart burn, Hiatus hernia syndrome, Hypertension, IBD (inflammatory bowel disease), Indigestion, Learning disability, Migraine, MRSA (methicillin resistant Staphylococcus aureus), Pancreatitis, PE (pulmonary embolism), Pneumonia, Psychiatric disorder, Pulmonary fibrosis (Formerly [facility]), Seizure (Formerly [facility]), Sleep apnea, Spinal disorder, Stomach ulcer, and Supplemental oxygen dependent.    SURGICAL HISTORY   has a past surgical history that includes nerve ulnar repair or explore; dental extraction(s); closed rx nasal septal fracture; rectal exploration; orif, fracture, femur; orif, elbow; inguinal hernia repair (Right, 09/06/2019); and hernia repair (Right).    FAMILY HISTORY  Family History   Problem Relation Age of Onset    Hypertension Mother     Cancer Father     Hypertension Father        SOCIAL HISTORY  Social History     Tobacco Use    Smoking status: Never    Smokeless tobacco: Never   Vaping Use    Vaping status: Never Used   Substance and Sexual Activity    Alcohol use: Never    Drug use: Never    Sexual activity: Not on file       CURRENT MEDICATIONS  Home Medications       Reviewed by Monica Piña R.N. (Registered Nurse) on 05/21/25 at 2206  Med List Status: Not Addressed     Medication Last Dose Status   albuterol 108 (90 Base) MCG/ACT Aero Soln inhalation aerosol  Active   ARIPiprazole (ABILIFY) 2 MG tablet  Active   clonazePAM (KLONOPIN) 1 MG Tab  Active   dexlansoprazole (DEXILANT) 30 MG CAPSULE DELAYED RELEASE  Active    diphenhydrAMINE (BENADRYL) 25 MG Tab  Active   EPINEPHrine (ADRENALINE) 1 mg/10mL Solution Prefilled Syringe injection  Active   fluticasone furoate-vilanterol (BREO ELLIPTA) 200-25 MCG/ACT AEROSOL POWDER, BREATH ACTIVATED  Active   HYDROmorphone (DILAUDID) 4 MG Tab  Active   naloxone (NARCAN) 0.4 MG/ML Solution  Active   Pancrelipase, Lip-Prot-Amyl, (ZENPEP) 95598-00438 units Cap DR Particles  Active   promethazine (PHENERGAN) 25 MG Tab  Active   pseudoephedrine (SUDAFED) 60 MG Tab  Active   rivaroxaban (XARELTO) 20 MG Tab tablet  Active   zonisamide (ZONEGRAN) 50 MG capsule  Active                    ALLERGIES  Allergies[1]    PHYSICAL EXAM  VITAL SIGNS: BP (!) 155/87   Pulse 81   Temp 36.9 °C (98.5 °F) (Temporal)   Resp 16   Wt 98 kg (216 lb)   SpO2 96%   BMI 29.29 kg/m²      Constitutional: Well developed, Well nourished, No acute distress, Non-toxic appearance.   HEENT: Normocephalic, Atraumatic,  external ears normal, pharynx pink,  Mucous  Membranes moist, No rhinorrhea or mucosal edema  Cardiovascular: Regular Rate and Rhythm, No murmurs,  rubs, or gallops.   Thorax & Lungs: Lungs clear to auscultation bilaterally, No respiratory distress, No wheezes, rhales or rhonchi, No chest wall tenderness.   Abdomen: Soft, epigastic ttp, otherwise abdomen nontender, non distended,  No pulsatile masses., no rebound guarding or peritoneal signs.   Skin: Warm, Dry, No erythema, No rash,   Back:  No CVA tenderness,  No spinal tenderness, bony crepitance step offs or instability.   Extremities: Equal, intact distal pulses, No cyanosis, clubbing or edema,  No tenderness.   Musculoskeletal: Good range of motion in all major joints. No tenderness to palpation or major deformities noted.   Neurologic: Alert & oriented No focal deficits noted.  Psychiatric: Affect normal, Judgment normal, Mood normal.      EKG/LABS  Results for orders placed or performed during the hospital encounter of 05/21/25   CBC WITH DIFFERENTIAL     Collection Time: 05/22/25 12:03 AM   Result Value Ref Range    WBC 6.0 4.8 - 10.8 K/uL    RBC 4.65 (L) 4.70 - 6.10 M/uL    Hemoglobin 14.9 14.0 - 18.0 g/dL    Hematocrit 44.5 42.0 - 52.0 %    MCV 95.7 81.4 - 97.8 fL    MCH 32.0 27.0 - 33.0 pg    MCHC 33.5 32.3 - 36.5 g/dL    RDW 43.5 35.9 - 50.0 fL    Platelet Count 179 164 - 446 K/uL    MPV 10.3 9.0 - 12.9 fL    Neutrophils-Polys 63.70 44.00 - 72.00 %    Lymphocytes 25.00 22.00 - 41.00 %    Monocytes 9.00 0.00 - 13.40 %    Eosinophils 1.80 0.00 - 6.90 %    Basophils 0.30 0.00 - 1.80 %    Immature Granulocytes 0.20 0.00 - 0.90 %    Nucleated RBC 0.00 0.00 - 0.20 /100 WBC    Neutrophils (Absolute) 3.84 1.82 - 7.42 K/uL    Lymphs (Absolute) 1.51 1.00 - 4.80 K/uL    Monos (Absolute) 0.54 0.00 - 0.85 K/uL    Eos (Absolute) 0.11 0.00 - 0.51 K/uL    Baso (Absolute) 0.02 0.00 - 0.12 K/uL    Immature Granulocytes (abs) 0.01 0.00 - 0.11 K/uL    NRBC (Absolute) 0.00 K/uL   COMP METABOLIC PANEL    Collection Time: 05/22/25 12:03 AM   Result Value Ref Range    Sodium 141 135 - 145 mmol/L    Potassium 3.9 3.6 - 5.5 mmol/L    Chloride 108 96 - 112 mmol/L    Co2 20 20 - 33 mmol/L    Anion Gap 13.0 7.0 - 16.0    Glucose 90 65 - 99 mg/dL    Bun 23 (H) 8 - 22 mg/dL    Creatinine 1.25 0.50 - 1.40 mg/dL    Calcium 9.6 8.5 - 10.5 mg/dL    Correct Calcium 9.4 8.5 - 10.5 mg/dL    AST(SGOT) 21 12 - 45 U/L    ALT(SGPT) 18 2 - 50 U/L    Alkaline Phosphatase 60 30 - 99 U/L    Total Bilirubin 0.9 0.1 - 1.5 mg/dL    Albumin 4.3 3.2 - 4.9 g/dL    Total Protein 7.2 6.0 - 8.2 g/dL    Globulin 2.9 1.9 - 3.5 g/dL    A-G Ratio 1.5 g/dL   LIPASE    Collection Time: 05/22/25 12:03 AM   Result Value Ref Range    Lipase 27 11 - 82 U/L   TROPONIN    Collection Time: 05/22/25 12:03 AM   Result Value Ref Range    Troponin T <6 6 - 19 ng/L   ESTIMATED GFR    Collection Time: 05/22/25 12:03 AM   Result Value Ref Range    GFR (CKD-EPI) 68 >60 mL/min/1.73 m 2   URINALYSIS,CULTURE IF INDICATED    Collection  Time: 05/22/25  1:15 AM    Specimen: Urine, Clean Catch   Result Value Ref Range    Color Yellow     Character Clear     Specific Gravity 1.027 <1.035    Ph 5.0 5.0 - 8.0    Glucose Negative Negative mg/dL    Ketones 15 (A) Negative mg/dL    Protein Negative Negative mg/dL    Bilirubin Negative Negative    Urobilinogen, Urine 0.2 <=1.0 EU/dL    Nitrite Negative Negative    Leukocyte Esterase Negative Negative    Occult Blood Negative Negative    Micro Urine Req see below      *Note: Due to a large number of results and/or encounters for the requested time period, some results have not been displayed. A complete set of results can be found in Results Review.       I have independently interpreted this EKG    RADIOLOGY/PROCEDURES   I have independently interpreted the diagnostic imaging associated with this visit and am waiting the final reading from the radiologist.   My preliminary interpretation is as follows: no focal consolidation    Radiologist interpretation:  DX-CHEST-PORTABLE (1 VIEW)   Final Result         1.  No acute cardiopulmonary disease.          COURSE & MEDICAL DECISION MAKING    ASSESSMENT, COURSE AND PLAN  Care Narrative:   This is a 55-year-old male with history of pancreatitis who is presenting for evaluation of epigastric pain with associated nausea and vomiting that started today.  Patient has frequent admissions for pancreatitis like symptoms in which only Dilaudid and Benadryl worked for symptoms.  I do suspect he likely has chronic pancreatitis.  Today, differential diagnosis includes not limited to pancreatitis, hepatitis, atypical ACS, pneumonia, electrolyte abnormality, urinary retention, urinary tract infection.    Labs demonstrate no leukocytosis.  He is not anemic.  Electrolytes within normal limits.  Lipase is not elevated, doubt pancreatitis.  Troponin is less than 6, doubt ACS.  He has no right upper quadrant tenderness palpation raise concern for acute biliary process.  Do not feel  like he needs any cross-sectional imaging of his abdomen given presence of only epigastric pain which is a chronic issue for the patient.    Patient also notes difficulty urinating over the past 10 days.  His creatinine here is normal.  Bladder scan was done and shows 250 cc in the bladder but then patient voided approximate 270 cc.  There is no indication for Cox catheter at this time.  There is no evidence of urinary tract infection.    Patient is advised to follow-up with his local urologist as well as gastroenterologist in [place].  He is advised to return for any fevers, worsening pain, persistent vomiting or any other concerns.  He is agreeable to discharge plan with no further questions.              ADDITIONAL PROBLEMS MANAGED  None    DISPOSITION AND DISCUSSIONS  I have discussed management of the patient with the following physicians and BECKA's:  None    Discussion of management with other [facility] or appropriate source(s): None     Escalation of care considered, and ultimately not performed:diagnostic imaging and acute inpatient care management, however at this time, the patient is most appropriate for outpatient management    Barriers to care at this time, including but not limited to: None.     Decision tools and prescription drugs considered including, but not limited to: None.     The patient will return for new or worsening symptoms and is stable at the time of discharge.    The patient is referred to a primary physician for blood pressure management, diabetic screening, and for all other preventative health concerns.    DISPOSITION:  Patient will be discharged home in stable condition.    FOLLOW UP:  John Chery D.O.  [address] [address]  [phone]          [facility], Emergency Dept  [address]  [facility] [address]  [phone]          OUTPATIENT MEDICATIONS:  Discharge Medication List as of 5/22/2025  2:24 AM     "        FINAL DIAGNOSIS  1. Epigastric pain Acute        Electronically signed by: Pam Geronimo M.D., 5/21/2025 11:26 PM           [1]   Allergies  Allergen Reactions    Capsaicin Unspecified, Rash and Shortness of Breath     Other Reaction(s): Eruption, Tachypnea    Capsaicin-Turpentine Anaphylaxis and Rash          Celery Oil Hives     Celery causes hives    Ciprofloxacin Anaphylaxis and Unspecified    Doxycycline Anaphylaxis    Duloxetine Hives    Haloperidol Unspecified and Anaphylaxis    Shellfish Allergy Anaphylaxis    Tape Hives    Wasp Venom Anaphylaxis    Amoxicillin-Pot Clavulanate Rash     Other reaction(s): .Unknown  rash      Dalteparin Diarrhea          Sulfamethoxazole W-Trimethoprim Hives          Abilify [Aripiprazole] Anxiety     Pt reports SI and HI tendencies    Amoxicillin Unspecified and Anaphylaxis    Metoclopramide Anaphylaxis     Has no idea what his RX is    Acetaminophen Unspecified and Vomiting     \"Makes me deathly ill\"    Other Reaction(s): Drowsy (finding), Nausea and vomiting (disorder), Nausea and vomiting (disorder), Drowsy (finding)      \"makes me deathly ill\" \"makes me deathly ill\"      Other reaction(s): Unknown      Other reaction(s): .Unknown \"makes me deathly ill\" \"makes me deathly ill\"      01/28/2012 21:05 [facility] - PILI HARDIN<br/><br/>patient states he is allergic to acetaminophen, makes him<br/>nauseated and drowsy      Other Reaction(s): Nausea and vomiting, Drowsy    Amitriptyline Hcl Unspecified     Unknown reaction     Aspirin Unspecified     Unknown reaction    Other Reaction(s): Bleeding from nose (finding), Bleeding from nose (finding), Bleeding from nose (finding), Bleeding from nose (finding)      10/16/2012 02:10 [facility] - CELESTINO RANGEL<br/><br/>Patient reported he gets nosebleeds when he takes aspirin      Other Reaction(s): Bleeding from nose    Baclofen Unspecified and Anaphylaxis     Unknown reaction    Biofreeze Unspecified     Unknown reaction     " "Carisoprodol Unspecified and Anaphylaxis     Unknown reaction    Other reaction(s): Unknown    Codeine Unspecified     Unknown reaction     Cyclobenzaprine Unspecified     Unknown reaction     Doxepin Unspecified     Unknown reaction    Duloxetine Hcl Unspecified     Unknown reaction     Enoxaparin Vomiting    Eszopiclone Unspecified     Unknown reaction     Hydrocodone-Acetaminophen Unspecified, Itching, Anaphylaxis and Vomiting     Unknown reaction    I DONT KNOW    Hydromorphone Itching     States \"I have an adverse reaction and need benadryl first\"    Ketamine Unspecified     Other reaction(s): .Unknown  Pt reports he \"gets violent\"    Latex Unspecified     Unknown reaction     Menthol Unspecified     Unknown reaction     Methocarbamol Unspecified     Unknown reaction     Morphine Vomiting    Nitroglycerin Unspecified and Anaphylaxis     Other reaction(s): Unknown    Ondansetron [Zofran] Unspecified     Unknown reaction     Oxycodone Unspecified     Unknown reaction     Oxycodone-Aspirin Unspecified     Unknown reaction     Prochlorperazine Unspecified     Hallucinations, dizziness    Propofol Unspecified     Unknown reaction     Propoxyphene Unspecified     Other reaction(s): Unknown    Quetiapine Unspecified     Other reaction(s): Unknown    Other Reaction(s): Headache, Swelling, Excitement, Swelling (finding), OTHER REACTION: SPEEDING THOUGHTS, Headache (finding), Excitement (finding), Swelling (finding), Headache (finding), Swelling (finding), OTHER REACTION: SPEEDING THOUGHTS, Headache (finding), Excitement (finding), Swelling (finding), Headache (finding)      Other reaction(s): Unknown 06/05/2007 23:18 [facility] - ANTWAN BRADFORD<br/><br/>Updated using auto clean up process from GMRA*4*29.  Changed reactant from QUETIAPINE (free text) to QUETIAPINE(file - PSNDF(50.6,) <br/><br/><br/>04/16/2007 23:21 [facility] - ANTWAN BRADFORD<br/><br/>Changed from QUETIAPINE (File 120.82) to free text by patch GMRA*4*36 " <br/><br/><br/>04/08/2005 21:10 [facility] - DONNELL CHACON<br/><br/>PT HAD JUST RESTARTED THE MEDICATION WHEN ADR OCCURRED.  PSYCH HAS DC'D      Other reaction(s): Unknown      06/05/2007 23:18 [facility] ANTWAN BRADFORD<br/><br/>Updated using auto clean up process from RA*4*29.  Changed reactant from QUETIAPINE (free text) to QUETIAPINE(file - PSNDF(50.6,) <br/><br/><br/>04/16/2007 23:21 [facility] - ANTWAN BRADFORD<br/><br/>Changed from QUETIAPINE (File 120.82) to free text by patch RA*4*36 <br/><br/><br/>04/08/2005 21:10 [facility] - DONNELL CHACON<br/><br/>PT HAD JUST RESTARTED THE MEDICATION WHEN ADR OCCURRED.  PSYCH HAS DC'D      Other Reaction(s): Headache, SPEEDING THOUGHTS, SWELLING, Headache, SPEEDING THOUGHTS, SWELLING    Rabeprazole Nausea, Vomiting and Unspecified     cannot remember cannot remember 06/05/2007 23:16 [facility] ANTWAN BRADFORD<br/><br/>Updated using auto clean up process from RA*4*29.  Changed reactant from RABEPRAZOLE (free text) to RABEPRAZOLE(file - PSNDF(50.6,) <br/><br/><br/>04/16/2007 23:18 [facility] ANTWAN BRADFORD<br/><br/>Changed from RABEPRAZOLE (File 120.82) to free text by patch RA*4*36 <br/><br/><br/>02/21/2007 00:57 [facility] - JOSE WHITMAN<br/><br/>Updated using clean up process.  Changed reactant from RABEPRAZOLE (ingredient) to RABEPRAZOLE(file - PSNDF(50.6,) <br/><br/><br/>01/17/2002 18:01 [facility] - JUAN MURRAY<br/><br/>reported by Dr. Antwan Kinney on lansoprazole request form      06/05/2007 23:16 [facility] - ANTWAN BRADFORD<br/><br/>Updated using auto clean up process from RA*4*29.  Changed reactant from RABEPRAZOLE (free text) to RABEPRAZOLE(file - PSNDF(50.6,) <br/><br/><br/>04/16/2007 23:18 [facility] - ANTWAN BRADFORD<br/><br/>Changed from RABEPRAZOLE (File 120.82) to free text by patch RA*4*36 <br/><br/><br/>02/21/2007 00:57 [facility] - JOSE WHITMAN<br/><br/>Updated using clean up process.  Changed reactant from RABEPRAZOLE (ingredient) to RABEPRAZOLE(file - PSNDF(50.6,) <br/><br/><br/>01/17/2002  18:01 [facility] - JUAN MURRAY<br/><br/>reported by Dr. Austin Kinney on lansoprazole request form      cannot remember cannot remember 06/05/2007 23:16 [facility] - AUSTIN BRADFORD<br/><br/>Updated using auto clean up process from [facility]*4*29.  Changed reactant from RABEPRAZOLE (free text) to RABEPRAZOLE(file - PSNDF(50.6,) <br/><br/><br/>04/16/2007 23:18 [facility] - SUZETTE,... (TRUNCATED)    Risperidone Unspecified     Unknown reaction     Tramadol Unspecified     Unknown reaction

## 2025-05-22 NOTE — ED NOTES
Patient is stable for discharge at this time, anticipatory guidance provided, close follow-up encouraged, and ED return instructions have been discussed. Patient is both agreeable to the disposition and plan and discharged home in ambulatory state, in good condition.      Rx education provided, Pt verbalized understanding;

## 2025-05-22 NOTE — DISCHARGE INSTRUCTIONS
You are seen for evaluation of epigastric pain.  Thankfully your lipase is not elevated.  You do not have a urinary tract infection.  You have emptied your bladder as well.  Please follow-up with your primary care doctor as well as urologist.  Return emergency room for any new or worsening symptoms.

## 2025-05-29 ENCOUNTER — HOSPITAL ENCOUNTER (EMERGENCY)
Facility: MEDICAL CENTER | Age: 55
End: 2025-05-29
Attending: EMERGENCY MEDICINE
Payer: MEDICARE

## 2025-05-29 ENCOUNTER — APPOINTMENT (OUTPATIENT)
Dept: RADIOLOGY | Facility: MEDICAL CENTER | Age: 55
End: 2025-05-29
Attending: EMERGENCY MEDICINE
Payer: MEDICARE

## 2025-05-29 VITALS
BODY MASS INDEX: 29.26 KG/M2 | TEMPERATURE: 98.6 F | RESPIRATION RATE: 20 BRPM | OXYGEN SATURATION: 95 % | WEIGHT: 216 LBS | DIASTOLIC BLOOD PRESSURE: 96 MMHG | HEART RATE: 74 BPM | HEIGHT: 72 IN | SYSTOLIC BLOOD PRESSURE: 142 MMHG

## 2025-05-29 DIAGNOSIS — I10 HYPERTENSION, UNSPECIFIED TYPE: ICD-10-CM

## 2025-05-29 DIAGNOSIS — J02.9 PHARYNGITIS, UNSPECIFIED ETIOLOGY: ICD-10-CM

## 2025-05-29 DIAGNOSIS — R10.9 ACUTE ABDOMINAL PAIN: Primary | ICD-10-CM

## 2025-05-29 DIAGNOSIS — F41.9 ANXIETY: ICD-10-CM

## 2025-05-29 DIAGNOSIS — R73.9 HYPERGLYCEMIA: ICD-10-CM

## 2025-05-29 DIAGNOSIS — Z11.3 SCREEN FOR STD (SEXUALLY TRANSMITTED DISEASE): ICD-10-CM

## 2025-05-29 DIAGNOSIS — J32.9 CHRONIC SINUSITIS, UNSPECIFIED LOCATION: ICD-10-CM

## 2025-05-29 LAB
ALBUMIN SERPL BCP-MCNC: 4.6 G/DL (ref 3.2–4.9)
ALBUMIN/GLOB SERPL: 1.4 G/DL
ALP SERPL-CCNC: 78 U/L (ref 30–99)
ALT SERPL-CCNC: 18 U/L (ref 2–50)
ANION GAP SERPL CALC-SCNC: 13 MMOL/L (ref 7–16)
AST SERPL-CCNC: 19 U/L (ref 12–45)
BASOPHILS # BLD AUTO: 0 % (ref 0–1.8)
BASOPHILS # BLD: 0 K/UL (ref 0–0.12)
BILIRUB SERPL-MCNC: 0.5 MG/DL (ref 0.1–1.5)
BUN SERPL-MCNC: 28 MG/DL (ref 8–22)
CALCIUM ALBUM COR SERPL-MCNC: 9.2 MG/DL (ref 8.5–10.5)
CALCIUM SERPL-MCNC: 9.7 MG/DL (ref 8.5–10.5)
CHLORIDE SERPL-SCNC: 108 MMOL/L (ref 96–112)
CO2 SERPL-SCNC: 20 MMOL/L (ref 20–33)
CREAT SERPL-MCNC: 1.17 MG/DL (ref 0.5–1.4)
EOSINOPHIL # BLD AUTO: 0 K/UL (ref 0–0.51)
EOSINOPHIL NFR BLD: 0 % (ref 0–6.9)
ERYTHROCYTE [DISTWIDTH] IN BLOOD BY AUTOMATED COUNT: 42.5 FL (ref 35.9–50)
GFR SERPLBLD CREATININE-BSD FMLA CKD-EPI: 73 ML/MIN/1.73 M 2
GLOBULIN SER CALC-MCNC: 3.2 G/DL (ref 1.9–3.5)
GLUCOSE SERPL-MCNC: 130 MG/DL (ref 65–99)
HCT VFR BLD AUTO: 46.2 % (ref 42–52)
HGB BLD-MCNC: 15.9 G/DL (ref 14–18)
IMM GRANULOCYTES # BLD AUTO: 0.01 K/UL (ref 0–0.11)
IMM GRANULOCYTES NFR BLD AUTO: 0.3 % (ref 0–0.9)
LIPASE SERPL-CCNC: 28 U/L (ref 11–82)
LYMPHOCYTES # BLD AUTO: 0.44 K/UL (ref 1–4.8)
LYMPHOCYTES NFR BLD: 11.8 % (ref 22–41)
MCH RBC QN AUTO: 32.3 PG (ref 27–33)
MCHC RBC AUTO-ENTMCNC: 34.4 G/DL (ref 32.3–36.5)
MCV RBC AUTO: 93.9 FL (ref 81.4–97.8)
MONOCYTES # BLD AUTO: 0.04 K/UL (ref 0–0.85)
MONOCYTES NFR BLD AUTO: 1.1 % (ref 0–13.4)
NEUTROPHILS # BLD AUTO: 3.25 K/UL (ref 1.82–7.42)
NEUTROPHILS NFR BLD: 86.8 % (ref 44–72)
NRBC # BLD AUTO: 0 K/UL
NRBC BLD-RTO: 0 /100 WBC (ref 0–0.2)
PLATELET # BLD AUTO: 202 K/UL (ref 164–446)
PMV BLD AUTO: 10.4 FL (ref 9–12.9)
POTASSIUM SERPL-SCNC: 4.2 MMOL/L (ref 3.6–5.5)
PROT SERPL-MCNC: 7.8 G/DL (ref 6–8.2)
RBC # BLD AUTO: 4.92 M/UL (ref 4.7–6.1)
SODIUM SERPL-SCNC: 141 MMOL/L (ref 135–145)
TROPONIN T SERPL-MCNC: <6 NG/L (ref 6–19)
WBC # BLD AUTO: 3.7 K/UL (ref 4.8–10.8)

## 2025-05-29 PROCEDURE — 96375 TX/PRO/DX INJ NEW DRUG ADDON: CPT

## 2025-05-29 PROCEDURE — 96374 THER/PROPH/DIAG INJ IV PUSH: CPT | Mod: XU

## 2025-05-29 PROCEDURE — 71045 X-RAY EXAM CHEST 1 VIEW: CPT

## 2025-05-29 PROCEDURE — 87491 CHLMYD TRACH DNA AMP PROBE: CPT

## 2025-05-29 PROCEDURE — 700105 HCHG RX REV CODE 258: Performed by: EMERGENCY MEDICINE

## 2025-05-29 PROCEDURE — 84484 ASSAY OF TROPONIN QUANT: CPT

## 2025-05-29 PROCEDURE — 85025 COMPLETE CBC W/AUTO DIFF WBC: CPT

## 2025-05-29 PROCEDURE — 700111 HCHG RX REV CODE 636 W/ 250 OVERRIDE (IP): Mod: JZ | Performed by: EMERGENCY MEDICINE

## 2025-05-29 PROCEDURE — 87591 N.GONORRHOEAE DNA AMP PROB: CPT

## 2025-05-29 PROCEDURE — 700101 HCHG RX REV CODE 250: Performed by: EMERGENCY MEDICINE

## 2025-05-29 PROCEDURE — 99285 EMERGENCY DEPT VISIT HI MDM: CPT

## 2025-05-29 PROCEDURE — 36415 COLL VENOUS BLD VENIPUNCTURE: CPT

## 2025-05-29 PROCEDURE — 86780 TREPONEMA PALLIDUM: CPT

## 2025-05-29 PROCEDURE — 70486 CT MAXILLOFACIAL W/O DYE: CPT

## 2025-05-29 PROCEDURE — 700117 HCHG RX CONTRAST REV CODE 255: Performed by: EMERGENCY MEDICINE

## 2025-05-29 PROCEDURE — G0475 HIV COMBINATION ASSAY: HCPCS

## 2025-05-29 PROCEDURE — 80053 COMPREHEN METABOLIC PANEL: CPT

## 2025-05-29 PROCEDURE — A9270 NON-COVERED ITEM OR SERVICE: HCPCS | Performed by: EMERGENCY MEDICINE

## 2025-05-29 PROCEDURE — 74177 CT ABD & PELVIS W/CONTRAST: CPT

## 2025-05-29 PROCEDURE — 700102 HCHG RX REV CODE 250 W/ 637 OVERRIDE(OP): Performed by: EMERGENCY MEDICINE

## 2025-05-29 PROCEDURE — 83690 ASSAY OF LIPASE: CPT

## 2025-05-29 RX ORDER — HYDROMORPHONE HYDROCHLORIDE 1 MG/ML
1 INJECTION, SOLUTION INTRAMUSCULAR; INTRAVENOUS; SUBCUTANEOUS ONCE
Status: COMPLETED | OUTPATIENT
Start: 2025-05-29 | End: 2025-05-29

## 2025-05-29 RX ORDER — METOCLOPRAMIDE HYDROCHLORIDE 5 MG/ML
10 INJECTION INTRAMUSCULAR; INTRAVENOUS ONCE
Status: DISCONTINUED | OUTPATIENT
Start: 2025-05-29 | End: 2025-05-30 | Stop reason: HOSPADM

## 2025-05-29 RX ORDER — FLUTICASONE PROPIONATE 50 MCG
1 SPRAY, SUSPENSION (ML) NASAL DAILY
Qty: 9.9 ML | Refills: 0 | Status: SHIPPED | OUTPATIENT
Start: 2025-05-29 | End: 2025-06-05

## 2025-05-29 RX ORDER — DIPHENHYDRAMINE HYDROCHLORIDE 50 MG/ML
25 INJECTION, SOLUTION INTRAMUSCULAR; INTRAVENOUS ONCE
Status: COMPLETED | OUTPATIENT
Start: 2025-05-29 | End: 2025-05-29

## 2025-05-29 RX ORDER — PROMETHAZINE HYDROCHLORIDE 25 MG/1
25 TABLET ORAL ONCE
Status: COMPLETED | OUTPATIENT
Start: 2025-05-29 | End: 2025-05-29

## 2025-05-29 RX ORDER — SODIUM CHLORIDE 9 MG/ML
1000 INJECTION, SOLUTION INTRAVENOUS ONCE
Status: COMPLETED | OUTPATIENT
Start: 2025-05-29 | End: 2025-05-29

## 2025-05-29 RX ORDER — DIAZEPAM 10 MG/2ML
5 INJECTION, SOLUTION INTRAMUSCULAR; INTRAVENOUS ONCE
Status: COMPLETED | OUTPATIENT
Start: 2025-05-29 | End: 2025-05-29

## 2025-05-29 RX ORDER — MORPHINE SULFATE 4 MG/ML
4 INJECTION INTRAVENOUS ONCE
Status: DISCONTINUED | OUTPATIENT
Start: 2025-05-29 | End: 2025-05-29

## 2025-05-29 RX ADMIN — PROMETHAZINE HYDROCHLORIDE 25 MG: 25 TABLET ORAL at 21:09

## 2025-05-29 RX ADMIN — IOHEXOL 100 ML: 350 INJECTION, SOLUTION INTRAVENOUS at 21:47

## 2025-05-29 RX ADMIN — SODIUM CHLORIDE 1000 ML: 9 INJECTION, SOLUTION INTRAVENOUS at 20:59

## 2025-05-29 RX ADMIN — DIPHENHYDRAMINE HYDROCHLORIDE 25 MG: 50 INJECTION, SOLUTION INTRAMUSCULAR; INTRAVENOUS at 21:02

## 2025-05-29 RX ADMIN — HYOSCYAMINE SULFATE 30 ML: 0.12 ELIXIR ORAL at 21:01

## 2025-05-29 RX ADMIN — DIAZEPAM 5 MG: 5 INJECTION, SOLUTION INTRAMUSCULAR; INTRAVENOUS at 21:04

## 2025-05-29 RX ADMIN — HYDROMORPHONE HYDROCHLORIDE 1 MG: 1 INJECTION, SOLUTION INTRAMUSCULAR; INTRAVENOUS; SUBCUTANEOUS at 21:08

## 2025-05-29 ASSESSMENT — FIBROSIS 4 INDEX: FIB4 SCORE: 1.71

## 2025-05-29 ASSESSMENT — PAIN DESCRIPTION - PAIN TYPE: TYPE: ACUTE PAIN

## 2025-05-30 LAB
C TRACH DNA SPEC QL NAA+PROBE: NEGATIVE
HIV 1+2 AB+HIV1 P24 AG SERPL QL IA: NORMAL
N GONORRHOEA DNA SPEC QL NAA+PROBE: NEGATIVE
SPECIMEN SOURCE: NORMAL
T PALLIDUM AB SER QL IA: NORMAL

## 2025-05-30 NOTE — DISCHARGE INSTRUCTIONS
Take your medication as prescribed. Followup with your doctor to discuss your recent ER visit. Check your mychart to see your pending labs. Return to the ER as needed.  We recommend you follow-up with your nose and throat regarding your chronic sinusitis/sinus pain.

## 2025-05-30 NOTE — ED TRIAGE NOTES
Chief Complaint   Patient presents with    Epigastric Pain     Pt states mid epigastric pain, believes it is related to pancreatitis. Pt states was seen at [facility] 2 days ago for same. States pain has been consistent x7 days.     BP (!) 142/96   Pulse (!) 102   Temp 37 °C (98.6 °F) (Temporal)   Resp 20   Ht 1.829 m (6')   Wt 98 kg (216 lb)   SpO2 96%   BMI 29.29 kg/m²

## 2025-05-30 NOTE — ED NOTES
Pt back from CT, RN notified by CT tech that pt has knife in pocket. Notified charge RN and security. Security to remove knife from pt and return once pt discharged from ER.

## 2025-05-30 NOTE — ED PROVIDER NOTES
ER Provider Note    Scribed for  Gary Pollard D.O. by Gary Pollard D.O.. 5/29/2025   8:29 PM    Primary Care Provider: John Chery D.O.    CHIEF COMPLAINT  Chief Complaint   Patient presents with    Epigastric Pain     Pt states mid epigastric pain, believes it is related to pancreatitis. Pt states was seen at [facility] 2 days ago for same. States pain has been consistent x7 days. Endorses N/V/D     EXTERNAL RECORDS REVIEWED  Outpatient Notes seen in the outpatient setting in the past for abdominal pain.  And pancreatitis.    HPI/ROS  LIMITATION TO HISTORY   Select: : None  OUTSIDE HISTORIAN(S):  None    Landon Allred is a 55 y.o. male who presents to the ED for evaluation of multiple complaints.  Patient is complaining of epigastric pain, history of pancreatitis.  Also history of reflux.  Also states that he has a sore throat but denies any cough or fever.  Also states that he is having sinus pain.  He started taking 60 mg of prednisone which she has a taper prescribed for him for his reported sinus pain.    Additionally patient has chronic respiratory failure on home oxygen.    PAST MEDICAL HISTORY  Past Medical History[1]    SURGICAL HISTORY  Past Surgical History[2]    FAMILY HISTORY  Family History   Problem Relation Age of Onset    Hypertension Mother     Cancer Father     Hypertension Father        SOCIAL HISTORY   reports that he has never smoked. He has never used smokeless tobacco. He reports that he does not drink alcohol and does not use drugs.    CURRENT MEDICATIONS  Discharge Medication List as of 5/29/2025 10:37 PM        CONTINUE these medications which have NOT CHANGED    Details   clonazePAM (KLONOPIN) 1 MG Tab Take 1 Tablet by mouth 2 times a day as needed (panic attacks) for up to 30 days., Disp-60 Tablet, R-5, Normal      ARIPiprazole (ABILIFY) 2 MG tablet Take 2 mg by mouth every day., Historical Med      fluticasone furoate-vilanterol (BREO ELLIPTA) 200-25 MCG/ACT AEROSOL  POWDER, BREATH ACTIVATED Inhale 1 Puff every day., Historical Med      albuterol 108 (90 Base) MCG/ACT Aero Soln inhalation aerosol Inhale 2 Puffs as needed for Shortness of Breath., Historical Med      naloxone (NARCAN) 0.4 MG/ML Solution 0.4 mg by Other route one time. Inhaled, Historical Med      EPINEPHrine (ADRENALINE) 1 mg/10mL Solution Prefilled Syringe injection Inject 1 mg into a catheter in the bone as needed (IM)., Historical Med      HYDROmorphone (DILAUDID) 4 MG Tab Take 4 mg by mouth every 6 hours as needed for Severe Pain., Historical Med      Pancrelipase, Lip-Prot-Amyl, (ZENPEP) 45444-38845 units Cap DR Particles Take 2 Capsules by mouth in the morning, at noon, and at bedtime., Historical Med      dexlansoprazole (DEXILANT) 30 MG CAPSULE DELAYED RELEASE Take 30 mg by mouth every day., Historical Med      zonisamide (ZONEGRAN) 50 MG capsule Take 50 mg by mouth 2 times a day., Historical Med      rivaroxaban (XARELTO) 20 MG Tab tablet Take 20 mg by mouth with dinner., Historical Med      diphenhydrAMINE (BENADRYL) 25 MG Tab Take 50 mg by mouth at bedtime as needed for Sleep., Historical Med      promethazine (PHENERGAN) 25 MG Tab Take 25 mg by mouth every 6 hours as needed for Nausea/Vomiting., Historical Med      pseudoephedrine (SUDAFED) 60 MG Tab Take 240 mg by mouth every four hours as needed for Congestion., Historical Med             ALLERGIES  Allergies[3]     PHYSICAL EXAM  BP (!) 142/96   Pulse 74   Temp 37 °C (98.6 °F) (Temporal)   Resp 20   Ht 1.829 m (6')   Wt 98 kg (216 lb)   SpO2 95%   BMI 29.29 kg/m²    General: No acute distress, nasal cannula  Neuro: Neuro: GCS 15, A and O x 4, CN 2-12 GI, MS 5/5 x 4 ex, Sensation Coordination Gait wnl.  HEENT: Pupils equal round reactive light accommodating extraocular movements intact, he some discomfort with palpation of the maxillary and frontal sinuses.  TMs are normal.  No anterior cervical adenopathy.  The uvula is midline without edema  he speaks in a normal voice there is no trismus or stridor.  The floor of his mouth is soft there is a normal tongue.  Neck: Supple, no meningismus, no spinal tenderness  Cardiac: Regular rate and rhythm  Pulmonary: Clear to auscultation bilaterally no distress  Abdomen: Soft, epigastric tenderness without rebound or guarding, nondistended  Back: Nontender, no CVA tenderness, no spinal tenderness  Psych: Anxious  Skin: Pink warm dry  Extremities: Full range of motion, muscle strength sensation intact 2+ pulses    DIAGNOSTIC STUDIES/PROCEDURES  Labs:   Labs Reviewed   CBC WITH DIFFERENTIAL - Abnormal; Notable for the following components:       Result Value    WBC 3.7 (*)     Neutrophils-Polys 86.80 (*)     Lymphocytes 11.80 (*)     Lymphs (Absolute) 0.44 (*)     All other components within normal limits   COMP METABOLIC PANEL - Abnormal; Notable for the following components:    Glucose 130 (*)     Bun 28 (*)     All other components within normal limits   LIPASE   TROPONIN   CHLAMYDIA/GC, PCR (URINE)   ESTIMATED GFR   HIV AG/AB COMBO ASSAY SCREENING   T.PALLIDUM AB ISA (SCREENING)     I have independently interpreted the above labs    EKG:   Results for orders placed or performed during the hospital encounter of 23   EKG   Result Value Ref Range    Report       [facility] Emergency Dept.    Test Date:  2023  Pt Name:    MANOJ MARISCAL                 Department: ER  MRN:        [id number]                      Room:  Gender:     Male                         Technician: [id number]  :        1970                   Requested By:ER TRIAGE PROTOCOL  Order #:    [id number]                    Majo MD: Jose Alejandro Phillip MD    Measurements  Intervals                                Axis  Rate:       79                           P:          72  WY:         134                          QRS:        15  QRSD:       84                           T:          44  QT:         386  QTc:         443    Interpretive Statements  Sinus rhythm  No escalations or ST depressions  Normal axis  QTc within normal limits  Electronically Signed On 09- 21:31:36 PDT by Jose Alejandro Phillip MD       *Note: Due to a large number of results and/or encounters for the requested time period, some results have not been displayed. A complete set of results can be found in Results Review.     I have independently interpreted this EKG    Radiology:   This attending emergency physician has independently interpreted the diagnostic imaging associated with this visit and is awaiting the final reading from the radiologist.   Preliminary interpretation is a follows: No intra-abdominal pathology.  Sinus mucoperiosteal thickening.  Radiologist interpretation:   CT-ABDOMEN-PELVIS WITH   Final Result      No evidence of bowel obstruction or focal inflammatory change.         CT-MAXILLOFACIAL W/O PLUS RECONS   Final Result      1.  No evidence of facial fracture.      2.  Minimal bilateral maxillary sinus mucoperiosteal thickening consistent with chronic sinus disease.      DX-CHEST-PORTABLE (1 VIEW)   Final Result      No evidence of acute cardiopulmonary process.           COURSE & MEDICAL DECISION MAKING         INITIAL ASSESSMENT, COURSE AND PLAN  Differential diagnoses include but not limited to: Pancreatitis, gastritis, biliary colic, colitis, enteritis, sinusitis.    Care Narrative: 55-year-old here with acute on chronic abdominal pain and sinus pressure.    8:29 PM - Patient was first seen and evaluated at bedside.  Labs CT and intervention.    Medications   metoclopramide (Reglan) injection 10 mg (10 mg Intravenous Refused 5/29/25 2030)   NS infusion 1,000 mL (0 mL Intravenous Stopped 5/29/25 2159)   diphenhydrAMINE (Benadryl) injection 25 mg (25 mg Intravenous Given 5/29/25 2102)   GI Cocktail (hyoscyamine-lidocaine-Maalox) oral susp cup 30 mL (30 mL Oral Given 5/29/25 2101)   diazePAM (Valium) injection 5 mg (5 mg  Intravenous Given 5/29/25 2104)   HYDROmorphone (Dilaudid) injection 1 mg (1 mg Intravenous Given 5/29/25 2108)   promethazine (Phenergan) tablet 25 mg (25 mg Oral Given 5/29/25 2109)   iohexol (OMNIPAQUE) 350 mg/mL (IV) (100 mL Intravenous Given 5/29/25 2147)            ED COURSE AND ADDITIONAL PROBLEMS    1. Acute abdominal pain Acute: Controlled with hydrocodone.  CT abdomen pelvis negative.  LFTs lipase normal.  Patient agrees to follow-up primary care in the next 48 hours.     2. Hypertension, unspecified type Acute prolonged discussion with patient regarding his elevated blood pressure.  He agrees to follow-up with primary care to discuss in further detail.  Hypertension counseling:   I spent a total of 5 minutes counseling patient on blood pressure control and management.  We discussed medication management with PCP.  I recommended keeping a blood pressure journal, taking the blood pressure once in the morning once evening after resting for 5 minutes.  Presented in general to PCP and discussing medication management or adjustment.  Discussed hypertensive emergencies and what signs and symptoms to be monitoring for and to return to the ED for.  Patient verbalized understanding and was receptive of counseling.     3. Anxiety Acute: Treated with Valium.  Patient feeling much better on reevaluation.     4. Pharyngitis, unspecified etiology Acute: Uvula midline without edema, no posterior oropharyngeal exudate, the floor the mouth is soft, no trismus no stridor, speaks in a normal voice.     5. Hyperglycemia Acute: Crystalloid.  Follow-up with primary care.  :    6. Chronic sinusitis, unspecified location Acute:We do prolonged discussion the patient has had this for many months if not years.  We prescribed fluticasone.  We recommend follow-up with ear nose and throat primary care.  Patient agrees with this plan.     7. Screen for STD (sexually transmitted disease) Acute: We offered to screen the patient for STDs  and he did not want to provide urine however we did send some blood test and he is jh follow-up with Jeniffer and discussed with primary care.  He is of decision-making capacity agrees with reasons to return and follow-up.        DISPOSITION AND DISCUSSIONS    I have discussed management of the patient with the following physicians and BECKA's: None    Discussion of management with other [facility] or appropriate source(s): CT technologist    Escalation of care considered, and ultimately not performed: acute inpatient care management, however at this time, the patient is most appropriate for outpatient management.    Barriers to care at this time, including but not limited to: None.     Decision tools and prescription drugs considered including, but not limited to: Patient agrees to take his primary long-term medications as prescribed.     The patient will return for new or worsening symptoms and is stable at the time of discharge.    The patient is referred to a primary physician for blood pressure management, diabetic screening, and for all other preventative health concerns.        DISPOSITION:  Patient will be discharged home in stable condition.    FOLLOW UP:  John Chery D.O.  [address] [id number] [phone]    Schedule an appointment as soon as possible for a visit in 1 day      [facility], Emergency Dept  [address] [facility] [id number] [phone]    As needed, If symptoms worsen    [facility]  [address] [facility] [id number] [phone]          OUTPATIENT MEDICATIONS:  Discharge Medication List as of 5/29/2025 10:37 PM        START taking these medications    Details   fluticasone (FLONASE) 50 MCG/ACT nasal spray Administer 1 Spray into affected nostril(S) every day for 7 days., Disp-9.9 mL, R-0, Normal               FINAL DIAGNOSIS  1. Acute abdominal pain Acute   2. Hypertension, unspecified type Acute   3. Anxiety Acute    4. Pharyngitis, unspecified etiology Acute   5. Hyperglycemia Acute   6. Chronic sinusitis, unspecified location Acute   7. Screen for STD (sexually transmitted disease) Acute   8.  Hypertension counseling     IGary D.O. (Scribe), am scribing for, and in the presence of, Gary Pollard D.O..    Electronically signed by: Gary Pollard D.O. (Scribe), 5/29/2025    IGary D.O. personally performed the services described in this documentation, as scribed by Gary Pollard D.O. in my presence, and it is both accurate and complete.      The note accurately reflects work and decisions made by me.  Gary Pollard D.O.  5/30/2025  12:03 AM         [1]   Past Medical History:  Diagnosis Date    Allergy     Arthritis     Asthma     Breath shortness     Per pt he is very debilitated and has no exercise tolerance. States he becomes severely short of breath and fatigued with basic ADLs such as getting dressed in the morning, cannot climb stairs and sometimes uses a wheelchair. Patient is on 3L of continuous oxygen as of 4/10/25.    Chronic kidney disease     Claustrophobia     Clostridium difficile diarrhea     Clotting disorder (HCC)     Concussion     Connective tissue disorder (HCC)     Coughing blood     Pt states he doesn't know what causes the bloody cough as of 4/10/25.    Delayed emergence from general anesthesia     Trouble coming out of sedation during a dental procedure    Dental disorder     Loose and chipped teeth    Depression     DVT (deep venous thrombosis) (HCC)     Fibromyalgia, primary     GERD (gastroesophageal reflux disease)     Heart burn     Hiatus hernia syndrome     States he has had one hernia repaired and has another currently untreated as of 4/10/25.    Hypertension     IBD (inflammatory bowel disease)     Indigestion     Learning disability     Migraine     MRSA (methicillin resistant Staphylococcus aureus)     Pancreatitis     PE (pulmonary embolism)      "Pneumonia     Psychiatric disorder     PTSD, depression    Pulmonary fibrosis (HCC)     Seizure (HCC)     Pt states he is not sure if he truly has seizures as of 4/10/25.    Sleep apnea     Spinal disorder     Stomach ulcer     Supplemental oxygen dependent    [2]   Past Surgical History:  Procedure Laterality Date    INGUINAL HERNIA REPAIR Right 09/06/2019    DENTAL EXTRACTION(S)      HERNIA REPAIR Right     NERVE ULNAR REPAIR OR EXPLORE      ORIF, ELBOW      ORIF, FRACTURE, FEMUR      DE CLOSED RX NASAL SEPTAL FRACTURE      RECTAL EXPLORATION     [3]   Allergies  Allergen Reactions    Capsaicin Unspecified, Rash and Shortness of Breath     Other Reaction(s): Eruption, Tachypnea    Capsaicin-Turpentine Anaphylaxis and Rash          Celery Oil Hives     Celery causes hives    Ciprofloxacin Anaphylaxis and Unspecified    Doxycycline Anaphylaxis    Duloxetine Hives    Haloperidol Unspecified and Anaphylaxis    Shellfish Allergy Anaphylaxis    Tape Hives    Wasp Venom Anaphylaxis    Amoxicillin-Pot Clavulanate Rash     Other reaction(s): .Unknown  rash      Dalteparin Diarrhea          Sulfamethoxazole W-Trimethoprim Hives          Abilify [Aripiprazole] Anxiety     Pt reports SI and HI tendencies    Amoxicillin Unspecified and Anaphylaxis    Apple Nausea    Chocolate Unspecified    Gabapentin Unspecified    Metoclopramide Anaphylaxis     Has no idea what his RX is    Prazosin Unspecified    Acetaminophen Unspecified and Vomiting     \"Makes me deathly ill\"    Other Reaction(s): Drowsy (finding), Nausea and vomiting (disorder), Nausea and vomiting (disorder), Drowsy (finding)      \"makes me deathly ill\" \"makes me deathly ill\"      Other reaction(s): Unknown      Other reaction(s): .Unknown \"makes me deathly ill\" \"makes me deathly ill\"      01/28/2012 21:05 [facility] - PILI HARDIN<br/><br/>patient states he is allergic to acetaminophen, makes him<br/>nauseated and drowsy      Other Reaction(s): Nausea and vomiting, Drowsy    " "Amitriptyline Hcl Unspecified     Unknown reaction     Aspirin Unspecified     Unknown reaction    Other Reaction(s): Bleeding from nose (finding), Bleeding from nose (finding), Bleeding from nose (finding), Bleeding from nose (finding)      10/16/2012 02:10 [facility] - CELESTINO RANGEL<br/><br/>Patient reported he gets nosebleeds when he takes aspirin      Other Reaction(s): Bleeding from nose    Baclofen Unspecified and Anaphylaxis     Unknown reaction    Biofreeze Unspecified     Unknown reaction     Carisoprodol Unspecified and Anaphylaxis     Unknown reaction    Other reaction(s): Unknown    Codeine Unspecified     Unknown reaction     Cyclobenzaprine Unspecified     Unknown reaction     Doxepin Unspecified     Unknown reaction    Duloxetine Hcl Unspecified     Unknown reaction     Enoxaparin Vomiting    Eszopiclone Unspecified     Unknown reaction     Hydrocodone-Acetaminophen Unspecified, Itching, Anaphylaxis and Vomiting     Unknown reaction    I DONT KNOW    Hydromorphone Itching     States \"I have an adverse reaction and need benadryl first\"    Ketamine Unspecified     Other reaction(s): .Unknown  Pt reports he \"gets violent\"    Latex Unspecified     Unknown reaction     Menthol Unspecified     Unknown reaction     Methocarbamol Unspecified     Unknown reaction     Morphine Vomiting    Nitroglycerin Unspecified and Anaphylaxis     Other reaction(s): Unknown    Ondansetron [Zofran] Unspecified     Unknown reaction     Oxycodone Unspecified     Unknown reaction     Oxycodone-Aspirin Unspecified     Unknown reaction     Prochlorperazine Unspecified     Hallucinations, dizziness    Propofol Unspecified     Unknown reaction     Propoxyphene Unspecified     Other reaction(s): Unknown    Quetiapine Unspecified     Other reaction(s): Unknown    Other Reaction(s): Headache, Swelling, Excitement, Swelling (finding), OTHER REACTION: SPEEDING THOUGHTS, Headache (finding), Excitement (finding), Swelling (finding), Headache " (finding), Swelling (finding), OTHER REACTION: SPEEDING THOUGHTS, Headache (finding), Excitement (finding), Swelling (finding), Headache (finding)      Other reaction(s): Unknown 06/05/2007 23:18 [facility] ANTWAN BRADFORD<br/><br/>Updated using auto clean up process from RA*4*29.  Changed reactant from QUETIAPINE (free text) to QUETIAPINE(file - PSNDF(50.6,) <br/><br/><br/>04/16/2007 23:21 [facility] - ANTWAN BRADFORD<br/><br/>Changed from QUETIAPINE (File 120.82) to free text by patch RA*4*36 <br/><br/><br/>04/08/2005 21:10 [facility] - DONNELL CHACON<br/><br/>PT HAD JUST RESTARTED THE MEDICATION WHEN ADR OCCURRED.  PSYCH HAS DC'D      Other reaction(s): Unknown      06/05/2007 23:18 [facility] ANTWAN BRADFORD<br/><br/>Updated using auto clean up process from RA*4*29.  Changed reactant from QUETIAPINE (free text) to QUETIAPINE(file - PSNDF(50.6,) <br/><br/><br/>04/16/2007 23:21 [facility] ANTWAN BRADFORD<br/><br/>Changed from QUETIAPINE (File 120.82) to free text by patch RA*4*36 <br/><br/><br/>04/08/2005 21:10 [facility] - DONNELL CHACON<br/><br/>PT HAD JUST RESTARTED THE MEDICATION WHEN ADR OCCURRED.  PSYCH HAS DC'D      Other Reaction(s): Headache, SPEEDING THOUGHTS, SWELLING, Headache, SPEEDING THOUGHTS, SWELLING    Rabeprazole Nausea, Vomiting and Unspecified     cannot remember cannot remember 06/05/2007 23:16 [facility] ANTWAN BRADFORD<br/><br/>Updated using auto clean up process from RA*4*29.  Changed reactant from RABEPRAZOLE (free text) to RABEPRAZOLE(file - PSNDF(50.6,) <br/><br/><br/>04/16/2007 23:18 [facility] - ANTWAN BRADFORD<br/><br/>Changed from RABEPRAZOLE (File 120.82) to free text by patch GMRA*4*36 <br/><br/><br/>02/21/2007 00:57 [facility] - JOSE WHITMAN<br/><br/>Updated using clean up process.  Changed reactant from RABEPRAZOLE (ingredient) to RABEPRAZOLE(file - PSNDF(50.6,) <br/><br/><br/>01/17/2002 18:01 [facility] - JUAN MURRAY<br/><br/>reported by Dr. Antwan Kinney on lansoprazole request form      06/05/2007 23:16 [facility] -  ANTWAN BRADFORD<br/><br/>Updated using auto clean up process from RA*4*29.  Changed reactant from RABEPRAZOLE (free text) to RABEPRAZOLE(file - PSNDF(50.6,) <br/><br/><br/>04/16/2007 23:18 [facility] - ANTWAN BRADFORD<br/><br/>Changed from RABEPRAZOLE (File 120.82) to free text by patch GMRA*4*36 <br/><br/><br/>02/21/2007 00:57 [facility] - JOSE WHITMAN<br/><br/>Updated using clean up process.  Changed reactant from RABEPRAZOLE (ingredient) to RABEPRAZOLE(file - PSNDF(50.6,) <br/><br/><br/>01/17/2002 18:01 [facility] - JUAN MURRAY<br/><br/>reported by Dr. Antwan Kinney on lansoprazole request form      cannot remember cannot remember 06/05/2007 23:16 [facility] - ANTWAN BRADFORD<br/><br/>Updated using auto clean up process from RA*4*29.  Changed reactant from RABEPRAZOLE (free text) to RABEPRAZOLE(file - PSNDF(50.6,) <br/><br/><br/>04/16/2007 23:18 [facility] - SUZETTE,... (TRUNCATED)    Risperidone Unspecified     Unknown reaction     Tramadol Unspecified     Unknown reaction

## 2025-05-30 NOTE — ED NOTES
Dc instructions and medications discussed with patient at bedside. All questions answered at this time. VSS. No IV in place at this time. Pt to lobby without incident. Friend to drive patient home.   Pt to attempt to give UA prior to discharge.

## 2025-06-06 ENCOUNTER — OFFICE VISIT (OUTPATIENT)
Dept: MEDICAL GROUP | Facility: PHYSICIAN GROUP | Age: 55
End: 2025-06-06
Payer: MEDICARE

## 2025-06-06 VITALS
BODY MASS INDEX: 29.26 KG/M2 | HEIGHT: 72 IN | RESPIRATION RATE: 12 BRPM | WEIGHT: 216 LBS | DIASTOLIC BLOOD PRESSURE: 76 MMHG | SYSTOLIC BLOOD PRESSURE: 100 MMHG | HEART RATE: 70 BPM | TEMPERATURE: 96.8 F | OXYGEN SATURATION: 96 %

## 2025-06-06 DIAGNOSIS — J32.9 CHRONIC SINUSITIS, UNSPECIFIED LOCATION: Primary | ICD-10-CM

## 2025-06-06 DIAGNOSIS — K86.1 CHRONIC PANCREATITIS, UNSPECIFIED PANCREATITIS TYPE (HCC): ICD-10-CM

## 2025-06-06 PROCEDURE — 3074F SYST BP LT 130 MM HG: CPT | Performed by: STUDENT IN AN ORGANIZED HEALTH CARE EDUCATION/TRAINING PROGRAM

## 2025-06-06 PROCEDURE — 3078F DIAST BP <80 MM HG: CPT | Performed by: STUDENT IN AN ORGANIZED HEALTH CARE EDUCATION/TRAINING PROGRAM

## 2025-06-06 PROCEDURE — 99214 OFFICE O/P EST MOD 30 MIN: CPT | Performed by: STUDENT IN AN ORGANIZED HEALTH CARE EDUCATION/TRAINING PROGRAM

## 2025-06-06 ASSESSMENT — FIBROSIS 4 INDEX: FIB4 SCORE: 1.22
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
## 2025-06-13 ENCOUNTER — APPOINTMENT (OUTPATIENT)
Dept: MEDICAL GROUP | Facility: PHYSICIAN GROUP | Age: 55
End: 2025-06-13
Payer: MEDICARE

## 2025-06-16 ENCOUNTER — OFFICE VISIT (OUTPATIENT)
Dept: NEUROLOGY | Facility: MEDICAL CENTER | Age: 55
End: 2025-06-16
Attending: PSYCHIATRY & NEUROLOGY
Payer: COMMERCIAL

## 2025-06-16 VITALS
OXYGEN SATURATION: 97 % | HEIGHT: 72 IN | SYSTOLIC BLOOD PRESSURE: 120 MMHG | DIASTOLIC BLOOD PRESSURE: 80 MMHG | HEART RATE: 97 BPM | BODY MASS INDEX: 29.29 KG/M2

## 2025-06-16 DIAGNOSIS — R56.9 OBSERVED SEIZURE-LIKE ACTIVITY (HCC): Primary | ICD-10-CM

## 2025-06-16 PROCEDURE — 99214 OFFICE O/P EST MOD 30 MIN: CPT | Performed by: PSYCHIATRY & NEUROLOGY

## 2025-06-16 PROCEDURE — 99204 OFFICE O/P NEW MOD 45 MIN: CPT | Performed by: PSYCHIATRY & NEUROLOGY

## 2025-06-16 PROCEDURE — 3074F SYST BP LT 130 MM HG: CPT | Performed by: PSYCHIATRY & NEUROLOGY

## 2025-06-16 PROCEDURE — 3079F DIAST BP 80-89 MM HG: CPT | Performed by: PSYCHIATRY & NEUROLOGY

## 2025-06-16 ASSESSMENT — PATIENT HEALTH QUESTIONNAIRE - PHQ9
CLINICAL INTERPRETATION OF PHQ2 SCORE: 6
5. POOR APPETITE OR OVEREATING: 3 - NEARLY EVERY DAY
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
## 2025-06-16 NOTE — Clinical Note
REFERRAL APPROVAL NOTICE         Sent on June 16, 2025                   Landon Allred  [address]  [facility] [address]                   Dear Mr. Allred,    After a careful review of the medical information and benefit coverage, [facility] has processed your referral. See below for additional details.    If applicable, you must be actively enrolled with your insurance for coverage of the authorized service. If you have any questions regarding your coverage, please contact your insurance directly.    REFERRAL INFORMATION   Referral #:  [id number]  Referred-To Provider    Referred-By Provider:  Otolaryngology    Aurosis MISHA Chery   [facility]      [address] [facility]  [address]  [facility] [address]  [phone] [address]  [facility] [place] [address]  [phone]    Referral Start Date:  06/06/2025  Referral End Date:   06/06/2026             SCHEDULING  If you do not already have an appointment, please call [phone] to make an appointment.     MORE INFORMATION  If you do not already have a [facility] account, sign up at: [email or web address]  You can access your medical information, make appointments, see lab results, billing information, and more.  If you have questions regarding this referral, please contact  the [facility] Referrals department at:             [phone]. Monday - Friday 8:00AM - 5:00PM.     Sincerely,    [facility]

## 2025-06-17 ENCOUNTER — HOSPITAL ENCOUNTER (OUTPATIENT)
Facility: MEDICAL CENTER | Age: 55
End: 2025-06-18
Attending: EMERGENCY MEDICINE | Admitting: HOSPITALIST
Payer: COMMERCIAL

## 2025-06-17 DIAGNOSIS — K85.90 ACUTE ON CHRONIC PANCREATITIS (HCC): Primary | ICD-10-CM

## 2025-06-17 DIAGNOSIS — R55 SYNCOPE, UNSPECIFIED SYNCOPE TYPE: ICD-10-CM

## 2025-06-17 DIAGNOSIS — K86.1 ACUTE ON CHRONIC PANCREATITIS (HCC): Primary | ICD-10-CM

## 2025-06-17 LAB
ALBUMIN SERPL BCP-MCNC: 4.2 G/DL (ref 3.2–4.9)
ALBUMIN/GLOB SERPL: 1.6 G/DL
ALP SERPL-CCNC: 57 U/L (ref 30–99)
ALT SERPL-CCNC: 25 U/L (ref 2–50)
ANION GAP SERPL CALC-SCNC: 10 MMOL/L (ref 7–16)
APPEARANCE UR: CLEAR
AST SERPL-CCNC: 24 U/L (ref 12–45)
BASOPHILS # BLD AUTO: 0.3 % (ref 0–1.8)
BASOPHILS # BLD: 0.01 K/UL (ref 0–0.12)
BILIRUB SERPL-MCNC: 0.5 MG/DL (ref 0.1–1.5)
BILIRUB UR QL STRIP.AUTO: NEGATIVE
BUN SERPL-MCNC: 20 MG/DL (ref 8–22)
CALCIUM ALBUM COR SERPL-MCNC: 9 MG/DL (ref 8.5–10.5)
CALCIUM SERPL-MCNC: 9.2 MG/DL (ref 8.5–10.5)
CHLORIDE SERPL-SCNC: 109 MMOL/L (ref 96–112)
CO2 SERPL-SCNC: 20 MMOL/L (ref 20–33)
COLOR UR: YELLOW
CREAT SERPL-MCNC: 1.09 MG/DL (ref 0.5–1.4)
EKG IMPRESSION: NORMAL
EOSINOPHIL # BLD AUTO: 0.09 K/UL (ref 0–0.51)
EOSINOPHIL NFR BLD: 2.4 % (ref 0–6.9)
ERYTHROCYTE [DISTWIDTH] IN BLOOD BY AUTOMATED COUNT: 46.1 FL (ref 35.9–50)
GFR SERPLBLD CREATININE-BSD FMLA CKD-EPI: 80 ML/MIN/1.73 M 2
GLOBULIN SER CALC-MCNC: 2.6 G/DL (ref 1.9–3.5)
GLUCOSE SERPL-MCNC: 92 MG/DL (ref 65–99)
GLUCOSE UR STRIP.AUTO-MCNC: NEGATIVE MG/DL
HCT VFR BLD AUTO: 43.7 % (ref 42–52)
HGB BLD-MCNC: 14.7 G/DL (ref 14–18)
IMM GRANULOCYTES # BLD AUTO: 0.01 K/UL (ref 0–0.11)
IMM GRANULOCYTES NFR BLD AUTO: 0.3 % (ref 0–0.9)
KETONES UR STRIP.AUTO-MCNC: NEGATIVE MG/DL
LEUKOCYTE ESTERASE UR QL STRIP.AUTO: NEGATIVE
LIPASE SERPL-CCNC: 169 U/L (ref 11–82)
LYMPHOCYTES # BLD AUTO: 1.2 K/UL (ref 1–4.8)
LYMPHOCYTES NFR BLD: 31.7 % (ref 22–41)
MCH RBC QN AUTO: 32.2 PG (ref 27–33)
MCHC RBC AUTO-ENTMCNC: 33.6 G/DL (ref 32.3–36.5)
MCV RBC AUTO: 95.6 FL (ref 81.4–97.8)
MICRO URNS: NORMAL
MONOCYTES # BLD AUTO: 0.4 K/UL (ref 0–0.85)
MONOCYTES NFR BLD AUTO: 10.6 % (ref 0–13.4)
NEUTROPHILS # BLD AUTO: 2.08 K/UL (ref 1.82–7.42)
NEUTROPHILS NFR BLD: 54.7 % (ref 44–72)
NITRITE UR QL STRIP.AUTO: NEGATIVE
NRBC # BLD AUTO: 0 K/UL
NRBC BLD-RTO: 0 /100 WBC (ref 0–0.2)
PH UR STRIP.AUTO: 5 [PH] (ref 5–8)
PLATELET # BLD AUTO: 149 K/UL (ref 164–446)
PMV BLD AUTO: 10 FL (ref 9–12.9)
POTASSIUM SERPL-SCNC: 4.1 MMOL/L (ref 3.6–5.5)
PROT SERPL-MCNC: 6.8 G/DL (ref 6–8.2)
PROT UR QL STRIP: NEGATIVE MG/DL
RBC # BLD AUTO: 4.57 M/UL (ref 4.7–6.1)
RBC UR QL AUTO: NEGATIVE
SODIUM SERPL-SCNC: 139 MMOL/L (ref 135–145)
SP GR UR STRIP.AUTO: 1.02
UROBILINOGEN UR STRIP.AUTO-MCNC: 0.2 EU/DL
WBC # BLD AUTO: 3.8 K/UL (ref 4.8–10.8)

## 2025-06-17 PROCEDURE — 93005 ELECTROCARDIOGRAM TRACING: CPT | Mod: TC

## 2025-06-17 PROCEDURE — 700105 HCHG RX REV CODE 258: Performed by: EMERGENCY MEDICINE

## 2025-06-17 PROCEDURE — 99285 EMERGENCY DEPT VISIT HI MDM: CPT

## 2025-06-17 PROCEDURE — 99223 1ST HOSP IP/OBS HIGH 75: CPT | Mod: AI | Performed by: HOSPITALIST

## 2025-06-17 PROCEDURE — 36415 COLL VENOUS BLD VENIPUNCTURE: CPT

## 2025-06-17 PROCEDURE — 85025 COMPLETE CBC W/AUTO DIFF WBC: CPT

## 2025-06-17 PROCEDURE — 80053 COMPREHEN METABOLIC PANEL: CPT

## 2025-06-17 PROCEDURE — 96374 THER/PROPH/DIAG INJ IV PUSH: CPT

## 2025-06-17 PROCEDURE — G0378 HOSPITAL OBSERVATION PER HR: HCPCS

## 2025-06-17 PROCEDURE — 83690 ASSAY OF LIPASE: CPT

## 2025-06-17 PROCEDURE — 81003 URINALYSIS AUTO W/O SCOPE: CPT

## 2025-06-17 PROCEDURE — 700111 HCHG RX REV CODE 636 W/ 250 OVERRIDE (IP): Mod: JZ | Performed by: EMERGENCY MEDICINE

## 2025-06-17 PROCEDURE — 93005 ELECTROCARDIOGRAM TRACING: CPT | Mod: TC | Performed by: EMERGENCY MEDICINE

## 2025-06-17 PROCEDURE — 96375 TX/PRO/DX INJ NEW DRUG ADDON: CPT

## 2025-06-17 RX ORDER — SODIUM CHLORIDE, SODIUM LACTATE, POTASSIUM CHLORIDE, CALCIUM CHLORIDE 600; 310; 30; 20 MG/100ML; MG/100ML; MG/100ML; MG/100ML
INJECTION, SOLUTION INTRAVENOUS CONTINUOUS
Status: DISCONTINUED | OUTPATIENT
Start: 2025-06-17 | End: 2025-06-18 | Stop reason: HOSPADM

## 2025-06-17 RX ORDER — HYDROMORPHONE HYDROCHLORIDE 1 MG/ML
1 INJECTION, SOLUTION INTRAMUSCULAR; INTRAVENOUS; SUBCUTANEOUS EVERY 6 HOURS PRN
Status: DISCONTINUED | OUTPATIENT
Start: 2025-06-17 | End: 2025-06-18 | Stop reason: HOSPADM

## 2025-06-17 RX ORDER — SODIUM CHLORIDE 9 MG/ML
1000 INJECTION, SOLUTION INTRAVENOUS ONCE
Status: COMPLETED | OUTPATIENT
Start: 2025-06-17 | End: 2025-06-17

## 2025-06-17 RX ORDER — HYDROMORPHONE HYDROCHLORIDE 2 MG/1
1 TABLET ORAL EVERY 6 HOURS PRN
Refills: 0 | Status: DISCONTINUED | OUTPATIENT
Start: 2025-06-17 | End: 2025-06-18 | Stop reason: HOSPADM

## 2025-06-17 RX ORDER — CLONAZEPAM 1 MG/1
2 TABLET ORAL NIGHTLY
COMMUNITY
End: 2025-06-19

## 2025-06-17 RX ORDER — ACETAMINOPHEN 325 MG/1
650 TABLET ORAL EVERY 6 HOURS PRN
Status: DISCONTINUED | OUTPATIENT
Start: 2025-06-17 | End: 2025-06-18 | Stop reason: HOSPADM

## 2025-06-17 RX ORDER — CLONAZEPAM 1 MG/1
2 TABLET ORAL NIGHTLY
Status: DISCONTINUED | OUTPATIENT
Start: 2025-06-17 | End: 2025-06-18 | Stop reason: HOSPADM

## 2025-06-17 RX ORDER — DIPHENHYDRAMINE HCL 25 MG
50 TABLET ORAL EVERY 6 HOURS PRN
Status: DISCONTINUED | OUTPATIENT
Start: 2025-06-17 | End: 2025-06-18 | Stop reason: HOSPADM

## 2025-06-17 RX ORDER — PROMETHAZINE HYDROCHLORIDE 25 MG/1
25 TABLET ORAL EVERY 6 HOURS PRN
Status: DISCONTINUED | OUTPATIENT
Start: 2025-06-17 | End: 2025-06-18 | Stop reason: HOSPADM

## 2025-06-17 RX ORDER — ZONISAMIDE 50 MG/1
50 CAPSULE ORAL 2 TIMES DAILY
Status: DISCONTINUED | OUTPATIENT
Start: 2025-06-17 | End: 2025-06-18 | Stop reason: HOSPADM

## 2025-06-17 RX ORDER — HYDROMORPHONE HYDROCHLORIDE 1 MG/ML
1 INJECTION, SOLUTION INTRAMUSCULAR; INTRAVENOUS; SUBCUTANEOUS ONCE
Status: COMPLETED | OUTPATIENT
Start: 2025-06-17 | End: 2025-06-17

## 2025-06-17 RX ORDER — DIPHENHYDRAMINE HCL 25 MG
50 TABLET ORAL NIGHTLY PRN
Status: DISCONTINUED | OUTPATIENT
Start: 2025-06-17 | End: 2025-06-17

## 2025-06-17 RX ORDER — DIPHENHYDRAMINE HYDROCHLORIDE 50 MG/ML
25 INJECTION, SOLUTION INTRAMUSCULAR; INTRAVENOUS ONCE
Status: COMPLETED | OUTPATIENT
Start: 2025-06-17 | End: 2025-06-17

## 2025-06-17 RX ORDER — ONDANSETRON 4 MG/1
4 TABLET, ORALLY DISINTEGRATING ORAL EVERY 4 HOURS PRN
Status: DISCONTINUED | OUTPATIENT
Start: 2025-06-17 | End: 2025-06-18 | Stop reason: HOSPADM

## 2025-06-17 RX ORDER — ONDANSETRON 2 MG/ML
4 INJECTION INTRAMUSCULAR; INTRAVENOUS EVERY 4 HOURS PRN
Status: DISCONTINUED | OUTPATIENT
Start: 2025-06-17 | End: 2025-06-18 | Stop reason: HOSPADM

## 2025-06-17 RX ADMIN — HYDROMORPHONE HYDROCHLORIDE 1 MG: 1 INJECTION, SOLUTION INTRAMUSCULAR; INTRAVENOUS; SUBCUTANEOUS at 19:19

## 2025-06-17 RX ADMIN — SODIUM CHLORIDE 1000 ML: 9 INJECTION, SOLUTION INTRAVENOUS at 18:58

## 2025-06-17 RX ADMIN — DIPHENHYDRAMINE HYDROCHLORIDE 25 MG: 50 INJECTION, SOLUTION INTRAMUSCULAR; INTRAVENOUS at 19:19

## 2025-06-17 SDOH — ECONOMIC STABILITY: TRANSPORTATION INSECURITY
IN THE PAST 12 MONTHS, HAS LACK OF RELIABLE TRANSPORTATION KEPT YOU FROM MEDICAL APPOINTMENTS, MEETINGS, WORK OR FROM GETTING THINGS NEEDED FOR DAILY LIVING?: NO

## 2025-06-17 SDOH — ECONOMIC STABILITY: TRANSPORTATION INSECURITY
IN THE PAST 12 MONTHS, HAS THE LACK OF TRANSPORTATION KEPT YOU FROM MEDICAL APPOINTMENTS OR FROM GETTING MEDICATIONS?: NO

## 2025-06-17 ASSESSMENT — ENCOUNTER SYMPTOMS
DIAPHORESIS: 0
BLOOD IN STOOL: 0
TREMORS: 0
VOMITING: 1
PND: 0
HALLUCINATIONS: 0
HEMOPTYSIS: 0
WHEEZING: 0
HEADACHES: 0
POLYDIPSIA: 0
FALLS: 0
EYE PAIN: 0
SHORTNESS OF BREATH: 0
TINGLING: 0
FEVER: 0
FLANK PAIN: 0
ABDOMINAL PAIN: 1
LOSS OF CONSCIOUSNESS: 1
CLAUDICATION: 0
DOUBLE VISION: 0
NAUSEA: 1
COUGH: 0
PALPITATIONS: 0
SINUS PAIN: 0
STRIDOR: 0
BRUISES/BLEEDS EASILY: 0
DEPRESSION: 0
HEARTBURN: 0
DIZZINESS: 1
SPUTUM PRODUCTION: 0
WEAKNESS: 0
BLURRED VISION: 0
CHILLS: 0
PHOTOPHOBIA: 0
ORTHOPNEA: 0
SORE THROAT: 0
BACK PAIN: 0
NECK PAIN: 0
CONSTIPATION: 0
DIARRHEA: 0
MYALGIAS: 0

## 2025-06-17 ASSESSMENT — SOCIAL DETERMINANTS OF HEALTH (SDOH)
WITHIN THE PAST 12 MONTHS, THE FOOD YOU BOUGHT JUST DIDN'T LAST AND YOU DIDN'T HAVE MONEY TO GET MORE: NEVER TRUE
WITHIN THE PAST 12 MONTHS, YOU WORRIED THAT YOUR FOOD WOULD RUN OUT BEFORE YOU GOT THE MONEY TO BUY MORE: NEVER TRUE
WITHIN THE LAST YEAR, HAVE YOU BEEN AFRAID OF YOUR PARTNER OR EX-PARTNER?: NO
WITHIN THE LAST YEAR, HAVE YOU BEEN KICKED, HIT, SLAPPED, OR OTHERWISE PHYSICALLY HURT BY YOUR PARTNER OR EX-PARTNER?: NO
IN THE PAST 12 MONTHS, HAS THE ELECTRIC, GAS, OIL, OR WATER COMPANY THREATENED TO SHUT OFF SERVICE IN YOUR HOME?: NO
WITHIN THE LAST YEAR, HAVE YOU BEEN HUMILIATED OR EMOTIONALLY ABUSED IN OTHER WAYS BY YOUR PARTNER OR EX-PARTNER?: NO
WITHIN THE LAST YEAR, HAVE TO BEEN RAPED OR FORCED TO HAVE ANY KIND OF SEXUAL ACTIVITY BY YOUR PARTNER OR EX-PARTNER?: NO

## 2025-06-17 ASSESSMENT — PAIN DESCRIPTION - PAIN TYPE
TYPE: ACUTE PAIN
TYPE: ACUTE PAIN

## 2025-06-17 ASSESSMENT — FIBROSIS 4 INDEX
FIB4 SCORE: 1.77
FIB4 SCORE: 1.52

## 2025-06-17 ASSESSMENT — LIFESTYLE VARIABLES: SUBSTANCE_ABUSE: 0

## 2025-06-18 ENCOUNTER — APPOINTMENT (OUTPATIENT)
Dept: MEDICAL GROUP | Facility: PHYSICIAN GROUP | Age: 55
End: 2025-06-18
Payer: MEDICARE

## 2025-06-18 VITALS
RESPIRATION RATE: 16 BRPM | HEIGHT: 72 IN | HEART RATE: 54 BPM | WEIGHT: 180.12 LBS | TEMPERATURE: 97 F | OXYGEN SATURATION: 98 % | DIASTOLIC BLOOD PRESSURE: 70 MMHG | BODY MASS INDEX: 24.4 KG/M2 | SYSTOLIC BLOOD PRESSURE: 113 MMHG

## 2025-06-18 LAB
ALBUMIN SERPL BCP-MCNC: 3.8 G/DL (ref 3.2–4.9)
ALBUMIN/GLOB SERPL: 1.7 G/DL
ALP SERPL-CCNC: 55 U/L (ref 30–99)
ALT SERPL-CCNC: 24 U/L (ref 2–50)
ANION GAP SERPL CALC-SCNC: 9 MMOL/L (ref 7–16)
AST SERPL-CCNC: 23 U/L (ref 12–45)
BASOPHILS # BLD AUTO: 0.4 % (ref 0–1.8)
BASOPHILS # BLD: 0.02 K/UL (ref 0–0.12)
BILIRUB SERPL-MCNC: 0.7 MG/DL (ref 0.1–1.5)
BUN SERPL-MCNC: 18 MG/DL (ref 8–22)
CALCIUM ALBUM COR SERPL-MCNC: 9 MG/DL (ref 8.5–10.5)
CALCIUM SERPL-MCNC: 8.8 MG/DL (ref 8.5–10.5)
CHLORIDE SERPL-SCNC: 110 MMOL/L (ref 96–112)
CO2 SERPL-SCNC: 21 MMOL/L (ref 20–33)
CREAT SERPL-MCNC: 1.09 MG/DL (ref 0.5–1.4)
EOSINOPHIL # BLD AUTO: 0.07 K/UL (ref 0–0.51)
EOSINOPHIL NFR BLD: 1.5 % (ref 0–6.9)
ERYTHROCYTE [DISTWIDTH] IN BLOOD BY AUTOMATED COUNT: 46.6 FL (ref 35.9–50)
GFR SERPLBLD CREATININE-BSD FMLA CKD-EPI: 80 ML/MIN/1.73 M 2
GLOBULIN SER CALC-MCNC: 2.3 G/DL (ref 1.9–3.5)
GLUCOSE SERPL-MCNC: 89 MG/DL (ref 65–99)
HCT VFR BLD AUTO: 43.1 % (ref 42–52)
HGB BLD-MCNC: 14.4 G/DL (ref 14–18)
IMM GRANULOCYTES # BLD AUTO: 0.01 K/UL (ref 0–0.11)
IMM GRANULOCYTES NFR BLD AUTO: 0.2 % (ref 0–0.9)
LIPASE SERPL-CCNC: 49 U/L (ref 11–82)
LYMPHOCYTES # BLD AUTO: 1.7 K/UL (ref 1–4.8)
LYMPHOCYTES NFR BLD: 36.8 % (ref 22–41)
MCH RBC QN AUTO: 32.4 PG (ref 27–33)
MCHC RBC AUTO-ENTMCNC: 33.4 G/DL (ref 32.3–36.5)
MCV RBC AUTO: 96.9 FL (ref 81.4–97.8)
MONOCYTES # BLD AUTO: 0.39 K/UL (ref 0–0.85)
MONOCYTES NFR BLD AUTO: 8.4 % (ref 0–13.4)
NEUTROPHILS # BLD AUTO: 2.43 K/UL (ref 1.82–7.42)
NEUTROPHILS NFR BLD: 52.7 % (ref 44–72)
NRBC # BLD AUTO: 0 K/UL
NRBC BLD-RTO: 0 /100 WBC (ref 0–0.2)
PLATELET # BLD AUTO: 84 K/UL (ref 164–446)
PMV BLD AUTO: 11.2 FL (ref 9–12.9)
POTASSIUM SERPL-SCNC: 4.1 MMOL/L (ref 3.6–5.5)
PROT SERPL-MCNC: 6.1 G/DL (ref 6–8.2)
RBC # BLD AUTO: 4.45 M/UL (ref 4.7–6.1)
SODIUM SERPL-SCNC: 140 MMOL/L (ref 135–145)
WBC # BLD AUTO: 4.6 K/UL (ref 4.8–10.8)

## 2025-06-18 PROCEDURE — 83690 ASSAY OF LIPASE: CPT

## 2025-06-18 PROCEDURE — 85025 COMPLETE CBC W/AUTO DIFF WBC: CPT

## 2025-06-18 PROCEDURE — 96376 TX/PRO/DX INJ SAME DRUG ADON: CPT

## 2025-06-18 PROCEDURE — 80053 COMPREHEN METABOLIC PANEL: CPT

## 2025-06-18 PROCEDURE — 700105 HCHG RX REV CODE 258: Performed by: HOSPITALIST

## 2025-06-18 PROCEDURE — 36415 COLL VENOUS BLD VENIPUNCTURE: CPT

## 2025-06-18 PROCEDURE — 96375 TX/PRO/DX INJ NEW DRUG ADDON: CPT

## 2025-06-18 PROCEDURE — 99239 HOSP IP/OBS DSCHRG MGMT >30: CPT | Performed by: INTERNAL MEDICINE

## 2025-06-18 PROCEDURE — A9270 NON-COVERED ITEM OR SERVICE: HCPCS | Performed by: HOSPITALIST

## 2025-06-18 PROCEDURE — G0378 HOSPITAL OBSERVATION PER HR: HCPCS

## 2025-06-18 PROCEDURE — 700111 HCHG RX REV CODE 636 W/ 250 OVERRIDE (IP): Mod: JZ | Performed by: HOSPITALIST

## 2025-06-18 PROCEDURE — 700102 HCHG RX REV CODE 250 W/ 637 OVERRIDE(OP): Performed by: HOSPITALIST

## 2025-06-18 RX ORDER — DIPHENHYDRAMINE HYDROCHLORIDE 50 MG/ML
25 INJECTION, SOLUTION INTRAMUSCULAR; INTRAVENOUS EVERY 6 HOURS PRN
Status: DISCONTINUED | OUTPATIENT
Start: 2025-06-18 | End: 2025-06-18 | Stop reason: HOSPADM

## 2025-06-18 RX ORDER — HYDROMORPHONE HYDROCHLORIDE 2 MG/1
2 TABLET ORAL EVERY 8 HOURS PRN
Qty: 10 TABLET | Refills: 0 | Status: SHIPPED | OUTPATIENT
Start: 2025-06-18 | End: 2025-06-18

## 2025-06-18 RX ORDER — HYDROMORPHONE HYDROCHLORIDE 2 MG/1
2 TABLET ORAL EVERY 6 HOURS PRN
Qty: 12 TABLET | Refills: 0 | Status: SHIPPED | OUTPATIENT
Start: 2025-06-18 | End: 2025-06-21

## 2025-06-18 RX ADMIN — CLONAZEPAM 2 MG: 1 TABLET ORAL at 02:11

## 2025-06-18 RX ADMIN — ZONISAMIDE 50 MG: 50 CAPSULE ORAL at 09:40

## 2025-06-18 RX ADMIN — PROMETHAZINE HYDROCHLORIDE 25 MG: 25 TABLET ORAL at 07:58

## 2025-06-18 RX ADMIN — HYDROMORPHONE HYDROCHLORIDE 1 MG: 1 INJECTION, SOLUTION INTRAMUSCULAR; INTRAVENOUS; SUBCUTANEOUS at 07:59

## 2025-06-18 RX ADMIN — DIPHENHYDRAMINE HYDROCHLORIDE 25 MG: 50 INJECTION, SOLUTION INTRAMUSCULAR; INTRAVENOUS at 07:59

## 2025-06-18 RX ADMIN — RIVAROXABAN 20 MG: 20 TABLET, FILM COATED ORAL at 02:11

## 2025-06-18 RX ADMIN — HYDROMORPHONE HYDROCHLORIDE 1 MG: 1 INJECTION, SOLUTION INTRAMUSCULAR; INTRAVENOUS; SUBCUTANEOUS at 02:14

## 2025-06-18 RX ADMIN — DIPHENHYDRAMINE HYDROCHLORIDE 25 MG: 50 INJECTION, SOLUTION INTRAMUSCULAR; INTRAVENOUS at 02:06

## 2025-06-18 RX ADMIN — SODIUM CHLORIDE, POTASSIUM CHLORIDE, SODIUM LACTATE AND CALCIUM CHLORIDE: 600; 310; 30; 20 INJECTION, SOLUTION INTRAVENOUS at 02:43

## 2025-06-18 ASSESSMENT — LIFESTYLE VARIABLES
HAVE PEOPLE ANNOYED YOU BY CRITICIZING YOUR DRINKING: NO
DOES PATIENT WANT TO STOP DRINKING: NO
AVERAGE NUMBER OF DAYS PER WEEK YOU HAVE A DRINK CONTAINING ALCOHOL: 0
CONSUMPTION TOTAL: NEGATIVE
TOTAL SCORE: 0
HOW MANY TIMES IN THE PAST YEAR HAVE YOU HAD 5 OR MORE DRINKS IN A DAY: 0
ALCOHOL_USE: NO
EVER FELT BAD OR GUILTY ABOUT YOUR DRINKING: NO
EVER HAD A DRINK FIRST THING IN THE MORNING TO STEADY YOUR NERVES TO GET RID OF A HANGOVER: NO
TOTAL SCORE: 0
TOTAL SCORE: 0
ON A TYPICAL DAY WHEN YOU DRINK ALCOHOL HOW MANY DRINKS DO YOU HAVE: 0
HAVE YOU EVER FELT YOU SHOULD CUT DOWN ON YOUR DRINKING: NO

## 2025-06-18 ASSESSMENT — COGNITIVE AND FUNCTIONAL STATUS - GENERAL
STANDING UP FROM CHAIR USING ARMS: A LITTLE
MOBILITY SCORE: 20
CLIMB 3 TO 5 STEPS WITH RAILING: A LITTLE
TURNING FROM BACK TO SIDE WHILE IN FLAT BAD: A LITTLE
WALKING IN HOSPITAL ROOM: A LITTLE
SUGGESTED CMS G CODE MODIFIER MOBILITY: CJ
DRESSING REGULAR UPPER BODY CLOTHING: A LITTLE
TOILETING: A LITTLE
DRESSING REGULAR LOWER BODY CLOTHING: A LITTLE
DAILY ACTIVITIY SCORE: 21
SUGGESTED CMS G CODE MODIFIER DAILY ACTIVITY: CJ

## 2025-06-18 ASSESSMENT — PATIENT HEALTH QUESTIONNAIRE - PHQ9
5. POOR APPETITE OR OVEREATING: NOT AT ALL
1. LITTLE INTEREST OR PLEASURE IN DOING THINGS: SEVERAL DAYS
2. FEELING DOWN, DEPRESSED, IRRITABLE, OR HOPELESS: SEVERAL DAYS
1. LITTLE INTEREST OR PLEASURE IN DOING THINGS: NOT AT ALL
3. TROUBLE FALLING OR STAYING ASLEEP OR SLEEPING TOO MUCH: NOT AT ALL
4. FEELING TIRED OR HAVING LITTLE ENERGY: SEVERAL DAYS
6. FEELING BAD ABOUT YOURSELF - OR THAT YOU ARE A FAILURE OR HAVE LET YOURSELF OR YOUR FAMILY DOWN: NOT AL ALL
8. MOVING OR SPEAKING SO SLOWLY THAT OTHER PEOPLE COULD HAVE NOTICED. OR THE OPPOSITE, BEING SO FIGETY OR RESTLESS THAT YOU HAVE BEEN MOVING AROUND A LOT MORE THAN USUAL: NOT AT ALL
9. THOUGHTS THAT YOU WOULD BE BETTER OFF DEAD, OR OF HURTING YOURSELF: NOT AT ALL
SUM OF ALL RESPONSES TO PHQ9 QUESTIONS 1 AND 2: 0
7. TROUBLE CONCENTRATING ON THINGS, SUCH AS READING THE NEWSPAPER OR WATCHING TELEVISION: NOT AT ALL
SUM OF ALL RESPONSES TO PHQ9 QUESTIONS 1 AND 2: 2
SUM OF ALL RESPONSES TO PHQ QUESTIONS 1-9: 3

## 2025-06-18 ASSESSMENT — PAIN DESCRIPTION - PAIN TYPE
TYPE: ACUTE PAIN
TYPE: CHRONIC PAIN

## 2025-06-18 NOTE — DISCHARGE SUMMARY
Discharge Summary    CHIEF COMPLAINT ON ADMISSION  Chief Complaint   Patient presents with    Abdominal Pain     RUQ pain x one week. Worst over the last 5 days.     Syncope     Patient reports episodes of passing out several times over the last week.        Reason for Admission  Sent by MD     Admission Date  6/17/2025    CODE STATUS  Full Code    HPI & HOSPITAL COURSE    Mr. Landon Allred is a 55 y.o. male who presented 6/17/2025 with past medical history of TBI, chronic pain, PTSD, hypertension, migraines, chronic pancreatitis, history of PE who presents to the hospital on 6/17/2025  for periumbilical abdominal pain that started 1 week ago.      The pain is not exertional or positional.  Is associated with nausea, vomiting.  She was in the shower this morning and noticed dark red-colored vomit.  The patient states that he follows up with [facility] gastroenterology for his recurrent pancreatitis episodes.  He denies drinking alcohol or smoking cigarettes.  Patient's been having frequent syncopal episodes and being worked up by neurology for this.  He states that it might be seizure-like activity and is taking medication for it.  1 week ago patient was prescribed Abilify and propranolol but was unable to tolerated due to suicidal ideations.  The patient said that he was fired by multiple pain medications specialist.  He does have a pain pump that is not working and needs to be removed.  He is unable to have removed until he gets a pain specialist.     In ER, EKG noted  normal sinus rhythm without ST segment changes.  Initial vital signs within normal limits, no fever noted, no signs of sepsis with heart rate of 78, respirations 16 and O2 saturation on room air at 94%.  Notable lab findings include WBC 3.8, RBC 4.57, lipase 169.  Urine analysis negative for any signs of infectious process.  Patient admitted to hospital medicine for management of care.    During this hospitalization, patient was monitored overnight  under the observation unit.  Patient's pain was managed with IV Dilaudid with Benadryl as patient claims that this are the only medications that work for his pain management.  Patient did tolerate full liquid diet.  Supposedly, patient had a bout of loose stools, not diarrhea, however, patient claims that he wanted to be ruled out for C. difficile.  There is no signs of C. difficile infection at this time as patient has not had any diarrhea episodes, no fevers and no leukocytosis.  A recheck on his lipase level was done which is down to 49.    Patient seen and examined prior to being discharged.  Patient requested staying overnight to so he can continuously get IV Dilaudid and Benadryl.  However, discussed with patient that he does not have any signs of chronic pancreatitis at this time due to his lipase level being within normal limits down to 49, patient being able to tolerate oral intake, and patient being given his home medications.  I feel that this patient does have a high concern for drug-seeking behavior which was noted in his previous admission to our facility.    [facility]  Unfortunately, due to patient's behavior, patient has been dismissed from a multiple providers and specialties as he is demanding certain medication for pain management.  Which in turn makes me even more concerned that this patient has drug-seeking tendencies.  Patient does have a follow-up with Dr. Chery for his PCP needs.  Encourage patient to follow-up with his psychiatry Dr. Castillo as there is high concerns for psychogenic pain.  We will discharge patient home with oral Dilaudid 2 mg every 6 hours and patient claims that he does have a lot of Benadryl pills at home.  Prior to discharge, patient requested if he could get IV Dilaudid or IM Dilaudid in conjunction with IV Benadryl however, I had denied this request due to concerns of safety as patient will be driving back  home.  Patient was educated and counseled in regards to narcotic use while operating heavy machinery or in his case driving back to [place].  Patient to resume all home medications and follow-up with established primary care and specialty.  All questions and concerns answered prior to being discharged.  Patient discharged home.    Therefore, he is discharged in good and stable condition to home with close outpatient follow-up.    The patient recovered much more quickly than anticipated on admission.    Discharge Date  06/18/25      FOLLOW UP ITEMS POST DISCHARGE  Please call [phone] to schedule PCP appointment for patient.    Required specialty appointments include:       Discharge Instructions per ARIEL Pham    - Follow-up with established primary care provider Dr. Chery  - Follow-up with psychiatry Dr. Castillo  - Utilize prescribed Dilaudid 2 mg tablet every 6 hours as needed for pain  - Utilize already prescribed Benadryl at home  - Resume all other home medications  - Continue following up with [facility] for management of chronic pancreatitis  - [facility]    DIET: As tolerated    ACTIVITY: As tolerated    DIAGNOSIS: Periumbilical abdominal pain    Return to ER if symptoms persist, chest pain, palpitations, shortness of breath, numbness, tingling, weakness, and high fevers.      DISCHARGE DIAGNOSES  Principal Problem:    Acute on chronic pancreatitis (HCC) (POA: Yes)  Active Problems:    Chronic narcotic use (POA: Yes)    Syncopal episodes (POA: Yes)    Factor V deficiency (HCC) (POA: Yes)    Post-traumatic stress syndrome (Chronic) (POA: Yes)  Resolved Problems:    * No resolved hospital problems. *      FOLLOW UP  Future Appointments   Date Time Provider Department Center   6/18/2025  2:00 PM [facility]   6/19/2025  8:00 AM Gennaro Castillo M.D. [facility] [address]   8/6/2025  2:00 PM Sarmad Velazco,  M.D. [facility] None     [facility] MISHA Chery  [address] [facility] [address]  [place] [id number] [phone]    Schedule an appointment as soon as possible for a visit in 1 week(s)        MEDICATIONS ON DISCHARGE     Medication List        START taking these medications        Instructions   HYDROmorphone 2 MG Tabs  Commonly known as: Dilaudid   Take 1 Tablet by mouth every 8 hours as needed for Severe Pain for up to 3 days.  Dose: 2 mg            CONTINUE taking these medications        Instructions   clonazePAM 1 MG Tabs  Commonly known as: KlonoPIN   Take 2 mg by mouth every evening. 2 mg = 2 tablets  Dose: 2 mg     diphenhydrAMINE 25 MG Tabs  Commonly known as: Benadryl   Take 50 mg by mouth at bedtime as needed for Sleep.  Dose: 50 mg     EPINEPHrine 1 mg/10mL Sosy injection  Commonly known as: Adrenaline   Inject 1 mg into a catheter in the bone as needed (IM).  Dose: 1 mg     Pancrelipase (Lip-Prot-Amyl) 18764-66524 units Cpep   Take 4,000 Units by mouth 3 times a day before meals.  Dose: 4,000 Units     promethazine 25 MG Tabs  Commonly known as: Phenergan   Take 25 mg by mouth every 6 hours as needed for Nausea/Vomiting.  Dose: 25 mg     Xarelto 20 MG Tabs tablet  Generic drug: rivaroxaban   Take 20 mg by mouth with dinner.  Dose: 20 mg     zonisamide 50 MG capsule  Commonly known as: Zonegran   Take 50 mg by mouth 2 times a day.  Dose: 50 mg              Allergies  Allergies[1]    DIET  Orders Placed This Encounter   Procedures    Diet Order Diet: Clear Liquid     Standing Status:   Standing     Number of Occurrences:   1     Diet::   Clear Liquid [10]       ACTIVITY  As tolerated.  Weight bearing as tolerated    CONSULTATIONS  NONE    PROCEDURES  NONE    IMAGING  No orders to display         LABORATORY  Lab Results   Component Value Date    SODIUM 140 06/18/2025    POTASSIUM 4.1 06/18/2025    CHLORIDE 110 06/18/2025    CO2 21 06/18/2025    GLUCOSE 89 06/18/2025    BUN 18 06/18/2025    CREATININE 1.09  "06/18/2025    GLOMRATE 68 12/14/2022        Lab Results   Component Value Date    WBC 4.6 (L) 06/18/2025    HEMOGLOBIN 14.4 06/18/2025    HEMATOCRIT 43.1 06/18/2025    PLATELETCT 84 (L) 06/18/2025                 [1]   Allergies  Allergen Reactions    Capsaicin Unspecified, Rash and Shortness of Breath     Other Reaction(s): Eruption, Tachypnea    Capsaicin-Turpentine Anaphylaxis and Rash          Celery Oil Hives     Celery causes hives    Ciprofloxacin Anaphylaxis and Unspecified    Doxycycline Anaphylaxis    Duloxetine Hives    Haloperidol Unspecified and Anaphylaxis    Shellfish Allergy Anaphylaxis    Tape Hives    Wasp Venom Anaphylaxis    Amoxicillin-Pot Clavulanate Rash     Other reaction(s): .Unknown  rash      Dalteparin Diarrhea          Sulfamethoxazole W-Trimethoprim Hives          Abilify [Aripiprazole] Anxiety     Pt reports SI and HI tendencies    Amoxicillin Unspecified and Anaphylaxis    Apple Nausea    Chocolate Unspecified    Gabapentin Unspecified    Metoclopramide Anaphylaxis     Has no idea what his RX is    Prazosin Unspecified    Acetaminophen Unspecified and Vomiting     \"Makes me deathly ill\"    Other Reaction(s): Drowsy (finding), Nausea and vomiting (disorder), Nausea and vomiting (disorder), Drowsy (finding)      \"makes me deathly ill\" \"makes me deathly ill\"      Other reaction(s): Unknown      Other reaction(s): .Unknown \"makes me deathly ill\" \"makes me deathly ill\"      01/28/2012 21:05 [facility] - PILI HARDIN<br/><br/>patient states he is allergic to acetaminophen, makes him<br/>nauseated and drowsy      Other Reaction(s): Nausea and vomiting, Drowsy    Amitriptyline Hcl Unspecified     Unknown reaction     Aspirin Unspecified     Unknown reaction    Other Reaction(s): Bleeding from nose (finding), Bleeding from nose (finding), Bleeding from nose (finding), Bleeding from nose (finding)      10/16/2012 02:10 [facility] - CELESTINO RANGEL<br/><br/>Patient reported he gets nosebleeds when he takes " "aspirin      Other Reaction(s): Bleeding from nose    Baclofen Unspecified and Anaphylaxis     Unknown reaction    Biofreeze Unspecified     Unknown reaction     Carisoprodol Unspecified and Anaphylaxis     Unknown reaction    Other reaction(s): Unknown    Codeine Unspecified     Unknown reaction     Cyclobenzaprine Unspecified     Unknown reaction     Doxepin Unspecified     Unknown reaction    Duloxetine Hcl Unspecified     Unknown reaction     Enoxaparin Vomiting    Eszopiclone Unspecified     Unknown reaction     Hydrocodone-Acetaminophen Unspecified, Itching, Anaphylaxis and Vomiting     Unknown reaction    I DONT KNOW    Hydromorphone Itching     States \"I have an adverse reaction and need benadryl first\"    Ketamine Unspecified     Other reaction(s): .Unknown  Pt reports he \"gets violent\"    Latex Unspecified     Unknown reaction     Menthol Unspecified     Unknown reaction     Methocarbamol Unspecified     Unknown reaction     Morphine Vomiting    Nitroglycerin Unspecified and Anaphylaxis     Other reaction(s): Unknown    Ondansetron [Zofran] Unspecified     Unknown reaction     Oxycodone Unspecified     Unknown reaction     Oxycodone-Aspirin Unspecified     Unknown reaction     Prochlorperazine Unspecified     Hallucinations, dizziness    Propofol Unspecified     Unknown reaction     Propoxyphene Unspecified     Other reaction(s): Unknown    Quetiapine Unspecified     Other reaction(s): Unknown    Other Reaction(s): Headache, Swelling, Excitement, Swelling (finding), OTHER REACTION: SPEEDING THOUGHTS, Headache (finding), Excitement (finding), Swelling (finding), Headache (finding), Swelling (finding), OTHER REACTION: SPEEDING THOUGHTS, Headache (finding), Excitement (finding), Swelling (finding), Headache (finding)      Other reaction(s): Unknown 06/05/2007 23:18 [facility] - ANTWAN BRADFORD<br/><br/>Updated using auto clean up process from RA*4*29.  Changed reactant from QUETIAPINE (free text) to QUETIAPINE(file " - PSNDF(50.6,) <br/><br/><br/>04/16/2007 23:21 [facility] - ANTWAN BRADFORD<br/><br/>Changed from QUETIAPINE (File 120.82) to free text by patch GMRA*4*36 <br/><br/><br/>04/08/2005 21:10 [facility] - DONNELL CHACON<br/><br/>PT HAD JUST RESTARTED THE MEDICATION WHEN ADR OCCURRED.  PSYCH HAS DC'D      Other reaction(s): Unknown      06/05/2007 23:18 [facility] - ANTWAN BRADFORD<br/><br/>Updated using auto clean up process from GMRA*4*29.  Changed reactant from QUETIAPINE (free text) to QUETIAPINE(file - PSNDF(50.6,) <br/><br/><br/>04/16/2007 23:21 [facility] - ANTWAN BRADFORD<br/><br/>Changed from QUETIAPINE (File 120.82) to free text by patch GMRA*4*36 <br/><br/><br/>04/08/2005 21:10 [facility] - DONNELL CHACON<br/><br/>PT HAD JUST RESTARTED THE MEDICATION WHEN ADR OCCURRED.  PSYCH HAS DC'D      Other Reaction(s): Headache, SPEEDING THOUGHTS, SWELLING, Headache, SPEEDING THOUGHTS, SWELLING    Rabeprazole Nausea, Vomiting and Unspecified     cannot remember cannot remember 06/05/2007 23:16 [facility] - ANTWAN BRADFORD<br/><br/>Updated using auto clean up process from GMRA*4*29.  Changed reactant from RABEPRAZOLE (free text) to RABEPRAZOLE(file - PSNDF(50.6,) <br/><br/><br/>04/16/2007 23:18 [facility] - ANTWAN BRADFORD<br/><br/>Changed from RABEPRAZOLE (File 120.82) to free text by patch GMRA*4*36 <br/><br/><br/>02/21/2007 00:57 [facility] - JOSE WHITMAN<br/><br/>Updated using clean up process.  Changed reactant from RABEPRAZOLE (ingredient) to RABEPRAZOLE(file - PSNDF(50.6,) <br/><br/><br/>01/17/2002 18:01 [facility] - JUAN MURRAY<br/><br/>reported by Dr. Antwan Kinney on lansoprazole request form      06/05/2007 23:16 [facility] - ANTWAN BRADFORD<br/><br/>Updated using auto clean up process from GMRA*4*29.  Changed reactant from RABEPRAZOLE (free text) to RABEPRAZOLE(file - PSNDF(50.6,) <br/><br/><br/>04/16/2007 23:18 [facility] - ANTWAN BRADFORD<br/><br/>Changed from RABEPRAZOLE (File 120.82) to free text by patch GMRA*4*36 <br/><br/><br/>02/21/2007 00:57 [facility] - JOSE WHITMAN  M<br/><br/>Updated using clean up process.  Changed reactant from RABEPRAZOLE (ingredient) to RABEPRAZOLE(file - PSNDF(50.6,) <br/><br/><br/>01/17/2002 18:01 [facility] - JUAN MURRAY<br/><br/>reported by Dr. Austin Kinney on lansoprazole request form      cannot remember cannot remember 06/05/2007 23:16 [facility] - AUSTIN BRADFORD<br/><br/>Updated using auto clean up process from [facility]*4*29.  Changed reactant from RABEPRAZOLE (free text) to RABEPRAZOLE(file - PSNDF(50.6,) <br/><br/><br/>04/16/2007 23:18 [facility] - SUZETTE,... (TRUNCATED)    Risperidone Unspecified     Unknown reaction     Tramadol Unspecified     Unknown reaction

## 2025-06-18 NOTE — H&P
Hospital Medicine History & Physical Note    Date of Service  6/17/2025    Primary Care Physician  John Chery D.O.    Consultants      Specialist Names:     Code Status  Full Code    Chief Complaint  Chief Complaint   Patient presents with    Abdominal Pain     RUQ pain x one week. Worst over the last 5 days.     Syncope     Patient reports episodes of passing out several times over the last week.        History of Presenting Illness  Landon Allred is a 55 y.o. male who presented 6/17/2025 with past medical history of TBI, chronic pain, PTSD, hypertension, migraines, chronic pancreatitis, history of PE who presents to the hospital for periumbilical abdominal pain that started 1 week ago.  The pain is not exertional or positional.  Is associated with nausea, vomiting.  She was in the shower this morning and noticed dark red-colored vomit.  The patient states that he follows up with [facility] gastroenterology for his recurrent pancreatitis episodes.  He denies drinking alcohol or smoking cigarettes.  Patient's been having frequent syncopal episodes and being worked up by neurology for this.  He states that it might be seizure-like activity and is taking medication for it.  1 week ago patient was prescribed Abilify and propranolol but was unable to tolerated due to suicidal ideations.  The patient said that he was fired by multiple pain medications specialist.  He does have a pain pump that is not working and needs to be removed.  He is unable to have removed until he gets a pain specialist.    EKG interpreted by me found normal sinus rhythm without ST segment changes    I discussed the plan of care with patient.    Review of Systems  Review of Systems   Constitutional:  Negative for chills, diaphoresis, fever and malaise/fatigue.   HENT:  Negative for congestion, ear discharge, ear pain, hearing loss, nosebleeds, sinus pain, sore throat and tinnitus.    Eyes:  Negative for blurred vision, double vision,  photophobia and pain.   Respiratory:  Negative for cough, hemoptysis, sputum production, shortness of breath, wheezing and stridor.    Cardiovascular:  Negative for chest pain, palpitations, orthopnea, claudication, leg swelling and PND.   Gastrointestinal:  Positive for abdominal pain, nausea and vomiting. Negative for blood in stool, constipation, diarrhea, heartburn and melena.   Genitourinary:  Negative for dysuria, flank pain, frequency, hematuria and urgency.   Musculoskeletal:  Negative for back pain, falls, joint pain, myalgias and neck pain.   Skin:  Negative for itching and rash.   Neurological:  Positive for dizziness and loss of consciousness. Negative for tingling, tremors, weakness and headaches.   Endo/Heme/Allergies:  Negative for environmental allergies and polydipsia. Does not bruise/bleed easily.   Psychiatric/Behavioral:  Negative for depression, hallucinations, substance abuse and suicidal ideas.        Past Medical History   has a past medical history of Allergy, Arthritis, Asthma, Breath shortness, Chronic kidney disease, Claustrophobia, Clostridium difficile diarrhea, Clotting disorder ([facility]), Concussion, Connective tissue disorder ([facility]), Coughing blood, Delayed emergence from general anesthesia, Dental disorder, Depression, DVT (deep venous thrombosis) ([facility]), Fibromyalgia, primary, GERD (gastroesophageal reflux disease), Heart burn, Hiatus hernia syndrome, Hypertension, IBD (inflammatory bowel disease), Indigestion, Learning disability, Migraine, MRSA (methicillin resistant Staphylococcus aureus), Pancreatitis, PE (pulmonary embolism), Pneumonia, Psychiatric disorder, Pulmonary fibrosis ([facility]), Seizure ([facility]), Sleep apnea, Spinal disorder, Stomach ulcer, and Supplemental oxygen dependent.    Surgical History   has a past surgical history that includes nerve ulnar repair or explore; dental extraction(s); pr closed rx nasal septal fracture; rectal exploration; orif, fracture, femur; orif, elbow;  inguinal hernia repair (Right, 09/06/2019); and hernia repair (Right).     Family History  Family History   Problem Relation Age of Onset    Hypertension Mother     Cancer Father     Hypertension Father         Family history reviewed with patient. There is no family history that is pertinent to the chief complaint.     Social History   reports that he has never smoked. He has never used smokeless tobacco. He reports that he does not drink alcohol and does not use drugs.    Allergies  Allergies[1]    Medications  Prior to Admission Medications   Prescriptions Last Dose Informant Patient Reported? Taking?   EPINEPHrine (ADRENALINE) 1 mg/10mL Solution Prefilled Syringe injection  Patient Yes No   Sig: Inject 1 mg into a catheter in the bone as needed (IM).   clonazePAM (KLONOPIN) 1 MG Tab 6/16/2025 Evening Patient Yes Yes   Sig: Take 2 mg by mouth every evening. 2 mg = 2 tablets   diphenhydrAMINE (BENADRYL) 25 MG Tab 6/16/2025 Evening Patient Yes Yes   Sig: Take 50 mg by mouth at bedtime as needed for Sleep.   promethazine (PHENERGAN) 25 MG Tab 6/16/2025 Evening Patient Yes Yes   Sig: Take 25 mg by mouth every 6 hours as needed for Nausea/Vomiting.   rivaroxaban (XARELTO) 20 MG Tab tablet 6/16/2025 Evening Patient Yes Yes   Sig: Take 20 mg by mouth with dinner.   zonisamide (ZONEGRAN) 50 MG capsule 6/16/2025 Evening Patient Yes Yes   Sig: Take 50 mg by mouth 2 times a day.      Facility-Administered Medications: None       Physical Exam  Temp:  [36.9 °C (98.4 °F)] 36.9 °C (98.4 °F)  Pulse:  [68-92] 76  Resp:  [15-18] 18  BP: (133-138)/(83-97) 138/83  SpO2:  [97 %-98 %] 98 %  Blood Pressure: 138/83   Temperature: 36.9 °C (98.4 °F)   Pulse: 76   Respiration: 18   Pulse Oximetry: 98 %       Physical Exam  Vitals and nursing note reviewed.   Constitutional:       General: He is not in acute distress.     Appearance: Normal appearance. He is not ill-appearing, toxic-appearing or diaphoretic.   HENT:      Head:  "Normocephalic and atraumatic.      Nose: No congestion or rhinorrhea.      Mouth/Throat:      Pharynx: No posterior oropharyngeal erythema.   Eyes:      General: No scleral icterus.        Right eye: No discharge.   Cardiovascular:      Rate and Rhythm: Normal rate and regular rhythm.      Pulses: Normal pulses.      Heart sounds: Normal heart sounds. No murmur heard.     No friction rub. No gallop.   Pulmonary:      Effort: Pulmonary effort is normal. No respiratory distress.      Breath sounds: Normal breath sounds. No stridor. No wheezing, rhonchi or rales.   Abdominal:      General: There is no distension.      Tenderness: There is no abdominal tenderness.   Musculoskeletal:         General: No swelling, tenderness, deformity or signs of injury.      Cervical back: Normal range of motion.      Right lower leg: No edema.      Left lower leg: No edema.   Skin:     Coloration: Skin is not jaundiced or pale.      Findings: No bruising, erythema, lesion or rash.   Neurological:      General: No focal deficit present.      Mental Status: He is alert and oriented to person, place, and time.         Laboratory:  Recent Labs     06/17/25  1804   WBC 3.8*   RBC 4.57*   HEMOGLOBIN 14.7   HEMATOCRIT 43.7   MCV 95.6   MCH 32.2   MCHC 33.6   RDW 46.1   PLATELETCT 149*   MPV 10.0     Recent Labs     06/17/25  1804   SODIUM 139   POTASSIUM 4.1   CHLORIDE 109   CO2 20   GLUCOSE 92   BUN 20   CREATININE 1.09   CALCIUM 9.2     Recent Labs     06/17/25  1804   ALTSGPT 25   ASTSGOT 24   ALKPHOSPHAT 57   TBILIRUBIN 0.5   LIPASE 169*   GLUCOSE 92         No results for input(s): \"NTPROBNP\" in the last 72 hours.      No results for input(s): \"TROPONINT\" in the last 72 hours.    Imaging:  No orders to display           Assessment/Plan:  Justification for Admission Status  I anticipate this patient is appropriate for observation status at this time because chronic pancreatitis    Patient will need a Med/Surg bed on MEDICAL service .  The " "need is secondary to chronic pancreatitis.    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)  Assessment & Plan   history of recurrent clots  Continue Xarelto    Syncopal episodes- (present on admission)  Assessment & Plan  Chronic issue and being worked up as an outpatient    Chronic narcotic use- (present on admission)  Assessment & Plan  The patient was prescribed high doses of opiate pain medications as an outpatient is currently not prescribed anything since he has been fired by multiple pain specialist        VTE prophylaxis: SCDs/TEDs       [1]   Allergies  Allergen Reactions    Capsaicin Unspecified, Rash and Shortness of Breath     Other Reaction(s): Eruption, Tachypnea    Capsaicin-Turpentine Anaphylaxis and Rash          Celery Oil Hives     Celery causes hives    Ciprofloxacin Anaphylaxis and Unspecified    Doxycycline Anaphylaxis    Duloxetine Hives    Haloperidol Unspecified and Anaphylaxis    Shellfish Allergy Anaphylaxis    Tape Hives    Wasp Venom Anaphylaxis    Amoxicillin-Pot Clavulanate Rash     Other reaction(s): .Unknown  rash      Dalteparin Diarrhea          Sulfamethoxazole W-Trimethoprim Hives          Abilify [Aripiprazole] Anxiety     Pt reports SI and HI tendencies    Amoxicillin Unspecified and Anaphylaxis    Apple Nausea    Chocolate Unspecified    Gabapentin Unspecified    Metoclopramide Anaphylaxis     Has no idea what his RX is    Prazosin Unspecified    Acetaminophen Unspecified and Vomiting     \"Makes me deathly ill\"    Other Reaction(s): Drowsy (finding), Nausea and vomiting " "(disorder), Nausea and vomiting (disorder), Drowsy (finding)      \"makes me deathly ill\" \"makes me deathly ill\"      Other reaction(s): Unknown      Other reaction(s): .Unknown \"makes me deathly ill\" \"makes me deathly ill\"      01/28/2012 21:05 [facility] - PILI HARDINItz<br/><br/>patient states he is allergic to acetaminophen, makes him<br/>nauseated and drowsy      Other Reaction(s): Nausea and vomiting, Drowsy    Amitriptyline Hcl Unspecified     Unknown reaction     Aspirin Unspecified     Unknown reaction    Other Reaction(s): Bleeding from nose (finding), Bleeding from nose (finding), Bleeding from nose (finding), Bleeding from nose (finding)      10/16/2012 02:10 [facility] - CELESTINO RANGEL<br/><br/>Patient reported he gets nosebleeds when he takes aspirin      Other Reaction(s): Bleeding from nose    Baclofen Unspecified and Anaphylaxis     Unknown reaction    Biofreeze Unspecified     Unknown reaction     Carisoprodol Unspecified and Anaphylaxis     Unknown reaction    Other reaction(s): Unknown    Codeine Unspecified     Unknown reaction     Cyclobenzaprine Unspecified     Unknown reaction     Doxepin Unspecified     Unknown reaction    Duloxetine Hcl Unspecified     Unknown reaction     Enoxaparin Vomiting    Eszopiclone Unspecified     Unknown reaction     Hydrocodone-Acetaminophen Unspecified, Itching, Anaphylaxis and Vomiting     Unknown reaction    I DONT KNOW    Hydromorphone Itching     States \"I have an adverse reaction and need benadryl first\"    Ketamine Unspecified     Other reaction(s): .Unknown  Pt reports he \"gets violent\"    Latex Unspecified     Unknown reaction     Menthol Unspecified     Unknown reaction     Methocarbamol Unspecified     Unknown reaction     Morphine Vomiting    Nitroglycerin Unspecified and Anaphylaxis     Other reaction(s): Unknown    Ondansetron [Zofran] Unspecified     Unknown reaction     Oxycodone Unspecified     Unknown reaction     Oxycodone-Aspirin Unspecified     Unknown " reaction     Prochlorperazine Unspecified     Hallucinations, dizziness    Propofol Unspecified     Unknown reaction     Propoxyphene Unspecified     Other reaction(s): Unknown    Quetiapine Unspecified     Other reaction(s): Unknown    Other Reaction(s): Headache, Swelling, Excitement, Swelling (finding), OTHER REACTION: SPEEDING THOUGHTS, Headache (finding), Excitement (finding), Swelling (finding), Headache (finding), Swelling (finding), OTHER REACTION: SPEEDING THOUGHTS, Headache (finding), Excitement (finding), Swelling (finding), Headache (finding)      Other reaction(s): Unknown 06/05/2007 23:18 [facility] - ANTWAN BRADFORD<br/><br/>Updated using auto clean up process from [facility]*4*29.  Changed reactant from QUETIAPINE (free text) to QUETIAPINE(file - PSNDF(50.6,) <br/><br/><br/>04/16/2007 23:21 [facility] - ANTWAN BRADFORD<br/><br/>Changed from QUETIAPINE (File 120.82) to free text by patch RA*4*36 <br/><br/><br/>04/08/2005 21:10 [facility] - DONNELL CHACON<br/><br/>PT HAD JUST RESTARTED THE MEDICATION WHEN ADR OCCURRED.  PSYCH HAS DC'D      Other reaction(s): Unknown      06/05/2007 23:18 [facility] - ANTWAN BRADFORD<br/><br/>Updated using auto clean up process from [facility]*4*29.  Changed reactant from QUETIAPINE (free text) to QUETIAPINE(file - PSNDF(50.6,) <br/><br/><br/>04/16/2007 23:21 [facility] - ANTWAN BRADFORD<br/><br/>Changed from QUETIAPINE (File 120.82) to free text by patch GMRA*4*36 <br/><br/><br/>04/08/2005 21:10 [facility] - DONNELL CHACON<br/><br/>PT HAD JUST RESTARTED THE MEDICATION WHEN ADR OCCURRED.  PSYCH HAS DC'D      Other Reaction(s): Headache, SPEEDING THOUGHTS, SWELLING, Headache, SPEEDING THOUGHTS, SWELLING    Rabeprazole Nausea, Vomiting and Unspecified     cannot remember cannot remember 06/05/2007 23:16 [facility] ANTWAN KING<br/><br/>Updated using auto clean up process from [facility]*4*29.  Changed reactant from RABEPRAZOLE (free text) to RABEPRAZOLE(file - PSNDF(50.6,) <br/><br/><br/>04/16/2007 23:18 ANTWAN WAYNE  DANIELA<br/><br/>Changed from RABEPRAZOLE (File 120.82) to free text by patch RA*4*36 <br/><br/><br/>02/21/2007 00:57 [facility] - JOSE WHITMAN<br/><br/>Updated using clean up process.  Changed reactant from RABEPRAZOLE (ingredient) to RABEPRAZOLE(file - PSNDF(50.6,) <br/><br/><br/>01/17/2002 18:01 [facility] JUAN GASPAR<br/><br/>reported by Dr. Antwan Kinney on lansoprazole request form      06/05/2007 23:16 [facility] ANTWAN KING<br/><br/>Updated using auto clean up process from RA*4*29.  Changed reactant from RABEPRAZOLE (free text) to RABEPRAZOLE(file - PSNDF(50.6,) <br/><br/><br/>04/16/2007 23:18 [facility] ANTWAN BRADFORD<br/><br/>Changed from RABEPRAZOLE (File 120.82) to free text by patch [facility]*4*36 <br/><br/><br/>02/21/2007 00:57 [facility] - JOSE WHITMAN<br/><br/>Updated using clean up process.  Changed reactant from RABEPRAZOLE (ingredient) to RABEPRAZOLE(file - PSNDF(50.6,) <br/><br/><br/>01/17/2002 18:01 [facility] JUAN GASPAR<br/><br/>reported by Dr. Antwan Kinney on lansoprazole request form      cannot remember cannot remember 06/05/2007 23:16 [facility] ANTWAN BRADFORD<br/><br/>Updated using auto clean up process from RA*4*29.  Changed reactant from RABEPRAZOLE (free text) to RABEPRAZOLE(file - PSNDF(50.6,) <br/><br/><br/>04/16/2007 23:18 [facility] - SUZETTE,... (TRUNCATED)    Risperidone Unspecified     Unknown reaction     Tramadol Unspecified     Unknown reaction

## 2025-06-18 NOTE — HOSPITAL COURSE
Mr. Landon Allred is a 55 y.o. male who presented 6/17/2025 with past medical history of TBI, chronic pain, PTSD, hypertension, migraines, chronic pancreatitis, history of PE who presents to the hospital on 6/17/2025  for periumbilical abdominal pain that started 1 week ago.      The pain is not exertional or positional.  Is associated with nausea, vomiting.  She was in the shower this morning and noticed dark red-colored vomit.  The patient states that he follows up with [facility] gastroenterology for his recurrent pancreatitis episodes.  He denies drinking alcohol or smoking cigarettes.  Patient's been having frequent syncopal episodes and being worked up by neurology for this.  He states that it might be seizure-like activity and is taking medication for it.  1 week ago patient was prescribed Abilify and propranolol but was unable to tolerated due to suicidal ideations.  The patient said that he was fired by multiple pain medications specialist.  He does have a pain pump that is not working and needs to be removed.  He is unable to have removed until he gets a pain specialist.     In ER, EKG noted  normal sinus rhythm without ST segment changes.  Initial vital signs within normal limits, no fever noted, no signs of sepsis with heart rate of 78, respirations 16 and O2 saturation on room air at 94%.  Notable lab findings include WBC 3.8, RBC 4.57, lipase 169.  Urine analysis negative for any signs of infectious process.  Patient admitted to hospital medicine for management of care.    During this hospitalization, patient was monitored overnight under the observation unit.  Patient's pain was managed with IV Dilaudid with Benadryl as patient claims that this are the only medications that work for his pain management.  Patient did tolerate full liquid diet.  Supposedly, patient had a bout of loose stools, not diarrhea, however, patient claims that he wanted to be ruled out for C. difficile.  There is no signs of C.  difficile infection at this time as patient has not had any diarrhea episodes, no fevers and no leukocytosis.  A recheck on his lipase level was done which is down to 49.    Patient seen and examined prior to being discharged.  Patient requested staying overnight to so he can continuously get IV Dilaudid and Benadryl.  However, discussed with patient that he does not have any signs of chronic pancreatitis at this time due to his lipase level being within normal limits down to 49, patient being able to tolerate oral intake, and patient being given his home medications.  I feel that this patient does have a high concern for drug-seeking behavior which was noted in his previous admission to our facility.    [facility]  Unfortunately, due to patient's behavior, patient has been dismissed from a multiple providers and specialties as he is demanding certain medication for pain management.  Which in turn makes me even more concerned that this patient has drug-seeking tendencies.  Patient does have a follow-up with Dr. Chery for his PCP needs.  Encourage patient to follow-up with his psychiatry Dr. Castillo as there is high concerns for psychogenic pain.  We will discharge patient home with oral Dilaudid 2 mg every 6 hours and patient claims that he does have a lot of Benadryl pills at home.  Prior to discharge, patient requested if he could get IV Dilaudid or IM Dilaudid in conjunction with IV Benadryl however, I had denied this request due to concerns of safety as patient will be driving back home.  Patient was educated and counseled in regards to narcotic use while operating heavy machinery or in his case driving back to [place].  Patient to resume all home medications and follow-up with established primary care and specialty.  All questions and concerns answered prior to being discharged.  Patient discharged home.

## 2025-06-18 NOTE — PROGRESS NOTES
"Discharge orders received.  Patient arrived to the discharge lounge.  PIV removed by floor RN. Meds to beds medications not ordered, medication sent to patient's home pharmacy.  Instructions given, medications reviewed and general discharge education provided to patient.  Follow up appointments discussed.  Patient verbalized understanding of dc instructions and prescriptions.  Patient unable to sign discharge paperwork per patient due to \"inability to write,\" okay to write verbal consent per patient. Patient verbalized he had all belongings with him, wheeled to ED to get handgun back from security. Denied having any home medications locked in our inpatient pharmacy that he needs back. Patient wheeled by discharge lounge staff from discharge lounge to private vehicle. Patient connected to home oxygen.  Patient left via car to home in stable condition.     "

## 2025-06-18 NOTE — PROGRESS NOTES
Bedside report received from Dulce HOLLEY at this time. Patient resting in bed, satting 97% on 3L 02, patient C/O chronic migraine and abd pain RUQ, denies vomiting, tolerating clear liquid diet. IVF infusing as ordered, patient requesting IV pain medications with IV dilaudid and he would like to speak with MD. Call bell in hand, bed alarm on for safety

## 2025-06-18 NOTE — ASSESSMENT & PLAN NOTE
The patient was prescribed high doses of opiate pain medications as an outpatient is currently not prescribed anything since he has been fired by multiple pain specialist

## 2025-06-18 NOTE — DISCHARGE INSTRUCTIONS
FOLLOW UP ITEMS POST DISCHARGE  Please call [phone] to schedule PCP appointment for patient.    Required specialty appointments include:       Discharge Instructions per ARIEL Pham    - Follow-up with established primary care provider Dr. Chery  - Follow-up with psychiatry Dr. Castillo  - Utilize prescribed Dilaudid 2 mg tablet every 6 hours as needed for pain  - Utilize already prescribed Benadryl at home  - Resume all other home medications  - Continue following up with [facility] for management of chronic pancreatitis  - [facility]    DIET: As tolerated    ACTIVITY: As tolerated    DIAGNOSIS: Periumbilical abdominal pain    Return to ER if symptoms persist, chest pain, palpitations, shortness of breath, numbness, tingling, weakness, and high fevers.

## 2025-06-18 NOTE — PROGRESS NOTES
Pt arrived to unit via gurney at 2315. Pt oriented to room, unit, and plan of care, IV fluids, pain control, monitor labs. Pt is medical status at this time, pt ambulated X1 assist to bed, assisted pt with removal of knee braces. Pt expressing moderate abd pain at this time, will medicate per orders. All questions answered at this time. Call light within reach; fall precautions in place.

## 2025-06-18 NOTE — ED NOTES
Pt to [address] via wheelchair from lobby. On 3 L O2 (baseline). Chart up for ERP. On pulse ox, cardiac leads.

## 2025-06-18 NOTE — ED TRIAGE NOTES
Chief Complaint   Patient presents with    Abdominal Pain     RUQ pain x one week. Worst over the last 5 days.     Syncope     Patient reports episodes of passing out several times over the last week.

## 2025-06-18 NOTE — ED PROVIDER NOTES
"ED Provider Note    CHIEF COMPLAINT  Chief Complaint   Patient presents with    Abdominal Pain     RUQ pain x one week. Worst over the last 5 days.     Syncope     Patient reports episodes of passing out several times over the last week.      EXTERNAL RECORDS REVIEWED  Review of records show that patient was seen on 06/14/2025 at [facility] for fall and head trauma in addition to chronic abdominal pain. Work up done at that time included a CT scan of his head that was normal in addition to having normal labs. He has a history of chronic recurrent pancreatitis and has been seen here multiple times related to chronic abdominal pain.    He has been seen by gastroenterology - \"Chronic abdominal pain: acute on chronic. While patient may have had episodes of acute pancreatitis, there is no evidence of ongoing pancreatitis and only very subtle changes of possible early chronic pancreatitis, which should not cause this degree of pain, worse with even sips of water. His clinical picture is most consistent with a pain syndrome. While we can start pancreatic enzymes, I am not expecting this to be of much use.     HPI/ROS  LIMITATION TO HISTORY   Select: : None  OUTSIDE HISTORIAN(S):  None     Landon Allred is a 55 y.o. male who presents to the Emergency Department for abdominal pain and multiple syncope episode. Patient reports a history of pancreatitis and states that this particular right upper quadrant abdominal pain started approximately 1 week ago and become worse in the last 5 days with associated nausea, vomiting, and diarrhea. Patient is not taking anything for pain and is not established with pain management.  Additionally, patient states that six days ago he started having episodes of syncope in which he \"blacks out\". He states that these episodes in which he \"black out\" are not new and have been going on for many years however happening more frequently. He states that they are now happening 2-3 " times a day. Patient has seen neurology for this and his most recent visit with them was yesterday. States that he has never diagnosed with seizures. Patient states that he has been seen by cardiology in the past however not recently. Patient denies chest pain and any other symptoms or injuries at this time.       PAST MEDICAL HISTORY  Past Medical History:   Diagnosis Date    Allergy     Arthritis     Asthma     Breath shortness     Per pt he is very debilitated and has no exercise tolerance. States he becomes severely short of breath and fatigued with basic ADLs such as getting dressed in the morning, cannot climb stairs and sometimes uses a wheelchair. Patient is on 3L of continuous oxygen as of 4/10/25.    Chronic kidney disease     Claustrophobia     Clostridium difficile diarrhea     Clotting disorder (HCC)     Concussion     Connective tissue disorder (HCC)     Coughing blood     Pt states he doesn't know what causes the bloody cough as of 4/10/25.    Delayed emergence from general anesthesia     Trouble coming out of sedation during a dental procedure    Dental disorder     Loose and chipped teeth    Depression     DVT (deep venous thrombosis) (HCC)     Fibromyalgia, primary     GERD (gastroesophageal reflux disease)     Heart burn     Hiatus hernia syndrome     States he has had one hernia repaired and has another currently untreated as of 4/10/25.    Hypertension     IBD (inflammatory bowel disease)     Indigestion     Learning disability     Migraine     MRSA (methicillin resistant Staphylococcus aureus)     Pancreatitis     PE (pulmonary embolism)     Pneumonia     Psychiatric disorder     PTSD, depression    Pulmonary fibrosis (HCC)     Seizure (HCC)     Pt states he is not sure if he truly has seizures as of 4/10/25.    Sleep apnea     Spinal disorder     Stomach ulcer     Supplemental oxygen dependent         SURGICAL HISTORY  Past Surgical History[1]     FAMILY HISTORY  Family History   Problem  Relation Age of Onset    Hypertension Mother     Cancer Father     Hypertension Father        SOCIAL HISTORY   reports that he has never smoked. He has never used smokeless tobacco. He reports that he does not drink alcohol and does not use drugs.    CURRENT MEDICATIONS  Previous Medications    CLONAZEPAM (KLONOPIN) 1 MG TAB    Take 2 mg by mouth every evening. 2 mg = 2 tablets    DIPHENHYDRAMINE (BENADRYL) 25 MG TAB    Take 50 mg by mouth at bedtime as needed for Sleep.    EPINEPHRINE (ADRENALINE) 1 MG/10ML SOLUTION PREFILLED SYRINGE INJECTION    Inject 1 mg into a catheter in the bone as needed (IM).    PROMETHAZINE (PHENERGAN) 25 MG TAB    Take 25 mg by mouth every 6 hours as needed for Nausea/Vomiting.    RIVAROXABAN (XARELTO) 20 MG TAB TABLET    Take 20 mg by mouth with dinner.    ZONISAMIDE (ZONEGRAN) 50 MG CAPSULE    Take 50 mg by mouth 2 times a day.       ALLERGIES  Capsaicin, Capsaicin-turpentine, Celery oil, Ciprofloxacin, Doxycycline, Duloxetine, Haloperidol, Shellfish allergy, Tape, Wasp venom, Amoxicillin-pot clavulanate, Dalteparin, Sulfamethoxazole w-trimethoprim, Abilify [aripiprazole], Amoxicillin, Apple, Chocolate, Gabapentin, Metoclopramide, Prazosin, Acetaminophen, Amitriptyline hcl, Aspirin, Baclofen, Biofreeze, Carisoprodol, Codeine, Cyclobenzaprine, Doxepin, Duloxetine hcl, Enoxaparin, Eszopiclone, Hydrocodone-acetaminophen, Hydromorphone, Ketamine, Latex, Menthol, Methocarbamol, Morphine, Nitroglycerin, Ondansetron [zofran], Oxycodone, Oxycodone-aspirin, Prochlorperazine, Propofol, Propoxyphene, Quetiapine, Rabeprazole, Risperidone, and Tramadol    PHYSICAL EXAM  BP (!) 133/97   Pulse 81   Temp 36.9 °C (98.4 °F) (Temporal)   Resp 15   Ht 1.829 m (6')   Wt 98 kg (216 lb)   SpO2 97%      Constitutional: Nontoxic appearing. Alert in no apparent distress.  HENT: Normocephalic, Atraumatic. Bilateral external ears normal. Nose normal.  Moist mucous membranes.  Oropharynx clear.  Eyes: Pupils  are equal and reactive. Conjunctiva normal.   Neck: Supple, full range of motion  Heart: Regular rate and rhythm.  No murmurs.    Lungs: No respiratory distress, normal work of breathing. Lungs clear to auscultation bilaterally.  Abdomen Soft, no distention. Epigastric tenderness no rebound or guarding, seems distractible   Musculoskeletal: Atraumatic. No obvious deformities noted.  No lower extremity edema.  Skin: Warm, Dry.  No erythema, No rash.   Neurologic: Alert and oriented x3. Moving all extremities spontaneously without focal deficits.  Psychiatric: Affect normal, Mood normal, Appears appropriate and not intoxicated.          DIAGNOSTIC STUDIES / PROCEDURES    EKG  I have independently interpreted this EKG  Results for orders placed or performed during the hospital encounter of 25   EKG (NOW)   Result Value Ref Range    Report       [facility]    Test Date:  2025  Pt Name:    MANOJ MARISCAL                 Department: ER  MRN:        [id number]                      Room:  Gender:     Male                         Technician: [id number]  :        1970                   Requested By:ER TRIAGE PROTOCOL  Order #:    [id number]                    Reading MD: Delia Adler MD    Measurements  Intervals                                Axis  Rate:       92                           P:          74  OR:         130                          QRS:        53  QRSD:       84                           T:          32  QT:         353  QTc:        437    Interpretive Statements  Sinus rhythm  Compared to ECG   Normal intervals, no ectopy  No ST or T wave change  Compared to 2023 20:08:54  No significant change from prior  Electronically Signed On 2025 18:25:34 PDT by Delia Adler MD         LABS  Labs Reviewed   CBC WITH DIFFERENTIAL - Abnormal; Notable for the following components:       Result Value    WBC 3.8 (*)     RBC 4.57 (*)     Platelet Count 149 (*)      All other components within normal limits   LIPASE - Abnormal; Notable for the following components:    Lipase 169 (*)     All other components within normal limits   COMP METABOLIC PANEL   ESTIMATED GFR   URINALYSIS       COURSE & MEDICAL DECISION MAKING    5:56 PM - Patient seen and examined at bedside. Discussed plan of care, including obtaining EKG and labs. Patient agrees to the plan of care. The patient will be resuscitated with 1L NS IV and medicated with 1 mg Dilaudid IV and 25 mg Benadryl IV. Ordered for EKG, urinalysis, lipase, CMP, and CBC with differential to evaluate his symptoms.     Hydration: Based on the patient's presentation of Other pancreatitis the patient was given IV fluids. IV Hydration was used because oral hydration was not adequate alone. Upon recheck following hydration, the patient was improved.    ASSESSMENT, COURSE AND PLAN  Care Narrative: Patient with history of chronic abdominal pain as well as questionably chronic idiopathic pancreatitis who presents with acute worsening of his chronic abdominal pain over the last few days.  The patient has also had a recurrent history of migraines and syncopal episodes.  The patient is alert with normal vital signs on arrival.  He appears very well and comfortable on exam.  His EKG does not show evidence of ischemia or arrhythmia.  He has no focal neurologic deficits on exam to necessitate neuroimaging.  These episodes do not necessarily sound like seizures although the patient is currently being worked up by neurology.  His labs do not show leukocytosis, renal dysfunction or electrolyte abnormality.  He does have mild elevation of his lipase that could be consistent with acute pancreatitis however symptoms seem much more chronic in nature.  I consider diagnostic imaging of the abdomen however there is no concern for acute surgical pathology such as bowel obstruction, perforation, appendicitis or diverticulitis.  There is definitely concern for  drug-seeking behavior as the patient is allergic to most medications other than Dilaudid and Benadryl.  He has also been fired from multiple pain management practices.  I attempted to give the patient pain medication and fluids however he is reporting intractable pain at this time and states he will not be able to tolerate fluids due to this flare of pancreatitis.  I guess we will plan to admit him overnight for hydration and pain control and hopefully he can be discharged in the morning.  He will need ongoing outpatient follow-up with neurology and possibly cardiology to deal with these syncopal episodes.     Upon reassessment, patient is resting comfortably with normal vital signs.  No new complaints at this time.  Discussed results with patient and/or family as well as plan of care for admission. Patient verbalizes understanding and agreement to this plan of care.    ADDITIONAL PROBLEM LIST  Problem #1: Acute on chronic pancreatitis -plan for admission for fluids and pain control    Problem #2: Chronic syncope -workup here reassuring, needs continued outpatient follow-up with neurology and possibly cardiology      DISPOSITION AND DISCUSSIONS  I have discussed management of the patient with the following physicians and BECKA's:    Dr. Simmons, hospitalist on-call    Escalation of care considered, and ultimately not performed:diagnostic imaging    DISPOSITION:  Patient will be hospitalized by Dr. Simmons in stable condition.      FINAL DIAGNOSIS  1. Acute on chronic pancreatitis (HCC)    2. Syncope, unspecified syncope type        The note accurately reflects work and decisions made by me.  Delia Adler M.D.  6/17/2025  10:46 PM     Dionne BURCH (Anika), am scribing for, and in the presence of, Delia Adler M.D..    Electronically signed by: Dionne Cristobal), 6/17/2025    Delia BURCH M.D. personally performed the services described in this documentation, as scribed by Dionne Haskins in my  presence, and it is both accurate and complete.            [1]   Past Surgical History:  Procedure Laterality Date    INGUINAL HERNIA REPAIR Right 09/06/2019    DENTAL EXTRACTION(S)      HERNIA REPAIR Right     NERVE ULNAR REPAIR OR EXPLORE      ORIF, ELBOW      ORIF, FRACTURE, FEMUR      [place] CLOSED RX NASAL SEPTAL FRACTURE      RECTAL EXPLORATION

## 2025-06-18 NOTE — CARE PLAN
The patient is Stable - Low risk of patient condition declining or worsening    Shift Goals  Clinical Goals: IV fluids, pain control, monitor labs.  Patient Goals: less pain, rest  Family Goals: KYLE    Progress made toward(s) clinical / shift goals:    Problem: Pain - Standard  Goal: Alleviation of pain or a reduction in pain to the patient’s comfort goal  Description: Target End Date:  Prior to discharge or change in level of care    1.  Document pain using the appropriate pain scale per order or unit policy  2.  Educate and implement non-pharmacologic comfort measures (i.e. relaxation, distraction, massage, cold/heat therapy, etc.)  3.  Pain management medications as ordered  4.  Reassess pain after pain med administration per policy  5.  If opiods administered assess patient's response to pain medication is appropriate per POSS sedation scale  6.  Follow pain management plan developed in collaboration with patient and interdisciplinary team (including palliative care or pain specialists if applicable)  Outcome: Progressing     Problem: Knowledge Deficit - Standard  Goal: Patient and family/care givers will demonstrate understanding of plan of care, disease process/condition, diagnostic tests and medications  Description: Target End Date:  1-3 days or as soon as patient condition allows    1.  Patient and family/caregiver oriented to unit, equipment, visitation policy and means for communicating concern  2.  Complete/review Learning Assessment  3.  Assess knowledge level of disease process/condition, treatment plan, diagnostic tests and medications  4.  Explain disease process/condition, treatment plan, diagnostic tests and medications  Outcome: Progressing     Problem: Skin Integrity  Goal: Skin integrity is maintained or improved  Description: Target End Date:  Prior to discharge or change in level of care    1.  Assess and monitor skin integrity, appearance and/or temperature  2.  Assess risk factors for  impaired skin integrity and/or pressures ulcers  3.  Implement precautions to protect skin integrity in collaboration with interdisciplinary team  4.  Implement pressure ulcer prevention protocol if at risk for skin breakdown  5.  Confirm wound care consult if at risk for skin breakdown  6.  Ensure patient use of pressure relieving devices  (Low air loss bed, waffle overlay, heel protectors, ROHO cushion, etc)  Outcome: Progressing     Problem: Fall Risk  Goal: Patient will remain free from falls  Description: Target End Date:  Prior to discharge or change in level of care    1.  Assess for fall risk factors  2.  Implement fall precautions  Outcome: Progressing     Problem: Provide Safe Environment  Goal: Suicide environmental safety, protocols, policies, and practices will be implemented  Description: Target End Date:  resolve day 1    1.  Remove objects or personal belongings that may cause harm or injury to self or others  2.  Dietary tray modifications (paperware)  3.  Provide a safe environment  4.  Render close patient supervision by sustaining observation or awareness of the patient at all times  Outcome: Progressing     Problem: Psychosocial  Goal: Patient's ability to identify and develop effective coping behaviors will improve  Description: Target End Date:  1 to 3 days    1.  Present opportunities for the patient to express thoughts, and feelings in a nonjudgmental environment  2.  Help the patient with problem-solving in a constructive manner.  3.  Educate the patient on cognitive-behavioral self-management responses to suicidal thoughts.  4.  Introduce the use of self-expression methods to manage suicidal feelings  5.  Provide emotional support  6.  Encourage identification of positive aspects of self  Outcome: Progressing  Goal: Patient's ability to identify and utilize available support systems will improve  Description: Target End Date:  1 to 3 days    1.  Help patient identify available resources  and support systems  2.  Collaborate with interdisciplinary team  3.  Collaborate with patient, family/caregiver and other support systems  Outcome: Progressing

## 2025-06-18 NOTE — PROGRESS NOTES
4 Eyes Skin Assessment Completed.    Skin assessment is primarily focused on high risk bony prominences. Pay special attention to skin beneath and around medical devices, high risk bony prominences, skin to skin areas and areas where the patient lacks sensation to feel pain and areas where the patient previously had breakdown.     Head (Occipital):  Scar   Ears (Under Medical Devices): WDL   Nose (Under Medical Devices): WDL   Mouth:  WDL   Neck: Scar   Breast/Chest:  Scar and     Shoulder Blades:  Red, Blanching, and Scar   Spine:   Scar   (R) Arm/Elbow/Hand: Scar   (L) Arm/Elbow/Hand: Scar   Abdomen: WDL   Pannus/Groin:  WDL   Sacrum/Coccyx:   Red and Blanching   (R) Ischial Tuberosity (Sit Bones):  WDL   (L) Ischial Tuberosity (Sit Bones):  WDL   (R) Leg:  Scar   (L) Leg:  Scar   (R) Heel:  Red and Blanching   (R) Foot/Toe: Red and Blanching   (L) Heel: Red and Blanching   (L) Foot/Toe:  Red and Blanching       DEVICES IN USE:   Respiratory Devices:  Nasal cannula and Pulse ox  Feeding Devices:  N/A   Lines & BP Monitoring Devices:  Peripheral IV, BP cuff, and Pulse ox    Orthopedic Devices:  knee braces off pt while in bed  Miscellaneous Devices:  N/A    PROTOCOL INTERVENTIONS:   Standard/Trauma Bed:  Already in place  Silicone Nasal Cannula Tubing:  Already in place  Nasal Cannula with Gray Foams:  Already in place    WOUND PHOTOS:   N/A no wounds identified    WOUND CONSULT:   N/A, no advanced wound care needs identified

## 2025-06-18 NOTE — ED NOTES
Med Rec completed per patient      Allergies reviewed: yes     Oral antibiotics in the past 30 days: no    Anticoagulant in past 14 days: yes     Anticoagulant:Xarelto 20 mg  Last dose: 06/16/2025 pm    Dispense history available in EPIC: no    Pharmacy patient utilizes: [facility]  [phone]

## 2025-06-18 NOTE — ASSESSMENT & PLAN NOTE
Acute on chronic pancreatitis  burned-out pancreas  IV hydration  CLD  pain control with oral and IV narcotics  frequent abdominal exams and x-rays: monitor bowel sounds for ileus  monitor calcium levels and vitals

## 2025-06-18 NOTE — PROGRESS NOTES
Pt states personal belongings are in possession. Pt escorted off unit via wheelchair by transporter to [facility].

## 2025-06-19 ENCOUNTER — TELEMEDICINE (OUTPATIENT)
Dept: BEHAVIORAL HEALTH | Facility: CLINIC | Age: 55
End: 2025-06-19
Payer: MEDICARE

## 2025-06-19 DIAGNOSIS — F43.10 POST-TRAUMATIC STRESS SYNDROME: ICD-10-CM

## 2025-06-19 DIAGNOSIS — F41.8 DEPRESSION WITH ANXIETY: ICD-10-CM

## 2025-06-19 DIAGNOSIS — F41.0 PANIC ATTACKS: Primary | ICD-10-CM

## 2025-06-19 PROCEDURE — 99214 OFFICE O/P EST MOD 30 MIN: CPT | Mod: 95 | Performed by: PSYCHIATRY & NEUROLOGY

## 2025-06-19 PROCEDURE — 90834 PSYTX W PT 45 MINUTES: CPT | Mod: 95 | Performed by: PSYCHIATRY & NEUROLOGY

## 2025-06-19 RX ORDER — CLONAZEPAM 1 MG/1
1 TABLET ORAL 2 TIMES DAILY PRN
Qty: 60 TABLET | Refills: 1 | Status: SHIPPED | OUTPATIENT
Start: 2025-06-29 | End: 2025-07-29
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
## 2025-06-24 ENCOUNTER — APPOINTMENT (OUTPATIENT)
Dept: NEUROLOGY | Facility: MEDICAL CENTER | Age: 55
End: 2025-06-24
Attending: PSYCHIATRY & NEUROLOGY
Payer: MEDICARE

## 2025-06-25 ENCOUNTER — APPOINTMENT (OUTPATIENT)
Dept: MEDICAL GROUP | Facility: PHYSICIAN GROUP | Age: 55
End: 2025-06-25
Payer: MEDICARE

## 2025-06-25 ENCOUNTER — TELEPHONE (OUTPATIENT)
Dept: NEUROLOGY | Facility: MEDICAL CENTER | Age: 55
End: 2025-06-25

## 2025-06-25 NOTE — TELEPHONE ENCOUNTER
Pt has questions about an MRI order that was put in for him during his last visit. Please contact pt asap.

## 2025-06-26 NOTE — TELEPHONE ENCOUNTER
Spoke with Ladonna at the EEG office, she said their VIKTORIYA Zabala will contact patient to let him know what is going on

## 2025-07-02 ENCOUNTER — OFFICE VISIT (OUTPATIENT)
Dept: MEDICAL GROUP | Facility: PHYSICIAN GROUP | Age: 55
End: 2025-07-02
Payer: MEDICARE

## 2025-07-02 ENCOUNTER — TELEPHONE (OUTPATIENT)
Dept: NEUROLOGY | Facility: MEDICAL CENTER | Age: 55
End: 2025-07-02

## 2025-07-02 ENCOUNTER — APPOINTMENT (OUTPATIENT)
Dept: MEDICAL GROUP | Facility: PHYSICIAN GROUP | Age: 55
End: 2025-07-02
Payer: MEDICARE

## 2025-07-02 VITALS
DIASTOLIC BLOOD PRESSURE: 80 MMHG | RESPIRATION RATE: 12 BRPM | OXYGEN SATURATION: 93 % | BODY MASS INDEX: 24.43 KG/M2 | TEMPERATURE: 96.8 F | HEART RATE: 85 BPM | HEIGHT: 72 IN | SYSTOLIC BLOOD PRESSURE: 100 MMHG

## 2025-07-02 DIAGNOSIS — R56.9 OBSERVED SEIZURE-LIKE ACTIVITY (HCC): Primary | ICD-10-CM

## 2025-07-02 DIAGNOSIS — R04.2 HEMOPTYSIS: Primary | ICD-10-CM

## 2025-07-02 PROCEDURE — 3079F DIAST BP 80-89 MM HG: CPT | Performed by: STUDENT IN AN ORGANIZED HEALTH CARE EDUCATION/TRAINING PROGRAM

## 2025-07-02 PROCEDURE — 3074F SYST BP LT 130 MM HG: CPT | Performed by: STUDENT IN AN ORGANIZED HEALTH CARE EDUCATION/TRAINING PROGRAM

## 2025-07-02 PROCEDURE — 99213 OFFICE O/P EST LOW 20 MIN: CPT | Performed by: STUDENT IN AN ORGANIZED HEALTH CARE EDUCATION/TRAINING PROGRAM

## 2025-07-02 NOTE — TELEPHONE ENCOUNTER
I spoke with patient, he does not want to drive to [email or web address] everyday for the EEG, this is why he wants in patient. I also advised him I have him on my list to check for cancellations for PM appointments. Patient did have a sooner follow up but he cancelled it stating it was too early in the day.  I will discuss with Dr. Parks and will call patient back.  Carlos Prabhakar, Med Ass't
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
## 2025-07-02 NOTE — TELEPHONE ENCOUNTER
"Pt is really upset and stated he received the wrong imaging referral and would like that corrected to the \"inpatient test\". Pt has also stated he wants a sooner appointment and got really upset when I explained that Dr. Parks had no open availability until September. He was added to the wait list 6/25 and has called the office multiple times demanding for a sooner appointment.   "

## 2025-07-03 ENCOUNTER — TELEPHONE (OUTPATIENT)
Dept: NEUROLOGY | Facility: MEDICAL CENTER | Age: 55
End: 2025-07-03
Payer: MEDICARE

## 2025-07-03 NOTE — TELEPHONE ENCOUNTER
Patient calls requesting a sooner appointment nearly everyday. I called again today to offer him appt 7/3 at 1:40 and he declines.    Carlos Prabhakar, Med Ass't

## 2025-07-09 ENCOUNTER — HOSPITAL ENCOUNTER (EMERGENCY)
Facility: MEDICAL CENTER | Age: 55
End: 2025-07-09
Attending: EMERGENCY MEDICINE | Admitting: HOSPITALIST
Payer: MEDICARE

## 2025-07-09 ENCOUNTER — APPOINTMENT (OUTPATIENT)
Dept: RADIOLOGY | Facility: MEDICAL CENTER | Age: 55
End: 2025-07-09
Attending: EMERGENCY MEDICINE
Payer: MEDICARE

## 2025-07-09 ASSESSMENT — FIBROSIS 4 INDEX: FIB4 SCORE: 3.07

## 2025-07-09 NOTE — ED TRIAGE NOTES
Chief Complaint   Patient presents with    Abdominal Pain     Hx of pancreatitis and feels similar per pt. Reports also dark bloody hematemesis and diarrhea.     Urinary Retention     Reports also decreased UOP and concern for urinary retention     Pt. Adds additional CC. CC updated.

## 2025-07-09 NOTE — ED TRIAGE NOTES
Chief Complaint   Patient presents with    Abdominal Pain     Hx of pancreatitis and feels similar per pt. Reports also dark bloody hematemesis and diarrhea.      Physical Exam  Pulmonary:      Effort: Pulmonary effort is normal.   Skin:     General: Skin is warm and dry.   Neurological:      Mental Status: He is alert.       /89   Pulse 80   Temp 36.7 °C (98.1 °F) (Temporal)   Resp 18   Ht 1.829 m (6')   Wt 98 kg (216 lb) Comment: states measured weight is inaccurate  SpO2 95%   BMI 29.29 kg/m²
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
## 2025-07-09 NOTE — Clinical Note
REFERRAL APPROVAL NOTICE         Sent on July 9, 2025                   Landon Allred  [address]  [place] [address]                   Dear Mr. Allred,    After a careful review of the medical information and benefit coverage, [facility] has processed your referral. See below for additional details.    If applicable, you must be actively enrolled with your insurance for coverage of the authorized service. If you have any questions regarding your coverage, please contact your insurance directly.    REFERRAL INFORMATION   Referral #:  [id number]  Referred-To Provider    Referred-By Provider:  Pulmonary Medicine    John Chery D.O.   [facility] PULMONARY & SLEEP      [address]  [place] [address]  [phone] [address] [address]  [phone]    Referral Start Date:  07/02/2025  Referral End Date:   07/02/2026             SCHEDULING  If you do not already have an appointment, please call [phone] to make an appointment.     MORE INFORMATION  If you do not already have a [employer] account, sign up at: [email or web address]  You can access your medical information, make appointments, see lab results, billing information, and more.  If you have questions regarding this referral, please contact  the [facility] Referrals department at:             [phone]. Monday - Friday 8:00AM - 5:00PM.     Sincerely,    [facility]

## 2025-07-10 NOTE — DISCHARGE INSTRUCTIONS
You were seen in the ER for abdominal pain.  Thankfully your labs and imaging were reassuring.  Your lipase was slightly elevated but does not meet criteria for pancreatitis and your CT scan was also quite reassuring and did not suggest pancreatitis.  We did offer you admission which you have declined due to the plan for pain management with spaced out Dilaudid and Benadryl.  I recommend close follow-up as an outpatient with your primary care provider.  Should you develop new or worsening symptoms return immediately to the ER.  I do hope you feel better soon!

## 2025-07-10 NOTE — ED NOTES
Medication history reviewed with pt. Med rec is complete.  Allergies reviewed, per pt    Pt reports that he has not taken his CREON [id number] since 2/2025.  [facility].    Any outpatient anti-MICROBIAL in the last 30 days? NO    Pt takes XARELTO 20MG, last dose was take on 7/8/2025 at 1800

## 2025-07-10 NOTE — PROGRESS NOTES
OVERNIGHT HOSPITALIST:    I received a request from Dr. Hahn to evaluate this patient for admission for abdominal pain control. He is 56 yo M, h/o chronic pancreatitis. He is requesting to only get IV Benadry and IV Dilaudid together on a bolus/ fast mode since is the only regimen that is effective for pain control.He has 51 medications listed as allergies.     Lipase today is 90 and CT Abdomen and pelvis is negative for acute findings. No other significant acute changes on Chemistry panel and blood work. Patient already received 1 mg IV Dilaudid and 25 mg IV Benadryl in the ED.     I came to the room and spoke with him. I offered to give him Oral Benadryl with IV Tylenol, and he declined. I explained to him we will have to wait at least until 4 am to give him another IV Medication round and he states he prefers to leave the hospital.     I interviewed him again with ERP Dr. Hahn and he is now being discharged from ED with instructions to return if symptoms get worse.

## 2025-07-23 ENCOUNTER — APPOINTMENT (OUTPATIENT)
Dept: NEUROLOGY | Facility: MEDICAL CENTER | Age: 55
End: 2025-07-23
Attending: PSYCHIATRY & NEUROLOGY
Payer: MEDICARE

## 2025-07-24 ENCOUNTER — TELEMEDICINE (OUTPATIENT)
Dept: NEUROLOGY | Facility: MEDICAL CENTER | Age: 55
End: 2025-07-24
Attending: PSYCHIATRY & NEUROLOGY
Payer: MEDICARE

## 2025-07-24 VITALS — BODY MASS INDEX: 29.26 KG/M2 | WEIGHT: 216 LBS | HEIGHT: 72 IN

## 2025-07-24 DIAGNOSIS — R56.9 OBSERVED SEIZURE-LIKE ACTIVITY (HCC): Primary | ICD-10-CM

## 2025-07-24 PROCEDURE — 99214 OFFICE O/P EST MOD 30 MIN: CPT | Mod: 95 | Performed by: PSYCHIATRY & NEUROLOGY

## 2025-07-24 RX ORDER — SILODOSIN 4 MG/1
4 CAPSULE ORAL DAILY
COMMUNITY
Start: 2025-05-13

## 2025-07-24 RX ORDER — PROMETHAZINE HYDROCHLORIDE 25 MG/1
25 SUPPOSITORY RECTAL
COMMUNITY
Start: 2025-07-20

## 2025-07-24 RX ORDER — HYDROMORPHONE HYDROCHLORIDE 4 MG/1
4 TABLET ORAL
COMMUNITY
Start: 2025-07-20 | End: 2025-07-27

## 2025-07-24 ASSESSMENT — PATIENT HEALTH QUESTIONNAIRE - PHQ9
SUM OF ALL RESPONSES TO PHQ QUESTIONS 1-9: 27
5. POOR APPETITE OR OVEREATING: 3 - NEARLY EVERY DAY
CLINICAL INTERPRETATION OF PHQ2 SCORE: 6

## 2025-07-24 ASSESSMENT — FIBROSIS 4 INDEX: FIB4 SCORE: 1.58

## 2025-07-24 NOTE — PROGRESS NOTES
"[facility] NEUROLOGY  GENERAL NEUROLOGY  FOLLOW-UP VISIT    This evaluation was conducted via Teams using secure and encrypted videoconferencing technology. The patient was in their home in the [facility].    The patient's identity was confirmed and verbal consent was obtained for this virtual visit.    CC: seizure-like events    INTERVAL HISTORY:  Landon Allred is a 55 y.o. man with seizure-like episodes as well as TBI, \"bilateral occipital neuralgia,\" cervicalgia, low back pain with bilateral sciatica, postherpetic neuralgia, Factor V deficiency (hypercoagulable), \"inflammatory and immune myopathies,\" depression, and anxiety.  I last saw him in the clinic on 6/16/2025.  At that time I recommended EEG.  Today, I followed up with him via Teams, and he provided the following interval history:    Mr. Allred was recently discharged from the hospital.    7/9-16/2025:  The most recent seizure-like event occurred occurred sometime between 7/9 and 7/16.  He \"simply blacked out.\" He was at home at the time, but he can't recall any additional details.    There have been no events while driving.    He continues to struggle with abdominal pain.    MEDICATIONS:  Current Outpatient Medications   Medication Sig    HYDROmorphone (DILAUDID) 4 MG Tab Take 4 mg by mouth.    promethazine (PHENERGAN) 25 MG Suppos Insert 25 mg into the rectum.    Silodosin 4 MG Cap Take 4 mg by mouth every day.    clonazePAM (KLONOPIN) 1 MG Tab Take 1 Tablet by mouth 2 times a day as needed (Anxiety) for up to 30 days. (Patient taking differently: Take 2 mg by mouth every evening.)    zonisamide (ZONEGRAN) 50 MG capsule Take 50 mg by mouth 2 times a day.    rivaroxaban (XARELTO) 20 MG Tab tablet Take 20 mg by mouth with dinner.    diphenhydrAMINE (BENADRYL) 25 MG Tab Take 50 mg by mouth 2 times a day.     MEDICAL, SOCIAL, AND FAMILY HISTORY:  There is no change in the patient's ROS or medical, social, or family histories since the previous visit on " "6/16/2025.    REVIEW OF SYSTEMS:  A ROS was completed.  Pertinent positives and negatives were included in the HPI, above.  All other systems were reviewed and are negative.    PHYSICAL EXAM:  General/Medical:  - NAD    Neuro:  MENTAL STATUS: awake and alert; no deficits of speech or language; oriented to conversation; affect was appropriate to situation; pleasant, cooperative    CRANIAL NERVES:    II: acuity: NT, fields: NT, pupils: NT, discs: NT    III/IV/VI: versions: grossly intact    V: facial sensation: NT    VII: facial expression: symmetric    VIII: hearing: intact to voice    IX/X: palate: NT    XI: shoulder shrug: NT    XII: tongue: NT    MOTOR:  - bulk: NT  - tone: NT  Upper Extremity Strength (R/L)    NT   Elbow flexion NT   Elbow extension NT   Shoulder abduction NT     Lower Extremity Strength  (R/L)   Hip flexion NT   Knee extension NT   Knee flexion NT   Ankle dorsiflexion NT   Ankle plantarflexion NT     - heel-walk: NT  - toe-walk: NT  - pronator drift: absent  - abnormal movements: none    SENSATION:  - light touch: NT  - vibration (R/L, seconds): NT at the great toes  - pinprick: NT  - proprioception: NT  - Romberg: NT    COORDINATION:  - finger to nose: NT  - finger tapping: NT    REFLEXES:  Reflex Right Left   BR NT NT   Biceps NT NT   Triceps NT NT   Patellae NT NT   Achilles NT NT   Toes NT NT     GAIT:  - NT    REVIEW OF IMAGING STUDIES:  No additional data since the last visit.    REVIEW OF LABORATORY STUDIES:  7/19/2025:  - CBC w/ diff: within acceptable limits    ASSESSMENT:  Landon lAlred is a 55 y.o. man with seizure-like episodes as well as TBI, \"bilateral occipital neuralgia,\" cervicalgia, low back pain with bilateral sciatica, postherpetic neuralgia, Factor V deficiency (hypercoagulable), \"inflammatory and immune myopathies,\" depression, and anxiety.  Plan to proceed with EMU admission.  My medical assistant has already reached out to the neuro-ophthalmology office and " "requested the appointment with Dr. Velazco be rescheduled.    PLAN:  Seizure-Like Episodes:  - EMU admission  - will request appointment with Dr. Velazco be rescheduled  - he requests ultrasound-guided IV placement  - I will speak to the neuro-diagnostics coordinator about Mr. Allred's case    Follow-Up:  - Return in about 8 weeks (around 9/16/2025).    Signed: Henrik Parks M.D.    BILLING DOCUMENTATION:   I spent 30 minutes reviewing the medical record, interviewing and examining the patient, discussing my impression (see \"assessment\" above), and coordinating care.    "

## 2025-08-03 RX ORDER — SILODOSIN 4 MG/1
4 CAPSULE ORAL DAILY
Status: DISCONTINUED | OUTPATIENT
Start: 2025-08-04 | End: 2025-08-03

## 2025-08-03 RX ORDER — DIAZEPAM 10 MG/2ML
10 INJECTION, SOLUTION INTRAMUSCULAR; INTRAVENOUS
Status: CANCELLED | OUTPATIENT
Start: 2025-08-03

## 2025-08-03 RX ORDER — CLONAZEPAM 1 MG/1
1 TABLET ORAL 2 TIMES DAILY
Status: ON HOLD | COMMUNITY
End: 2025-08-06

## 2025-08-03 RX ORDER — ZONISAMIDE 50 MG/1
50 CAPSULE ORAL 2 TIMES DAILY
Status: DISCONTINUED | OUTPATIENT
Start: 2025-08-03 | End: 2025-08-04

## 2025-08-03 RX ORDER — CLONAZEPAM 1 MG/1
1 TABLET ORAL 2 TIMES DAILY
Status: DISCONTINUED | OUTPATIENT
Start: 2025-08-03 | End: 2025-08-04

## 2025-08-04 ENCOUNTER — APPOINTMENT (OUTPATIENT)
Dept: RADIOLOGY | Facility: MEDICAL CENTER | Age: 55
DRG: 101 | End: 2025-08-04
Attending: STUDENT IN AN ORGANIZED HEALTH CARE EDUCATION/TRAINING PROGRAM
Payer: COMMERCIAL

## 2025-08-04 ENCOUNTER — HOSPITAL ENCOUNTER (INPATIENT)
Facility: MEDICAL CENTER | Age: 55
LOS: 4 days | DRG: 101 | End: 2025-08-08
Attending: STUDENT IN AN ORGANIZED HEALTH CARE EDUCATION/TRAINING PROGRAM | Admitting: STUDENT IN AN ORGANIZED HEALTH CARE EDUCATION/TRAINING PROGRAM
Payer: COMMERCIAL

## 2025-08-04 ENCOUNTER — TELEPHONE (OUTPATIENT)
Dept: OPHTHALMOLOGY | Facility: MEDICAL CENTER | Age: 55
End: 2025-08-04
Payer: MEDICARE

## 2025-08-04 DIAGNOSIS — R41.0 DELIRIUM: ICD-10-CM

## 2025-08-04 DIAGNOSIS — F43.12 CHRONIC POST-TRAUMATIC STRESS DISORDER (PTSD) AFTER MILITARY COMBAT: ICD-10-CM

## 2025-08-04 DIAGNOSIS — F41.0 PANIC ATTACKS: ICD-10-CM

## 2025-08-04 DIAGNOSIS — K86.1 CHRONIC PANCREATITIS, UNSPECIFIED PANCREATITIS TYPE (HCC): Primary | ICD-10-CM

## 2025-08-04 DIAGNOSIS — F41.0 ANXIETY ATTACK: ICD-10-CM

## 2025-08-04 DIAGNOSIS — K21.01 GASTROESOPHAGEAL REFLUX DISEASE WITH ESOPHAGITIS AND HEMORRHAGE: ICD-10-CM

## 2025-08-04 DIAGNOSIS — F44.9 DISSOCIATIVE AND CONVERSION DISORDER, UNSPECIFIED: ICD-10-CM

## 2025-08-04 DIAGNOSIS — K59.03 OPIOID-INDUCED CONSTIPATION: ICD-10-CM

## 2025-08-04 DIAGNOSIS — R33.9 URINARY RETENTION: ICD-10-CM

## 2025-08-04 DIAGNOSIS — K85.90 ACUTE ON CHRONIC PANCREATITIS (HCC): ICD-10-CM

## 2025-08-04 DIAGNOSIS — R11.2 INTRACTABLE NAUSEA AND VOMITING: ICD-10-CM

## 2025-08-04 DIAGNOSIS — T40.2X5A OPIOID-INDUCED CONSTIPATION: ICD-10-CM

## 2025-08-04 DIAGNOSIS — F33.1 MODERATE EPISODE OF RECURRENT MAJOR DEPRESSIVE DISORDER (HCC): ICD-10-CM

## 2025-08-04 DIAGNOSIS — R55 VASOVAGAL NEAR-SYNCOPE: ICD-10-CM

## 2025-08-04 DIAGNOSIS — K86.1 ACUTE ON CHRONIC PANCREATITIS (HCC): ICD-10-CM

## 2025-08-04 DIAGNOSIS — G89.29 OTHER CHRONIC PAIN: ICD-10-CM

## 2025-08-04 DIAGNOSIS — K92.2 CHRONIC UPPER GI BLEEDING: ICD-10-CM

## 2025-08-04 DIAGNOSIS — R55 SYNCOPE, VASOVAGAL: ICD-10-CM

## 2025-08-04 DIAGNOSIS — R09.02 HYPOXEMIA: ICD-10-CM

## 2025-08-04 DIAGNOSIS — F41.3 OTHER MIXED ANXIETY DISORDERS: ICD-10-CM

## 2025-08-04 DIAGNOSIS — R52 INTRACTABLE PAIN: ICD-10-CM

## 2025-08-04 DIAGNOSIS — K92.0 HEMATEMESIS OF UNKNOWN CAUSE: ICD-10-CM

## 2025-08-04 DIAGNOSIS — R10.9 CHRONIC ABDOMINAL PAIN: ICD-10-CM

## 2025-08-04 DIAGNOSIS — R55 BLACKOUT SPELL: ICD-10-CM

## 2025-08-04 DIAGNOSIS — G47.34 NOCTURNAL HYPOXIA: ICD-10-CM

## 2025-08-04 DIAGNOSIS — F44.0 DISSOCIATIVE AMNESIA (HCC): ICD-10-CM

## 2025-08-04 DIAGNOSIS — R40.4 TRANSIENT ALTERATION OF AWARENESS: ICD-10-CM

## 2025-08-04 DIAGNOSIS — G89.29 CHRONIC ABDOMINAL PAIN: ICD-10-CM

## 2025-08-04 DIAGNOSIS — F43.10 POST-TRAUMATIC STRESS SYNDROME: Chronic | ICD-10-CM

## 2025-08-04 LAB
ABO GROUP BLD: NORMAL
ALBUMIN SERPL BCP-MCNC: 4.5 G/DL (ref 3.2–4.9)
ALBUMIN/GLOB SERPL: 1.6 G/DL
ALP SERPL-CCNC: 51 U/L (ref 30–99)
ALT SERPL-CCNC: 17 U/L (ref 2–50)
AMMONIA PLAS-SCNC: 38 UMOL/L (ref 11–45)
AMYLASE SERPL-CCNC: 38 U/L (ref 20–103)
ANION GAP SERPL CALC-SCNC: 11 MMOL/L (ref 7–16)
APPEARANCE UR: CLEAR
AST SERPL-CCNC: 25 U/L (ref 12–45)
BASOPHILS # BLD AUTO: 0.4 % (ref 0–1.8)
BASOPHILS # BLD: 0.02 K/UL (ref 0–0.12)
BILIRUB SERPL-MCNC: 0.7 MG/DL (ref 0.1–1.5)
BILIRUB UR QL STRIP.AUTO: NEGATIVE
BLD GP AB SCN SERPL QL: NORMAL
BUN SERPL-MCNC: 27 MG/DL (ref 8–22)
CALCIUM ALBUM COR SERPL-MCNC: 9 MG/DL (ref 8.5–10.5)
CALCIUM SERPL-MCNC: 9.4 MG/DL (ref 8.5–10.5)
CHLORIDE SERPL-SCNC: 109 MMOL/L (ref 96–112)
CO2 SERPL-SCNC: 20 MMOL/L (ref 20–33)
COLOR UR: YELLOW
CREAT SERPL-MCNC: 1.04 MG/DL (ref 0.5–1.4)
CRP SERPL HS-MCNC: 0.8 MG/L (ref 0–3)
EOSINOPHIL # BLD AUTO: 0.13 K/UL (ref 0–0.51)
EOSINOPHIL NFR BLD: 2.6 % (ref 0–6.9)
ERYTHROCYTE [DISTWIDTH] IN BLOOD BY AUTOMATED COUNT: 44.2 FL (ref 35.9–50)
ERYTHROCYTE [SEDIMENTATION RATE] IN BLOOD BY WESTERGREN METHOD: 4 MM/HOUR (ref 0–20)
GFR SERPLBLD CREATININE-BSD FMLA CKD-EPI: 85 ML/MIN/1.73 M 2
GLOBULIN SER CALC-MCNC: 2.8 G/DL (ref 1.9–3.5)
GLUCOSE SERPL-MCNC: 90 MG/DL (ref 65–99)
GLUCOSE UR STRIP.AUTO-MCNC: NEGATIVE MG/DL
HCT VFR BLD AUTO: 43.7 % (ref 42–52)
HGB BLD-MCNC: 15.2 G/DL (ref 14–18)
IMM GRANULOCYTES # BLD AUTO: 0.01 K/UL (ref 0–0.11)
IMM GRANULOCYTES NFR BLD AUTO: 0.2 % (ref 0–0.9)
KETONES UR STRIP.AUTO-MCNC: NEGATIVE MG/DL
LEUKOCYTE ESTERASE UR QL STRIP.AUTO: NEGATIVE
LIPASE SERPL-CCNC: 28 U/L (ref 11–82)
LYMPHOCYTES # BLD AUTO: 1.56 K/UL (ref 1–4.8)
LYMPHOCYTES NFR BLD: 30.8 % (ref 22–41)
MAGNESIUM SERPL-MCNC: 2.3 MG/DL (ref 1.5–2.5)
MCH RBC QN AUTO: 32.7 PG (ref 27–33)
MCHC RBC AUTO-ENTMCNC: 34.8 G/DL (ref 32.3–36.5)
MCV RBC AUTO: 94 FL (ref 81.4–97.8)
MICRO URNS: NORMAL
MONOCYTES # BLD AUTO: 0.39 K/UL (ref 0–0.85)
MONOCYTES NFR BLD AUTO: 7.7 % (ref 0–13.4)
NEUTROPHILS # BLD AUTO: 2.96 K/UL (ref 1.82–7.42)
NEUTROPHILS NFR BLD: 58.3 % (ref 44–72)
NITRITE UR QL STRIP.AUTO: NEGATIVE
NRBC # BLD AUTO: 0 K/UL
NRBC BLD-RTO: 0 /100 WBC (ref 0–0.2)
PH UR STRIP.AUTO: 6.5 [PH] (ref 5–8)
PLATELET # BLD AUTO: 196 K/UL (ref 164–446)
PMV BLD AUTO: 9.9 FL (ref 9–12.9)
POTASSIUM SERPL-SCNC: 4.2 MMOL/L (ref 3.6–5.5)
PROT SERPL-MCNC: 7.3 G/DL (ref 6–8.2)
PROT UR QL STRIP: NEGATIVE MG/DL
RBC # BLD AUTO: 4.65 M/UL (ref 4.7–6.1)
RBC UR QL AUTO: NEGATIVE
RH BLD: NORMAL
SODIUM SERPL-SCNC: 140 MMOL/L (ref 135–145)
SP GR UR STRIP.AUTO: 1.02
UROBILINOGEN UR STRIP.AUTO-MCNC: 0.2 EU/DL
WBC # BLD AUTO: 5.1 K/UL (ref 4.8–10.8)

## 2025-08-04 PROCEDURE — 74018 RADEX ABDOMEN 1 VIEW: CPT

## 2025-08-04 PROCEDURE — 4A10X4Z MONITORING OF CENTRAL NERVOUS ELECTRICAL ACTIVITY, EXTERNAL APPROACH: ICD-10-PCS | Performed by: STUDENT IN AN ORGANIZED HEALTH CARE EDUCATION/TRAINING PROGRAM

## 2025-08-04 PROCEDURE — 86900 BLOOD TYPING SEROLOGIC ABO: CPT

## 2025-08-04 PROCEDURE — 700111 HCHG RX REV CODE 636 W/ 250 OVERRIDE (IP): Mod: JZ | Performed by: STUDENT IN AN ORGANIZED HEALTH CARE EDUCATION/TRAINING PROGRAM

## 2025-08-04 PROCEDURE — 700105 HCHG RX REV CODE 258: Performed by: STUDENT IN AN ORGANIZED HEALTH CARE EDUCATION/TRAINING PROGRAM

## 2025-08-04 PROCEDURE — 71045 X-RAY EXAM CHEST 1 VIEW: CPT

## 2025-08-04 PROCEDURE — 82140 ASSAY OF AMMONIA: CPT

## 2025-08-04 PROCEDURE — 85025 COMPLETE CBC W/AUTO DIFF WBC: CPT

## 2025-08-04 PROCEDURE — 87086 URINE CULTURE/COLONY COUNT: CPT

## 2025-08-04 PROCEDURE — 770020 HCHG ROOM/CARE - TELE (206)

## 2025-08-04 PROCEDURE — 81003 URINALYSIS AUTO W/O SCOPE: CPT

## 2025-08-04 PROCEDURE — 86141 C-REACTIVE PROTEIN HS: CPT

## 2025-08-04 PROCEDURE — 80053 COMPREHEN METABOLIC PANEL: CPT

## 2025-08-04 PROCEDURE — A9270 NON-COVERED ITEM OR SERVICE: HCPCS | Performed by: STUDENT IN AN ORGANIZED HEALTH CARE EDUCATION/TRAINING PROGRAM

## 2025-08-04 PROCEDURE — 86850 RBC ANTIBODY SCREEN: CPT

## 2025-08-04 PROCEDURE — 51798 US URINE CAPACITY MEASURE: CPT

## 2025-08-04 PROCEDURE — 95716 VEEG EA 12-26HR CONT MNTR: CPT | Performed by: STUDENT IN AN ORGANIZED HEALTH CARE EDUCATION/TRAINING PROGRAM

## 2025-08-04 PROCEDURE — 36415 COLL VENOUS BLD VENIPUNCTURE: CPT

## 2025-08-04 PROCEDURE — 700101 HCHG RX REV CODE 250: Performed by: STUDENT IN AN ORGANIZED HEALTH CARE EDUCATION/TRAINING PROGRAM

## 2025-08-04 PROCEDURE — 700102 HCHG RX REV CODE 250 W/ 637 OVERRIDE(OP): Performed by: STUDENT IN AN ORGANIZED HEALTH CARE EDUCATION/TRAINING PROGRAM

## 2025-08-04 PROCEDURE — 83690 ASSAY OF LIPASE: CPT

## 2025-08-04 PROCEDURE — 95700 EEG CONT REC W/VID EEG TECH: CPT | Performed by: STUDENT IN AN ORGANIZED HEALTH CARE EDUCATION/TRAINING PROGRAM

## 2025-08-04 PROCEDURE — 95720 EEG PHY/QHP EA INCR W/VEEG: CPT | Performed by: STUDENT IN AN ORGANIZED HEALTH CARE EDUCATION/TRAINING PROGRAM

## 2025-08-04 PROCEDURE — 82150 ASSAY OF AMYLASE: CPT

## 2025-08-04 PROCEDURE — 99418 PROLNG IP/OBS E/M EA 15 MIN: CPT | Mod: 25 | Performed by: STUDENT IN AN ORGANIZED HEALTH CARE EDUCATION/TRAINING PROGRAM

## 2025-08-04 PROCEDURE — 86901 BLOOD TYPING SEROLOGIC RH(D): CPT

## 2025-08-04 PROCEDURE — 85652 RBC SED RATE AUTOMATED: CPT

## 2025-08-04 PROCEDURE — 83735 ASSAY OF MAGNESIUM: CPT

## 2025-08-04 PROCEDURE — 99223 1ST HOSP IP/OBS HIGH 75: CPT | Mod: 25 | Performed by: STUDENT IN AN ORGANIZED HEALTH CARE EDUCATION/TRAINING PROGRAM

## 2025-08-04 RX ORDER — DIAZEPAM 10 MG/2ML
10 INJECTION, SOLUTION INTRAMUSCULAR; INTRAVENOUS
Status: DISCONTINUED | OUTPATIENT
Start: 2025-08-04 | End: 2025-08-08 | Stop reason: HOSPADM

## 2025-08-04 RX ORDER — PROMETHAZINE HYDROCHLORIDE 25 MG/1
25 TABLET ORAL EVERY 6 HOURS PRN
Status: DISCONTINUED | OUTPATIENT
Start: 2025-08-04 | End: 2025-08-05

## 2025-08-04 RX ORDER — HYDROMORPHONE HYDROCHLORIDE 1 MG/ML
1 INJECTION, SOLUTION INTRAMUSCULAR; INTRAVENOUS; SUBCUTANEOUS EVERY 6 HOURS PRN
Status: DISCONTINUED | OUTPATIENT
Start: 2025-08-04 | End: 2025-08-05

## 2025-08-04 RX ORDER — BISACODYL 10 MG
10 SUPPOSITORY, RECTAL RECTAL
Status: DISCONTINUED | OUTPATIENT
Start: 2025-08-04 | End: 2025-08-08 | Stop reason: HOSPADM

## 2025-08-04 RX ORDER — DIPHENHYDRAMINE HYDROCHLORIDE 50 MG/ML
25 INJECTION, SOLUTION INTRAMUSCULAR; INTRAVENOUS EVERY 6 HOURS PRN
Status: DISCONTINUED | OUTPATIENT
Start: 2025-08-04 | End: 2025-08-04

## 2025-08-04 RX ORDER — AMOXICILLIN 250 MG
2 CAPSULE ORAL 2 TIMES DAILY
Status: DISCONTINUED | OUTPATIENT
Start: 2025-08-04 | End: 2025-08-08 | Stop reason: HOSPADM

## 2025-08-04 RX ORDER — HYDROMORPHONE HYDROCHLORIDE 2 MG/1
2 TABLET ORAL ONCE
Refills: 0 | Status: DISCONTINUED | OUTPATIENT
Start: 2025-08-04 | End: 2025-08-04

## 2025-08-04 RX ORDER — LORAZEPAM 2 MG/ML
1 INJECTION INTRAMUSCULAR EVERY 8 HOURS
Status: DISCONTINUED | OUTPATIENT
Start: 2025-08-04 | End: 2025-08-06

## 2025-08-04 RX ORDER — HYDROMORPHONE HYDROCHLORIDE 1 MG/ML
1 INJECTION, SOLUTION INTRAMUSCULAR; INTRAVENOUS; SUBCUTANEOUS ONCE
Status: COMPLETED | OUTPATIENT
Start: 2025-08-04 | End: 2025-08-04

## 2025-08-04 RX ORDER — LIDOCAINE HYDROCHLORIDE 20 MG/ML
JELLY TOPICAL
Status: DISCONTINUED | OUTPATIENT
Start: 2025-08-04 | End: 2025-08-08 | Stop reason: HOSPADM

## 2025-08-04 RX ORDER — LIDOCAINE HYDROCHLORIDE 20 MG/ML
JELLY TOPICAL ONCE
Status: COMPLETED | OUTPATIENT
Start: 2025-08-04 | End: 2025-08-04

## 2025-08-04 RX ORDER — PANTOPRAZOLE SODIUM 40 MG/10ML
40 INJECTION, POWDER, LYOPHILIZED, FOR SOLUTION INTRAVENOUS 2 TIMES DAILY
Status: DISCONTINUED | OUTPATIENT
Start: 2025-08-04 | End: 2025-08-06

## 2025-08-04 RX ORDER — ALBUTEROL SULFATE 5 MG/ML
2.5 SOLUTION RESPIRATORY (INHALATION)
Status: DISCONTINUED | OUTPATIENT
Start: 2025-08-04 | End: 2025-08-08 | Stop reason: HOSPADM

## 2025-08-04 RX ORDER — PROCHLORPERAZINE EDISYLATE 5 MG/ML
10 INJECTION INTRAMUSCULAR; INTRAVENOUS EVERY 6 HOURS PRN
Status: DISCONTINUED | OUTPATIENT
Start: 2025-08-04 | End: 2025-08-04

## 2025-08-04 RX ORDER — DEXLANSOPRAZOLE 30 MG/1
30 CAPSULE, DELAYED RELEASE ORAL DAILY
Status: ON HOLD | COMMUNITY
End: 2025-08-06

## 2025-08-04 RX ORDER — HYDROMORPHONE HYDROCHLORIDE 1 MG/ML
1 INJECTION, SOLUTION INTRAMUSCULAR; INTRAVENOUS; SUBCUTANEOUS EVERY 4 HOURS PRN
Status: DISCONTINUED | OUTPATIENT
Start: 2025-08-04 | End: 2025-08-04

## 2025-08-04 RX ORDER — HYDROMORPHONE HYDROCHLORIDE 2 MG/1
2 TABLET ORAL
COMMUNITY

## 2025-08-04 RX ORDER — DIPHENHYDRAMINE HYDROCHLORIDE 50 MG/ML
50 INJECTION, SOLUTION INTRAMUSCULAR; INTRAVENOUS EVERY 6 HOURS PRN
Status: DISCONTINUED | OUTPATIENT
Start: 2025-08-04 | End: 2025-08-05

## 2025-08-04 RX ORDER — DEXTROSE, SODIUM CHLORIDE, SODIUM LACTATE, POTASSIUM CHLORIDE, AND CALCIUM CHLORIDE 5; .6; .31; .03; .02 G/100ML; G/100ML; G/100ML; G/100ML; G/100ML
INJECTION, SOLUTION INTRAVENOUS CONTINUOUS
Status: ACTIVE | OUTPATIENT
Start: 2025-08-04 | End: 2025-08-05

## 2025-08-04 RX ORDER — DIPHENHYDRAMINE HCL 25 MG
25 TABLET ORAL EVERY 6 HOURS PRN
Status: DISCONTINUED | OUTPATIENT
Start: 2025-08-04 | End: 2025-08-04

## 2025-08-04 RX ORDER — POLYETHYLENE GLYCOL 3350 17 G/17G
1 POWDER, FOR SOLUTION ORAL 2 TIMES DAILY
Status: DISCONTINUED | OUTPATIENT
Start: 2025-08-05 | End: 2025-08-08 | Stop reason: HOSPADM

## 2025-08-04 RX ADMIN — PANTOPRAZOLE SODIUM 40 MG: 40 INJECTION, POWDER, FOR SOLUTION INTRAVENOUS at 17:19

## 2025-08-04 RX ADMIN — DIPHENHYDRAMINE HYDROCHLORIDE 50 MG: 50 INJECTION, SOLUTION INTRAMUSCULAR; INTRAVENOUS at 21:44

## 2025-08-04 RX ADMIN — HYDROMORPHONE HYDROCHLORIDE 1 MG: 1 INJECTION, SOLUTION INTRAMUSCULAR; INTRAVENOUS; SUBCUTANEOUS at 10:10

## 2025-08-04 RX ADMIN — FLUTICASONE FUROATE, UMECLIDINIUM BROMIDE AND VILANTEROL TRIFENATATE 1 PUFF: 200; 62.5; 25 POWDER RESPIRATORY (INHALATION) at 14:40

## 2025-08-04 RX ADMIN — LIDOCAINE HYDROCHLORIDE 6 ML: 20 JELLY TOPICAL at 21:53

## 2025-08-04 RX ADMIN — LORAZEPAM 1 MG: 2 INJECTION, SOLUTION INTRAMUSCULAR; INTRAVENOUS at 14:36

## 2025-08-04 RX ADMIN — SODIUM CHLORIDE, SODIUM LACTATE, POTASSIUM CHLORIDE, CALCIUM CHLORIDE AND DEXTROSE MONOHYDRATE: 5; 600; 310; 30; 20 INJECTION, SOLUTION INTRAVENOUS at 15:36

## 2025-08-04 RX ADMIN — LORAZEPAM 1 MG: 2 INJECTION, SOLUTION INTRAMUSCULAR; INTRAVENOUS at 23:45

## 2025-08-04 RX ADMIN — DIPHENHYDRAMINE HYDROCHLORIDE 25 MG: 50 INJECTION, SOLUTION INTRAMUSCULAR; INTRAVENOUS at 10:10

## 2025-08-04 RX ADMIN — HYDROMORPHONE HYDROCHLORIDE 1 MG: 1 INJECTION, SOLUTION INTRAMUSCULAR; INTRAVENOUS; SUBCUTANEOUS at 21:45

## 2025-08-04 RX ADMIN — HYDROMORPHONE HYDROCHLORIDE 1 MG: 1 INJECTION, SOLUTION INTRAMUSCULAR; INTRAVENOUS; SUBCUTANEOUS at 21:44

## 2025-08-04 RX ADMIN — DIPHENHYDRAMINE HYDROCHLORIDE 50 MG: 50 INJECTION, SOLUTION INTRAMUSCULAR; INTRAVENOUS at 15:37

## 2025-08-04 RX ADMIN — HYDROMORPHONE HYDROCHLORIDE 1 MG: 1 INJECTION, SOLUTION INTRAMUSCULAR; INTRAVENOUS; SUBCUTANEOUS at 15:42

## 2025-08-04 ASSESSMENT — LIFESTYLE VARIABLES
EVER FELT BAD OR GUILTY ABOUT YOUR DRINKING: NO
TOTAL SCORE: 0
HOW MANY TIMES IN THE PAST YEAR HAVE YOU HAD 5 OR MORE DRINKS IN A DAY: 0
ALCOHOL_USE: NO
TOTAL SCORE: 0
CONSUMPTION TOTAL: NEGATIVE
AVERAGE NUMBER OF DAYS PER WEEK YOU HAVE A DRINK CONTAINING ALCOHOL: 0
HAVE PEOPLE ANNOYED YOU BY CRITICIZING YOUR DRINKING: NO
EVER HAD A DRINK FIRST THING IN THE MORNING TO STEADY YOUR NERVES TO GET RID OF A HANGOVER: NO
HAVE YOU EVER FELT YOU SHOULD CUT DOWN ON YOUR DRINKING: NO
ON A TYPICAL DAY WHEN YOU DRINK ALCOHOL HOW MANY DRINKS DO YOU HAVE: 0
TOTAL SCORE: 0

## 2025-08-04 ASSESSMENT — FIBROSIS 4 INDEX
FIB4 SCORE: 1.58
FIB4 SCORE: 1.58

## 2025-08-04 ASSESSMENT — COGNITIVE AND FUNCTIONAL STATUS - GENERAL
WALKING IN HOSPITAL ROOM: A LITTLE
DAILY ACTIVITIY SCORE: 24
CLIMB 3 TO 5 STEPS WITH RAILING: A LITTLE
SUGGESTED CMS G CODE MODIFIER DAILY ACTIVITY: CH
SUGGESTED CMS G CODE MODIFIER MOBILITY: CJ
MOBILITY SCORE: 22

## 2025-08-04 ASSESSMENT — PATIENT HEALTH QUESTIONNAIRE - PHQ9
9. THOUGHTS THAT YOU WOULD BE BETTER OFF DEAD, OR OF HURTING YOURSELF: NEARLY EVERY DAY
2. FEELING DOWN, DEPRESSED, IRRITABLE, OR HOPELESS: NEARLY EVERY DAY
1. LITTLE INTEREST OR PLEASURE IN DOING THINGS: NEARLY EVERY DAY
7. TROUBLE CONCENTRATING ON THINGS, SUCH AS READING THE NEWSPAPER OR WATCHING TELEVISION: NEARLY EVERY DAY
3. TROUBLE FALLING OR STAYING ASLEEP OR SLEEPING TOO MUCH: NEARLY EVERY DAY
8. MOVING OR SPEAKING SO SLOWLY THAT OTHER PEOPLE COULD HAVE NOTICED. OR THE OPPOSITE, BEING SO FIGETY OR RESTLESS THAT YOU HAVE BEEN MOVING AROUND A LOT MORE THAN USUAL: NEARLY EVERY DAY
5. POOR APPETITE OR OVEREATING: NEARLY EVERY DAY
SUM OF ALL RESPONSES TO PHQ QUESTIONS 1-9: 27
4. FEELING TIRED OR HAVING LITTLE ENERGY: NEARLY EVERY DAY
6. FEELING BAD ABOUT YOURSELF - OR THAT YOU ARE A FAILURE OR HAVE LET YOURSELF OR YOUR FAMILY DOWN: NEARLY EVERY DAY
SUM OF ALL RESPONSES TO PHQ9 QUESTIONS 1 AND 2: 6

## 2025-08-04 ASSESSMENT — PAIN DESCRIPTION - PAIN TYPE
TYPE: CHRONIC PAIN
TYPE: CHRONIC PAIN
TYPE: ACUTE PAIN
TYPE: CHRONIC PAIN;ACUTE PAIN

## 2025-08-05 ENCOUNTER — TELEPHONE (OUTPATIENT)
Dept: OPHTHALMOLOGY | Facility: MEDICAL CENTER | Age: 55
End: 2025-08-05
Payer: MEDICARE

## 2025-08-05 LAB
AMPHET UR QL SCN: NEGATIVE
BARBITURATES UR QL SCN: NEGATIVE
BENZODIAZ UR QL SCN: POSITIVE
BZE UR QL SCN: NEGATIVE
CANNABINOIDS UR QL SCN: NEGATIVE
FENTANYL UR QL: NEGATIVE
GLUCOSE BLD STRIP.AUTO-MCNC: 109 MG/DL (ref 65–99)
METHADONE UR QL SCN: NEGATIVE
OPIATES UR QL SCN: NEGATIVE
OXYCODONE UR QL SCN: NEGATIVE
PCP UR QL SCN: NEGATIVE
PROPOXYPH UR QL SCN: NEGATIVE

## 2025-08-05 PROCEDURE — 770020 HCHG ROOM/CARE - TELE (206)

## 2025-08-05 PROCEDURE — 99418 PROLNG IP/OBS E/M EA 15 MIN: CPT | Mod: 25 | Performed by: STUDENT IN AN ORGANIZED HEALTH CARE EDUCATION/TRAINING PROGRAM

## 2025-08-05 PROCEDURE — 700111 HCHG RX REV CODE 636 W/ 250 OVERRIDE (IP): Mod: JZ | Performed by: STUDENT IN AN ORGANIZED HEALTH CARE EDUCATION/TRAINING PROGRAM

## 2025-08-05 PROCEDURE — 700102 HCHG RX REV CODE 250 W/ 637 OVERRIDE(OP): Performed by: STUDENT IN AN ORGANIZED HEALTH CARE EDUCATION/TRAINING PROGRAM

## 2025-08-05 PROCEDURE — 80307 DRUG TEST PRSMV CHEM ANLYZR: CPT

## 2025-08-05 PROCEDURE — 95720 EEG PHY/QHP EA INCR W/VEEG: CPT | Performed by: STUDENT IN AN ORGANIZED HEALTH CARE EDUCATION/TRAINING PROGRAM

## 2025-08-05 PROCEDURE — 99233 SBSQ HOSP IP/OBS HIGH 50: CPT | Mod: 25 | Performed by: STUDENT IN AN ORGANIZED HEALTH CARE EDUCATION/TRAINING PROGRAM

## 2025-08-05 PROCEDURE — 700105 HCHG RX REV CODE 258: Performed by: STUDENT IN AN ORGANIZED HEALTH CARE EDUCATION/TRAINING PROGRAM

## 2025-08-05 PROCEDURE — A9270 NON-COVERED ITEM OR SERVICE: HCPCS | Performed by: STUDENT IN AN ORGANIZED HEALTH CARE EDUCATION/TRAINING PROGRAM

## 2025-08-05 PROCEDURE — 82962 GLUCOSE BLOOD TEST: CPT | Performed by: STUDENT IN AN ORGANIZED HEALTH CARE EDUCATION/TRAINING PROGRAM

## 2025-08-05 PROCEDURE — 4A10X4Z MONITORING OF CENTRAL NERVOUS ELECTRICAL ACTIVITY, EXTERNAL APPROACH: ICD-10-PCS | Performed by: STUDENT IN AN ORGANIZED HEALTH CARE EDUCATION/TRAINING PROGRAM

## 2025-08-05 PROCEDURE — 95716 VEEG EA 12-26HR CONT MNTR: CPT | Performed by: STUDENT IN AN ORGANIZED HEALTH CARE EDUCATION/TRAINING PROGRAM

## 2025-08-05 RX ORDER — DEXTROSE, SODIUM CHLORIDE, SODIUM LACTATE, POTASSIUM CHLORIDE, AND CALCIUM CHLORIDE 5; .6; .31; .03; .02 G/100ML; G/100ML; G/100ML; G/100ML; G/100ML
INJECTION, SOLUTION INTRAVENOUS CONTINUOUS
Status: DISCONTINUED | OUTPATIENT
Start: 2025-08-05 | End: 2025-08-06

## 2025-08-05 RX ORDER — PROMETHAZINE HYDROCHLORIDE 6.25 MG/5ML
50 SYRUP ORAL EVERY 6 HOURS PRN
Status: DISCONTINUED | OUTPATIENT
Start: 2025-08-05 | End: 2025-08-07

## 2025-08-05 RX ORDER — DIPHENHYDRAMINE HYDROCHLORIDE 50 MG/ML
50 INJECTION, SOLUTION INTRAMUSCULAR; INTRAVENOUS EVERY 4 HOURS PRN
Status: DISCONTINUED | OUTPATIENT
Start: 2025-08-05 | End: 2025-08-08

## 2025-08-05 RX ORDER — HYDROMORPHONE HYDROCHLORIDE 1 MG/ML
1 INJECTION, SOLUTION INTRAMUSCULAR; INTRAVENOUS; SUBCUTANEOUS EVERY 4 HOURS PRN
Status: DISCONTINUED | OUTPATIENT
Start: 2025-08-05 | End: 2025-08-07

## 2025-08-05 RX ADMIN — RIVAROXABAN 20 MG: 20 TABLET, FILM COATED ORAL at 20:30

## 2025-08-05 RX ADMIN — PANTOPRAZOLE SODIUM 40 MG: 40 INJECTION, POWDER, FOR SOLUTION INTRAVENOUS at 06:06

## 2025-08-05 RX ADMIN — SENNOSIDES, DOCUSATE SODIUM 2 TABLET: 50; 8.6 TABLET, FILM COATED ORAL at 06:07

## 2025-08-05 RX ADMIN — DIPHENHYDRAMINE HYDROCHLORIDE 50 MG: 50 INJECTION, SOLUTION INTRAMUSCULAR; INTRAVENOUS at 13:33

## 2025-08-05 RX ADMIN — FLUTICASONE FUROATE, UMECLIDINIUM BROMIDE AND VILANTEROL TRIFENATATE 1 PUFF: 200; 62.5; 25 POWDER RESPIRATORY (INHALATION) at 13:32

## 2025-08-05 RX ADMIN — PROMETHAZINE HYDROCHLORIDE 50 MG: 6.25 SOLUTION ORAL at 21:59

## 2025-08-05 RX ADMIN — POLYETHYLENE GLYCOL 3350 1 PACKET: 17 POWDER, FOR SOLUTION ORAL at 06:07

## 2025-08-05 RX ADMIN — SODIUM CHLORIDE, SODIUM LACTATE, POTASSIUM CHLORIDE, CALCIUM CHLORIDE AND DEXTROSE MONOHYDRATE: 5; 600; 310; 30; 20 INJECTION, SOLUTION INTRAVENOUS at 21:04

## 2025-08-05 RX ADMIN — POLYETHYLENE GLYCOL 3350 1 PACKET: 17 POWDER, FOR SOLUTION ORAL at 18:21

## 2025-08-05 RX ADMIN — HYDROMORPHONE HYDROCHLORIDE 1 MG: 1 INJECTION, SOLUTION INTRAMUSCULAR; INTRAVENOUS; SUBCUTANEOUS at 18:22

## 2025-08-05 RX ADMIN — DIPHENHYDRAMINE HYDROCHLORIDE 50 MG: 50 INJECTION, SOLUTION INTRAMUSCULAR; INTRAVENOUS at 18:23

## 2025-08-05 RX ADMIN — PANTOPRAZOLE SODIUM 40 MG: 40 INJECTION, POWDER, FOR SOLUTION INTRAVENOUS at 18:22

## 2025-08-05 RX ADMIN — LORAZEPAM 1 MG: 2 INJECTION, SOLUTION INTRAMUSCULAR; INTRAVENOUS at 13:33

## 2025-08-05 RX ADMIN — DIPHENHYDRAMINE HYDROCHLORIDE 50 MG: 50 INJECTION, SOLUTION INTRAMUSCULAR; INTRAVENOUS at 22:25

## 2025-08-05 RX ADMIN — HYDROMORPHONE HYDROCHLORIDE 1 MG: 1 INJECTION, SOLUTION INTRAMUSCULAR; INTRAVENOUS; SUBCUTANEOUS at 22:33

## 2025-08-05 RX ADMIN — LORAZEPAM 1 MG: 2 INJECTION, SOLUTION INTRAMUSCULAR; INTRAVENOUS at 06:06

## 2025-08-05 RX ADMIN — LORAZEPAM 1 MG: 2 INJECTION, SOLUTION INTRAMUSCULAR; INTRAVENOUS at 21:00

## 2025-08-05 RX ADMIN — HYDROMORPHONE HYDROCHLORIDE 1 MG: 1 INJECTION, SOLUTION INTRAMUSCULAR; INTRAVENOUS; SUBCUTANEOUS at 13:32

## 2025-08-05 RX ADMIN — SENNOSIDES, DOCUSATE SODIUM 2 TABLET: 50; 8.6 TABLET, FILM COATED ORAL at 18:23

## 2025-08-05 ASSESSMENT — PAIN DESCRIPTION - PAIN TYPE
TYPE: ACUTE PAIN
TYPE: CHRONIC PAIN
TYPE: ACUTE PAIN
TYPE: CHRONIC PAIN

## 2025-08-06 ENCOUNTER — APPOINTMENT (OUTPATIENT)
Dept: RADIOLOGY | Facility: MEDICAL CENTER | Age: 55
DRG: 101 | End: 2025-08-06
Attending: STUDENT IN AN ORGANIZED HEALTH CARE EDUCATION/TRAINING PROGRAM
Payer: COMMERCIAL

## 2025-08-06 ENCOUNTER — APPOINTMENT (OUTPATIENT)
Dept: OPHTHALMOLOGY | Facility: MEDICAL CENTER | Age: 55
End: 2025-08-06
Payer: MEDICARE

## 2025-08-06 LAB
BACTERIA UR CULT: NORMAL
CORTIS SERPL-MCNC: 3.7 UG/DL (ref 0–23)
D DIMER PPP IA.FEU-MCNC: 0.31 UG/ML (FEU) (ref 0–0.5)
EST. AVERAGE GLUCOSE BLD GHB EST-MCNC: 111 MG/DL
HBA1C MFR BLD: 5.5 % (ref 4–5.6)
NT-PROBNP SERPL IA-MCNC: 71 PG/ML (ref 0–125)
SIGNIFICANT IND 70042: NORMAL
SITE SITE: NORMAL
SOURCE SOURCE: NORMAL
T3FREE SERPL-MCNC: 3.13 PG/ML (ref 2–4.4)
T4 FREE SERPL-MCNC: 1.14 NG/DL (ref 0.93–1.7)
T4 SERPL-MCNC: 6 UG/DL (ref 4–12)
THYROPEROXIDASE AB SERPL-ACNC: 18.6 IU/ML (ref 0–9)
TSH SERPL-ACNC: 2.02 UIU/ML (ref 0.38–5.33)

## 2025-08-06 PROCEDURE — 700111 HCHG RX REV CODE 636 W/ 250 OVERRIDE (IP): Performed by: STUDENT IN AN ORGANIZED HEALTH CARE EDUCATION/TRAINING PROGRAM

## 2025-08-06 PROCEDURE — 51798 US URINE CAPACITY MEASURE: CPT

## 2025-08-06 PROCEDURE — 83525 ASSAY OF INSULIN: CPT

## 2025-08-06 PROCEDURE — 99233 SBSQ HOSP IP/OBS HIGH 50: CPT | Mod: 25 | Performed by: STUDENT IN AN ORGANIZED HEALTH CARE EDUCATION/TRAINING PROGRAM

## 2025-08-06 PROCEDURE — 83880 ASSAY OF NATRIURETIC PEPTIDE: CPT

## 2025-08-06 PROCEDURE — 84439 ASSAY OF FREE THYROXINE: CPT

## 2025-08-06 PROCEDURE — 99418 PROLNG IP/OBS E/M EA 15 MIN: CPT | Mod: 25 | Performed by: STUDENT IN AN ORGANIZED HEALTH CARE EDUCATION/TRAINING PROGRAM

## 2025-08-06 PROCEDURE — 95720 EEG PHY/QHP EA INCR W/VEEG: CPT | Performed by: STUDENT IN AN ORGANIZED HEALTH CARE EDUCATION/TRAINING PROGRAM

## 2025-08-06 PROCEDURE — 700102 HCHG RX REV CODE 250 W/ 637 OVERRIDE(OP): Performed by: STUDENT IN AN ORGANIZED HEALTH CARE EDUCATION/TRAINING PROGRAM

## 2025-08-06 PROCEDURE — 36415 COLL VENOUS BLD VENIPUNCTURE: CPT

## 2025-08-06 PROCEDURE — 95716 VEEG EA 12-26HR CONT MNTR: CPT | Performed by: STUDENT IN AN ORGANIZED HEALTH CARE EDUCATION/TRAINING PROGRAM

## 2025-08-06 PROCEDURE — 86376 MICROSOMAL ANTIBODY EACH: CPT

## 2025-08-06 PROCEDURE — A9270 NON-COVERED ITEM OR SERVICE: HCPCS | Performed by: STUDENT IN AN ORGANIZED HEALTH CARE EDUCATION/TRAINING PROGRAM

## 2025-08-06 PROCEDURE — 4A10X4Z MONITORING OF CENTRAL NERVOUS ELECTRICAL ACTIVITY, EXTERNAL APPROACH: ICD-10-PCS | Performed by: STUDENT IN AN ORGANIZED HEALTH CARE EDUCATION/TRAINING PROGRAM

## 2025-08-06 PROCEDURE — 83036 HEMOGLOBIN GLYCOSYLATED A1C: CPT

## 2025-08-06 PROCEDURE — 86337 INSULIN ANTIBODIES: CPT

## 2025-08-06 PROCEDURE — 85379 FIBRIN DEGRADATION QUANT: CPT

## 2025-08-06 PROCEDURE — 82024 ASSAY OF ACTH: CPT

## 2025-08-06 PROCEDURE — 83520 IMMUNOASSAY QUANT NOS NONAB: CPT

## 2025-08-06 PROCEDURE — 82533 TOTAL CORTISOL: CPT

## 2025-08-06 PROCEDURE — 84443 ASSAY THYROID STIM HORMONE: CPT

## 2025-08-06 PROCEDURE — 84153 ASSAY OF PSA TOTAL: CPT

## 2025-08-06 PROCEDURE — 84481 FREE ASSAY (FT-3): CPT

## 2025-08-06 PROCEDURE — 770020 HCHG ROOM/CARE - TELE (206)

## 2025-08-06 RX ORDER — OMEPRAZOLE 20 MG/1
40 CAPSULE, DELAYED RELEASE ORAL DAILY
Status: DISCONTINUED | OUTPATIENT
Start: 2025-08-07 | End: 2025-08-07

## 2025-08-06 RX ORDER — CARBOXYMETHYLCELLULOSE SODIUM 5 MG/ML
1 SOLUTION/ DROPS OPHTHALMIC PRN
Status: DISCONTINUED | OUTPATIENT
Start: 2025-08-06 | End: 2025-08-08 | Stop reason: HOSPADM

## 2025-08-06 RX ORDER — LORAZEPAM 2 MG/ML
3 INJECTION INTRAMUSCULAR ONCE
Status: COMPLETED | OUTPATIENT
Start: 2025-08-06 | End: 2025-08-06

## 2025-08-06 RX ORDER — AMOXICILLIN 250 MG
2 CAPSULE ORAL 2 TIMES DAILY
Qty: 30 TABLET | Refills: 0 | Status: CANCELLED | OUTPATIENT
Start: 2025-08-06

## 2025-08-06 RX ORDER — CLONAZEPAM 1 MG/1
1 TABLET ORAL 2 TIMES DAILY PRN
Status: DISCONTINUED | OUTPATIENT
Start: 2025-08-07 | End: 2025-08-08 | Stop reason: HOSPADM

## 2025-08-06 RX ADMIN — POLYETHYLENE GLYCOL 3350 1 PACKET: 17 POWDER, FOR SOLUTION ORAL at 08:44

## 2025-08-06 RX ADMIN — LORAZEPAM 3 MG: 2 INJECTION, SOLUTION INTRAMUSCULAR; INTRAVENOUS at 17:18

## 2025-08-06 RX ADMIN — FLUTICASONE FUROATE, UMECLIDINIUM BROMIDE AND VILANTEROL TRIFENATATE 1 PUFF: 200; 62.5; 25 POWDER RESPIRATORY (INHALATION) at 08:44

## 2025-08-06 RX ADMIN — LORAZEPAM 1 MG: 2 INJECTION, SOLUTION INTRAMUSCULAR; INTRAVENOUS at 08:44

## 2025-08-06 RX ADMIN — PANTOPRAZOLE SODIUM 40 MG: 40 INJECTION, POWDER, FOR SOLUTION INTRAVENOUS at 20:36

## 2025-08-06 RX ADMIN — PANTOPRAZOLE SODIUM 40 MG: 40 INJECTION, POWDER, FOR SOLUTION INTRAVENOUS at 08:44

## 2025-08-06 RX ADMIN — DIPHENHYDRAMINE HYDROCHLORIDE 50 MG: 50 INJECTION, SOLUTION INTRAMUSCULAR; INTRAVENOUS at 11:44

## 2025-08-06 RX ADMIN — SENNOSIDES, DOCUSATE SODIUM 2 TABLET: 50; 8.6 TABLET, FILM COATED ORAL at 08:44

## 2025-08-06 RX ADMIN — DIPHENHYDRAMINE HYDROCHLORIDE 50 MG: 50 INJECTION, SOLUTION INTRAMUSCULAR; INTRAVENOUS at 01:00

## 2025-08-06 RX ADMIN — HYDROMORPHONE HYDROCHLORIDE 1 MG: 1 INJECTION, SOLUTION INTRAMUSCULAR; INTRAVENOUS; SUBCUTANEOUS at 17:18

## 2025-08-06 RX ADMIN — HYDROMORPHONE HYDROCHLORIDE 1 MG: 1 INJECTION, SOLUTION INTRAMUSCULAR; INTRAVENOUS; SUBCUTANEOUS at 06:49

## 2025-08-06 RX ADMIN — DIPHENHYDRAMINE HYDROCHLORIDE 50 MG: 50 INJECTION, SOLUTION INTRAMUSCULAR; INTRAVENOUS at 17:19

## 2025-08-06 RX ADMIN — DIPHENHYDRAMINE HYDROCHLORIDE 50 MG: 50 INJECTION, SOLUTION INTRAMUSCULAR; INTRAVENOUS at 06:49

## 2025-08-06 RX ADMIN — HYDROMORPHONE HYDROCHLORIDE 1 MG: 1 INJECTION, SOLUTION INTRAMUSCULAR; INTRAVENOUS; SUBCUTANEOUS at 11:44

## 2025-08-06 ASSESSMENT — PAIN DESCRIPTION - PAIN TYPE
TYPE: CHRONIC PAIN
TYPE: CHRONIC PAIN
TYPE: ACUTE PAIN
TYPE: ACUTE PAIN

## 2025-08-06 ASSESSMENT — FIBROSIS 4 INDEX: FIB4 SCORE: 1.7

## 2025-08-07 ENCOUNTER — APPOINTMENT (OUTPATIENT)
Dept: RADIOLOGY | Facility: MEDICAL CENTER | Age: 55
DRG: 101 | End: 2025-08-07
Attending: STUDENT IN AN ORGANIZED HEALTH CARE EDUCATION/TRAINING PROGRAM
Payer: COMMERCIAL

## 2025-08-07 PROCEDURE — 51798 US URINE CAPACITY MEASURE: CPT

## 2025-08-07 PROCEDURE — A9270 NON-COVERED ITEM OR SERVICE: HCPCS | Performed by: STUDENT IN AN ORGANIZED HEALTH CARE EDUCATION/TRAINING PROGRAM

## 2025-08-07 PROCEDURE — 700117 HCHG RX CONTRAST REV CODE 255: Performed by: STUDENT IN AN ORGANIZED HEALTH CARE EDUCATION/TRAINING PROGRAM

## 2025-08-07 PROCEDURE — 95716 VEEG EA 12-26HR CONT MNTR: CPT | Performed by: STUDENT IN AN ORGANIZED HEALTH CARE EDUCATION/TRAINING PROGRAM

## 2025-08-07 PROCEDURE — 74174 CTA ABD&PLVS W/CONTRAST: CPT

## 2025-08-07 PROCEDURE — 4A10X4Z MONITORING OF CENTRAL NERVOUS ELECTRICAL ACTIVITY, EXTERNAL APPROACH: ICD-10-PCS | Performed by: STUDENT IN AN ORGANIZED HEALTH CARE EDUCATION/TRAINING PROGRAM

## 2025-08-07 PROCEDURE — 700111 HCHG RX REV CODE 636 W/ 250 OVERRIDE (IP): Mod: JZ | Performed by: STUDENT IN AN ORGANIZED HEALTH CARE EDUCATION/TRAINING PROGRAM

## 2025-08-07 PROCEDURE — 700102 HCHG RX REV CODE 250 W/ 637 OVERRIDE(OP): Performed by: STUDENT IN AN ORGANIZED HEALTH CARE EDUCATION/TRAINING PROGRAM

## 2025-08-07 PROCEDURE — 95720 EEG PHY/QHP EA INCR W/VEEG: CPT | Performed by: STUDENT IN AN ORGANIZED HEALTH CARE EDUCATION/TRAINING PROGRAM

## 2025-08-07 PROCEDURE — 770020 HCHG ROOM/CARE - TELE (206)

## 2025-08-07 PROCEDURE — 99233 SBSQ HOSP IP/OBS HIGH 50: CPT | Mod: 25 | Performed by: STUDENT IN AN ORGANIZED HEALTH CARE EDUCATION/TRAINING PROGRAM

## 2025-08-07 PROCEDURE — 99418 PROLNG IP/OBS E/M EA 15 MIN: CPT | Mod: 25 | Performed by: STUDENT IN AN ORGANIZED HEALTH CARE EDUCATION/TRAINING PROGRAM

## 2025-08-07 RX ORDER — HYDROMORPHONE HYDROCHLORIDE 1 MG/ML
1 INJECTION, SOLUTION INTRAMUSCULAR; INTRAVENOUS; SUBCUTANEOUS ONCE
Status: COMPLETED | OUTPATIENT
Start: 2025-08-07 | End: 2025-08-07

## 2025-08-07 RX ORDER — PROMETHAZINE HYDROCHLORIDE 25 MG/1
50 TABLET ORAL EVERY 6 HOURS PRN
Status: DISCONTINUED | OUTPATIENT
Start: 2025-08-07 | End: 2025-08-08 | Stop reason: HOSPADM

## 2025-08-07 RX ORDER — PROMETHAZINE HYDROCHLORIDE 25 MG/1
50 TABLET ORAL EVERY 6 HOURS PRN
Status: DISCONTINUED | OUTPATIENT
Start: 2025-08-07 | End: 2025-08-07

## 2025-08-07 RX ORDER — LORAZEPAM 2 MG/ML
3 INJECTION INTRAMUSCULAR
Status: COMPLETED | OUTPATIENT
Start: 2025-08-07 | End: 2025-08-07

## 2025-08-07 RX ORDER — HYDROMORPHONE HYDROCHLORIDE 1 MG/ML
1 INJECTION, SOLUTION INTRAMUSCULAR; INTRAVENOUS; SUBCUTANEOUS EVERY 4 HOURS PRN
Status: COMPLETED | OUTPATIENT
Start: 2025-08-07 | End: 2025-08-07

## 2025-08-07 RX ORDER — HYDROMORPHONE HYDROCHLORIDE 2 MG/1
2 TABLET ORAL EVERY 4 HOURS PRN
Refills: 0 | Status: DISCONTINUED | OUTPATIENT
Start: 2025-08-07 | End: 2025-08-08

## 2025-08-07 RX ORDER — DEXLANSOPRAZOLE 30 MG/1
30 CAPSULE, DELAYED RELEASE ORAL DAILY
Status: DISCONTINUED | OUTPATIENT
Start: 2025-08-07 | End: 2025-08-08 | Stop reason: HOSPADM

## 2025-08-07 RX ADMIN — DIPHENHYDRAMINE HYDROCHLORIDE 50 MG: 50 INJECTION, SOLUTION INTRAMUSCULAR; INTRAVENOUS at 20:06

## 2025-08-07 RX ADMIN — DIPHENHYDRAMINE HYDROCHLORIDE 50 MG: 50 INJECTION, SOLUTION INTRAMUSCULAR; INTRAVENOUS at 07:42

## 2025-08-07 RX ADMIN — SENNOSIDES, DOCUSATE SODIUM 2 TABLET: 50; 8.6 TABLET, FILM COATED ORAL at 07:34

## 2025-08-07 RX ADMIN — SENNOSIDES, DOCUSATE SODIUM 2 TABLET: 50; 8.6 TABLET, FILM COATED ORAL at 17:00

## 2025-08-07 RX ADMIN — FLUTICASONE FUROATE, UMECLIDINIUM BROMIDE AND VILANTEROL TRIFENATATE 1 PUFF: 200; 62.5; 25 POWDER RESPIRATORY (INHALATION) at 07:34

## 2025-08-07 RX ADMIN — POLYETHYLENE GLYCOL 3350 1 PACKET: 17 POWDER, FOR SOLUTION ORAL at 16:59

## 2025-08-07 RX ADMIN — RIVAROXABAN 20 MG: 20 TABLET, FILM COATED ORAL at 17:00

## 2025-08-07 RX ADMIN — HYDROMORPHONE HYDROCHLORIDE 1 MG: 1 INJECTION, SOLUTION INTRAMUSCULAR; INTRAVENOUS; SUBCUTANEOUS at 07:43

## 2025-08-07 RX ADMIN — PANCRELIPASE LIPASE, PANCRELIPASE PROTEASE, PANCRELIPASE AMYLASE 2 CAPSULE: 20000; 63000; 84000 CAPSULE, DELAYED RELEASE ORAL at 21:32

## 2025-08-07 RX ADMIN — HYDROMORPHONE HYDROCHLORIDE 1 MG: 1 INJECTION, SOLUTION INTRAMUSCULAR; INTRAVENOUS; SUBCUTANEOUS at 11:49

## 2025-08-07 RX ADMIN — POLYETHYLENE GLYCOL 3350 1 PACKET: 17 POWDER, FOR SOLUTION ORAL at 07:49

## 2025-08-07 RX ADMIN — IOHEXOL 100 ML: 350 INJECTION, SOLUTION INTRAVENOUS at 20:31

## 2025-08-07 RX ADMIN — CLONAZEPAM 1 MG: 1 TABLET ORAL at 09:24

## 2025-08-07 RX ADMIN — DIPHENHYDRAMINE HYDROCHLORIDE 50 MG: 50 INJECTION, SOLUTION INTRAMUSCULAR; INTRAVENOUS at 16:31

## 2025-08-07 RX ADMIN — DIPHENHYDRAMINE HYDROCHLORIDE 50 MG: 50 INJECTION, SOLUTION INTRAMUSCULAR; INTRAVENOUS at 11:49

## 2025-08-07 RX ADMIN — PROMETHAZINE HYDROCHLORIDE 50 MG: 25 TABLET ORAL at 21:32

## 2025-08-07 RX ADMIN — HYDROMORPHONE HYDROCHLORIDE 1 MG: 1 INJECTION, SOLUTION INTRAMUSCULAR; INTRAVENOUS; SUBCUTANEOUS at 16:31

## 2025-08-07 RX ADMIN — HYDROMORPHONE HYDROCHLORIDE 1 MG: 1 INJECTION, SOLUTION INTRAMUSCULAR; INTRAVENOUS; SUBCUTANEOUS at 20:05

## 2025-08-07 RX ADMIN — LORAZEPAM 3 MG: 2 INJECTION, SOLUTION INTRAMUSCULAR; INTRAVENOUS at 20:06

## 2025-08-07 RX ADMIN — CARBOXYMETHYLCELLULOSE SODIUM 1 DROP: 5 SOLUTION/ DROPS OPHTHALMIC at 23:22

## 2025-08-07 ASSESSMENT — PAIN DESCRIPTION - PAIN TYPE
TYPE: ACUTE PAIN

## 2025-08-07 ASSESSMENT — FIBROSIS 4 INDEX: FIB4 SCORE: 1.7

## 2025-08-08 VITALS
HEART RATE: 82 BPM | OXYGEN SATURATION: 94 % | SYSTOLIC BLOOD PRESSURE: 119 MMHG | HEIGHT: 72 IN | BODY MASS INDEX: 26.58 KG/M2 | RESPIRATION RATE: 18 BRPM | TEMPERATURE: 98.4 F | DIASTOLIC BLOOD PRESSURE: 83 MMHG | WEIGHT: 196.21 LBS

## 2025-08-08 LAB
ACTH PLAS-MCNC: <1.5 PG/ML (ref 7.2–63.3)
INSULIN P FAST SERPL-ACNC: 6 UIU/ML (ref 3–25)
PSA FREE MFR SERPL: NORMAL %
PSA FREE SERPL-MCNC: NORMAL NG/ML
PSA SERPL-MCNC: 0.6 NG/ML (ref 0–4)
TSH RECEP AB SER-ACNC: <1.1 IU/L

## 2025-08-08 PROCEDURE — 94760 N-INVAS EAR/PLS OXIMETRY 1: CPT

## 2025-08-08 PROCEDURE — 700102 HCHG RX REV CODE 250 W/ 637 OVERRIDE(OP): Performed by: STUDENT IN AN ORGANIZED HEALTH CARE EDUCATION/TRAINING PROGRAM

## 2025-08-08 PROCEDURE — 36415 COLL VENOUS BLD VENIPUNCTURE: CPT

## 2025-08-08 PROCEDURE — 700111 HCHG RX REV CODE 636 W/ 250 OVERRIDE (IP): Mod: JZ | Performed by: STUDENT IN AN ORGANIZED HEALTH CARE EDUCATION/TRAINING PROGRAM

## 2025-08-08 PROCEDURE — 99239 HOSP IP/OBS DSCHRG MGMT >30: CPT | Mod: 25 | Performed by: STUDENT IN AN ORGANIZED HEALTH CARE EDUCATION/TRAINING PROGRAM

## 2025-08-08 PROCEDURE — A9270 NON-COVERED ITEM OR SERVICE: HCPCS | Performed by: STUDENT IN AN ORGANIZED HEALTH CARE EDUCATION/TRAINING PROGRAM

## 2025-08-08 PROCEDURE — 95713 VEEG 2-12 HR CONT MNTR: CPT | Performed by: STUDENT IN AN ORGANIZED HEALTH CARE EDUCATION/TRAINING PROGRAM

## 2025-08-08 PROCEDURE — 95718 EEG PHYS/QHP 2-12 HR W/VEEG: CPT | Performed by: STUDENT IN AN ORGANIZED HEALTH CARE EDUCATION/TRAINING PROGRAM

## 2025-08-08 PROCEDURE — 99285 EMERGENCY DEPT VISIT HI MDM: CPT

## 2025-08-08 PROCEDURE — 4A10X4Z MONITORING OF CENTRAL NERVOUS ELECTRICAL ACTIVITY, EXTERNAL APPROACH: ICD-10-PCS | Performed by: STUDENT IN AN ORGANIZED HEALTH CARE EDUCATION/TRAINING PROGRAM

## 2025-08-08 RX ORDER — DIPHENHYDRAMINE HYDROCHLORIDE 50 MG/ML
50 INJECTION, SOLUTION INTRAMUSCULAR; INTRAVENOUS ONCE
Status: COMPLETED | OUTPATIENT
Start: 2025-08-08 | End: 2025-08-08

## 2025-08-08 RX ORDER — POLYETHYLENE GLYCOL 3350 17 G/17G
17 POWDER, FOR SOLUTION ORAL 2 TIMES DAILY
Qty: 60 PACKET | Refills: 0 | Status: SHIPPED | OUTPATIENT
Start: 2025-08-08 | End: 2025-09-07

## 2025-08-08 RX ORDER — AMOXICILLIN 250 MG
2 CAPSULE ORAL 2 TIMES DAILY
Qty: 120 TABLET | Refills: 0 | Status: SHIPPED | OUTPATIENT
Start: 2025-08-08 | End: 2025-09-07

## 2025-08-08 RX ORDER — HYDROMORPHONE HYDROCHLORIDE 2 MG/1
2 TABLET ORAL ONCE
Refills: 0 | Status: COMPLETED | OUTPATIENT
Start: 2025-08-08 | End: 2025-08-08

## 2025-08-08 RX ORDER — CLONAZEPAM 1 MG/1
1 TABLET ORAL 2 TIMES DAILY PRN
Qty: 60 TABLET | Refills: 2
Start: 2025-08-08 | End: 2025-11-06

## 2025-08-08 RX ORDER — DEXLANSOPRAZOLE 60 MG/1
60 CAPSULE, DELAYED RELEASE ORAL DAILY
Qty: 60 CAPSULE | Refills: 0 | Status: SHIPPED | OUTPATIENT
Start: 2025-08-08 | End: 2025-10-07

## 2025-08-08 RX ADMIN — DIPHENHYDRAMINE HYDROCHLORIDE 50 MG: 50 INJECTION, SOLUTION INTRAMUSCULAR; INTRAVENOUS at 11:50

## 2025-08-08 RX ADMIN — HYDROMORPHONE HYDROCHLORIDE 2 MG: 2 TABLET ORAL at 15:00

## 2025-08-08 RX ADMIN — DIPHENHYDRAMINE HYDROCHLORIDE 50 MG: 50 INJECTION, SOLUTION INTRAMUSCULAR; INTRAVENOUS at 15:00

## 2025-08-08 RX ADMIN — HYDROMORPHONE HYDROCHLORIDE 2 MG: 2 TABLET ORAL at 01:19

## 2025-08-08 RX ADMIN — DIPHENHYDRAMINE HYDROCHLORIDE 50 MG: 50 INJECTION, SOLUTION INTRAMUSCULAR; INTRAVENOUS at 06:14

## 2025-08-08 RX ADMIN — HYDROMORPHONE HYDROCHLORIDE 2 MG: 2 TABLET ORAL at 06:15

## 2025-08-08 RX ADMIN — FLUTICASONE FUROATE, UMECLIDINIUM BROMIDE AND VILANTEROL TRIFENATATE 1 PUFF: 200; 62.5; 25 POWDER RESPIRATORY (INHALATION) at 07:50

## 2025-08-08 RX ADMIN — DEXLANSOPRAZOLE 30 MG: 30 CAPSULE, DELAYED RELEASE ORAL at 06:15

## 2025-08-08 RX ADMIN — SENNOSIDES, DOCUSATE SODIUM 2 TABLET: 50; 8.6 TABLET, FILM COATED ORAL at 06:14

## 2025-08-08 RX ADMIN — PANCRELIPASE LIPASE, PANCRELIPASE PROTEASE, PANCRELIPASE AMYLASE 2 CAPSULE: 20000; 63000; 84000 CAPSULE, DELAYED RELEASE ORAL at 11:44

## 2025-08-08 RX ADMIN — CLONAZEPAM 1 MG: 1 TABLET ORAL at 01:57

## 2025-08-08 RX ADMIN — DIPHENHYDRAMINE HYDROCHLORIDE 50 MG: 50 INJECTION, SOLUTION INTRAMUSCULAR; INTRAVENOUS at 01:24

## 2025-08-08 RX ADMIN — PROMETHAZINE HYDROCHLORIDE 50 MG: 25 TABLET ORAL at 01:57

## 2025-08-08 RX ADMIN — PANCRELIPASE LIPASE, PANCRELIPASE PROTEASE, PANCRELIPASE AMYLASE 2 CAPSULE: 20000; 63000; 84000 CAPSULE, DELAYED RELEASE ORAL at 07:51

## 2025-08-08 RX ADMIN — HYDROMORPHONE HYDROCHLORIDE 2 MG: 2 TABLET ORAL at 11:50

## 2025-08-08 RX ADMIN — POLYETHYLENE GLYCOL 3350 1 PACKET: 17 POWDER, FOR SOLUTION ORAL at 06:15

## 2025-08-08 ASSESSMENT — PAIN DESCRIPTION - PAIN TYPE
TYPE: ACUTE PAIN

## 2025-08-08 ASSESSMENT — FIBROSIS 4 INDEX: FIB4 SCORE: 1.7

## 2025-08-09 ENCOUNTER — HOSPITAL ENCOUNTER (EMERGENCY)
Facility: MEDICAL CENTER | Age: 55
End: 2025-08-09
Attending: STUDENT IN AN ORGANIZED HEALTH CARE EDUCATION/TRAINING PROGRAM
Payer: MEDICARE

## 2025-08-09 VITALS
TEMPERATURE: 98.4 F | HEART RATE: 72 BPM | SYSTOLIC BLOOD PRESSURE: 120 MMHG | DIASTOLIC BLOOD PRESSURE: 78 MMHG | RESPIRATION RATE: 18 BRPM | BODY MASS INDEX: 26.55 KG/M2 | OXYGEN SATURATION: 96 % | HEIGHT: 72 IN | WEIGHT: 196 LBS

## 2025-08-09 DIAGNOSIS — R19.7 DIARRHEA, UNSPECIFIED TYPE: Primary | ICD-10-CM

## 2025-08-09 LAB
ALBUMIN SERPL BCP-MCNC: 3.9 G/DL (ref 3.2–4.9)
ALBUMIN/GLOB SERPL: 1.5 G/DL
ALP SERPL-CCNC: 57 U/L (ref 30–99)
ALT SERPL-CCNC: 14 U/L (ref 2–50)
ANION GAP SERPL CALC-SCNC: 11 MMOL/L (ref 7–16)
AST SERPL-CCNC: 17 U/L (ref 12–45)
BASOPHILS # BLD AUTO: 0.2 % (ref 0–1.8)
BASOPHILS # BLD: 0.01 K/UL (ref 0–0.12)
BILIRUB SERPL-MCNC: 0.5 MG/DL (ref 0.1–1.5)
BUN SERPL-MCNC: 20 MG/DL (ref 8–22)
CALCIUM ALBUM COR SERPL-MCNC: 9.1 MG/DL (ref 8.5–10.5)
CALCIUM SERPL-MCNC: 9 MG/DL (ref 8.5–10.5)
CHLORIDE SERPL-SCNC: 107 MMOL/L (ref 96–112)
CO2 SERPL-SCNC: 22 MMOL/L (ref 20–33)
CREAT SERPL-MCNC: 1.26 MG/DL (ref 0.5–1.4)
EOSINOPHIL # BLD AUTO: 0.3 K/UL (ref 0–0.51)
EOSINOPHIL NFR BLD: 5.9 % (ref 0–6.9)
ERYTHROCYTE [DISTWIDTH] IN BLOOD BY AUTOMATED COUNT: 44.9 FL (ref 35.9–50)
GFR SERPLBLD CREATININE-BSD FMLA CKD-EPI: 67 ML/MIN/1.73 M 2
GLOBULIN SER CALC-MCNC: 2.6 G/DL (ref 1.9–3.5)
GLUCOSE SERPL-MCNC: 109 MG/DL (ref 65–99)
HCT VFR BLD AUTO: 41 % (ref 42–52)
HGB BLD-MCNC: 13.5 G/DL (ref 14–18)
IMM GRANULOCYTES # BLD AUTO: 0.01 K/UL (ref 0–0.11)
IMM GRANULOCYTES NFR BLD AUTO: 0.2 % (ref 0–0.9)
LACTATE SERPL-SCNC: 1.3 MMOL/L (ref 0.5–2)
LIPASE SERPL-CCNC: 32 U/L (ref 11–82)
LYMPHOCYTES # BLD AUTO: 1.19 K/UL (ref 1–4.8)
LYMPHOCYTES NFR BLD: 23.4 % (ref 22–41)
MCH RBC QN AUTO: 31.8 PG (ref 27–33)
MCHC RBC AUTO-ENTMCNC: 32.9 G/DL (ref 32.3–36.5)
MCV RBC AUTO: 96.5 FL (ref 81.4–97.8)
MONOCYTES # BLD AUTO: 0.39 K/UL (ref 0–0.85)
MONOCYTES NFR BLD AUTO: 7.7 % (ref 0–13.4)
NEUTROPHILS # BLD AUTO: 3.18 K/UL (ref 1.82–7.42)
NEUTROPHILS NFR BLD: 62.6 % (ref 44–72)
NRBC # BLD AUTO: 0 K/UL
NRBC BLD-RTO: 0 /100 WBC (ref 0–0.2)
PLATELET # BLD AUTO: 169 K/UL (ref 164–446)
PMV BLD AUTO: 10.1 FL (ref 9–12.9)
POTASSIUM SERPL-SCNC: 3.8 MMOL/L (ref 3.6–5.5)
PROT SERPL-MCNC: 6.5 G/DL (ref 6–8.2)
RBC # BLD AUTO: 4.25 M/UL (ref 4.7–6.1)
SODIUM SERPL-SCNC: 140 MMOL/L (ref 135–145)
WBC # BLD AUTO: 5.1 K/UL (ref 4.8–10.8)

## 2025-08-09 PROCEDURE — 83605 ASSAY OF LACTIC ACID: CPT

## 2025-08-09 PROCEDURE — 96375 TX/PRO/DX INJ NEW DRUG ADDON: CPT

## 2025-08-09 PROCEDURE — 700105 HCHG RX REV CODE 258: Performed by: STUDENT IN AN ORGANIZED HEALTH CARE EDUCATION/TRAINING PROGRAM

## 2025-08-09 PROCEDURE — 700102 HCHG RX REV CODE 250 W/ 637 OVERRIDE(OP): Performed by: STUDENT IN AN ORGANIZED HEALTH CARE EDUCATION/TRAINING PROGRAM

## 2025-08-09 PROCEDURE — 94760 N-INVAS EAR/PLS OXIMETRY 1: CPT

## 2025-08-09 PROCEDURE — 96374 THER/PROPH/DIAG INJ IV PUSH: CPT

## 2025-08-09 PROCEDURE — A9270 NON-COVERED ITEM OR SERVICE: HCPCS | Performed by: STUDENT IN AN ORGANIZED HEALTH CARE EDUCATION/TRAINING PROGRAM

## 2025-08-09 PROCEDURE — 36415 COLL VENOUS BLD VENIPUNCTURE: CPT

## 2025-08-09 PROCEDURE — 83690 ASSAY OF LIPASE: CPT

## 2025-08-09 PROCEDURE — 80053 COMPREHEN METABOLIC PANEL: CPT

## 2025-08-09 PROCEDURE — 700111 HCHG RX REV CODE 636 W/ 250 OVERRIDE (IP): Mod: JZ | Performed by: STUDENT IN AN ORGANIZED HEALTH CARE EDUCATION/TRAINING PROGRAM

## 2025-08-09 PROCEDURE — 85025 COMPLETE CBC W/AUTO DIFF WBC: CPT

## 2025-08-09 RX ORDER — SODIUM CHLORIDE, SODIUM LACTATE, POTASSIUM CHLORIDE, CALCIUM CHLORIDE 600; 310; 30; 20 MG/100ML; MG/100ML; MG/100ML; MG/100ML
1000 INJECTION, SOLUTION INTRAVENOUS ONCE
Status: COMPLETED | OUTPATIENT
Start: 2025-08-09 | End: 2025-08-09

## 2025-08-09 RX ORDER — HYDROMORPHONE HYDROCHLORIDE 1 MG/ML
1 INJECTION, SOLUTION INTRAMUSCULAR; INTRAVENOUS; SUBCUTANEOUS ONCE
Status: COMPLETED | OUTPATIENT
Start: 2025-08-09 | End: 2025-08-09

## 2025-08-09 RX ORDER — DIPHENHYDRAMINE HCL 25 MG
25 TABLET ORAL ONCE
Status: COMPLETED | OUTPATIENT
Start: 2025-08-09 | End: 2025-08-09

## 2025-08-09 RX ORDER — DIAZEPAM 10 MG/2ML
2.5 INJECTION, SOLUTION INTRAMUSCULAR; INTRAVENOUS ONCE
Status: COMPLETED | OUTPATIENT
Start: 2025-08-09 | End: 2025-08-09

## 2025-08-09 RX ADMIN — HYDROMORPHONE HYDROCHLORIDE 1 MG: 1 INJECTION, SOLUTION INTRAMUSCULAR; INTRAVENOUS; SUBCUTANEOUS at 02:33

## 2025-08-09 RX ADMIN — DIAZEPAM 2.5 MG: 5 INJECTION, SOLUTION INTRAMUSCULAR; INTRAVENOUS at 02:36

## 2025-08-09 RX ADMIN — SODIUM CHLORIDE, POTASSIUM CHLORIDE, SODIUM LACTATE AND CALCIUM CHLORIDE 1000 ML: 600; 310; 30; 20 INJECTION, SOLUTION INTRAVENOUS at 02:21

## 2025-08-09 RX ADMIN — DIPHENHYDRAMINE HYDROCHLORIDE 25 MG: 25 TABLET ORAL at 02:30

## 2025-08-11 LAB — INSULIN HUMAN AB SER-ACNC: <0.4 U/ML (ref 0–0.4)

## 2025-08-20 ENCOUNTER — TELEMEDICINE (OUTPATIENT)
Dept: NEUROLOGY | Facility: MEDICAL CENTER | Age: 55
End: 2025-08-20
Attending: PSYCHIATRY & NEUROLOGY
Payer: MEDICARE

## 2025-08-20 VITALS — BODY MASS INDEX: 26.58 KG/M2 | HEIGHT: 72 IN

## 2025-08-20 DIAGNOSIS — F44.5 PSYCHOGENIC NONEPILEPTIC SEIZURE: Primary | ICD-10-CM

## 2025-08-20 PROCEDURE — 99213 OFFICE O/P EST LOW 20 MIN: CPT | Mod: 95 | Performed by: PSYCHIATRY & NEUROLOGY

## 2025-08-20 ASSESSMENT — LIFESTYLE VARIABLES
AUDIT-C TOTAL SCORE: 0
HOW OFTEN DO YOU HAVE SIX OR MORE DRINKS ON ONE OCCASION: NEVER
SKIP TO QUESTIONS 9-10: 1
HOW MANY STANDARD DRINKS CONTAINING ALCOHOL DO YOU HAVE ON A TYPICAL DAY: PATIENT DOES NOT DRINK
HOW OFTEN DO YOU HAVE A DRINK CONTAINING ALCOHOL: NEVER

## 2025-08-20 ASSESSMENT — PATIENT HEALTH QUESTIONNAIRE - PHQ9
CLINICAL INTERPRETATION OF PHQ2 SCORE: 6
SUM OF ALL RESPONSES TO PHQ QUESTIONS 1-9: 25
5. POOR APPETITE OR OVEREATING: 3 - NEARLY EVERY DAY

## 2025-08-22 ENCOUNTER — OFFICE VISIT (OUTPATIENT)
Dept: BEHAVIORAL HEALTH | Facility: CLINIC | Age: 55
End: 2025-08-22
Payer: MEDICARE

## 2025-08-22 DIAGNOSIS — F43.10 POST-TRAUMATIC STRESS SYNDROME: Primary | Chronic | ICD-10-CM

## 2025-08-22 DIAGNOSIS — F41.0 PANIC ATTACKS: ICD-10-CM

## 2025-08-22 DIAGNOSIS — F41.8 DEPRESSION WITH ANXIETY: ICD-10-CM

## 2025-08-22 PROCEDURE — G2212 PROLONG OUTPT/OFFICE VIS: HCPCS | Performed by: PSYCHIATRY & NEUROLOGY

## 2025-08-22 PROCEDURE — 99215 OFFICE O/P EST HI 40 MIN: CPT | Performed by: PSYCHIATRY & NEUROLOGY

## 2025-08-25 ENCOUNTER — APPOINTMENT (OUTPATIENT)
Dept: RADIOLOGY | Facility: MEDICAL CENTER | Age: 55
End: 2025-08-25
Attending: EMERGENCY MEDICINE
Payer: MEDICARE

## 2025-08-25 ENCOUNTER — HOSPITAL ENCOUNTER (EMERGENCY)
Facility: MEDICAL CENTER | Age: 55
End: 2025-08-25
Attending: EMERGENCY MEDICINE
Payer: MEDICARE

## 2025-08-25 VITALS
TEMPERATURE: 97.6 F | BODY MASS INDEX: 25.71 KG/M2 | HEART RATE: 66 BPM | DIASTOLIC BLOOD PRESSURE: 68 MMHG | HEIGHT: 72 IN | RESPIRATION RATE: 16 BRPM | OXYGEN SATURATION: 97 % | WEIGHT: 189.82 LBS | SYSTOLIC BLOOD PRESSURE: 110 MMHG

## 2025-08-25 DIAGNOSIS — S16.1XXA STRAIN OF NECK MUSCLE, INITIAL ENCOUNTER: ICD-10-CM

## 2025-08-25 DIAGNOSIS — K86.1 CHRONIC PANCREATITIS, UNSPECIFIED PANCREATITIS TYPE (HCC): Primary | ICD-10-CM

## 2025-08-25 LAB
ALBUMIN SERPL BCP-MCNC: 4.4 G/DL (ref 3.2–4.9)
ALBUMIN/GLOB SERPL: 1.6 G/DL
ALP SERPL-CCNC: 60 U/L (ref 30–99)
ALT SERPL-CCNC: 22 U/L (ref 2–50)
ANION GAP SERPL CALC-SCNC: 13 MMOL/L (ref 7–16)
AST SERPL-CCNC: 19 U/L (ref 12–45)
BASOPHILS # BLD AUTO: 0.1 % (ref 0–1.8)
BASOPHILS # BLD: 0.01 K/UL (ref 0–0.12)
BILIRUB SERPL-MCNC: 0.5 MG/DL (ref 0.1–1.5)
BUN SERPL-MCNC: 13 MG/DL (ref 8–22)
CALCIUM ALBUM COR SERPL-MCNC: 9.4 MG/DL (ref 8.5–10.5)
CALCIUM SERPL-MCNC: 9.7 MG/DL (ref 8.5–10.5)
CHLORIDE SERPL-SCNC: 108 MMOL/L (ref 96–112)
CO2 SERPL-SCNC: 22 MMOL/L (ref 20–33)
CREAT SERPL-MCNC: 1.13 MG/DL (ref 0.5–1.4)
EKG IMPRESSION: NORMAL
EOSINOPHIL # BLD AUTO: 0.06 K/UL (ref 0–0.51)
EOSINOPHIL NFR BLD: 0.9 % (ref 0–6.9)
ERYTHROCYTE [DISTWIDTH] IN BLOOD BY AUTOMATED COUNT: 43.2 FL (ref 35.9–50)
GFR SERPLBLD CREATININE-BSD FMLA CKD-EPI: 77 ML/MIN/1.73 M 2
GLOBULIN SER CALC-MCNC: 2.8 G/DL (ref 1.9–3.5)
GLUCOSE SERPL-MCNC: 100 MG/DL (ref 65–99)
HCT VFR BLD AUTO: 44.8 % (ref 42–52)
HGB BLD-MCNC: 15 G/DL (ref 14–18)
IMM GRANULOCYTES # BLD AUTO: 0.02 K/UL (ref 0–0.11)
IMM GRANULOCYTES NFR BLD AUTO: 0.3 % (ref 0–0.9)
LIPASE SERPL-CCNC: 109 U/L (ref 11–82)
LYMPHOCYTES # BLD AUTO: 1.57 K/UL (ref 1–4.8)
LYMPHOCYTES NFR BLD: 23.5 % (ref 22–41)
MCH RBC QN AUTO: 31.8 PG (ref 27–33)
MCHC RBC AUTO-ENTMCNC: 33.5 G/DL (ref 32.3–36.5)
MCV RBC AUTO: 94.9 FL (ref 81.4–97.8)
MONOCYTES # BLD AUTO: 0.42 K/UL (ref 0–0.85)
MONOCYTES NFR BLD AUTO: 6.3 % (ref 0–13.4)
NEUTROPHILS # BLD AUTO: 4.59 K/UL (ref 1.82–7.42)
NEUTROPHILS NFR BLD: 68.9 % (ref 44–72)
NRBC # BLD AUTO: 0 K/UL
NRBC BLD-RTO: 0 /100 WBC (ref 0–0.2)
PLATELET # BLD AUTO: 199 K/UL (ref 164–446)
PMV BLD AUTO: 10.1 FL (ref 9–12.9)
POTASSIUM SERPL-SCNC: 3.8 MMOL/L (ref 3.6–5.5)
PROT SERPL-MCNC: 7.2 G/DL (ref 6–8.2)
RBC # BLD AUTO: 4.72 M/UL (ref 4.7–6.1)
SODIUM SERPL-SCNC: 143 MMOL/L (ref 135–145)
TROPONIN T SERPL-MCNC: 7 NG/L (ref 6–19)
WBC # BLD AUTO: 6.7 K/UL (ref 4.8–10.8)

## 2025-08-25 PROCEDURE — 96376 TX/PRO/DX INJ SAME DRUG ADON: CPT

## 2025-08-25 PROCEDURE — 700111 HCHG RX REV CODE 636 W/ 250 OVERRIDE (IP): Performed by: EMERGENCY MEDICINE

## 2025-08-25 PROCEDURE — 96375 TX/PRO/DX INJ NEW DRUG ADDON: CPT

## 2025-08-25 PROCEDURE — 93005 ELECTROCARDIOGRAM TRACING: CPT | Mod: TC | Performed by: EMERGENCY MEDICINE

## 2025-08-25 PROCEDURE — 70450 CT HEAD/BRAIN W/O DYE: CPT

## 2025-08-25 PROCEDURE — 72125 CT NECK SPINE W/O DYE: CPT

## 2025-08-25 PROCEDURE — 83690 ASSAY OF LIPASE: CPT

## 2025-08-25 PROCEDURE — 96374 THER/PROPH/DIAG INJ IV PUSH: CPT

## 2025-08-25 PROCEDURE — 85025 COMPLETE CBC W/AUTO DIFF WBC: CPT

## 2025-08-25 PROCEDURE — 96361 HYDRATE IV INFUSION ADD-ON: CPT

## 2025-08-25 PROCEDURE — 80053 COMPREHEN METABOLIC PANEL: CPT

## 2025-08-25 PROCEDURE — 36415 COLL VENOUS BLD VENIPUNCTURE: CPT

## 2025-08-25 PROCEDURE — 700105 HCHG RX REV CODE 258: Performed by: EMERGENCY MEDICINE

## 2025-08-25 PROCEDURE — 84484 ASSAY OF TROPONIN QUANT: CPT

## 2025-08-25 PROCEDURE — 99285 EMERGENCY DEPT VISIT HI MDM: CPT

## 2025-08-25 RX ORDER — DIPHENHYDRAMINE HYDROCHLORIDE 50 MG/ML
25 INJECTION, SOLUTION INTRAMUSCULAR; INTRAVENOUS ONCE
Status: COMPLETED | OUTPATIENT
Start: 2025-08-25 | End: 2025-08-25

## 2025-08-25 RX ORDER — SODIUM CHLORIDE, SODIUM LACTATE, POTASSIUM CHLORIDE, AND CALCIUM CHLORIDE .6; .31; .03; .02 G/100ML; G/100ML; G/100ML; G/100ML
1000 INJECTION, SOLUTION INTRAVENOUS ONCE
Status: COMPLETED | OUTPATIENT
Start: 2025-08-25 | End: 2025-08-25

## 2025-08-25 RX ORDER — HYDROMORPHONE HYDROCHLORIDE 1 MG/ML
1 INJECTION, SOLUTION INTRAMUSCULAR; INTRAVENOUS; SUBCUTANEOUS ONCE
Status: COMPLETED | OUTPATIENT
Start: 2025-08-25 | End: 2025-08-25

## 2025-08-25 RX ORDER — PANTOPRAZOLE SODIUM 40 MG/10ML
40 INJECTION, POWDER, LYOPHILIZED, FOR SOLUTION INTRAVENOUS ONCE
Status: COMPLETED | OUTPATIENT
Start: 2025-08-25 | End: 2025-08-25

## 2025-08-25 RX ADMIN — PANTOPRAZOLE SODIUM 40 MG: 40 INJECTION, POWDER, FOR SOLUTION INTRAVENOUS at 05:06

## 2025-08-25 RX ADMIN — SODIUM CHLORIDE, POTASSIUM CHLORIDE, SODIUM LACTATE AND CALCIUM CHLORIDE 1000 ML: 600; 310; 30; 20 INJECTION, SOLUTION INTRAVENOUS at 03:11

## 2025-08-25 RX ADMIN — DIPHENHYDRAMINE HYDROCHLORIDE 25 MG: 50 INJECTION, SOLUTION INTRAMUSCULAR; INTRAVENOUS at 03:15

## 2025-08-25 RX ADMIN — HYDROMORPHONE HYDROCHLORIDE 1 MG: 1 INJECTION, SOLUTION INTRAMUSCULAR; INTRAVENOUS; SUBCUTANEOUS at 03:15

## 2025-08-25 RX ADMIN — DIPHENHYDRAMINE HYDROCHLORIDE 25 MG: 50 INJECTION, SOLUTION INTRAMUSCULAR; INTRAVENOUS at 04:40

## 2025-08-25 RX ADMIN — HYDROMORPHONE HYDROCHLORIDE 1 MG: 1 INJECTION, SOLUTION INTRAMUSCULAR; INTRAVENOUS; SUBCUTANEOUS at 04:40

## 2025-08-25 ASSESSMENT — FIBROSIS 4 INDEX: FIB4 SCORE: 1.48

## 2025-08-25 ASSESSMENT — PAIN DESCRIPTION - PAIN TYPE: TYPE: ACUTE PAIN

## 2025-09-16 ENCOUNTER — APPOINTMENT (OUTPATIENT)
Dept: NEUROLOGY | Facility: MEDICAL CENTER | Age: 55
End: 2025-09-16
Attending: PSYCHIATRY & NEUROLOGY
Payer: MEDICARE

## 2025-10-15 ENCOUNTER — APPOINTMENT (OUTPATIENT)
Dept: OPHTHALMOLOGY | Facility: MEDICAL CENTER | Age: 55
End: 2025-10-15
Payer: MEDICARE